# Patient Record
Sex: FEMALE | Race: BLACK OR AFRICAN AMERICAN | NOT HISPANIC OR LATINO | Employment: OTHER | URBAN - METROPOLITAN AREA
[De-identification: names, ages, dates, MRNs, and addresses within clinical notes are randomized per-mention and may not be internally consistent; named-entity substitution may affect disease eponyms.]

---

## 2017-02-02 ENCOUNTER — TELEPHONE (OUTPATIENT)
Dept: INTERNAL MEDICINE | Facility: CLINIC | Age: 82
End: 2017-02-02

## 2017-02-02 ENCOUNTER — OFFICE VISIT (OUTPATIENT)
Dept: INTERNAL MEDICINE | Facility: CLINIC | Age: 82
End: 2017-02-02

## 2017-02-02 VITALS
BODY MASS INDEX: 23.89 KG/M2 | SYSTOLIC BLOOD PRESSURE: 160 MMHG | OXYGEN SATURATION: 98 % | WEIGHT: 148 LBS | TEMPERATURE: 97.6 F | DIASTOLIC BLOOD PRESSURE: 80 MMHG | RESPIRATION RATE: 16 BRPM | HEART RATE: 51 BPM

## 2017-02-02 DIAGNOSIS — I10 ESSENTIAL HYPERTENSION: Primary | ICD-10-CM

## 2017-02-02 DIAGNOSIS — H65.91 MIDDLE EAR EFFUSION, RIGHT: ICD-10-CM

## 2017-02-02 DIAGNOSIS — H81.10 BENIGN POSITIONAL VERTIGO, UNSPECIFIED LATERALITY: Primary | ICD-10-CM

## 2017-02-02 PROCEDURE — 99214 OFFICE O/P EST MOD 30 MIN: CPT | Performed by: NURSE PRACTITIONER

## 2017-02-02 PROCEDURE — 96372 THER/PROPH/DIAG INJ SC/IM: CPT | Performed by: NURSE PRACTITIONER

## 2017-02-02 RX ORDER — METHYLDOPA 250 MG/1
250 TABLET, FILM COATED ORAL 3 TIMES DAILY
Qty: 30 TABLET | Refills: 0 | Status: SHIPPED | OUTPATIENT
Start: 2017-02-02 | End: 2017-02-02

## 2017-02-02 RX ORDER — FLUTICASONE PROPIONATE 50 MCG
2 SPRAY, SUSPENSION (ML) NASAL DAILY
Qty: 1 EACH | Refills: 2 | Status: SHIPPED | OUTPATIENT
Start: 2017-02-02 | End: 2017-03-04

## 2017-02-02 RX ORDER — MECLIZINE HYDROCHLORIDE 25 MG/1
25 TABLET ORAL 3 TIMES DAILY PRN
Qty: 30 TABLET | Refills: 0 | Status: SHIPPED | OUTPATIENT
Start: 2017-02-02 | End: 2017-02-03 | Stop reason: SDUPTHER

## 2017-02-02 RX ORDER — TRIAMCINOLONE ACETONIDE 40 MG/ML
40 INJECTION, SUSPENSION INTRA-ARTICULAR; INTRAMUSCULAR ONCE
Status: COMPLETED | OUTPATIENT
Start: 2017-02-02 | End: 2017-02-02

## 2017-02-02 RX ORDER — METHYLDOPA 250 MG/1
250 TABLET, FILM COATED ORAL 2 TIMES DAILY
Qty: 60 TABLET | Refills: 0 | Status: SHIPPED | OUTPATIENT
Start: 2017-02-02 | End: 2017-02-03 | Stop reason: SDUPTHER

## 2017-02-02 RX ADMIN — TRIAMCINOLONE ACETONIDE 40 MG: 40 INJECTION, SUSPENSION INTRA-ARTICULAR; INTRAMUSCULAR at 10:49

## 2017-02-02 NOTE — TELEPHONE ENCOUNTER
Left message on pt voicemail in regards to stopping bystolic and starting aldomet 250 mg BID per Dr. Clay request. I discussed pt visit with him today and he would like to give aldomet a try to see if pt will respond appropriately to this.

## 2017-02-02 NOTE — PROGRESS NOTES
Vitals:    02/02/17 1041   BP: 160/80   Pulse: 51   Resp:    Temp:    SpO2:      Last 2 weights    02/02/17  1002   Weight: 148 lb (67.1 kg)     Social History   Substance Use Topics   • Smoking status: Never Smoker   • Smokeless tobacco: None   • Alcohol use No       Subjective     HPI  Pt presents with dizziness, right ear pain/fullness, sinus pressure that has been occurring for the past week. She states she has been battling the dizziness for some time and was told her had vertigo. However, since her ear and sinus's having been acting up it seems to be getting worse. She denies fever, tooth pain, sore throat, cough, runny nose. She has been taking sudafed OTC. Also she says she has been on bystolic 5mg since this past November. She was on Tiazac 360mg prior to to noffsinger stopping and switching to bystolic. She denies any Se's from the bystolic. However today her BP I took manually was 160/80 and her HR is 51. This could be a contributing factor to her dizziness. Pt also take a Claritin a day.    The following portions of the patient's history were reviewed and updated as appropriate: allergies, current medications, past medical history, past social history and problem list.    Review of Systems   HENT: Positive for ear pain (and right ear pressure) and sinus pressure.    Neurological: Positive for dizziness.       Objective     Physical Exam   Constitutional: She is oriented to person, place, and time. She appears well-developed and well-nourished.   HENT:   Head: Normocephalic and atraumatic.   Right Ear: A middle ear effusion is present.   Nose: Right sinus exhibits maxillary sinus tenderness. Left sinus exhibits maxillary sinus tenderness.   Neck: Normal range of motion.   Cardiovascular: Normal rate, regular rhythm and normal heart sounds.    Pulmonary/Chest: Effort normal and breath sounds normal.   Musculoskeletal: Normal range of motion.   Neurological: She is alert and oriented to person, place, and  time.   Nursing note and vitals reviewed.      Assessment/Plan   Ryann was seen today for dizziness and headache.    Diagnoses and all orders for this visit:    Benign positional vertigo, unspecified laterality  -     Ambulatory Referral to Physical Therapy Vestibular, Evaluate and treat    Middle ear effusion, right  -     triamcinolone acetonide (KENALOG-40) injection 40 mg; Inject 1 mL into the shoulder, thigh, or buttocks 1 (One) Time.    Other orders  -     meclizine (ANTIVERT) 25 MG tablet; Take 1 tablet by mouth 3 (Three) Times a Day As Needed for dizziness.  -     fluticasone (FLONASE) 50 MCG/ACT nasal spray; 2 sprays into each nostril Daily for 30 days.           -refer to physical therapy for BPPV  -kenalog to help fluid off ear and sinus's  -keep taking claritin, and start flonase  -refilled antivert for pt to have on hand when she experience dizzy spells. Educated her on use of medication and to only take it when needed  -advised pt to stop sudafed and try coracidin instead for sinus's  -increase fluid intake  -Pt refuses change of BP medication at this time. She would like for Dr. Calvo to take that decision due to him managing her BP and she doesn't want to switch/start too many medication. I educated her on her elevated BP and low HR currently and how that could be a contributing factor to dizziness as well and importance of starting a new BP medication that would suit her body better than bystolic. She would still like for Dr. Calvo to make that call.  -spent more than 30 min with pt discussing her HPI and plan of care  -Fu prn or if symptoms persist/worsen

## 2017-02-03 DIAGNOSIS — I10 ESSENTIAL HYPERTENSION: ICD-10-CM

## 2017-02-03 RX ORDER — MECLIZINE HYDROCHLORIDE 25 MG/1
25 TABLET ORAL 3 TIMES DAILY PRN
Qty: 30 TABLET | Refills: 0 | Status: SHIPPED | OUTPATIENT
Start: 2017-02-03 | End: 2017-02-23 | Stop reason: SDUPTHER

## 2017-02-03 RX ORDER — METHYLDOPA 250 MG/1
250 TABLET, FILM COATED ORAL 2 TIMES DAILY
Qty: 60 TABLET | Refills: 0 | Status: SHIPPED | OUTPATIENT
Start: 2017-02-03 | End: 2017-02-09 | Stop reason: SINTOL

## 2017-02-09 ENCOUNTER — OFFICE VISIT (OUTPATIENT)
Dept: INTERNAL MEDICINE | Facility: CLINIC | Age: 82
End: 2017-02-09

## 2017-02-09 VITALS
HEART RATE: 53 BPM | WEIGHT: 148 LBS | RESPIRATION RATE: 16 BRPM | TEMPERATURE: 98.6 F | BODY MASS INDEX: 23.89 KG/M2 | SYSTOLIC BLOOD PRESSURE: 190 MMHG | DIASTOLIC BLOOD PRESSURE: 76 MMHG | OXYGEN SATURATION: 98 %

## 2017-02-09 DIAGNOSIS — I10 MALIGNANT HYPERTENSION: Primary | ICD-10-CM

## 2017-02-09 DIAGNOSIS — R00.1 BRADYCARDIA: ICD-10-CM

## 2017-02-09 DIAGNOSIS — R42 DIZZINESS: ICD-10-CM

## 2017-02-09 DIAGNOSIS — R53.83 FATIGUE, UNSPECIFIED TYPE: ICD-10-CM

## 2017-02-09 PROCEDURE — 99214 OFFICE O/P EST MOD 30 MIN: CPT | Performed by: NURSE PRACTITIONER

## 2017-02-09 PROCEDURE — 93000 ELECTROCARDIOGRAM COMPLETE: CPT | Performed by: NURSE PRACTITIONER

## 2017-02-09 RX ORDER — AMLODIPINE BESYLATE 2.5 MG/1
2.5 TABLET ORAL DAILY
Qty: 30 TABLET | Refills: 0 | Status: SHIPPED | OUTPATIENT
Start: 2017-02-09 | End: 2017-03-06 | Stop reason: SDUPTHER

## 2017-02-09 NOTE — PROGRESS NOTES
Procedure     ECG 12 Lead  Date/Time: 2/9/2017 2:11 PM  Performed by: HAMIDA LIU  Authorized by: HAMIDA LIU   Comparison: not compared with previous ECG   Rhythm: sinus bradycardia  Rate: bradycardic  Conduction: 1st degree  ST Segments: ST segments normal  T Waves: T waves normal  QRS axis: normal  Other: no other findings  Clinical impression: abnormal ECG

## 2017-02-09 NOTE — PROGRESS NOTES
"Vitals:    02/09/17 1316   BP: (!) 190/76   Pulse: 53   Resp: 16   Temp: 98.6 °F (37 °C)   SpO2: 98%     Last 2 weights    02/09/17  1316   Weight: 148 lb (67.1 kg)     Social History   Substance Use Topics   • Smoking status: Never Smoker   • Smokeless tobacco: Not on file   • Alcohol use No       Subjective     HPI  Pt presents to office with feeling much worse since starting aldomet. She states her dizziness is worse, she feels more tired, having difficulty getting her thoughts together. She stopped bystolic 5 mg 1 week ago due to her remaining hypertensive and bradycardia with dizziness. I spoke with Dr. Calvo and he decided on starting her on Aldomet 250mg BID. Pt BP today remains to be elevated at 190/76 and bradycardic. Pt is experiencing headaches as well, and states she can \"feel the pulsation in her legs.\" Pt denies CP, palpitations, SOA, wheezing, chest tightness, vision changes. Pt risk cardiovascular risk factors include greater than 65, AA, fam hx of CVD, hx HTN, sedentary.        The following portions of the patient's history were reviewed and updated as appropriate: allergies, current medications, past medical history, past social history and problem list.    Review of Systems   Constitutional: Negative.    Respiratory: Negative.    Cardiovascular: Negative.         HTN       Objective     Physical Exam   Constitutional: She is oriented to person, place, and time. She appears well-developed and well-nourished.   HENT:   Head: Normocephalic and atraumatic.   Neck: Normal range of motion.   Cardiovascular: Regular rhythm and normal heart sounds.  Bradycardia present.    Pulses:       Dorsalis pedis pulses are 2+ on the right side, and 2+ on the left side.        Posterior tibial pulses are 2+ on the right side, and 2+ on the left side.   Pulmonary/Chest: Effort normal and breath sounds normal.   Musculoskeletal: Normal range of motion.   Neurological: She is alert and oriented to person, place, " and time.   Nursing note and vitals reviewed.      Assessment/Plan   Ryann was seen today for medication problem, dizziness and fatigue.    Diagnoses and all orders for this visit:    Malignant hypertension  -     Ambulatory Referral to Nephrology  -     amLODIPine (NORVASC) 2.5 MG tablet; Take 1 tablet by mouth Daily.  -     ECG 12 Lead    Bradycardia  -     Ambulatory Referral to Nephrology  -     amLODIPine (NORVASC) 2.5 MG tablet; Take 1 tablet by mouth Daily.  -     ECG 12 Lead    Dizziness    Fatigue, unspecified type           -Spoke to Dr. Calvo and he would like to refer to nephrology to be evaluated  -advised pt to stopped aldomet, and start norvasc 2.5 mg at night. Also asked pt to get bp cuff fixed and importance of monitoring BP at home.   -Asked pt to call office on Monday to let me know her BP readings to see if adjustments of medication is needed  -Educated pt on s/s of MI and CVA  -spent more than 35 min discussing HPI, PE, plan of care, and education to pt during office visit.  -cont home meds  -FU in 4 weeks or soon if symptoms persist/worsen

## 2017-02-13 ENCOUNTER — TELEPHONE (OUTPATIENT)
Dept: INTERNAL MEDICINE | Facility: CLINIC | Age: 82
End: 2017-02-13

## 2017-02-13 ENCOUNTER — DOCUMENTATION (OUTPATIENT)
Dept: INTERNAL MEDICINE | Facility: CLINIC | Age: 82
End: 2017-02-13

## 2017-02-13 RX ORDER — AZITHROMYCIN 250 MG/1
TABLET, FILM COATED ORAL
Qty: 6 TABLET | Refills: 0 | Status: SHIPPED | OUTPATIENT
Start: 2017-02-13 | End: 2017-03-06

## 2017-02-13 NOTE — PROGRESS NOTES
Pt BP's readings for last Thursday and Friday were in the 150/80's HR in the 50's-low 60's. On Saturday/Sunday her BP are in the 110-130's/70's HR in the 50's. Pt started amlodipine 2.5 mg at night. Pt to continue taking BP medication and follow up in 2 weeks. She told MA that she thinks she has a sinus infection with the foul taste in mouth, nasal congestion, and ear pressure. Will send in antibiotic for pt

## 2017-02-22 ENCOUNTER — TELEPHONE (OUTPATIENT)
Dept: INTERNAL MEDICINE | Facility: CLINIC | Age: 82
End: 2017-02-22

## 2017-02-22 NOTE — TELEPHONE ENCOUNTER
PT FINISHED Z PACK MONDAY AND SHE STILL FEELS NAUSEATED AND SHAKY. PT IS ONLY TAKING BP MEDICATION AMLODIPINE

## 2017-02-23 ENCOUNTER — HOSPITAL ENCOUNTER (OUTPATIENT)
Dept: GENERAL RADIOLOGY | Facility: HOSPITAL | Age: 82
Discharge: HOME | End: 2017-02-23
Attending: INTERNAL MEDICINE | Admitting: INTERNAL MEDICINE

## 2017-02-23 ENCOUNTER — OFFICE VISIT (OUTPATIENT)
Dept: INTERNAL MEDICINE | Facility: CLINIC | Age: 82
End: 2017-02-23

## 2017-02-23 VITALS
RESPIRATION RATE: 16 BRPM | TEMPERATURE: 98.8 F | HEART RATE: 89 BPM | OXYGEN SATURATION: 95 % | HEIGHT: 66 IN | BODY MASS INDEX: 23.78 KG/M2 | WEIGHT: 148 LBS | SYSTOLIC BLOOD PRESSURE: 139 MMHG | DIASTOLIC BLOOD PRESSURE: 81 MMHG

## 2017-02-23 DIAGNOSIS — R22.0 FACIAL SWELLING: ICD-10-CM

## 2017-02-23 DIAGNOSIS — J01.01 ACUTE RECURRENT MAXILLARY SINUSITIS: Primary | ICD-10-CM

## 2017-02-23 DIAGNOSIS — I10 ESSENTIAL HYPERTENSION: ICD-10-CM

## 2017-02-23 DIAGNOSIS — R26.89 BALANCE DISORDER: ICD-10-CM

## 2017-02-23 DIAGNOSIS — K21.9 GASTROESOPHAGEAL REFLUX DISEASE WITHOUT ESOPHAGITIS: ICD-10-CM

## 2017-02-23 DIAGNOSIS — J30.1 NON-SEASONAL ALLERGIC RHINITIS DUE TO POLLEN: ICD-10-CM

## 2017-02-23 DIAGNOSIS — R42 DIZZINESS: ICD-10-CM

## 2017-02-23 PROCEDURE — 99214 OFFICE O/P EST MOD 30 MIN: CPT | Performed by: INTERNAL MEDICINE

## 2017-02-23 PROCEDURE — 71020 HC CHEST PA AND LATERAL: CPT

## 2017-02-23 PROCEDURE — 96372 THER/PROPH/DIAG INJ SC/IM: CPT | Performed by: INTERNAL MEDICINE

## 2017-02-23 RX ORDER — MECLIZINE HYDROCHLORIDE 25 MG/1
25 TABLET ORAL 3 TIMES DAILY PRN
Qty: 30 TABLET | Refills: 0 | Status: SHIPPED | OUTPATIENT
Start: 2017-02-23 | End: 2017-02-23 | Stop reason: SDUPTHER

## 2017-02-23 RX ORDER — METHYLPREDNISOLONE ACETATE 80 MG/ML
80 INJECTION, SUSPENSION INTRA-ARTICULAR; INTRALESIONAL; INTRAMUSCULAR; SOFT TISSUE ONCE
Status: COMPLETED | OUTPATIENT
Start: 2017-02-23 | End: 2017-02-23

## 2017-02-23 RX ORDER — AMOXICILLIN AND CLAVULANATE POTASSIUM 875; 125 MG/1; MG/1
1 TABLET, FILM COATED ORAL 2 TIMES DAILY
Qty: 20 TABLET | Refills: 0 | Status: SHIPPED | OUTPATIENT
Start: 2017-02-23 | End: 2017-05-03

## 2017-02-23 RX ORDER — MECLIZINE HYDROCHLORIDE 25 MG/1
25 TABLET ORAL 3 TIMES DAILY PRN
Qty: 30 TABLET | Refills: 1 | Status: SHIPPED | OUTPATIENT
Start: 2017-02-23 | End: 2017-06-13

## 2017-02-23 RX ORDER — TRIAMCINOLONE ACETONIDE 40 MG/ML
40 INJECTION, SUSPENSION INTRA-ARTICULAR; INTRAMUSCULAR ONCE
Status: COMPLETED | OUTPATIENT
Start: 2017-02-23 | End: 2017-02-23

## 2017-02-23 RX ADMIN — TRIAMCINOLONE ACETONIDE 40 MG: 40 INJECTION, SUSPENSION INTRA-ARTICULAR; INTRAMUSCULAR at 17:15

## 2017-02-23 RX ADMIN — METHYLPREDNISOLONE ACETATE 80 MG: 80 INJECTION, SUSPENSION INTRA-ARTICULAR; INTRALESIONAL; INTRAMUSCULAR; SOFT TISSUE at 17:14

## 2017-02-26 ENCOUNTER — APPOINTMENT (OUTPATIENT)
Dept: GENERAL RADIOLOGY | Facility: HOSPITAL | Age: 82
End: 2017-02-26

## 2017-02-26 ENCOUNTER — HOSPITAL ENCOUNTER (EMERGENCY)
Facility: HOSPITAL | Age: 82
Discharge: HOME | End: 2017-02-26
Attending: EMERGENCY MEDICINE | Admitting: EMERGENCY MEDICINE

## 2017-02-26 VITALS
OXYGEN SATURATION: 97 % | BODY MASS INDEX: 23.3 KG/M2 | SYSTOLIC BLOOD PRESSURE: 120 MMHG | DIASTOLIC BLOOD PRESSURE: 87 MMHG | HEIGHT: 66 IN | HEART RATE: 97 BPM | RESPIRATION RATE: 16 BRPM | TEMPERATURE: 99 F | WEIGHT: 145 LBS

## 2017-02-26 DIAGNOSIS — J10.1 INFLUENZA A: Primary | ICD-10-CM

## 2017-02-26 LAB
ALBUMIN SERPL-MCNC: 4.8 G/DL (ref 3.5–5.2)
ALBUMIN/GLOB SERPL: 1.2 G/DL
ALP SERPL-CCNC: 75 U/L (ref 39–117)
ALT SERPL W P-5'-P-CCNC: 22 U/L (ref 1–33)
ANION GAP SERPL CALCULATED.3IONS-SCNC: 13 MMOL/L
AST SERPL-CCNC: 24 U/L (ref 1–32)
BACTERIA UR QL AUTO: ABNORMAL /HPF
BASOPHILS # BLD AUTO: 0.01 10*3/MM3 (ref 0–0.2)
BASOPHILS NFR BLD AUTO: 0.2 % (ref 0–1.5)
BILIRUB SERPL-MCNC: 1.2 MG/DL (ref 0.1–1.2)
BILIRUB UR QL STRIP: NEGATIVE
BUN BLD-MCNC: 9 MG/DL (ref 8–23)
BUN/CREAT SERPL: 9.9 (ref 7–25)
CALCIUM SPEC-SCNC: 10.2 MG/DL (ref 8.6–10.5)
CHLORIDE SERPL-SCNC: 98 MMOL/L (ref 98–107)
CLARITY UR: CLEAR
CO2 SERPL-SCNC: 29 MMOL/L (ref 22–29)
COLOR UR: YELLOW
CREAT BLD-MCNC: 0.91 MG/DL (ref 0.57–1)
DEPRECATED RDW RBC AUTO: 49.1 FL (ref 37–54)
EOSINOPHIL # BLD AUTO: 0 10*3/MM3 (ref 0–0.7)
EOSINOPHIL NFR BLD AUTO: 0 % (ref 0.3–6.2)
ERYTHROCYTE [DISTWIDTH] IN BLOOD BY AUTOMATED COUNT: 14.1 % (ref 11.7–13)
FLUAV AG NPH QL: POSITIVE
FLUBV AG NPH QL IA: NEGATIVE
GFR SERPL CREATININE-BSD FRML MDRD: 71 ML/MIN/1.73
GLOBULIN UR ELPH-MCNC: 4 GM/DL
GLUCOSE BLD-MCNC: 136 MG/DL (ref 65–99)
GLUCOSE UR STRIP-MCNC: NEGATIVE MG/DL
HCT VFR BLD AUTO: 42.2 % (ref 35.6–45.5)
HETEROPH AB SER QL LA: NEGATIVE
HGB BLD-MCNC: 14.1 G/DL (ref 11.9–15.5)
HGB UR QL STRIP.AUTO: ABNORMAL
HYALINE CASTS UR QL AUTO: ABNORMAL /LPF
IMM GRANULOCYTES # BLD: 0 10*3/MM3 (ref 0–0.03)
IMM GRANULOCYTES NFR BLD: 0 % (ref 0–0.5)
KETONES UR QL STRIP: NEGATIVE
LEUKOCYTE ESTERASE UR QL STRIP.AUTO: NEGATIVE
LYMPHOCYTES # BLD AUTO: 0.38 10*3/MM3 (ref 0.9–4.8)
LYMPHOCYTES NFR BLD AUTO: 7.5 % (ref 19.6–45.3)
MCH RBC QN AUTO: 31.9 PG (ref 26.9–32)
MCHC RBC AUTO-ENTMCNC: 33.4 G/DL (ref 32.4–36.3)
MCV RBC AUTO: 95.5 FL (ref 80.5–98.2)
MONOCYTES # BLD AUTO: 0.85 10*3/MM3 (ref 0.2–1.2)
MONOCYTES NFR BLD AUTO: 16.7 % (ref 5–12)
NEUTROPHILS # BLD AUTO: 3.86 10*3/MM3 (ref 1.9–8.1)
NEUTROPHILS NFR BLD AUTO: 75.6 % (ref 42.7–76)
NITRITE UR QL STRIP: NEGATIVE
NT-PROBNP SERPL-MCNC: 618.8 PG/ML (ref 0–1800)
PH UR STRIP.AUTO: 7 [PH] (ref 5–8)
PLATELET # BLD AUTO: 195 10*3/MM3 (ref 140–500)
PMV BLD AUTO: 10.2 FL (ref 6–12)
POTASSIUM BLD-SCNC: 3.6 MMOL/L (ref 3.5–5.2)
PROT SERPL-MCNC: 8.8 G/DL (ref 6–8.5)
PROT UR QL STRIP: ABNORMAL
RBC # BLD AUTO: 4.42 10*6/MM3 (ref 3.9–5.2)
RBC # UR: ABNORMAL /HPF
REF LAB TEST METHOD: ABNORMAL
SODIUM BLD-SCNC: 140 MMOL/L (ref 136–145)
SP GR UR STRIP: 1.01 (ref 1–1.03)
SQUAMOUS #/AREA URNS HPF: ABNORMAL /HPF
UROBILINOGEN UR QL STRIP: ABNORMAL
WBC NRBC COR # BLD: 5.1 10*3/MM3 (ref 4.5–10.7)
WBC UR QL AUTO: ABNORMAL /HPF

## 2017-02-26 PROCEDURE — 85025 COMPLETE CBC W/AUTO DIFF WBC: CPT | Performed by: EMERGENCY MEDICINE

## 2017-02-26 PROCEDURE — 80053 COMPREHEN METABOLIC PANEL: CPT | Performed by: EMERGENCY MEDICINE

## 2017-02-26 PROCEDURE — 86308 HETEROPHILE ANTIBODY SCREEN: CPT | Performed by: EMERGENCY MEDICINE

## 2017-02-26 PROCEDURE — 83880 ASSAY OF NATRIURETIC PEPTIDE: CPT | Performed by: EMERGENCY MEDICINE

## 2017-02-26 PROCEDURE — 99284 EMERGENCY DEPT VISIT MOD MDM: CPT

## 2017-02-26 PROCEDURE — 81001 URINALYSIS AUTO W/SCOPE: CPT | Performed by: EMERGENCY MEDICINE

## 2017-02-26 PROCEDURE — 71020 HC CHEST PA AND LATERAL: CPT

## 2017-02-26 PROCEDURE — 87804 INFLUENZA ASSAY W/OPTIC: CPT | Performed by: EMERGENCY MEDICINE

## 2017-02-26 RX ORDER — ACETAMINOPHEN 500 MG
1000 TABLET ORAL ONCE
Status: COMPLETED | OUTPATIENT
Start: 2017-02-26 | End: 2017-02-26

## 2017-02-26 RX ORDER — OSELTAMIVIR PHOSPHATE 75 MG/1
75 CAPSULE ORAL 2 TIMES DAILY
Qty: 10 CAPSULE | Refills: 0 | Status: SHIPPED | OUTPATIENT
Start: 2017-02-26 | End: 2017-05-03

## 2017-02-26 RX ADMIN — ACETAMINOPHEN 1000 MG: 500 TABLET ORAL at 10:30

## 2017-02-27 ENCOUNTER — TELEPHONE (OUTPATIENT)
Dept: SOCIAL WORK | Facility: HOSPITAL | Age: 82
End: 2017-02-27

## 2017-02-27 NOTE — TELEPHONE ENCOUNTER
Spoke with pt today in f/u and she states she still feels bad but her temp has gone down. She was able to fill her script for Tamiflu and is taking it a Tylenol for aches. She will call in the next couple days to amanda a f/u with her PCP. No other questions or concerns voiced by pt at this time. Mary Kay MARIE

## 2017-03-05 NOTE — PROGRESS NOTES
Subjective   Ryann Hernadez is a 85 y.o. female.   She is here today with acute recurrent maxillary sinusitis along with allergic rhinitis hypertension GERD balance disorder facial swelling as well as dizziness  History of Present Illness   She is here today with acute recurrent maxillary sinusitis along with allergic rhinitis hypertension GERD balance disorder facial swelling as well as dizziness  The following portions of the patient's history were reviewed and updated as appropriate: allergies, current medications, past family history, past medical history, past social history, past surgical history and problem list.    Review of Systems   HENT: Positive for facial swelling and sinus pressure.    Musculoskeletal: Positive for gait problem.   Neurological: Positive for dizziness.   All other systems reviewed and are negative.      Objective   Physical Exam   Constitutional: She is oriented to person, place, and time. She appears well-developed and well-nourished. She is cooperative.   HENT:   Head: Normocephalic and atraumatic.   Right Ear: Hearing, tympanic membrane, external ear and ear canal normal.   Left Ear: Hearing, tympanic membrane, external ear and ear canal normal.   Nose: Right sinus exhibits maxillary sinus tenderness. Left sinus exhibits maxillary sinus tenderness.   Mouth/Throat: Uvula is midline, oropharynx is clear and moist and mucous membranes are normal.   Eyes: Conjunctivae, EOM and lids are normal. Pupils are equal, round, and reactive to light.   Neck: Phonation normal. Neck supple. Carotid bruit is not present.   Cardiovascular: Normal rate, regular rhythm and normal heart sounds.  Exam reveals no gallop and no friction rub.    No murmur heard.  Pulmonary/Chest: Effort normal and breath sounds normal. No respiratory distress.   Abdominal: Soft. Bowel sounds are normal. She exhibits no distension and no mass. There is no hepatosplenomegaly. There is no tenderness. There is no rebound and  no guarding. No hernia.   Musculoskeletal: She exhibits no edema.   Neurological: She is alert and oriented to person, place, and time. Gait abnormal. Coordination normal.   Skin: Skin is warm and dry.   Psychiatric: She has a normal mood and affect. Her speech is normal and behavior is normal. Judgment and thought content normal.   Nursing note and vitals reviewed.      Assessment/Plan   Diagnoses and all orders for this visit:    Acute recurrent maxillary sinusitis  -     methylPREDNISolone acetate (DEPO-medrol) injection 80 mg; Inject 1 mL into the shoulder, thigh, or buttocks 1 (One) Time.  -     triamcinolone acetonide (KENALOG-40) injection 40 mg; Inject 1 mL into the shoulder, thigh, or buttocks 1 (One) Time.    Non-seasonal allergic rhinitis due to pollen    Essential hypertension    Gastroesophageal reflux disease without esophagitis    Balance disorder    Facial swelling  -     XR Chest PA & Lateral    Dizziness  -     methylPREDNISolone acetate (DEPO-medrol) injection 80 mg; Inject 1 mL into the shoulder, thigh, or buttocks 1 (One) Time.  -     triamcinolone acetonide (KENALOG-40) injection 40 mg; Inject 1 mL into the shoulder, thigh, or buttocks 1 (One) Time.    Other orders  -     Discontinue: meclizine (ANTIVERT) 25 MG tablet; Take 1 tablet by mouth 3 (Three) Times a Day As Needed for dizziness.  -     amoxicillin-clavulanate (AUGMENTIN) 875-125 MG per tablet; Take 1 tablet by mouth 2 (Two) Times a Day.  -     meclizine (ANTIVERT) 25 MG tablet; Take 1 tablet by mouth 3 (Three) Times a Day As Needed for dizziness.     acute recurrent maxillary sinusitis supportive meds for this  Allergic rhinitis supportive meds  Hypertension well-controlled on current medication  Balance disorder follow closely  Facial swelling.  Lateral chest to rule out superior vena cava syndrome  Dizziness supportive meds for this

## 2017-03-06 ENCOUNTER — OFFICE VISIT (OUTPATIENT)
Dept: INTERNAL MEDICINE | Facility: CLINIC | Age: 82
End: 2017-03-06

## 2017-03-06 VITALS
BODY MASS INDEX: 23.78 KG/M2 | SYSTOLIC BLOOD PRESSURE: 158 MMHG | OXYGEN SATURATION: 96 % | DIASTOLIC BLOOD PRESSURE: 104 MMHG | WEIGHT: 148 LBS | HEIGHT: 66 IN | HEART RATE: 90 BPM | TEMPERATURE: 97.1 F

## 2017-03-06 DIAGNOSIS — I10 MALIGNANT HYPERTENSION: ICD-10-CM

## 2017-03-06 DIAGNOSIS — I10 ESSENTIAL HYPERTENSION: ICD-10-CM

## 2017-03-06 DIAGNOSIS — Z00.00 MEDICARE ANNUAL WELLNESS VISIT, INITIAL: Primary | ICD-10-CM

## 2017-03-06 DIAGNOSIS — Z09 HOSPITAL DISCHARGE FOLLOW-UP: ICD-10-CM

## 2017-03-06 DIAGNOSIS — R00.1 BRADYCARDIA: ICD-10-CM

## 2017-03-06 DIAGNOSIS — J30.1 NON-SEASONAL ALLERGIC RHINITIS DUE TO POLLEN: ICD-10-CM

## 2017-03-06 DIAGNOSIS — K21.9 GASTROESOPHAGEAL REFLUX DISEASE WITHOUT ESOPHAGITIS: ICD-10-CM

## 2017-03-06 DIAGNOSIS — R26.89 BALANCE DISORDER: ICD-10-CM

## 2017-03-06 PROBLEM — J01.01 ACUTE RECURRENT MAXILLARY SINUSITIS: Status: RESOLVED | Noted: 2017-02-23 | Resolved: 2017-03-06

## 2017-03-06 PROCEDURE — 99214 OFFICE O/P EST MOD 30 MIN: CPT | Performed by: INTERNAL MEDICINE

## 2017-03-06 PROCEDURE — G0439 PPPS, SUBSEQ VISIT: HCPCS | Performed by: INTERNAL MEDICINE

## 2017-03-06 RX ORDER — AMLODIPINE BESYLATE 2.5 MG/1
2.5 TABLET ORAL DAILY
Qty: 90 TABLET | Refills: 1 | Status: SHIPPED | OUTPATIENT
Start: 2017-03-06 | End: 2017-05-03

## 2017-03-06 NOTE — PATIENT INSTRUCTIONS
Medicare Wellness  Personal Prevention Plan of Service     Date of Office Visit:  2017  Encounter Provider:  Vern Calvo MD  Place of Service:  [facility]  Patient Name: Ryann Hernadez  :  1931    As part of the Medicare Wellness portion of your visit today, we are providing you with this personalized preventive plan of services (PPPS). This plan is based upon recommendations of the [place] Preventive Services Task Force (USPSTF) and the Advisory Committee on Immunization Practices (ACIP).    This lists the preventive care services that should be considered, and provides dates of when you are due. Items listed as completed are up-to-date and do not require any further intervention.    Health Maintenance   Topic Date Due   • ZOSTER VACCINE  2016   • MEDICARE ANNUAL WELLNESS  2017   • PNEUMOCOCCAL VACCINES (65+ LOW/MEDIUM RISK) (2 of 2 - PPSV23) 2018   • TDAP/TD VACCINES (2 - Td) 2027   • INFLUENZA VACCINE  Addressed

## 2017-03-06 NOTE — PROGRESS NOTES
QUICK REFERENCE INFORMATION:  The ABCs of the Annual Wellness Visit    Initial Medicare Wellness Visit    HEALTH RISK ASSESSMENT    6/1/1931    Recent Hospitalizations:  Recently treated at the following:  [facility].        Current Medical Providers:  Patient Care Team:  Vern Calvo MD as PCP - General (Internal Medicine)        Smoking Status:  History   Smoking Status   • Never Smoker   Smokeless Tobacco   • Never Used       Alcohol Consumption:  History   Alcohol Use No       Depression Screen:   PHQ-9 Depression Screening 3/6/2017   Little interest or pleasure in doing things 0   Feeling down, depressed, or hopeless 0   Trouble falling or staying asleep, or sleeping too much 0   Feeling tired or having little energy 0   Poor appetite or overeating 0   Feeling bad about yourself - or that you are a failure or have let yourself or your family down 0   Trouble concentrating on things, such as reading the newspaper or watching television 0   Moving or speaking so slowly that other people could have noticed. Or the opposite - being so fidgety or restless that you have been moving around a lot more than usual 0   Thoughts that you would be better off dead, or of hurting yourself in some way 0   PHQ-9 Total Score 0   If you checked off any problems, how difficult have these problems made it for you to do your work, take care of things at home, or get along with other people? Not difficult at all       Health Habits and Functional and Cognitive Screening:  Functional & Cognitive Status 3/6/2017   Do you have difficulty preparing food and eating? No   Do you have difficulty bathing yourself? No   Do you have difficulty getting dressed? No   Do you have difficulty using the toilet? No   Do you have difficulty moving around from place to place? No   In the past year have you fallen or experienced a near fall? No   Do you need help using the phone?  No   Are you deaf or do you have serious difficulty  hearing?  No   Do you need help with transportation? No   Do you need help shopping? No   Do you need help preparing meals?  No   Do you need help with housework?  No   Do you need help with laundry? No   Do you need help taking your medications? No   Do you need help managing money? No   Do you have difficulty concentrating, remembering or making decisions? No                  Does the patient have evidence of cognitive impairment? No    Asprin use counseling:yes      Recent Lab Results:    Visual Acuity:  No exam data present    Age-appropriate Screening Schedule:  Refer to the list below for future screening recommendations based on patient's age, sex and/or medical conditions. Orders for these recommended tests are listed in the plan section. The patient has been provided with a written plan.    Health Maintenance   Topic Date Due   • TDAP/TD VACCINES (1 - Tdap) [birth year]/1950   • PNEUMOCOCCAL VACCINES (65+ LOW/MEDIUM RISK) (1 of 2 - PCV13) 06/01/1996   • INFLUENZA VACCINE  08/01/2016   • ZOSTER VACCINE  08/02/2016        Subjective   History of Present Illness    Ryann Hernadez is a 85 y.o. female who presents for an Annual Wellness Visit.    The following portions of the patient's history were reviewed and updated as appropriate: allergies, current medications, past family history, past medical history, past social history, past surgical history and problem list.    Outpatient Medications Prior to Visit   Medication Sig Dispense Refill   • amLODIPine (NORVASC) 2.5 MG tablet Take 1 tablet by mouth Daily. 30 tablet 0   • amoxicillin-clavulanate (AUGMENTIN) 875-125 MG per tablet Take 1 tablet by mouth 2 (Two) Times a Day. 20 tablet 0   • meclizine (ANTIVERT) 25 MG tablet Take 1 tablet by mouth 3 (Three) Times a Day As Needed for dizziness. 30 tablet 1   • oseltamivir (TAMIFLU) 75 MG capsule Take 1 capsule by mouth 2 (Two) Times a Day. 10 capsule 0   • azithromycin (ZITHROMAX) 250 MG tablet Take 2 tablets the  "first day, then 1 tablet daily for 4 days. 6 tablet 0     No facility-administered medications prior to visit.        Patient Active Problem List   Diagnosis   • Allergic rhinitis   • Gastroesophageal reflux disease   • Hypertension   • Ataxic gait   • Balance disorder   • Chronic nonintractable headache   • Facial swelling   • Dizziness   • Medicare annual wellness visit, initial       Advanced Care Planning:  has an advanced directive - a copy has been provided and is in file    Identification of Risk Factors:  Risk factors include: increased fall risk.    Review of Systems    Compared to one year ago, the patient feels her physical health is the same.  Compared to one year ago, the patient feels her mental health is the same.    Objective     Physical Exam    Vitals:    03/06/17 0939   BP: (!) 158/104   BP Location: Left arm   Patient Position: Sitting   Cuff Size: Adult   Pulse: 90   Temp: 97.1 °F (36.2 °C)   TempSrc: Tympanic   SpO2: 96%   Weight: 148 lb (67.1 kg)   Height: 66\" (167.6 cm)       Body mass index is 23.89 kg/(m^2).  Discussed the patient's BMI with her. The BMI is in the acceptable range.    Assessment/Plan   Patient Self-Management and Personalized Health Advice  The patient has been provided with information about: fall prevention and preventive services including:   · Fall Risk plan of care done.    Visit Diagnoses:    ICD-10-CM ICD-9-CM   1. Medicare annual wellness visit, initial Z00.00 V70.0   2. Essential hypertension I10 401.9   3. Non-seasonal allergic rhinitis due to pollen J30.1 477.0   4. Gastroesophageal reflux disease without esophagitis K21.9 530.81   5. Balance disorder R26.89 781.99       No orders of the defined types were placed in this encounter.      Outpatient Encounter Prescriptions as of 3/6/2017   Medication Sig Dispense Refill   • amLODIPine (NORVASC) 2.5 MG tablet Take 1 tablet by mouth Daily. 30 tablet 0   • amoxicillin-clavulanate (AUGMENTIN) 875-125 MG per tablet Take " 1 tablet by mouth 2 (Two) Times a Day. 20 tablet 0   • meclizine (ANTIVERT) 25 MG tablet Take 1 tablet by mouth 3 (Three) Times a Day As Needed for dizziness. 30 tablet 1   • oseltamivir (TAMIFLU) 75 MG capsule Take 1 capsule by mouth 2 (Two) Times a Day. 10 capsule 0   • [DISCONTINUED] azithromycin (ZITHROMAX) 250 MG tablet Take 2 tablets the first day, then 1 tablet daily for 4 days. 6 tablet 0     No facility-administered encounter medications on file as of 3/6/2017.        Reviewed use of high risk medication in the elderly: yes  Reviewed for potential of harmful drug interactions in the elderly: yes    Follow Up:  No Follow-up on file.     An After Visit Summary and PPPS with all of these plans were given to the patient.

## 2017-03-07 LAB
ALBUMIN SERPL-MCNC: 4.6 G/DL (ref 3.5–5.2)
ALBUMIN/GLOB SERPL: 1.4 G/DL
ALP SERPL-CCNC: 64 U/L (ref 39–117)
ALT SERPL-CCNC: 23 U/L (ref 1–33)
APPEARANCE UR: CLEAR
AST SERPL-CCNC: 22 U/L (ref 1–32)
BACTERIA #/AREA URNS HPF: ABNORMAL /HPF
BASOPHILS # BLD AUTO: 0.01 10*3/MM3 (ref 0–0.2)
BASOPHILS NFR BLD AUTO: 0.2 % (ref 0–1.5)
BILIRUB SERPL-MCNC: 1.1 MG/DL (ref 0.1–1.2)
BILIRUB UR QL STRIP: NEGATIVE
BUN SERPL-MCNC: 14 MG/DL (ref 8–23)
BUN/CREAT SERPL: 17.1 (ref 7–25)
CALCIUM SERPL-MCNC: 10.2 MG/DL (ref 8.6–10.5)
CASTS URNS MICRO: ABNORMAL
CHLORIDE SERPL-SCNC: 100 MMOL/L (ref 98–107)
CHOLEST SERPL-MCNC: 218 MG/DL (ref 0–200)
CO2 SERPL-SCNC: 29.1 MMOL/L (ref 22–29)
COLOR UR: YELLOW
CREAT SERPL-MCNC: 0.82 MG/DL (ref 0.57–1)
CRYSTALS URNS MICRO: ABNORMAL
EOSINOPHIL # BLD AUTO: 0.02 10*3/MM3 (ref 0–0.7)
EOSINOPHIL NFR BLD AUTO: 0.4 % (ref 0.3–6.2)
EPI CELLS #/AREA URNS HPF: ABNORMAL /HPF
ERYTHROCYTE [DISTWIDTH] IN BLOOD BY AUTOMATED COUNT: 13.8 % (ref 11.7–13)
GLOBULIN SER CALC-MCNC: 3.3 GM/DL
GLUCOSE SERPL-MCNC: 107 MG/DL (ref 65–99)
GLUCOSE UR QL: NEGATIVE
HCT VFR BLD AUTO: 42.7 % (ref 35.6–45.5)
HDLC SERPL-MCNC: 72 MG/DL (ref 40–60)
HGB BLD-MCNC: 13.8 G/DL (ref 11.9–15.5)
HGB UR QL STRIP: NEGATIVE
IMM GRANULOCYTES # BLD: 0.03 10*3/MM3 (ref 0–0.03)
IMM GRANULOCYTES NFR BLD: 0.6 % (ref 0–0.5)
KETONES UR QL STRIP: NEGATIVE
LDLC SERPL CALC-MCNC: 132 MG/DL (ref 0–100)
LDLC/HDLC SERPL: 1.83 {RATIO}
LEUKOCYTE ESTERASE UR QL STRIP: (no result)
LYMPHOCYTES # BLD AUTO: 2.22 10*3/MM3 (ref 0.9–4.8)
LYMPHOCYTES NFR BLD AUTO: 41.8 % (ref 19.6–45.3)
MCH RBC QN AUTO: 31.9 PG (ref 26.9–32)
MCHC RBC AUTO-ENTMCNC: 32.3 G/DL (ref 32.4–36.3)
MCV RBC AUTO: 98.6 FL (ref 80.5–98.2)
MONOCYTES # BLD AUTO: 0.4 10*3/MM3 (ref 0.2–1.2)
MONOCYTES NFR BLD AUTO: 7.5 % (ref 5–12)
NEUTROPHILS # BLD AUTO: 2.63 10*3/MM3 (ref 1.9–8.1)
NEUTROPHILS NFR BLD AUTO: 49.5 % (ref 42.7–76)
NITRITE UR QL STRIP: NEGATIVE
PH UR STRIP: 6.5 [PH] (ref 5–8)
PLATELET # BLD AUTO: 245 10*3/MM3 (ref 140–500)
POTASSIUM SERPL-SCNC: 3.9 MMOL/L (ref 3.5–5.2)
PROT SERPL-MCNC: 7.9 G/DL (ref 6–8.5)
PROT UR QL STRIP: NEGATIVE
RBC # BLD AUTO: 4.33 10*6/MM3 (ref 3.9–5.2)
RBC #/AREA URNS HPF: ABNORMAL /HPF
SODIUM SERPL-SCNC: 145 MMOL/L (ref 136–145)
SP GR UR: 1.02 (ref 1–1.03)
T4 FREE SERPL-MCNC: 1.34 NG/DL (ref 0.93–1.7)
TRIGL SERPL-MCNC: 70 MG/DL (ref 0–150)
TSH SERPL DL<=0.005 MIU/L-ACNC: 1.07 MIU/ML (ref 0.27–4.2)
UROBILINOGEN UR STRIP-MCNC: (no result) MG/DL
VLDLC SERPL CALC-MCNC: 14 MG/DL (ref 5–40)
WBC # BLD AUTO: 5.31 10*3/MM3 (ref 4.5–10.7)
WBC #/AREA URNS HPF: ABNORMAL /HPF

## 2017-03-15 ENCOUNTER — TRANSCRIBE ORDERS (OUTPATIENT)
Dept: ADMINISTRATIVE | Facility: HOSPITAL | Age: 82
End: 2017-03-15

## 2017-03-15 DIAGNOSIS — N18.2 CHRONIC KIDNEY DISEASE, STAGE II (MILD): Primary | ICD-10-CM

## 2017-03-18 NOTE — PROGRESS NOTES
Subjective   Ryann Hernadez is a 85 y.o. female.   She is here today for Medicare annual wellness visit initial along with hypertension allergic rhinitis balance disorder malignant hypertension bradycardia and hospital discharge follow-up as well  History of Present Illness   She is here today for Medicare annual wellness visit initial along with hypertension allergic rhinitis bowel disorder lipid and hypertension bradycardia and hospital discharge follow-up as well and she did not take her blood pressure medicine today  The following portions of the patient's history were reviewed and updated as appropriate: allergies, current medications, past family history, past medical history, past social history, past surgical history and problem list.    Review of Systems   Musculoskeletal: Positive for gait problem.   Neurological: Positive for dizziness.   All other systems reviewed and are negative.      Objective   Physical Exam   Constitutional: She is oriented to person, place, and time. She appears well-developed and well-nourished. She is cooperative.   HENT:   Head: Normocephalic and atraumatic.   Right Ear: Hearing, tympanic membrane, external ear and ear canal normal.   Left Ear: Hearing, tympanic membrane, external ear and ear canal normal.   Nose: Nose normal.   Mouth/Throat: Uvula is midline, oropharynx is clear and moist and mucous membranes are normal.   Eyes: Conjunctivae, EOM and lids are normal. Pupils are equal, round, and reactive to light.   Neck: Phonation normal. Neck supple. Carotid bruit is not present.   Cardiovascular: Normal rate, regular rhythm and normal heart sounds.  Exam reveals no gallop and no friction rub.    No murmur heard.  Pulmonary/Chest: Effort normal and breath sounds normal. No respiratory distress.   Abdominal: Soft. Bowel sounds are normal. She exhibits no distension and no mass. There is no hepatosplenomegaly. There is no tenderness. There is no rebound and no guarding. No  hernia.   Musculoskeletal: She exhibits no edema.   Neurological: She is alert and oriented to person, place, and time. Gait abnormal. Coordination normal.   Skin: Skin is warm and dry.   Psychiatric: She has a normal mood and affect. Her speech is normal and behavior is normal. Judgment and thought content normal.   Nursing note and vitals reviewed.      Assessment/Plan   Diagnoses and all orders for this visit:    Medicare annual wellness visit, initial  -     T4, Free  -     TSH  -     Comprehensive Metabolic Panel  -     CBC & Differential  -     Urinalysis With Microscopic  -     Lipid Panel With LDL / HDL Ratio    Essential hypertension  -     T4, Free  -     TSH  -     Comprehensive Metabolic Panel  -     CBC & Differential  -     Urinalysis With Microscopic  -     Lipid Panel With LDL / HDL Ratio    Non-seasonal allergic rhinitis due to pollen  -     T4, Free  -     TSH  -     Comprehensive Metabolic Panel  -     CBC & Differential  -     Urinalysis With Microscopic  -     Lipid Panel With LDL / HDL Ratio    Gastroesophageal reflux disease without esophagitis  -     T4, Free  -     TSH  -     Comprehensive Metabolic Panel  -     CBC & Differential  -     Urinalysis With Microscopic  -     Lipid Panel With LDL / HDL Ratio    Balance disorder  -     T4, Free  -     TSH  -     Comprehensive Metabolic Panel  -     CBC & Differential  -     Urinalysis With Microscopic  -     Lipid Panel With LDL / HDL Ratio    Malignant hypertension  -     amLODIPine (NORVASC) 2.5 MG tablet; Take 1 tablet by mouth Daily.  -     T4, Free  -     TSH  -     Comprehensive Metabolic Panel  -     CBC & Differential  -     Urinalysis With Microscopic  -     Lipid Panel With LDL / HDL Ratio    Bradycardia  -     amLODIPine (NORVASC) 2.5 MG tablet; Take 1 tablet by mouth Daily.  -     T4, Free  -     TSH  -     Comprehensive Metabolic Panel  -     CBC & Differential  -     Urinalysis With Microscopic  -     Lipid Panel With LDL / HDL  Ratio    Hospital discharge follow-up  -     T4, Free  -     TSH  -     Comprehensive Metabolic Panel  -     CBC & Differential  -     Urinalysis With Microscopic  -     Lipid Panel With LDL / HDL Ratio    Other orders  -     Microscopic Examination        Medicare annual wellness visit see recommendations  Bradycardia stable at this time  Malignant Hypertension she did not take her blood pressure meds today and we encouraged her to take her blood pressure meds  Hospital discharge follow-up doing better  Balance disorder no easy answers  GERD stable on current medication  Allergic rhinitis supportive meds for this  Hypertension as mentioned

## 2017-03-20 ENCOUNTER — HOSPITAL ENCOUNTER (OUTPATIENT)
Dept: ULTRASOUND IMAGING | Facility: HOSPITAL | Age: 82
Discharge: HOME | End: 2017-03-20
Admitting: INTERNAL MEDICINE

## 2017-03-20 DIAGNOSIS — N18.2 CHRONIC KIDNEY DISEASE, STAGE II (MILD): ICD-10-CM

## 2017-03-20 PROCEDURE — 76775 US EXAM ABDO BACK WALL LIM: CPT

## 2017-05-03 ENCOUNTER — OFFICE VISIT (OUTPATIENT)
Dept: INTERNAL MEDICINE | Facility: CLINIC | Age: 82
End: 2017-05-03

## 2017-05-03 VITALS
SYSTOLIC BLOOD PRESSURE: 178 MMHG | BODY MASS INDEX: 22.82 KG/M2 | TEMPERATURE: 98.6 F | HEART RATE: 69 BPM | HEIGHT: 66 IN | OXYGEN SATURATION: 96 % | DIASTOLIC BLOOD PRESSURE: 92 MMHG | WEIGHT: 142 LBS

## 2017-05-03 DIAGNOSIS — J30.1 NON-SEASONAL ALLERGIC RHINITIS DUE TO POLLEN: ICD-10-CM

## 2017-05-03 DIAGNOSIS — R26.89 BALANCE DISORDER: ICD-10-CM

## 2017-05-03 DIAGNOSIS — R42 DIZZINESS: Primary | ICD-10-CM

## 2017-05-03 DIAGNOSIS — R26.0 ATAXIC GAIT: ICD-10-CM

## 2017-05-03 DIAGNOSIS — I10 ESSENTIAL HYPERTENSION: ICD-10-CM

## 2017-05-03 PROCEDURE — 99214 OFFICE O/P EST MOD 30 MIN: CPT | Performed by: INTERNAL MEDICINE

## 2017-05-03 RX ORDER — HYDRALAZINE HYDROCHLORIDE 50 MG/1
50 TABLET, FILM COATED ORAL 3 TIMES DAILY
Qty: 90 TABLET | Refills: 5 | Status: SHIPPED | OUTPATIENT
Start: 2017-05-03 | End: 2017-06-13

## 2017-06-13 ENCOUNTER — OFFICE VISIT (OUTPATIENT)
Dept: INTERNAL MEDICINE | Facility: CLINIC | Age: 82
End: 2017-06-13

## 2017-06-13 VITALS
DIASTOLIC BLOOD PRESSURE: 99 MMHG | BODY MASS INDEX: 22.98 KG/M2 | TEMPERATURE: 98.7 F | HEART RATE: 85 BPM | RESPIRATION RATE: 16 BRPM | SYSTOLIC BLOOD PRESSURE: 170 MMHG | WEIGHT: 143 LBS | HEIGHT: 66 IN | OXYGEN SATURATION: 98 %

## 2017-06-13 DIAGNOSIS — R26.0 ATAXIC GAIT: ICD-10-CM

## 2017-06-13 DIAGNOSIS — I10 ESSENTIAL HYPERTENSION: Primary | ICD-10-CM

## 2017-06-13 PROCEDURE — 99213 OFFICE O/P EST LOW 20 MIN: CPT | Performed by: INTERNAL MEDICINE

## 2017-06-13 RX ORDER — NEBIVOLOL 10 MG/1
10 TABLET ORAL DAILY
Qty: 90 TABLET | Refills: 3 | Status: ON HOLD | OUTPATIENT
Start: 2017-06-13 | End: 2019-09-22

## 2017-06-25 NOTE — PROGRESS NOTES
Subjective   Ryann Hernadez is a 86 y.o. female.   She is here today for hypertension and ataxic gait  History of Present Illness   She is here today for hypertension not well-controlled along with ataxic gait  The following portions of the patient's history were reviewed and updated as appropriate: allergies, current medications, past family history, past medical history, past social history, past surgical history and problem list.    Review of Systems   Musculoskeletal: Positive for gait problem.   All other systems reviewed and are negative.      Objective   Physical Exam   Constitutional: She is oriented to person, place, and time. She appears well-developed and well-nourished. She is cooperative.   HENT:   Head: Normocephalic and atraumatic.   Right Ear: Hearing, tympanic membrane, external ear and ear canal normal.   Left Ear: Hearing, tympanic membrane, external ear and ear canal normal.   Nose: Nose normal.   Mouth/Throat: Uvula is midline, oropharynx is clear and moist and mucous membranes are normal.   Eyes: Conjunctivae, EOM and lids are normal. Pupils are equal, round, and reactive to light.   Neck: Phonation normal. Neck supple. Carotid bruit is not present.   Cardiovascular: Normal rate, regular rhythm and normal heart sounds.  Exam reveals no gallop and no friction rub.    No murmur heard.  Pulmonary/Chest: Effort normal and breath sounds normal. No respiratory distress.   Abdominal: Soft. Bowel sounds are normal. She exhibits no distension and no mass. There is no hepatosplenomegaly. There is no tenderness. There is no rebound and no guarding. No hernia.   Musculoskeletal: She exhibits no edema.   Neurological: She is alert and oriented to person, place, and time. Gait abnormal. Coordination normal.   Skin: Skin is warm and dry.   Psychiatric: She has a normal mood and affect. Her speech is normal and behavior is normal. Judgment and thought content normal.   Nursing note and vitals  reviewed.      Assessment/Plan   Diagnoses and all orders for this visit:    Essential hypertension    Ataxic gait    Other orders  -     nebivolol (BYSTOLIC) 10 MG tablet; Take 1 tablet by mouth Daily.      Hypertension medication  Ataxic gait follow with physical therapy

## 2017-08-07 ENCOUNTER — OFFICE VISIT (OUTPATIENT)
Dept: INTERNAL MEDICINE | Facility: CLINIC | Age: 82
End: 2017-08-07

## 2017-08-07 VITALS
BODY MASS INDEX: 22.66 KG/M2 | HEIGHT: 66 IN | OXYGEN SATURATION: 94 % | TEMPERATURE: 98.4 F | DIASTOLIC BLOOD PRESSURE: 78 MMHG | WEIGHT: 141 LBS | HEART RATE: 58 BPM | SYSTOLIC BLOOD PRESSURE: 180 MMHG

## 2017-08-07 DIAGNOSIS — I10 ESSENTIAL HYPERTENSION: ICD-10-CM

## 2017-08-07 DIAGNOSIS — Z91.81 AT HIGH RISK FOR FALLS: Primary | ICD-10-CM

## 2017-08-07 DIAGNOSIS — H65.04 RECURRENT ACUTE SEROUS OTITIS MEDIA OF RIGHT EAR: ICD-10-CM

## 2017-08-07 DIAGNOSIS — R42 DIZZINESS: ICD-10-CM

## 2017-08-07 DIAGNOSIS — N28.9 RENAL INSUFFICIENCY: ICD-10-CM

## 2017-08-07 DIAGNOSIS — R26.0 ATAXIC GAIT: ICD-10-CM

## 2017-08-07 PROCEDURE — 99214 OFFICE O/P EST MOD 30 MIN: CPT | Performed by: INTERNAL MEDICINE

## 2017-08-07 RX ORDER — AZITHROMYCIN 250 MG/1
TABLET, FILM COATED ORAL
Qty: 6 TABLET | Refills: 0 | Status: ON HOLD | OUTPATIENT
Start: 2017-08-07 | End: 2019-09-22

## 2017-08-07 RX ORDER — METHYLPREDNISOLONE 4 MG/1
TABLET ORAL
Qty: 21 TABLET | Refills: 0 | Status: ON HOLD | OUTPATIENT
Start: 2017-08-07 | End: 2019-09-22

## 2017-08-10 ENCOUNTER — PATIENT OUTREACH (OUTPATIENT)
Dept: CASE MANAGEMENT | Facility: OTHER | Age: 82
End: 2017-08-10

## 2017-08-20 NOTE — PROGRESS NOTES
Subjective   Ryann Hernadez is a 86 y.o. female.   She is here today for follow-up risk ataxic gait dizziness renal insufficiency hypertension and recurrent acute otitis media of right ear  History of Present Illness   She is here today for follow-up for high fall risk ataxic gait dizziness renal insufficiency hypertension and recurrent otitis media of right ear  The following portions of the patient's history were reviewed and updated as appropriate: allergies, current medications, past family history, past medical history, past social history, past surgical history and problem list.    Review of Systems   HENT: Positive for ear pain (right ear).    Musculoskeletal: Positive for gait problem.   Neurological: Positive for dizziness.   All other systems reviewed and are negative.      Objective   Physical Exam   Constitutional: She is oriented to person, place, and time. She appears well-developed and well-nourished. She is cooperative.   HENT:   Head: Normocephalic and atraumatic.   Right Ear: Hearing, external ear and ear canal normal. Tympanic membrane is erythematous.   Left Ear: Hearing, tympanic membrane, external ear and ear canal normal.   Nose: Nose normal.   Mouth/Throat: Uvula is midline, oropharynx is clear and moist and mucous membranes are normal.   Eyes: Conjunctivae, EOM and lids are normal. Pupils are equal, round, and reactive to light.   Neck: Phonation normal. Neck supple. Carotid bruit is not present.   Cardiovascular: Normal rate, regular rhythm and normal heart sounds.  Exam reveals no gallop and no friction rub.    No murmur heard.  Pulmonary/Chest: Effort normal and breath sounds normal. No respiratory distress.   Abdominal: Soft. Bowel sounds are normal. She exhibits no distension and no mass. There is no hepatosplenomegaly. There is no tenderness. There is no rebound and no guarding. No hernia.   Musculoskeletal: She exhibits no edema.   Neurological: She is alert and oriented to  person, place, and time. Gait abnormal. Coordination normal.   Skin: Skin is warm and dry.   Psychiatric: She has a normal mood and affect. Her speech is normal and behavior is normal. Judgment and thought content normal.   Nursing note and vitals reviewed.      Assessment/Plan   Diagnoses and all orders for this visit:    At high risk for falls    Ataxic gait    Dizziness    Renal insufficiency    Essential hypertension    Recurrent acute serous otitis media of right ear    Other orders  -     MethylPREDNISolone (MEDROL, REGAN,) 4 MG tablet; Take as directed on package instructions.  -     azithromycin (ZITHROMAX) 250 MG tablet; Take 2 tablets the first day, then 1 tablet daily for 4 days.        At high risk for falls noted  Ataxic gait physical therapy as needed  Dizziness supportive meds for this  Renal sufficiency follow closely  Hypertension well-controlled on current medication  Recurrent otitis media right ear supportive meds for this

## 2017-09-01 DIAGNOSIS — M53.3 SACRO-ILIAC PAIN: Primary | ICD-10-CM

## 2017-09-01 DIAGNOSIS — Z91.81 HISTORY OF RECENT FALL: ICD-10-CM

## 2017-09-01 DIAGNOSIS — M53.3 SACROILIAC JOINT DYSFUNCTION OF LEFT SIDE: ICD-10-CM

## 2017-09-07 ENCOUNTER — PATIENT OUTREACH (OUTPATIENT)
Dept: CASE MANAGEMENT | Facility: OTHER | Age: 82
End: 2017-09-07

## 2017-09-22 ENCOUNTER — EPISODE CHANGES (OUTPATIENT)
Dept: CASE MANAGEMENT | Facility: OTHER | Age: 82
End: 2017-09-22

## 2018-04-25 ENCOUNTER — EPISODE CHANGES (OUTPATIENT)
Dept: CASE MANAGEMENT | Facility: OTHER | Age: 83
End: 2018-04-25

## 2019-09-22 ENCOUNTER — HOSPITAL ENCOUNTER (OUTPATIENT)
Facility: HOSPITAL | Age: 84
Setting detail: OBSERVATION
LOS: 1 days | Discharge: HOME | End: 2019-09-26
Attending: EMERGENCY MEDICINE | Admitting: HOSPITALIST

## 2019-09-22 ENCOUNTER — APPOINTMENT (OUTPATIENT)
Dept: GENERAL RADIOLOGY | Facility: HOSPITAL | Age: 84
End: 2019-09-22

## 2019-09-22 ENCOUNTER — APPOINTMENT (OUTPATIENT)
Dept: CT IMAGING | Facility: HOSPITAL | Age: 84
End: 2019-09-22

## 2019-09-22 DIAGNOSIS — N13.2 HYDRONEPHROSIS WITH URINARY OBSTRUCTION DUE TO URETERAL CALCULUS: ICD-10-CM

## 2019-09-22 DIAGNOSIS — N20.0 KIDNEY STONE ON LEFT SIDE: ICD-10-CM

## 2019-09-22 DIAGNOSIS — N13.2 URETERAL STONE WITH HYDRONEPHROSIS: Primary | ICD-10-CM

## 2019-09-22 DIAGNOSIS — I48.91 NEW ONSET A-FIB (HCC): ICD-10-CM

## 2019-09-22 LAB
ALBUMIN SERPL-MCNC: 4.8 G/DL (ref 3.5–5.2)
ALBUMIN/GLOB SERPL: 1.4 G/DL
ALP SERPL-CCNC: 90 U/L (ref 39–117)
ALT SERPL W P-5'-P-CCNC: 9 U/L (ref 1–33)
ANION GAP SERPL CALCULATED.3IONS-SCNC: 15.8 MMOL/L (ref 5–15)
AST SERPL-CCNC: 20 U/L (ref 1–32)
BACTERIA UR QL AUTO: NORMAL /HPF
BASOPHILS # BLD AUTO: 0.02 10*3/MM3 (ref 0–0.2)
BASOPHILS NFR BLD AUTO: 0.3 % (ref 0–1.5)
BILIRUB SERPL-MCNC: 1.1 MG/DL (ref 0.2–1.2)
BILIRUB UR QL STRIP: NEGATIVE
BUN BLD-MCNC: 13 MG/DL (ref 8–23)
BUN/CREAT SERPL: 12.1 (ref 7–25)
CALCIUM SPEC-SCNC: 9.8 MG/DL (ref 8.6–10.5)
CHLORIDE SERPL-SCNC: 96 MMOL/L (ref 98–107)
CLARITY UR: CLEAR
CO2 SERPL-SCNC: 25.2 MMOL/L (ref 22–29)
COLOR UR: YELLOW
CREAT BLD-MCNC: 1.07 MG/DL (ref 0.57–1)
DEPRECATED RDW RBC AUTO: 47.6 FL (ref 37–54)
EOSINOPHIL # BLD AUTO: 0.01 10*3/MM3 (ref 0–0.4)
EOSINOPHIL NFR BLD AUTO: 0.1 % (ref 0.3–6.2)
ERYTHROCYTE [DISTWIDTH] IN BLOOD BY AUTOMATED COUNT: 12.8 % (ref 12.3–15.4)
GFR SERPL CREATININE-BSD FRML MDRD: 59 ML/MIN/1.73
GLOBULIN UR ELPH-MCNC: 3.5 GM/DL
GLUCOSE BLD-MCNC: 153 MG/DL (ref 65–99)
GLUCOSE UR STRIP-MCNC: NEGATIVE MG/DL
HCT VFR BLD AUTO: 40.8 % (ref 34–46.6)
HGB BLD-MCNC: 13.1 G/DL (ref 12–15.9)
HGB UR QL STRIP.AUTO: NEGATIVE
HOLD SPECIMEN: NORMAL
HOLD SPECIMEN: NORMAL
HYALINE CASTS UR QL AUTO: NORMAL /LPF
IMM GRANULOCYTES # BLD AUTO: 0.01 10*3/MM3 (ref 0–0.05)
IMM GRANULOCYTES NFR BLD AUTO: 0.1 % (ref 0–0.5)
INR PPP: 0.99 (ref 0.9–1.1)
KETONES UR QL STRIP: ABNORMAL
LEUKOCYTE ESTERASE UR QL STRIP.AUTO: ABNORMAL
LIPASE SERPL-CCNC: 22 U/L (ref 13–60)
LYMPHOCYTES # BLD AUTO: 0.76 10*3/MM3 (ref 0.7–3.1)
LYMPHOCYTES NFR BLD AUTO: 10.3 % (ref 19.6–45.3)
MAGNESIUM SERPL-MCNC: 2.1 MG/DL (ref 1.6–2.4)
MCH RBC QN AUTO: 32.3 PG (ref 26.6–33)
MCHC RBC AUTO-ENTMCNC: 32.1 G/DL (ref 31.5–35.7)
MCV RBC AUTO: 100.7 FL (ref 79–97)
MONOCYTES # BLD AUTO: 0.44 10*3/MM3 (ref 0.1–0.9)
MONOCYTES NFR BLD AUTO: 6 % (ref 5–12)
NEUTROPHILS # BLD AUTO: 6.15 10*3/MM3 (ref 1.7–7)
NEUTROPHILS NFR BLD AUTO: 83.2 % (ref 42.7–76)
NITRITE UR QL STRIP: NEGATIVE
NRBC BLD AUTO-RTO: 0 /100 WBC (ref 0–0.2)
PH UR STRIP.AUTO: 7.5 [PH] (ref 5–8)
PLATELET # BLD AUTO: 225 10*3/MM3 (ref 140–450)
PMV BLD AUTO: 10.6 FL (ref 6–12)
POTASSIUM BLD-SCNC: 3.8 MMOL/L (ref 3.5–5.2)
PROT SERPL-MCNC: 8.3 G/DL (ref 6–8.5)
PROT UR QL STRIP: ABNORMAL
PROTHROMBIN TIME: 12.8 SECONDS (ref 11.7–14.2)
RBC # BLD AUTO: 4.05 10*6/MM3 (ref 3.77–5.28)
RBC # UR: NORMAL /HPF
REF LAB TEST METHOD: NORMAL
SODIUM BLD-SCNC: 137 MMOL/L (ref 136–145)
SP GR UR STRIP: 1.01 (ref 1–1.03)
SQUAMOUS #/AREA URNS HPF: NORMAL /HPF
T4 FREE SERPL-MCNC: 1.16 NG/DL (ref 0.93–1.7)
TROPONIN T SERPL-MCNC: <0.01 NG/ML (ref 0–0.03)
TSH SERPL DL<=0.05 MIU/L-ACNC: 1.43 UIU/ML (ref 0.27–4.2)
UROBILINOGEN UR QL STRIP: ABNORMAL
WBC NRBC COR # BLD: 7.39 10*3/MM3 (ref 3.4–10.8)
WBC UR QL AUTO: NORMAL /HPF
WHOLE BLOOD HOLD SPECIMEN: NORMAL
WHOLE BLOOD HOLD SPECIMEN: NORMAL

## 2019-09-22 PROCEDURE — 84484 ASSAY OF TROPONIN QUANT: CPT | Performed by: EMERGENCY MEDICINE

## 2019-09-22 PROCEDURE — 93010 ELECTROCARDIOGRAM REPORT: CPT | Performed by: INTERNAL MEDICINE

## 2019-09-22 PROCEDURE — 83735 ASSAY OF MAGNESIUM: CPT | Performed by: EMERGENCY MEDICINE

## 2019-09-22 PROCEDURE — G0378 HOSPITAL OBSERVATION PER HR: HCPCS

## 2019-09-22 PROCEDURE — 96375 TX/PRO/DX INJ NEW DRUG ADDON: CPT

## 2019-09-22 PROCEDURE — 71046 X-RAY EXAM CHEST 2 VIEWS: CPT

## 2019-09-22 PROCEDURE — 80053 COMPREHEN METABOLIC PANEL: CPT | Performed by: EMERGENCY MEDICINE

## 2019-09-22 PROCEDURE — 84443 ASSAY THYROID STIM HORMONE: CPT | Performed by: EMERGENCY MEDICINE

## 2019-09-22 PROCEDURE — 25010000002 IOPAMIDOL 61 % SOLUTION: Performed by: EMERGENCY MEDICINE

## 2019-09-22 PROCEDURE — 25010000002 ONDANSETRON PER 1 MG: Performed by: HOSPITALIST

## 2019-09-22 PROCEDURE — 93005 ELECTROCARDIOGRAM TRACING: CPT | Performed by: EMERGENCY MEDICINE

## 2019-09-22 PROCEDURE — 99284 EMERGENCY DEPT VISIT MOD MDM: CPT

## 2019-09-22 PROCEDURE — 25010000002 MORPHINE PER 10 MG: Performed by: EMERGENCY MEDICINE

## 2019-09-22 PROCEDURE — 85610 PROTHROMBIN TIME: CPT | Performed by: EMERGENCY MEDICINE

## 2019-09-22 PROCEDURE — 96374 THER/PROPH/DIAG INJ IV PUSH: CPT

## 2019-09-22 PROCEDURE — 84439 ASSAY OF FREE THYROXINE: CPT | Performed by: EMERGENCY MEDICINE

## 2019-09-22 PROCEDURE — 25010000002 MORPHINE PER 10 MG: Performed by: HOSPITALIST

## 2019-09-22 PROCEDURE — 25010000002 ONDANSETRON PER 1 MG: Performed by: EMERGENCY MEDICINE

## 2019-09-22 PROCEDURE — 81001 URINALYSIS AUTO W/SCOPE: CPT | Performed by: EMERGENCY MEDICINE

## 2019-09-22 PROCEDURE — 85025 COMPLETE CBC W/AUTO DIFF WBC: CPT | Performed by: EMERGENCY MEDICINE

## 2019-09-22 PROCEDURE — 93005 ELECTROCARDIOGRAM TRACING: CPT | Performed by: HOSPITALIST

## 2019-09-22 PROCEDURE — 83690 ASSAY OF LIPASE: CPT | Performed by: EMERGENCY MEDICINE

## 2019-09-22 PROCEDURE — 74177 CT ABD & PELVIS W/CONTRAST: CPT

## 2019-09-22 RX ORDER — ACETAMINOPHEN 325 MG/1
650 TABLET ORAL EVERY 4 HOURS PRN
Status: DISCONTINUED | OUTPATIENT
Start: 2019-09-22 | End: 2019-09-26 | Stop reason: HOSPADM

## 2019-09-22 RX ORDER — LISINOPRIL 40 MG/1
40 TABLET ORAL DAILY
COMMUNITY
End: 2019-09-26 | Stop reason: HOSPADM

## 2019-09-22 RX ORDER — ONDANSETRON 2 MG/ML
4 INJECTION INTRAMUSCULAR; INTRAVENOUS EVERY 6 HOURS PRN
Status: DISCONTINUED | OUTPATIENT
Start: 2019-09-22 | End: 2019-09-26 | Stop reason: HOSPADM

## 2019-09-22 RX ORDER — OXYCODONE HYDROCHLORIDE AND ACETAMINOPHEN 5; 325 MG/1; MG/1
1 TABLET ORAL EVERY 4 HOURS PRN
COMMUNITY
End: 2019-11-05

## 2019-09-22 RX ORDER — ACETAMINOPHEN 650 MG/1
650 SUPPOSITORY RECTAL EVERY 4 HOURS PRN
Status: DISCONTINUED | OUTPATIENT
Start: 2019-09-22 | End: 2019-09-26 | Stop reason: HOSPADM

## 2019-09-22 RX ORDER — AMLODIPINE BESYLATE 5 MG/1
5 TABLET ORAL DAILY
Status: ON HOLD | COMMUNITY
End: 2019-09-26 | Stop reason: SDUPTHER

## 2019-09-22 RX ORDER — OXYCODONE HYDROCHLORIDE AND ACETAMINOPHEN 5; 325 MG/1; MG/1
1 TABLET ORAL EVERY 4 HOURS PRN
Status: DISCONTINUED | OUTPATIENT
Start: 2019-09-22 | End: 2019-09-26 | Stop reason: HOSPADM

## 2019-09-22 RX ORDER — ONDANSETRON 2 MG/ML
4 INJECTION INTRAMUSCULAR; INTRAVENOUS ONCE
Status: COMPLETED | OUTPATIENT
Start: 2019-09-22 | End: 2019-09-22

## 2019-09-22 RX ORDER — MORPHINE SULFATE 2 MG/ML
2 INJECTION, SOLUTION INTRAMUSCULAR; INTRAVENOUS
Status: DISCONTINUED | OUTPATIENT
Start: 2019-09-22 | End: 2019-09-26 | Stop reason: HOSPADM

## 2019-09-22 RX ORDER — ACETAMINOPHEN 160 MG/5ML
650 SOLUTION ORAL EVERY 4 HOURS PRN
Status: DISCONTINUED | OUTPATIENT
Start: 2019-09-22 | End: 2019-09-26 | Stop reason: HOSPADM

## 2019-09-22 RX ORDER — SODIUM CHLORIDE 0.9 % (FLUSH) 0.9 %
10 SYRINGE (ML) INJECTION AS NEEDED
Status: DISCONTINUED | OUTPATIENT
Start: 2019-09-22 | End: 2019-09-22

## 2019-09-22 RX ORDER — NITROGLYCERIN 0.4 MG/1
0.4 TABLET SUBLINGUAL
Status: DISCONTINUED | OUTPATIENT
Start: 2019-09-22 | End: 2019-09-26 | Stop reason: HOSPADM

## 2019-09-22 RX ORDER — ONDANSETRON 4 MG/1
4 TABLET, FILM COATED ORAL EVERY 6 HOURS PRN
Status: DISCONTINUED | OUTPATIENT
Start: 2019-09-22 | End: 2019-09-26 | Stop reason: HOSPADM

## 2019-09-22 RX ORDER — MORPHINE SULFATE 2 MG/ML
2 INJECTION, SOLUTION INTRAMUSCULAR; INTRAVENOUS ONCE
Status: COMPLETED | OUTPATIENT
Start: 2019-09-22 | End: 2019-09-22

## 2019-09-22 RX ADMIN — ONDANSETRON 4 MG: 2 INJECTION INTRAMUSCULAR; INTRAVENOUS at 20:07

## 2019-09-22 RX ADMIN — MORPHINE SULFATE 2 MG: 2 INJECTION, SOLUTION INTRAMUSCULAR; INTRAVENOUS at 20:09

## 2019-09-22 RX ADMIN — IOPAMIDOL 85 ML: 612 INJECTION, SOLUTION INTRAVENOUS at 17:39

## 2019-09-23 ENCOUNTER — APPOINTMENT (OUTPATIENT)
Dept: CARDIOLOGY | Facility: HOSPITAL | Age: 84
End: 2019-09-23

## 2019-09-23 LAB
ANION GAP SERPL CALCULATED.3IONS-SCNC: 13.1 MMOL/L (ref 5–15)
BH CV ECHO MEAS - ACS: 1.5 CM
BH CV ECHO MEAS - AI DEC SLOPE: 152.5 CM/SEC^2
BH CV ECHO MEAS - AI MAX PG: 56.9 MMHG
BH CV ECHO MEAS - AI MAX VEL: 377 CM/SEC
BH CV ECHO MEAS - AI P1/2T: 724.1 MSEC
BH CV ECHO MEAS - AO MAX PG (FULL): 6.1 MMHG
BH CV ECHO MEAS - AO MAX PG: 11.6 MMHG
BH CV ECHO MEAS - AO MEAN PG (FULL): 2 MMHG
BH CV ECHO MEAS - AO MEAN PG: 5 MMHG
BH CV ECHO MEAS - AO ROOT AREA (BSA CORRECTED): 1.8
BH CV ECHO MEAS - AO ROOT AREA: 7.1 CM^2
BH CV ECHO MEAS - AO ROOT DIAM: 3 CM
BH CV ECHO MEAS - AO V2 MAX: 170 CM/SEC
BH CV ECHO MEAS - AO V2 MEAN: 101 CM/SEC
BH CV ECHO MEAS - AO V2 VTI: 35.9 CM
BH CV ECHO MEAS - ASC AORTA: 3.9 CM
BH CV ECHO MEAS - AVA(I,A): 2.3 CM^2
BH CV ECHO MEAS - AVA(I,D): 2.3 CM^2
BH CV ECHO MEAS - AVA(V,A): 2.2 CM^2
BH CV ECHO MEAS - AVA(V,D): 2.2 CM^2
BH CV ECHO MEAS - BSA(HAYCOCK): 1.7 M^2
BH CV ECHO MEAS - BSA: 1.7 M^2
BH CV ECHO MEAS - BZI_BMI: 21.8 KILOGRAMS/M^2
BH CV ECHO MEAS - BZI_METRIC_HEIGHT: 167.6 CM
BH CV ECHO MEAS - BZI_METRIC_WEIGHT: 61.2 KG
BH CV ECHO MEAS - EDV(CUBED): 117.6 ML
BH CV ECHO MEAS - EDV(MOD-SP2): 81 ML
BH CV ECHO MEAS - EDV(MOD-SP4): 76 ML
BH CV ECHO MEAS - EDV(TEICH): 112.8 ML
BH CV ECHO MEAS - EF(CUBED): 66.6 %
BH CV ECHO MEAS - EF(MOD-BP): 60 %
BH CV ECHO MEAS - EF(MOD-SP2): 60.5 %
BH CV ECHO MEAS - EF(MOD-SP4): 61.8 %
BH CV ECHO MEAS - EF(TEICH): 58 %
BH CV ECHO MEAS - ESV(CUBED): 39.3 ML
BH CV ECHO MEAS - ESV(MOD-SP2): 32 ML
BH CV ECHO MEAS - ESV(MOD-SP4): 29 ML
BH CV ECHO MEAS - ESV(TEICH): 47.4 ML
BH CV ECHO MEAS - FS: 30.6 %
BH CV ECHO MEAS - IVS/LVPW: 1
BH CV ECHO MEAS - IVSD: 1 CM
BH CV ECHO MEAS - LAT PEAK E' VEL: 5 CM/SEC
BH CV ECHO MEAS - LV DIASTOLIC VOL/BSA (35-75): 44.9 ML/M^2
BH CV ECHO MEAS - LV MASS(C)D: 176 GRAMS
BH CV ECHO MEAS - LV MASS(C)DI: 104 GRAMS/M^2
BH CV ECHO MEAS - LV MAX PG: 5.5 MMHG
BH CV ECHO MEAS - LV MEAN PG: 3 MMHG
BH CV ECHO MEAS - LV SYSTOLIC VOL/BSA (12-30): 17.1 ML/M^2
BH CV ECHO MEAS - LV V1 MAX: 117 CM/SEC
BH CV ECHO MEAS - LV V1 MEAN: 81.2 CM/SEC
BH CV ECHO MEAS - LV V1 VTI: 26.6 CM
BH CV ECHO MEAS - LVIDD: 4.9 CM
BH CV ECHO MEAS - LVIDS: 3.4 CM
BH CV ECHO MEAS - LVLD AP2: 7.2 CM
BH CV ECHO MEAS - LVLD AP4: 7.3 CM
BH CV ECHO MEAS - LVLS AP2: 5.9 CM
BH CV ECHO MEAS - LVLS AP4: 6.2 CM
BH CV ECHO MEAS - LVOT AREA (M): 3.1 CM^2
BH CV ECHO MEAS - LVOT AREA: 3.1 CM^2
BH CV ECHO MEAS - LVOT DIAM: 2 CM
BH CV ECHO MEAS - LVPWD: 1 CM
BH CV ECHO MEAS - MED PEAK E' VEL: 5 CM/SEC
BH CV ECHO MEAS - MR MAX PG: 85.7 MMHG
BH CV ECHO MEAS - MR MAX VEL: 463 CM/SEC
BH CV ECHO MEAS - MV A DUR: 0.12 SEC
BH CV ECHO MEAS - MV A MAX VEL: 119 CM/SEC
BH CV ECHO MEAS - MV DEC SLOPE: 193 CM/SEC^2
BH CV ECHO MEAS - MV DEC TIME: 245 SEC
BH CV ECHO MEAS - MV E MAX VEL: 73.7 CM/SEC
BH CV ECHO MEAS - MV E/A: 0.62
BH CV ECHO MEAS - MV MAX PG: 4.5 MMHG
BH CV ECHO MEAS - MV MEAN PG: 1 MMHG
BH CV ECHO MEAS - MV P1/2T MAX VEL: 72.1 CM/SEC
BH CV ECHO MEAS - MV P1/2T: 109.4 MSEC
BH CV ECHO MEAS - MV V2 MAX: 106 CM/SEC
BH CV ECHO MEAS - MV V2 MEAN: 50.9 CM/SEC
BH CV ECHO MEAS - MV V2 VTI: 39.8 CM
BH CV ECHO MEAS - MVA P1/2T LCG: 3.1 CM^2
BH CV ECHO MEAS - MVA(P1/2T): 2 CM^2
BH CV ECHO MEAS - MVA(VTI): 2.1 CM^2
BH CV ECHO MEAS - PA ACC TIME: 0.12 SEC
BH CV ECHO MEAS - PA MAX PG (FULL): 2.4 MMHG
BH CV ECHO MEAS - PA MAX PG: 6.7 MMHG
BH CV ECHO MEAS - PA PR(ACCEL): 25.5 MMHG
BH CV ECHO MEAS - PA V2 MAX: 129 CM/SEC
BH CV ECHO MEAS - PI END-D VEL: 129 CM/SEC
BH CV ECHO MEAS - PULM A REVS DUR: 0.16 SEC
BH CV ECHO MEAS - PULM A REVS VEL: 19.9 CM/SEC
BH CV ECHO MEAS - PULM DIAS VEL: 35.2 CM/SEC
BH CV ECHO MEAS - PULM S/D: 1.5
BH CV ECHO MEAS - PULM SYS VEL: 51.4 CM/SEC
BH CV ECHO MEAS - PVA(V,A): 3.6 CM^2
BH CV ECHO MEAS - PVA(V,D): 3.6 CM^2
BH CV ECHO MEAS - QP/QS: 1.2
BH CV ECHO MEAS - RAP SYSTOLE: 8 MMHG
BH CV ECHO MEAS - RV MAX PG: 4.2 MMHG
BH CV ECHO MEAS - RV MEAN PG: 2 MMHG
BH CV ECHO MEAS - RV V1 MAX: 103 CM/SEC
BH CV ECHO MEAS - RV V1 MEAN: 54.2 CM/SEC
BH CV ECHO MEAS - RV V1 VTI: 22.2 CM
BH CV ECHO MEAS - RVOT AREA: 4.5 CM^2
BH CV ECHO MEAS - RVOT DIAM: 2.4 CM
BH CV ECHO MEAS - RVSP: 53.4 MMHG
BH CV ECHO MEAS - SI(AO): 149.9 ML/M^2
BH CV ECHO MEAS - SI(CUBED): 46.3 ML/M^2
BH CV ECHO MEAS - SI(LVOT): 49.4 ML/M^2
BH CV ECHO MEAS - SI(MOD-SP2): 29 ML/M^2
BH CV ECHO MEAS - SI(MOD-SP4): 27.8 ML/M^2
BH CV ECHO MEAS - SI(TEICH): 38.6 ML/M^2
BH CV ECHO MEAS - SUP REN AO DIAM: 2.5 CM
BH CV ECHO MEAS - SV(AO): 253.8 ML
BH CV ECHO MEAS - SV(CUBED): 78.3 ML
BH CV ECHO MEAS - SV(LVOT): 83.6 ML
BH CV ECHO MEAS - SV(MOD-SP2): 49 ML
BH CV ECHO MEAS - SV(MOD-SP4): 47 ML
BH CV ECHO MEAS - SV(RVOT): 100.4 ML
BH CV ECHO MEAS - SV(TEICH): 65.4 ML
BH CV ECHO MEAS - TAPSE (>1.6): 2.5 CM2
BH CV ECHO MEAS - TR MAX VEL: 337 CM/SEC
BH CV ECHO MEASUREMENTS AVERAGE E/E' RATIO: 14.74
BH CV VAS BP RIGHT ARM: NORMAL MMHG
BH CV XLRA - RV BASE: 3.7 CM
BH CV XLRA - TDI S': 10 CM/SEC
BUN BLD-MCNC: 14 MG/DL (ref 8–23)
BUN/CREAT SERPL: 10.3 (ref 7–25)
CALCIUM SPEC-SCNC: 9.3 MG/DL (ref 8.6–10.5)
CHLORIDE SERPL-SCNC: 99 MMOL/L (ref 98–107)
CO2 SERPL-SCNC: 25.9 MMOL/L (ref 22–29)
CREAT BLD-MCNC: 1.36 MG/DL (ref 0.57–1)
DEPRECATED RDW RBC AUTO: 46.4 FL (ref 37–54)
ERYTHROCYTE [DISTWIDTH] IN BLOOD BY AUTOMATED COUNT: 12.9 % (ref 12.3–15.4)
GFR SERPL CREATININE-BSD FRML MDRD: 44 ML/MIN/1.73
GLUCOSE BLD-MCNC: 124 MG/DL (ref 65–99)
HCT VFR BLD AUTO: 39.6 % (ref 34–46.6)
HGB BLD-MCNC: 13 G/DL (ref 12–15.9)
LEFT ATRIUM VOLUME INDEX: 28 ML/M2
LV EF 2D ECHO EST: 60 %
MAXIMAL PREDICTED HEART RATE: 132 BPM
MCH RBC QN AUTO: 32.3 PG (ref 26.6–33)
MCHC RBC AUTO-ENTMCNC: 32.8 G/DL (ref 31.5–35.7)
MCV RBC AUTO: 98.5 FL (ref 79–97)
PLATELET # BLD AUTO: 210 10*3/MM3 (ref 140–450)
PMV BLD AUTO: 10.1 FL (ref 6–12)
POTASSIUM BLD-SCNC: 4.3 MMOL/L (ref 3.5–5.2)
RBC # BLD AUTO: 4.02 10*6/MM3 (ref 3.77–5.28)
SODIUM BLD-SCNC: 138 MMOL/L (ref 136–145)
STRESS TARGET HR: 112 BPM
WBC NRBC COR # BLD: 7.24 10*3/MM3 (ref 3.4–10.8)

## 2019-09-23 PROCEDURE — 99204 OFFICE O/P NEW MOD 45 MIN: CPT | Performed by: INTERNAL MEDICINE

## 2019-09-23 PROCEDURE — 85027 COMPLETE CBC AUTOMATED: CPT | Performed by: HOSPITALIST

## 2019-09-23 PROCEDURE — G0378 HOSPITAL OBSERVATION PER HR: HCPCS

## 2019-09-23 PROCEDURE — 93306 TTE W/DOPPLER COMPLETE: CPT | Performed by: INTERNAL MEDICINE

## 2019-09-23 PROCEDURE — 25010000002 ONDANSETRON PER 1 MG: Performed by: HOSPITALIST

## 2019-09-23 PROCEDURE — 93306 TTE W/DOPPLER COMPLETE: CPT

## 2019-09-23 PROCEDURE — 80048 BASIC METABOLIC PNL TOTAL CA: CPT | Performed by: HOSPITALIST

## 2019-09-23 PROCEDURE — 96376 TX/PRO/DX INJ SAME DRUG ADON: CPT

## 2019-09-23 RX ORDER — SODIUM CHLORIDE 9 MG/ML
50 INJECTION, SOLUTION INTRAVENOUS CONTINUOUS
Status: DISCONTINUED | OUTPATIENT
Start: 2019-09-23 | End: 2019-09-26

## 2019-09-23 RX ADMIN — ONDANSETRON 4 MG: 2 INJECTION INTRAMUSCULAR; INTRAVENOUS at 08:53

## 2019-09-23 RX ADMIN — SODIUM CHLORIDE 50 ML/HR: 9 INJECTION, SOLUTION INTRAVENOUS at 20:54

## 2019-09-23 RX ADMIN — METOPROLOL TARTRATE 25 MG: 25 TABLET ORAL at 08:40

## 2019-09-23 RX ADMIN — METOPROLOL TARTRATE 25 MG: 25 TABLET ORAL at 20:54

## 2019-09-24 PROBLEM — N13.1 HYDRONEPHROSIS DUE TO OBSTRUCTION OF URETER: Status: ACTIVE | Noted: 2019-09-24

## 2019-09-24 PROCEDURE — 99213 OFFICE O/P EST LOW 20 MIN: CPT | Performed by: INTERNAL MEDICINE

## 2019-09-24 RX ADMIN — SODIUM CHLORIDE 50 ML/HR: 9 INJECTION, SOLUTION INTRAVENOUS at 09:11

## 2019-09-24 RX ADMIN — METOPROLOL TARTRATE 25 MG: 25 TABLET ORAL at 09:11

## 2019-09-25 LAB
ANION GAP SERPL CALCULATED.3IONS-SCNC: 9.5 MMOL/L (ref 5–15)
BUN BLD-MCNC: 16 MG/DL (ref 8–23)
BUN/CREAT SERPL: 14.3 (ref 7–25)
CALCIUM SPEC-SCNC: 9.3 MG/DL (ref 8.6–10.5)
CHLORIDE SERPL-SCNC: 106 MMOL/L (ref 98–107)
CO2 SERPL-SCNC: 27.5 MMOL/L (ref 22–29)
CREAT BLD-MCNC: 1.12 MG/DL (ref 0.57–1)
GFR SERPL CREATININE-BSD FRML MDRD: 56 ML/MIN/1.73
GLUCOSE BLD-MCNC: 163 MG/DL (ref 65–99)
POTASSIUM BLD-SCNC: 3.7 MMOL/L (ref 3.5–5.2)
SODIUM BLD-SCNC: 143 MMOL/L (ref 136–145)

## 2019-09-25 PROCEDURE — G0378 HOSPITAL OBSERVATION PER HR: HCPCS

## 2019-09-25 PROCEDURE — 99213 OFFICE O/P EST LOW 20 MIN: CPT | Performed by: INTERNAL MEDICINE

## 2019-09-25 PROCEDURE — 82360 CALCULUS ASSAY QUANT: CPT | Performed by: UROLOGY

## 2019-09-25 PROCEDURE — 80048 BASIC METABOLIC PNL TOTAL CA: CPT | Performed by: INTERNAL MEDICINE

## 2019-09-25 RX ADMIN — SODIUM CHLORIDE 50 ML/HR: 9 INJECTION, SOLUTION INTRAVENOUS at 12:30

## 2019-09-25 RX ADMIN — METOPROLOL TARTRATE 12.5 MG: 25 TABLET ORAL at 09:40

## 2019-09-25 RX ADMIN — APIXABAN 2.5 MG: 2.5 TABLET, FILM COATED ORAL at 20:14

## 2019-09-26 VITALS
WEIGHT: 139.1 LBS | TEMPERATURE: 97.9 F | HEART RATE: 63 BPM | OXYGEN SATURATION: 95 % | SYSTOLIC BLOOD PRESSURE: 116 MMHG | DIASTOLIC BLOOD PRESSURE: 68 MMHG | RESPIRATION RATE: 18 BRPM | HEIGHT: 66 IN | BODY MASS INDEX: 22.35 KG/M2

## 2019-09-26 PROCEDURE — G0378 HOSPITAL OBSERVATION PER HR: HCPCS

## 2019-09-26 RX ORDER — AMLODIPINE BESYLATE 5 MG/1
5 TABLET ORAL DAILY
Start: 2019-09-26 | End: 2020-02-18 | Stop reason: SDUPTHER

## 2019-09-26 RX ORDER — ACETAMINOPHEN 325 MG/1
650 TABLET ORAL EVERY 4 HOURS PRN
Status: ON HOLD
Start: 2019-09-26 | End: 2020-02-06 | Stop reason: SDUPTHER

## 2019-09-26 RX ADMIN — APIXABAN 2.5 MG: 2.5 TABLET, FILM COATED ORAL at 09:13

## 2019-09-26 RX ADMIN — METOPROLOL TARTRATE 12.5 MG: 25 TABLET ORAL at 09:13

## 2019-10-02 LAB
CA PHOS CRY STONE QL IR: 3 %
COLOR STONE: NORMAL
COM CRY STONE QL IR: 97 %
COMPN STONE: NORMAL
Lab: NORMAL
Lab: NORMAL
NIDUS STONE QL: NORMAL
PATH REPORT.COMMENTS IMP SPEC: NORMAL
SIZE STONE: NORMAL MM
WT STONE: 38.9 MG

## 2019-10-10 ENCOUNTER — OFFICE VISIT (OUTPATIENT)
Dept: CARDIOLOGY | Facility: CLINIC | Age: 84
End: 2019-10-10

## 2019-10-10 VITALS
WEIGHT: 141 LBS | HEART RATE: 59 BPM | HEIGHT: 66 IN | BODY MASS INDEX: 22.66 KG/M2 | OXYGEN SATURATION: 95 % | SYSTOLIC BLOOD PRESSURE: 140 MMHG | DIASTOLIC BLOOD PRESSURE: 80 MMHG

## 2019-10-10 DIAGNOSIS — I48.0 PAROXYSMAL ATRIAL FIBRILLATION (HCC): ICD-10-CM

## 2019-10-10 DIAGNOSIS — I71.21 ASCENDING AORTIC ANEURYSM (HCC): Primary | ICD-10-CM

## 2019-10-10 DIAGNOSIS — I10 ESSENTIAL HYPERTENSION: ICD-10-CM

## 2019-10-10 PROCEDURE — 99214 OFFICE O/P EST MOD 30 MIN: CPT | Performed by: INTERNAL MEDICINE

## 2019-10-10 RX ORDER — CHLORPHENIR/PHENYLEPH/ASPIRIN 2-7.8-325
TABLET, EFFERVESCENT ORAL
COMMUNITY

## 2019-10-10 NOTE — PROGRESS NOTES
PATIENTINFORMATION    Date of Office Visit: 10/10/2019  Encounter Provider: Yossi Pastor MD  Place of Service: [facility] CARDIOLOGY  Patient Name: Ryann Hernadez  : 1931    Subjective:     Encounter Date:10/10/2019      Patient ID: Ryann Hernadez is a 88 y.o. female.    No chief complaint on file.    HPI  Ms. Hernadez is an 88 years old pleasant lady was past medical history of hypertension, paroxysmal A. fib and ureteral stones came to clinic for post hospital discharge follow-up.  She was incidentally found out to be in A. fib when she was admitted with ureteric colic last month.  She did not have recurrence of A. fib on telemetry and her heart rate mostly remained in the 50s and 60s beats per minute even after holding metoprolol that was started temporally.  She denied any palpitations after discharge.  Her daughter is concerned about her mom not returning to baseline yet.  She still lives by herself with little assistance and she can easily do house chores and even work some in her flower garden.   She admits to not taking Eliquis for fear of bleeding.  But she reports being compliant with amlodipine  No recurrence of flank pain.  No other significant new complaints today.  ROS    Past Medical History:   Diagnosis Date   • A-fib (CMS/HCC)    • Allergic rhinitis    • Anemia    • Aneurysm, ascending aorta (CMS/HCC) 2016    5 CM   • Ataxic gait    • Atypical chest pain    • BPPV (benign paroxysmal positional vertigo)    • Bradycardia    • Chronic cystitis    • Chronic headaches    • Chronic nonintractable headache 2016   • Chronic paroxysmal hemicrania    • Depression    • Esophagitis    • Gastritis    • GERD (gastroesophageal reflux disease)    • Hypertension    • IBS (irritable bowel syndrome)    • Influenza A    • Kidney lesion     LEFT KIDNEY CYSTIC   • Labyrinthitis 2014   • OA (osteoarthritis)    • Other abnormal clinical finding 2012  "   LEFT UTEROCELE   • Renal disorder    • Renal insufficiency 8/7/2017       Past Surgical History:   Procedure Laterality Date   • COLONOSCOPY N/A     DR. MARGARITA MENDOZA   • ELBOW ARTHROTOMY  07/01/1999    DR. RYAN RITCHIE   • FRACTURE SURGERY      HAND, BROKEN FINGERS  DR. MIKE CHRISTIANSON   • KNEE SURGERY  08/31/1999    DR. THADDEUS MENDES   • TUBAL ABDOMINAL LIGATION Bilateral 1962    DR. EDDA MANNING       Social History     Socioeconomic History   • Marital status: Single     Spouse name: Not on file   • Number of children: Not on file   • Years of education: Not on file   • Highest education level: Not on file   Tobacco Use   • Smoking status: Never Smoker   • Smokeless tobacco: Never Used   Substance and Sexual Activity   • Alcohol use: No   • Drug use: No   • Sexual activity: Defer       Family History   Problem Relation Age of Onset   • Heart attack Mother    • Arthritis Other    • Kidney disease Other    • Thyroid disease Other    • Diabetes Other    • Hypertension Other    • Heart disease Other          Procedures       Objective:     /80 (BP Location: Left arm)   Pulse 59   Ht 167.6 cm (66\")   Wt 64 kg (141 lb)   SpO2 95%   BMI 22.76 kg/m²  Body mass index is 22.76 kg/m².     Physical Exam   Constitutional: She is oriented to person, place, and time. She appears well-developed and well-nourished. No distress. Uses a cane  HENT:   Head: Normocephalic and atraumatic.   Eyes: EOM are normal. Pupils are equal, round, and reactive to light.   Neck: Normal range of motion. Neck supple. No thyromegaly present.   Cardiovascular: Normal rate, regular rhythm, normal heart sounds and intact distal pulses. Exam reveals no gallop and no friction rub.   Systolic and diastolic murmur heard in the second intercostal space.    Pulmonary/Chest: Effort normal and breath sounds normal. No respiratory distress. She has no wheezes. She has no rales. She exhibits no tenderness.   Abdominal: Soft. Bowel sounds are normal. She " exhibits no distension. There is no guarding.   Musculoskeletal: Normal range of motion. She exhibits no edema or deformity.   Neurological: She is alert and oriented to person, place, and time. She has normal reflexes. No cranial nerve deficit.   Skin: Skin is warm and dry. No rash noted. She is not diaphoretic.   Psychiatric: She has a normal mood and affect. Judgment normal.     Review Of Data: I have reviewed prior medical records and labs      Assessment/Plan:       Assessment/Plan  1.  Paroxysmal atrial fibrillation  -Currently in sinus rhythm.  A. fib diagnosed in the setting of acute illness in the hospital.  -[id number] Vasc score of 4( age, female and HTN)   -Echocardiogram- no significant valvular abnormalities except mild AI noted, proximal ascending aorta is dilated  -She quit taking Eliquis for fear of bleeding and after long discussion in the office today she still does want to take Eliquis or any form of blood thinner-  -heart rate in the 60s without metoprolol        2. Hypertension   -Fairly controlled on amlodipine  -She reports her home blood pressure is better than the office today which is 140 x 80     3. Ascending aortic aneurysm  -3.9 cm on echo.  No further work-up considering her age and after discussing with her.  -will fu on CT     4. Pulmonary hypertension   -Moderate  -No clinical evidence for right side heart failure   -fu on echo     5.  RUEBN  Resolved      6.  Ureteral stone  -Resolved   -FU with urology       Diagnosis and plan of care discussed with patient and verbalized understanding.           Yossi Pastor MD  10/10/19  11:17 AM

## 2019-10-29 ENCOUNTER — TELEPHONE (OUTPATIENT)
Dept: CARDIOLOGY | Facility: CLINIC | Age: 84
End: 2019-10-29

## 2019-11-05 ENCOUNTER — OFFICE VISIT (OUTPATIENT)
Dept: FAMILY MEDICINE CLINIC | Facility: CLINIC | Age: 84
End: 2019-11-05

## 2019-11-05 VITALS
OXYGEN SATURATION: 99 % | HEIGHT: 66 IN | SYSTOLIC BLOOD PRESSURE: 144 MMHG | DIASTOLIC BLOOD PRESSURE: 90 MMHG | WEIGHT: 140 LBS | BODY MASS INDEX: 22.5 KG/M2 | TEMPERATURE: 99.4 F | HEART RATE: 98 BPM

## 2019-11-05 DIAGNOSIS — N13.2 URETERAL STONE WITH HYDRONEPHROSIS: ICD-10-CM

## 2019-11-05 DIAGNOSIS — I10 ESSENTIAL HYPERTENSION: ICD-10-CM

## 2019-11-05 DIAGNOSIS — I48.0 PAROXYSMAL ATRIAL FIBRILLATION (HCC): Primary | ICD-10-CM

## 2019-11-05 DIAGNOSIS — R53.81 DEBILITY: ICD-10-CM

## 2019-11-05 DIAGNOSIS — N13.1 HYDRONEPHROSIS DUE TO OBSTRUCTION OF URETER: ICD-10-CM

## 2019-11-05 DIAGNOSIS — L84 CORN OF FOOT: ICD-10-CM

## 2019-11-05 PROBLEM — I77.810 AORTIC ROOT DILATATION: Status: ACTIVE | Noted: 2019-10-10

## 2019-11-05 PROBLEM — G31.84 MCI (MILD COGNITIVE IMPAIRMENT): Status: ACTIVE | Noted: 2018-09-26

## 2019-11-05 PROBLEM — M81.0 OSTEOPOROSIS: Status: ACTIVE | Noted: 2018-09-13

## 2019-11-05 PROCEDURE — 99214 OFFICE O/P EST MOD 30 MIN: CPT | Performed by: FAMILY MEDICINE

## 2019-11-05 RX ORDER — LISINOPRIL 20 MG/1
20 TABLET ORAL DAILY
COMMUNITY
End: 2019-11-05

## 2019-11-05 NOTE — PROGRESS NOTES
Yuly Hernadez is a 88 y.o. female.     Chief Complaint   Patient presents with   • Kidney stone follow up from [facility]       History of Present Illness   This is a hospital follow-up but out of the 14-day window.  Patient was discharged on September 22.  Is reviewed.  Patient had a hydronephrosis due to obstruction of ureter and stone.  As well as new onset A. fib.  She passed her kidney stone without intervention.  She converted back into normal rhythm and was given metoprolol to control her A. fib but then her heart rate went down in the 40s so was taken off of this and told to follow-up with cardiology outpatient.  Patient knows the risks and benefits of a blood thinner and was told to take Eliquis but has refused.  Has followed up with cardiology who did not change her plan. Has had follow up with urology and was told she needs no further eval or treatment. F/U as needed.     Using a ane as she is a little wobbly on her feet. Started after hospitalization and she was laying in bed for a week. Has not done PT.     Corn on left foot and wanting referral.     The following portions of the patient's history were reviewed and updated as appropriate: allergies, current medications, past family history, past medical history, past social history, past surgical history and problem list.    Past Medical History:   Diagnosis Date   • A-fib (CMS/[facility])    • Allergic rhinitis    • Anemia    • Aneurysm, ascending aorta (CMS/[facility]) 03/22/2016    5 CM   • Ataxic gait    • Atypical chest pain    • BPPV (benign paroxysmal positional vertigo)    • Bradycardia    • Chronic cystitis    • Chronic headaches    • Chronic nonintractable headache 9/14/2016   • Chronic paroxysmal hemicrania    • Depression    • Esophagitis    • Gastritis    • GERD (gastroesophageal reflux disease)    • Hypertension    • IBS (irritable bowel syndrome)    • Influenza A    • Kidney lesion     LEFT KIDNEY CYSTIC   • Labyrinthitis  "07/28/2014   • OA (osteoarthritis)    • Osteoporosis 9/13/2018   • Other abnormal clinical finding 09/21/2012    LEFT UTEROCELE   • Renal disorder    • Renal insufficiency 8/7/2017       Past Surgical History:   Procedure Laterality Date   • COLONOSCOPY N/A     DR. MARGARITA MENDOZA   • ELBOW ARTHROTOMY  07/01/1999    DR. RYAN RITCHIE   • FRACTURE SURGERY      HAND, BROKEN FINGERS  DR. MIKE CHRISTIANSON   • KNEE SURGERY  08/31/1999    DR. THADDEUS MENDES   • TUBAL ABDOMINAL LIGATION Bilateral 1962    DR. EDDA MANNING       Family History   Problem Relation Age of Onset   • Heart attack Mother    • Arthritis Other    • Kidney disease Other    • Thyroid disease Other    • Diabetes Other    • Hypertension Other    • Heart disease Other        Social History     Socioeconomic History   • Marital status: Single     Spouse name: Not on file   • Number of children: Not on file   • Years of education: Not on file   • Highest education level: Not on file   Tobacco Use   • Smoking status: Never Smoker   • Smokeless tobacco: Never Used   Substance and Sexual Activity   • Alcohol use: No   • Drug use: No   • Sexual activity: Defer       Review of Systems   Respiratory: Negative for shortness of breath.    Cardiovascular: Negative for chest pain.       Objective   Visit Vitals  /90   Pulse 98   Temp 99.4 °F (37.4 °C) (Temporal)   Ht 167.6 cm (66\")   Wt 63.5 kg (140 lb)   SpO2 99%   BMI 22.60 kg/m²     Body mass index is 22.6 kg/m².  Physical Exam   Constitutional: She is oriented to person, place, and time. She appears well-developed and well-nourished.   Cardiovascular: Normal rate, regular rhythm, normal heart sounds and intact distal pulses.   Pulmonary/Chest: Effort normal and breath sounds normal.   Musculoskeletal: Normal range of motion. She exhibits no edema.   Neurological: She is alert and oriented to person, place, and time.   Skin: Skin is warm and dry.   Thickened skin on 5th toe left foot.    Psychiatric: She has a normal " mood and affect. Her behavior is normal.         Assessment/Plan   Ryann was seen today for kidney stone follow up from [facility].    Diagnoses and all orders for this visit:    Paroxysmal atrial fibrillation (CMS/HCC)    Essential hypertension    Hydronephrosis due to obstruction of ureter    Ureteral stone with hydronephrosis    Debility  -     Ambulatory Referral to Physical Therapy Evaluate and treat    [place] of foot  -     Ambulatory Referral to Podiatry    f/u in 6 months and will get medicare wellness.

## 2020-01-08 ENCOUNTER — OFFICE VISIT (OUTPATIENT)
Dept: FAMILY MEDICINE CLINIC | Facility: CLINIC | Age: 85
End: 2020-01-08

## 2020-01-08 VITALS
BODY MASS INDEX: 22.73 KG/M2 | SYSTOLIC BLOOD PRESSURE: 136 MMHG | TEMPERATURE: 99.3 F | HEART RATE: 80 BPM | DIASTOLIC BLOOD PRESSURE: 84 MMHG | WEIGHT: 141.4 LBS | OXYGEN SATURATION: 97 % | HEIGHT: 66 IN

## 2020-01-08 DIAGNOSIS — R42 DIZZINESS: Primary | ICD-10-CM

## 2020-01-08 DIAGNOSIS — H81.10 BPPV (BENIGN PAROXYSMAL POSITIONAL VERTIGO), UNSPECIFIED LATERALITY: ICD-10-CM

## 2020-01-08 DIAGNOSIS — I71.21 ASCENDING AORTIC ANEURYSM (HCC): ICD-10-CM

## 2020-01-08 DIAGNOSIS — I48.0 PAROXYSMAL ATRIAL FIBRILLATION (HCC): ICD-10-CM

## 2020-01-08 PROCEDURE — 99214 OFFICE O/P EST MOD 30 MIN: CPT | Performed by: FAMILY MEDICINE

## 2020-01-08 RX ORDER — LISINOPRIL 40 MG/1
TABLET ORAL
COMMUNITY
Start: 2019-12-21 | End: 2020-01-08 | Stop reason: SDUPTHER

## 2020-01-08 RX ORDER — MECLIZINE HCL 25 MG/1
TABLET, CHEWABLE ORAL
COMMUNITY
Start: 2019-12-09 | End: 2020-02-06 | Stop reason: HOSPADM

## 2020-01-08 RX ORDER — LISINOPRIL 40 MG/1
40 TABLET ORAL DAILY
COMMUNITY
End: 2020-02-06 | Stop reason: HOSPADM

## 2020-01-08 NOTE — PROGRESS NOTES
Subjective   Ryann Hernadez is a 88 y.o. female.     Chief Complaint   Patient presents with   • Dizziness     x  1 month        History of Present Illness   Pt has had relapse of vertigo for one month, constant, Moving makes it worse. Turning her head or sudden movements makes it worse. Has been diagnosed with BPPV in the past. Went to [facility] and was give meclizine and this has not helped. PT in the past has been helpful. Has seen ENT in the past for this. Is a little better now. No sick symptoms. BP has been fine. No syncope.     Afib/ aortic root dilation- stable, has recently followed up with cardiologist.     The following portions of the patient's history were reviewed and updated as appropriate: allergies, current medications, past family history, past medical history, past social history, past surgical history and problem list.    Past Medical History:   Diagnosis Date   • A-fib (CMS/Formerly [facility])    • Allergic rhinitis    • Anemia    • Aneurysm, ascending aorta (CMS/Formerly [facility]) 03/22/2016    5 CM   • Anxiety    • Ataxic gait    • Atypical chest pain    • BPPV (benign paroxysmal positional vertigo)    • Bradycardia    • Chronic cystitis    • Chronic headaches    • Chronic nonintractable headache 9/14/2016   • Chronic paroxysmal hemicrania    • Depression    • Esophagitis    • Gastritis    • GERD (gastroesophageal reflux disease)    • Hematuria    • High blood pressure    • History of cataract    • History of irritable bowel syndrome    • Hypertension    • IBS (irritable bowel syndrome)    • Influenza A    • Kidney lesion     LEFT KIDNEY CYSTIC   • Labyrinthitis 07/28/2014   • OA (osteoarthritis)    • Osteoporosis 9/13/2018   • Other abnormal clinical finding 09/21/2012    LEFT UTEROCELE   • Pain of thigh    • Personal history of gastric ulcer    • Renal disorder    • Renal insufficiency 8/7/2017       Past Surgical History:   Procedure Laterality Date   • BREAST LUMPECTOMY     • BREAST SURGERY     • CATARACT EXTRACTION    "  • COLONOSCOPY N/A     DR. MARGARITA MENDOZA   • ELBOW ARTHROTOMY  07/01/1999    DR. RYAN RITCHIE   • EYE SURGERY     • FRACTURE SURGERY      HAND, BROKEN FINGERS  DR. MIKE CHRISTIANSON   • KNEE SURGERY  08/31/1999    DR. THADDEUS MENDES   • OOPHORECTOMY     • TUBAL ABDOMINAL LIGATION Bilateral 1962    DR. EDDA MANNING       Family History   Problem Relation Age of Onset   • Heart attack Mother    • Arthritis Mother    • Arthritis Father    • Arthritis Sister    • Arthritis Other    • Kidney disease Other    • Thyroid disease Other    • Diabetes Other    • Hypertension Other    • Heart disease Other    • Arthritis Maternal Grandmother        Social History     Socioeconomic History   • Marital status: Single     Spouse name: Not on file   • Number of children: Not on file   • Years of education: Not on file   • Highest education level: Not on file   Tobacco Use   • Smoking status: Never Smoker   • Smokeless tobacco: Never Used   Substance and Sexual Activity   • Alcohol use: No   • Drug use: No   • Sexual activity: Defer       Review of Systems   Respiratory: Negative for shortness of breath.    Cardiovascular: Negative for palpitations.       Objective   Visit Vitals  /84   Pulse 80   Temp 99.3 °F (37.4 °C) (Temporal)   Ht 167.6 cm (66\")   Wt 64.1 kg (141 lb 6.4 oz)   SpO2 97%   BMI 22.82 kg/m²     Body mass index is 22.82 kg/m².  Physical Exam   Constitutional: She is oriented to person, place, and time. She appears well-developed and well-nourished.   Cardiovascular: Normal rate, regular rhythm, normal heart sounds and intact distal pulses.   Pulmonary/Chest: Effort normal and breath sounds normal.   Musculoskeletal: Normal range of motion. She exhibits no edema.   Neurological: She is alert and oriented to person, place, and time.   Skin: Skin is warm and dry.   Psychiatric: She has a normal mood and affect. Her behavior is normal.         Assessment/Plan   Ryann was seen today for dizziness.    Diagnoses and all " orders for this visit:    Dizziness  -     CBC & Differential  -     Comprehensive Metabolic Panel  -     Ambulatory Referral to Physical Therapy Evaluate and treat    BPPV (benign paroxysmal positional vertigo), unspecified laterality  -     Ambulatory Referral to Physical Therapy Evaluate and treat    Paroxysmal atrial fibrillation (CMS/HCC)    Ascending aortic aneurysm (CMS/HCC)        F/U if worse or no better and gave ER warnings.

## 2020-01-09 LAB
ALBUMIN SERPL-MCNC: 4.5 G/DL (ref 3.5–5.2)
ALBUMIN/GLOB SERPL: 1.3 G/DL
ALP SERPL-CCNC: 79 U/L (ref 39–117)
ALT SERPL-CCNC: 9 U/L (ref 1–33)
AST SERPL-CCNC: 17 U/L (ref 1–32)
BASOPHILS # BLD AUTO: 0.04 10*3/MM3 (ref 0–0.2)
BASOPHILS NFR BLD AUTO: 0.9 % (ref 0–1.5)
BILIRUB SERPL-MCNC: 1.2 MG/DL (ref 0.2–1.2)
BUN SERPL-MCNC: 15 MG/DL (ref 8–23)
BUN/CREAT SERPL: 14.6 (ref 7–25)
CALCIUM SERPL-MCNC: 9.9 MG/DL (ref 8.6–10.5)
CHLORIDE SERPL-SCNC: 103 MMOL/L (ref 98–107)
CO2 SERPL-SCNC: 28.3 MMOL/L (ref 22–29)
CREAT SERPL-MCNC: 1.03 MG/DL (ref 0.57–1)
EOSINOPHIL # BLD AUTO: 0.04 10*3/MM3 (ref 0–0.4)
EOSINOPHIL NFR BLD AUTO: 0.9 % (ref 0.3–6.2)
ERYTHROCYTE [DISTWIDTH] IN BLOOD BY AUTOMATED COUNT: 12.9 % (ref 12.3–15.4)
GLOBULIN SER CALC-MCNC: 3.4 GM/DL
GLUCOSE SERPL-MCNC: 94 MG/DL (ref 65–99)
HCT VFR BLD AUTO: 37.5 % (ref 34–46.6)
HGB BLD-MCNC: 12.5 G/DL (ref 12–15.9)
IMM GRANULOCYTES # BLD AUTO: 0.01 10*3/MM3 (ref 0–0.05)
IMM GRANULOCYTES NFR BLD AUTO: 0.2 % (ref 0–0.5)
LYMPHOCYTES # BLD AUTO: 1.83 10*3/MM3 (ref 0.7–3.1)
LYMPHOCYTES NFR BLD AUTO: 39.7 % (ref 19.6–45.3)
MCH RBC QN AUTO: 33.6 PG (ref 26.6–33)
MCHC RBC AUTO-ENTMCNC: 33.3 G/DL (ref 31.5–35.7)
MCV RBC AUTO: 100.8 FL (ref 79–97)
MONOCYTES # BLD AUTO: 0.54 10*3/MM3 (ref 0.1–0.9)
MONOCYTES NFR BLD AUTO: 11.7 % (ref 5–12)
NEUTROPHILS # BLD AUTO: 2.15 10*3/MM3 (ref 1.7–7)
NEUTROPHILS NFR BLD AUTO: 46.6 % (ref 42.7–76)
NRBC BLD AUTO-RTO: 0 /100 WBC (ref 0–0.2)
PLATELET # BLD AUTO: 249 10*3/MM3 (ref 140–450)
POTASSIUM SERPL-SCNC: 5.2 MMOL/L (ref 3.5–5.2)
PROT SERPL-MCNC: 7.9 G/DL (ref 6–8.5)
RBC # BLD AUTO: 3.72 10*6/MM3 (ref 3.77–5.28)
SODIUM SERPL-SCNC: 144 MMOL/L (ref 136–145)
WBC # BLD AUTO: 4.61 10*3/MM3 (ref 3.4–10.8)

## 2020-02-05 ENCOUNTER — HOSPITAL ENCOUNTER (OUTPATIENT)
Facility: HOSPITAL | Age: 85
Setting detail: OBSERVATION
Discharge: HOME | End: 2020-02-06
Attending: EMERGENCY MEDICINE | Admitting: INTERNAL MEDICINE

## 2020-02-05 ENCOUNTER — APPOINTMENT (OUTPATIENT)
Dept: CT IMAGING | Facility: HOSPITAL | Age: 85
End: 2020-02-05

## 2020-02-05 ENCOUNTER — APPOINTMENT (OUTPATIENT)
Dept: GENERAL RADIOLOGY | Facility: HOSPITAL | Age: 85
End: 2020-02-05

## 2020-02-05 ENCOUNTER — APPOINTMENT (OUTPATIENT)
Dept: MRI IMAGING | Facility: HOSPITAL | Age: 85
End: 2020-02-05

## 2020-02-05 DIAGNOSIS — G45.9 TIA (TRANSIENT ISCHEMIC ATTACK): Primary | ICD-10-CM

## 2020-02-05 LAB
ABO GROUP BLD: NORMAL
ALBUMIN SERPL-MCNC: 4.4 G/DL (ref 3.5–5.2)
ALBUMIN/GLOB SERPL: 1.3 G/DL
ALP SERPL-CCNC: 86 U/L (ref 39–117)
ALT SERPL W P-5'-P-CCNC: 11 U/L (ref 1–33)
ANION GAP SERPL CALCULATED.3IONS-SCNC: 12.2 MMOL/L (ref 5–15)
APTT PPP: 28.9 SECONDS (ref 22.7–35.4)
AST SERPL-CCNC: 15 U/L (ref 1–32)
BASOPHILS # BLD AUTO: 0.02 10*3/MM3 (ref 0–0.2)
BASOPHILS NFR BLD AUTO: 0.4 % (ref 0–1.5)
BILIRUB SERPL-MCNC: 1 MG/DL (ref 0.2–1.2)
BLD GP AB SCN SERPL QL: NEGATIVE
BUN BLD-MCNC: 13 MG/DL (ref 8–23)
BUN/CREAT SERPL: 15.9 (ref 7–25)
CALCIUM SPEC-SCNC: 9.4 MG/DL (ref 8.6–10.5)
CHLORIDE SERPL-SCNC: 102 MMOL/L (ref 98–107)
CO2 SERPL-SCNC: 27.8 MMOL/L (ref 22–29)
CREAT BLD-MCNC: 0.82 MG/DL (ref 0.57–1)
DEPRECATED RDW RBC AUTO: 47.8 FL (ref 37–54)
EOSINOPHIL # BLD AUTO: 0.07 10*3/MM3 (ref 0–0.4)
EOSINOPHIL NFR BLD AUTO: 1.5 % (ref 0.3–6.2)
ERYTHROCYTE [DISTWIDTH] IN BLOOD BY AUTOMATED COUNT: 13 % (ref 12.3–15.4)
GFR SERPL CREATININE-BSD FRML MDRD: 80 ML/MIN/1.73
GLOBULIN UR ELPH-MCNC: 3.3 GM/DL
GLUCOSE BLD-MCNC: 93 MG/DL (ref 65–99)
HCT VFR BLD AUTO: 37.7 % (ref 34–46.6)
HGB BLD-MCNC: 12.6 G/DL (ref 12–15.9)
HOLD SPECIMEN: NORMAL
IMM GRANULOCYTES # BLD AUTO: 0.01 10*3/MM3 (ref 0–0.05)
IMM GRANULOCYTES NFR BLD AUTO: 0.2 % (ref 0–0.5)
INR PPP: 0.98 (ref 0.9–1.1)
LYMPHOCYTES # BLD AUTO: 1.95 10*3/MM3 (ref 0.7–3.1)
LYMPHOCYTES NFR BLD AUTO: 42.5 % (ref 19.6–45.3)
MCH RBC QN AUTO: 33.7 PG (ref 26.6–33)
MCHC RBC AUTO-ENTMCNC: 33.4 G/DL (ref 31.5–35.7)
MCV RBC AUTO: 100.8 FL (ref 79–97)
MONOCYTES # BLD AUTO: 0.49 10*3/MM3 (ref 0.1–0.9)
MONOCYTES NFR BLD AUTO: 10.7 % (ref 5–12)
NEUTROPHILS # BLD AUTO: 2.05 10*3/MM3 (ref 1.7–7)
NEUTROPHILS NFR BLD AUTO: 44.7 % (ref 42.7–76)
NRBC BLD AUTO-RTO: 0 /100 WBC (ref 0–0.2)
PLATELET # BLD AUTO: 223 10*3/MM3 (ref 140–450)
PMV BLD AUTO: 10.3 FL (ref 6–12)
POTASSIUM BLD-SCNC: 3.9 MMOL/L (ref 3.5–5.2)
PROT SERPL-MCNC: 7.7 G/DL (ref 6–8.5)
PROTHROMBIN TIME: 12.7 SECONDS (ref 11.7–14.2)
RBC # BLD AUTO: 3.74 10*6/MM3 (ref 3.77–5.28)
RH BLD: POSITIVE
SODIUM BLD-SCNC: 142 MMOL/L (ref 136–145)
T&S EXPIRATION DATE: NORMAL
TROPONIN T SERPL-MCNC: <0.01 NG/ML (ref 0–0.03)
WBC NRBC COR # BLD: 4.59 10*3/MM3 (ref 3.4–10.8)
WHOLE BLOOD HOLD SPECIMEN: NORMAL
WHOLE BLOOD HOLD SPECIMEN: NORMAL

## 2020-02-05 PROCEDURE — 80053 COMPREHEN METABOLIC PANEL: CPT | Performed by: EMERGENCY MEDICINE

## 2020-02-05 PROCEDURE — 0 IOPAMIDOL PER 1 ML: Performed by: EMERGENCY MEDICINE

## 2020-02-05 PROCEDURE — 70498 CT ANGIOGRAPHY NECK: CPT

## 2020-02-05 PROCEDURE — 86850 RBC ANTIBODY SCREEN: CPT | Performed by: EMERGENCY MEDICINE

## 2020-02-05 PROCEDURE — 86900 BLOOD TYPING SEROLOGIC ABO: CPT | Performed by: EMERGENCY MEDICINE

## 2020-02-05 PROCEDURE — 85025 COMPLETE CBC W/AUTO DIFF WBC: CPT | Performed by: EMERGENCY MEDICINE

## 2020-02-05 PROCEDURE — 93010 ELECTROCARDIOGRAM REPORT: CPT | Performed by: INTERNAL MEDICINE

## 2020-02-05 PROCEDURE — 71045 X-RAY EXAM CHEST 1 VIEW: CPT

## 2020-02-05 PROCEDURE — G0378 HOSPITAL OBSERVATION PER HR: HCPCS

## 2020-02-05 PROCEDURE — 70496 CT ANGIOGRAPHY HEAD: CPT

## 2020-02-05 PROCEDURE — 85610 PROTHROMBIN TIME: CPT | Performed by: EMERGENCY MEDICINE

## 2020-02-05 PROCEDURE — 82565 ASSAY OF CREATININE: CPT

## 2020-02-05 PROCEDURE — 86901 BLOOD TYPING SEROLOGIC RH(D): CPT | Performed by: EMERGENCY MEDICINE

## 2020-02-05 PROCEDURE — 70551 MRI BRAIN STEM W/O DYE: CPT

## 2020-02-05 PROCEDURE — 93005 ELECTROCARDIOGRAM TRACING: CPT | Performed by: EMERGENCY MEDICINE

## 2020-02-05 PROCEDURE — 99285 EMERGENCY DEPT VISIT HI MDM: CPT

## 2020-02-05 PROCEDURE — 84484 ASSAY OF TROPONIN QUANT: CPT | Performed by: EMERGENCY MEDICINE

## 2020-02-05 PROCEDURE — 0042T HC CT CEREBRAL PERFUSION W/WO CONTRAST: CPT

## 2020-02-05 PROCEDURE — 85730 THROMBOPLASTIN TIME PARTIAL: CPT | Performed by: EMERGENCY MEDICINE

## 2020-02-05 RX ORDER — ATORVASTATIN CALCIUM 80 MG/1
80 TABLET, FILM COATED ORAL NIGHTLY
Status: DISCONTINUED | OUTPATIENT
Start: 2020-02-05 | End: 2020-02-06 | Stop reason: HOSPADM

## 2020-02-05 RX ORDER — LABETALOL HYDROCHLORIDE 5 MG/ML
20 INJECTION, SOLUTION INTRAVENOUS
Status: DISCONTINUED | OUTPATIENT
Start: 2020-02-05 | End: 2020-02-06 | Stop reason: HOSPADM

## 2020-02-05 RX ORDER — ASPIRIN 325 MG
325 TABLET ORAL ONCE
Status: COMPLETED | OUTPATIENT
Start: 2020-02-05 | End: 2020-02-05

## 2020-02-05 RX ORDER — SODIUM CHLORIDE 9 MG/ML
75 INJECTION, SOLUTION INTRAVENOUS CONTINUOUS
Status: DISCONTINUED | OUTPATIENT
Start: 2020-02-05 | End: 2020-02-06

## 2020-02-05 RX ORDER — SODIUM CHLORIDE 0.9 % (FLUSH) 0.9 %
10 SYRINGE (ML) INJECTION AS NEEDED
Status: DISCONTINUED | OUTPATIENT
Start: 2020-02-05 | End: 2020-02-06 | Stop reason: HOSPADM

## 2020-02-05 RX ORDER — ASPIRIN 325 MG
325 TABLET ORAL DAILY
Status: DISCONTINUED | OUTPATIENT
Start: 2020-02-05 | End: 2020-02-06 | Stop reason: HOSPADM

## 2020-02-05 RX ORDER — SODIUM CHLORIDE 0.9 % (FLUSH) 0.9 %
10 SYRINGE (ML) INJECTION EVERY 12 HOURS SCHEDULED
Status: DISCONTINUED | OUTPATIENT
Start: 2020-02-05 | End: 2020-02-06 | Stop reason: HOSPADM

## 2020-02-05 RX ORDER — ACETAMINOPHEN 325 MG/1
650 TABLET ORAL EVERY 6 HOURS PRN
Status: DISCONTINUED | OUTPATIENT
Start: 2020-02-05 | End: 2020-02-06 | Stop reason: HOSPADM

## 2020-02-05 RX ORDER — ASPIRIN 300 MG/1
300 SUPPOSITORY RECTAL DAILY
Status: DISCONTINUED | OUTPATIENT
Start: 2020-02-05 | End: 2020-02-06 | Stop reason: HOSPADM

## 2020-02-05 RX ADMIN — ASPIRIN 325 MG: 325 TABLET ORAL at 16:40

## 2020-02-05 RX ADMIN — ACETAMINOPHEN 650 MG: 325 TABLET, FILM COATED ORAL at 21:57

## 2020-02-05 RX ADMIN — IOPAMIDOL 150 ML: 755 INJECTION, SOLUTION INTRAVENOUS at 15:33

## 2020-02-05 RX ADMIN — SODIUM CHLORIDE 75 ML/HR: 9 INJECTION, SOLUTION INTRAVENOUS at 21:24

## 2020-02-05 RX ADMIN — SODIUM CHLORIDE, PRESERVATIVE FREE 10 ML: 5 INJECTION INTRAVENOUS at 21:26

## 2020-02-05 RX ADMIN — ATORVASTATIN CALCIUM 80 MG: 80 TABLET, FILM COATED ORAL at 21:25

## 2020-02-05 NOTE — H&P
[facility]    Patient Identification:  Name: Ryann Hernadez  Age: 88 y.o.  Sex: female  :  1931  MRN: [id number]         Primary Care Physician: Vanesa Connor MD    Chief Complaint   Patient presents with   • Speech Problem     Subjective   Patient is a 88 y.o. female this morning she was taking her car in for service noticed that she had left hand numbness as well as difficulty finding words.  This started before noon possibly around 10 AM.  Symptoms have essentially resolved.  She denies any problems with understanding people, walking, slurred speech, weakness.  She states she has some chronic problems with balance and high blood pressure.  In 2019 she was admitted for new onset atrial fibrillation and it looks like she was started on Eliquis at that time but it is not on her home med list    Past Medical History:   Diagnosis Date   • A-fib (CMS/Formerly [facility])    • Allergic rhinitis    • Anemia    • Aneurysm, ascending aorta (CMS/Formerly [facility]) 2016    5 CM   • Anxiety    • Ataxic gait    • Atypical chest pain    • BPPV (benign paroxysmal positional vertigo)    • Bradycardia    • Chronic cystitis    • Chronic headaches    • Chronic nonintractable headache 2016   • Chronic paroxysmal hemicrania    • Depression    • Esophagitis    • Gastritis    • GERD (gastroesophageal reflux disease)    • Hematuria    • High blood pressure    • History of cataract    • History of irritable bowel syndrome    • Hypertension    • IBS (irritable bowel syndrome)    • Influenza A    • Kidney lesion     LEFT KIDNEY CYSTIC   • Labyrinthitis 2014   • OA (osteoarthritis)    • Osteoporosis 2018   • Other abnormal clinical finding 2012    LEFT UTEROCELE   • Pain of thigh    • Personal history of gastric ulcer    • Renal disorder    • Renal insufficiency 2017   • TIA (transient ischemic attack) 2020     Past Surgical History:   Procedure  Laterality Date   • BREAST LUMPECTOMY     • BREAST SURGERY     • CATARACT EXTRACTION     • COLONOSCOPY N/A     DR. MARGARITA MENDOZA   • ELBOW ARTHROTOMY  07/01/1999    DR. RYAN RITCHIE   • EYE SURGERY     • FRACTURE SURGERY      HAND, BROKEN FINGERS  DR. MIKE CHRISTIANSON   • KNEE SURGERY  08/31/1999    DR. THADDEUS MENDES   • OOPHORECTOMY     • TUBAL ABDOMINAL LIGATION Bilateral 1962    DR. EDDA MANNING     Current medications reviewed.    Family History   Problem Relation Age of Onset   • Heart attack Mother    • Arthritis Mother    • Arthritis Father    • Arthritis Sister    • Arthritis Other    • Kidney disease Other    • Thyroid disease Other    • Diabetes Other    • Hypertension Other    • Heart disease Other    • Arthritis Maternal Grandmother      Social History     Tobacco Use   • Smoking status: Never Smoker   • Smokeless tobacco: Never Used   Substance Use Topics   • Alcohol use: No   • Drug use: No     Review of Systems   Constitutional: Negative.  Negative for chills and fever.   HENT: Negative.    Eyes: Negative.    Respiratory: Negative.  Negative for chest tightness and shortness of breath.    Cardiovascular: Positive for leg swelling (ankles (chronic)). Negative for chest pain.   Gastrointestinal: Negative.  Negative for diarrhea, nausea and vomiting.   Endocrine: Negative.    Genitourinary: Negative.    Musculoskeletal: Negative.    Skin: Negative.    Allergic/Immunologic: Negative.    Neurological: Positive for speech difficulty and numbness. Negative for weakness.   Hematological: Negative.    Psychiatric/Behavioral: Negative.      Objective      Vital Signs  Temp:  [97.9 °F (36.6 °C)] 97.9 °F (36.6 °C)  Heart Rate:  [67-77] 69  Resp:  [16] 16  BP: (145-169)/(91-95) 148/92  Body mass index is 22.77 kg/m².    Physical Exam   Constitutional: She is oriented to person, place, and time. She appears well-developed and well-nourished.  Non-toxic appearance. She does not have a sickly appearance. She does not appear  ill. No distress.   HENT:   Head: Normocephalic and atraumatic.   Eyes: Conjunctivae and EOM are normal.   Neck: Neck supple. No tracheal deviation present.   Cardiovascular: Normal rate, regular rhythm and intact distal pulses.   Pulses:       Radial pulses are 2+ on the right side, and 2+ on the left side.        Dorsalis pedis pulses are 2+ on the right side, and 2+ on the left side.   Pulmonary/Chest: Effort normal and breath sounds normal. No respiratory distress. She has no wheezes. She has no rales.   Abdominal: Soft. Bowel sounds are normal. She exhibits no distension. There is no tenderness. There is no rebound and no guarding.   Musculoskeletal: She exhibits edema (ankles).   Lymphadenopathy:     She has no cervical adenopathy.   Neurological: She is alert and oriented to person, place, and time. She has normal strength.   face symmetric, tongue midline, no drift, speech fluent.   Skin: Skin is warm and dry.   Psychiatric: She has a normal mood and affect. Her behavior is normal.     Results Review:  I reviewed the patient's new clinical results.  I reviewed the patient's new imaging results and agree with the interpretation.  I personally viewed and interpreted the patient's EKG/Telemetry data.    Lab Results   Component Value Date    ANIONGAP 12.2 02/05/2020     Estimated Creatinine Clearance: 47.9 mL/min (by C-G formula based on SCr of 0.82 mg/dL).    Assessment/Plan   Active Hospital Problems    Diagnosis  POA   • **TIA (transient ischemic attack) [G45.9]  Yes   • Aortic root dilatation (CMS/HCC) [I77.810]  Yes   • Paroxysmal atrial fibrillation (CMS/HCC) [I48.0]  Yes   • HTN (hypertension) [I10]  Yes      Resolved Hospital Problems   No resolved problems to display.     88 y.o. female with left hand numbness and speech difficulty.  She has a history of paroxysmal atrial fibrillation diagnosed September, refused Eliquis    · CT/CTA unremarkable  · check MRI, echo  · Aspirin, statin, fluids  · Neurology  consult  · She has a history of paroxysmal atrial fibrillation diagnosed September 2019 and at that time was discharged on Eliquis she is currently not taking.  Office visit with PCP November 2019 notes that she refused to take Eliquis and it looks like even during September 2019 admission she stated that she might not take the Eliquis at discharge.  · She has chronic ankle swelling (Norvasc could be contributing)  · Daughter is healthcare surrogate.  Full code.  · discussed with patient, family and nursing staff and Dr. Yen.    Lance Puentes MD  02/05/20  7:10 PM

## 2020-02-05 NOTE — ED TRIAGE NOTES
"Pt to ED for subjective aphasia. Pt reports she is unsure of the last time her speech was clear, but speech is clear when pt arrived to ED.    Pt reports she was etting her car worked on and noticed she was having difficulty getting her words \"out\". Also reports numbness in fingers. Numbness started in her left thumb and went to 5th fingers. Numbness resolved at this time.     Pt then drove herself to [facility] and EMS was called.    Pt reports mild headache starting this am.    Not on blood thinners  "

## 2020-02-05 NOTE — ED PROVIDER NOTES
EMERGENCY DEPARTMENT ENCOUNTER    Room Number:  38/38  PCP: Vanesa Connor MD  Historian: Pt  History Limited By: None      HPI  Chief Complaint: Speech difficulty   Context: Ryann Hernadez is a 88 y.o. female who presents to the ED c/o an episode of word finding difficulty that occurred at 10:00AM. Pt was talking to her  and could not find the right words. Her speech was last known normal last night. She also c/o left hand numbness at the same time of the speech difficulty. These symptoms have resolved. She c/o intermittent headache for several days. Pt denies fever, CP, SOA, visual disturbance, and weakness.      Location: speech  Character: word finding difficulty   Duration: pt noticed at 10:00AM  Severity: moderate   Progression: resolved  Aggravating Factors: none  Alleviating Factors: none            PAST MEDICAL HISTORY  Active Ambulatory Problems     Diagnosis Date Noted   • Allergic rhinitis 01/15/2014   • Gastroesophageal reflux disease 01/15/2014   • HTN (hypertension) 01/09/2013   • Ataxic gait 08/12/2016   • Balance disorder 08/12/2016   • Acute right otitis media 08/12/2016   • Chronic nonintractable headache 09/14/2016   • Facial swelling 02/23/2017   • Dizziness 02/23/2017   • Medicare annual wellness visit, initial 03/06/2017   • Hospital discharge follow-up 03/06/2017   • At high risk for falls 08/07/2017   • Renal insufficiency 08/07/2017   • Paroxysmal atrial fibrillation (CMS/HCC) 09/22/2019   • Ureteral stone with hydronephrosis 09/22/2019   • Hydronephrosis due to obstruction of ureter 09/24/2019   • Aortic root dilatation (CMS/HCC) 10/10/2019   • MCI (mild cognitive impairment) 09/26/2018   • Osteoporosis 09/13/2018   • Corn of foot 11/05/2019   • BPPV (benign paroxysmal positional vertigo), unspecified laterality 01/08/2020     Resolved Ambulatory Problems     Diagnosis Date Noted   • Acute recurrent maxillary sinusitis 02/23/2017     Past Medical History:   Diagnosis Date    • A-fib (CMS/[facility])    • Anemia    • Aneurysm, ascending aorta (CMS/[facility]) 03/22/2016   • Anxiety    • Atypical chest pain    • BPPV (benign paroxysmal positional vertigo)    • Bradycardia    • Chronic cystitis    • Chronic headaches    • Chronic paroxysmal hemicrania    • Depression    • Esophagitis    • Gastritis    • GERD (gastroesophageal reflux disease)    • Hematuria    • High blood pressure    • History of cataract    • History of irritable bowel syndrome    • Hypertension    • IBS (irritable bowel syndrome)    • Influenza A    • Kidney lesion    • Labyrinthitis 07/28/2014   • OA (osteoarthritis)    • Other abnormal clinical finding 09/21/2012   • Pain of thigh    • Personal history of gastric ulcer    • Renal disorder    • TIA (transient ischemic attack) 2/5/2020         PAST SURGICAL HISTORY  Past Surgical History:   Procedure Laterality Date   • BREAST LUMPECTOMY     • BREAST SURGERY     • CATARACT EXTRACTION     • COLONOSCOPY N/A     DR. MARGARITA MENDOZA   • ELBOW ARTHROTOMY  07/01/1999    DR. RYAN RITCHIE   • EYE SURGERY     • FRACTURE SURGERY      HAND, BROKEN FINGERS  DR. MIKE CHRISTIANSON   • KNEE SURGERY  08/31/1999    DR. THADDEUS MENDES   • OOPHORECTOMY     • TUBAL ABDOMINAL LIGATION Bilateral 1962    DR. EDDA MANNING         FAMILY HISTORY  Family History   Problem Relation Age of Onset   • Heart attack Mother    • Arthritis Mother    • Arthritis Father    • Arthritis Sister    • Arthritis Other    • Kidney disease Other    • Thyroid disease Other    • Diabetes Other    • Hypertension Other    • Heart disease Other    • Arthritis Maternal Grandmother          SOCIAL HISTORY  Social History     Socioeconomic History   • Marital status: Single     Spouse name: Not on file   • Number of children: Not on file   • Years of education: Not on file   • Highest education level: Not on file   Tobacco Use   • Smoking status: Never Smoker   • Smokeless tobacco: Never Used   Substance and Sexual Activity   • Alcohol use: No    • Drug use: No   • Sexual activity: Defer         ALLERGIES  Sulfa antibiotics; Lisinopril; and Olmesartan        REVIEW OF SYSTEMS  Review of Systems   Constitutional: Negative for fever.   HENT: Negative for sore throat.    Eyes: Negative.  Negative for visual disturbance.   Respiratory: Negative for cough and shortness of breath.    Cardiovascular: Negative for chest pain.   Gastrointestinal: Negative for abdominal pain, diarrhea and vomiting.   Genitourinary: Negative for dysuria.   Musculoskeletal: Negative for neck pain.   Skin: Negative for rash.   Allergic/Immunologic: Negative.    Neurological: Positive for speech difficulty (resolved) and numbness (left hand (resolved)). Negative for weakness and headaches.   Hematological: Negative.    Psychiatric/Behavioral: Negative.    All other systems reviewed and are negative.           PHYSICAL EXAM  ED Triage Vitals   Temp Pulse Resp BP SpO2   -- -- -- -- --      Temp src Heart Rate Source Patient Position BP Location FiO2 (%)   -- -- -- -- --       Physical Exam   Constitutional: She is oriented to person, place, and time. No distress.   HENT:   Head: Normocephalic and atraumatic.   Eyes: Pupils are equal, round, and reactive to light. EOM are normal.   Neck: Normal range of motion. Neck supple.   Cardiovascular: Normal rate, regular rhythm and normal heart sounds.   Pulmonary/Chest: Effort normal and breath sounds normal. No respiratory distress.   Abdominal: Soft. There is no tenderness. There is no rebound and no guarding.   Musculoskeletal: Normal range of motion. She exhibits no edema.   Neurological: She is alert and oriented to person, place, and time. She has normal sensation and normal strength. She displays abnormal speech (very mild aphasia). She displays no weakness and facial symmetry. No sensory deficit.   No focal deficit   Skin: Skin is warm and dry. No rash noted.   Psychiatric: Mood and affect normal.   Nursing note and vitals  reviewed.          LAB RESULTS  Recent Results (from the past 24 hour(s))   Comprehensive Metabolic Panel    Collection Time: 02/05/20  3:14 PM   Result Value Ref Range    Glucose 93 65 - 99 mg/dL    BUN 13 8 - 23 mg/dL    Creatinine 0.82 0.57 - 1.00 mg/dL    Sodium 142 136 - 145 mmol/L    Potassium 3.9 3.5 - 5.2 mmol/L    Chloride 102 98 - 107 mmol/L    CO2 27.8 22.0 - 29.0 mmol/L    Calcium 9.4 8.6 - 10.5 mg/dL    Total Protein 7.7 6.0 - 8.5 g/dL    Albumin 4.40 3.50 - 5.20 g/dL    ALT (SGPT) 11 1 - 33 U/L    AST (SGOT) 15 1 - 32 U/L    Alkaline Phosphatase 86 39 - 117 U/L    Total Bilirubin 1.0 0.2 - 1.2 mg/dL    eGFR  African Amer 80 >60 mL/min/1.73    Globulin 3.3 gm/dL    A/G Ratio 1.3 g/dL    BUN/Creatinine Ratio 15.9 7.0 - 25.0    Anion Gap 12.2 5.0 - 15.0 mmol/L   Protime-INR    Collection Time: 02/05/20  3:14 PM   Result Value Ref Range    Protime 12.7 11.7 - 14.2 Seconds    INR 0.98 0.90 - 1.10   aPTT    Collection Time: 02/05/20  3:14 PM   Result Value Ref Range    PTT 28.9 22.7 - 35.4 seconds   Troponin    Collection Time: 02/05/20  3:14 PM   Result Value Ref Range    Troponin T <0.010 0.000 - 0.030 ng/mL   Type & Screen    Collection Time: 02/05/20  3:14 PM   Result Value Ref Range    ABO Type B     RH type Positive     Antibody Screen Negative     T&S Expiration Date 2/8/2020 11:59:59 PM    Light Blue Top    Collection Time: 02/05/20  3:14 PM   Result Value Ref Range    Extra Tube hold for add-on    Green Top (Gel)    Collection Time: 02/05/20  3:14 PM   Result Value Ref Range    Extra Tube Hold for add-ons.    Lavender Top    Collection Time: 02/05/20  3:14 PM   Result Value Ref Range    Extra Tube hold for add-on    CBC Auto Differential    Collection Time: 02/05/20  3:14 PM   Result Value Ref Range    WBC 4.59 3.40 - 10.80 10*3/mm3    RBC 3.74 (L) 3.77 - 5.28 10*6/mm3    Hemoglobin 12.6 12.0 - 15.9 g/dL    Hematocrit 37.7 34.0 - 46.6 %    .8 (H) 79.0 - 97.0 fL    MCH 33.7 (H) 26.6 - 33.0 pg     MCHC 33.4 31.5 - 35.7 g/dL    RDW 13.0 12.3 - 15.4 %    RDW-SD 47.8 37.0 - 54.0 fl    MPV 10.3 6.0 - 12.0 fL    Platelets 223 140 - 450 10*3/mm3    Neutrophil % 44.7 42.7 - 76.0 %    Lymphocyte % 42.5 19.6 - 45.3 %    Monocyte % 10.7 5.0 - 12.0 %    Eosinophil % 1.5 0.3 - 6.2 %    Basophil % 0.4 0.0 - 1.5 %    Immature Grans % 0.2 0.0 - 0.5 %    Neutrophils, Absolute 2.05 1.70 - 7.00 10*3/mm3    Lymphocytes, Absolute 1.95 0.70 - 3.10 10*3/mm3    Monocytes, Absolute 0.49 0.10 - 0.90 10*3/mm3    Eosinophils, Absolute 0.07 0.00 - 0.40 10*3/mm3    Basophils, Absolute 0.02 0.00 - 0.20 10*3/mm3    Immature Grans, Absolute 0.01 0.00 - 0.05 10*3/mm3    nRBC 0.0 0.0 - 0.2 /100 WBC       Ordered the above labs and reviewed the results.        RADIOLOGY  XR Chest 1 View    (Results Pending)   CT Angiogram Neck    (Results Pending)   CT Angiogram Head    (Results Pending)   CT Cerebral Perfusion With & Without Contrast    (Results Pending)        Ordered the above noted radiological studies. Reviewed by me in PACS.      EKG          EKG time: 16:12  Rhythm/Rate: NSR rare 67  P waves and TX: First degree AV block  QRS, axis: LVH   ST and T waves: nl    Interpreted Contemporaneously by me, independently viewed  Unchanged compared to prior 9/22/19      PROCEDURES  Procedures    NIH Stroke Scale  Interval: baseline  1a. Level of Consciousness: 0-->Alert, keenly responsive  1b. LOC Questions: 0-->Answers both questions correctly  1c. LOC Commands: 0-->Performs both tasks correctly  2. Best Gaze: 0-->Normal  3. Visual: 0-->No visual loss  4. Facial Palsy: 0-->Normal symmetrical movements  5a. Motor Arm, Left: 0-->No drift, limb holds 90 (or 45) degrees for full 10 secs  5b. Motor Arm, Right: 0-->No drift, limb holds 90 (or 45) degrees for full 10 secs  6a. Motor Leg, Left: 0-->No drift, leg holds 30 degree position for full 5 secs  6b. Motor Leg, Right: 0-->No drift, leg holds 30 degree position for full 5 secs  7. Limb Ataxia:  0-->Absent  8. Sensory: 0-->Normal, no sensory loss  9. Best Language: 1-->Mild-to-moderate aphasia, some obvious loss of fluency or facility of comprehension, without significant limitation on ideas expressed or form of expression. Reduction of speech and/or comprehension, however, makes conversation. . . (see row details)  10. Dysarthria: 0-->Normal  11. Extinction and Inattention (formerly Neglect): 0-->No abnormality    Total (NIH Stroke Scale): 1        MEDICATIONS GIVEN IN ER  Medications   sodium chloride 0.9 % flush 10 mL (has no administration in time range)   iopamidol (ISOVUE-370) 76 % injection 150 mL (150 mL Intravenous Given by Other 2/5/20 1533)   aspirin tablet 325 mg (325 mg Oral Given 2/5/20 1640)             PROGRESS AND CONSULTS  ED Course as of Feb 05 1646   Wed Feb 05, 2020   1623 4:23 PM  Patient here for expressive aphasia and left arm numbness.  Normal last night so not a tpa candidate.  Discussed with Dr. Marcus.  CT a and P normal.  Given aspirin and will admit.    [SL]   1644 4:44 PM  Discussed with Dr. Puenets and will admit.    [SL]      ED Course User Index  [SL] Brijesh Yen MD     3:09 PM  Discussed pt case with Dr. Marcus, stroke neurology. He requested all Team D imaging be ordered.   Pt is not a tPA candidate due to last known normal being last night and NIH of 1.  Labs and imaging ordered.       MEDICAL DECISION MAKING      MDM  Number of Diagnoses or Management Options     Amount and/or Complexity of Data Reviewed  Clinical lab tests: ordered and reviewed  Tests in the radiology section of CPT®: ordered and reviewed  Tests in the medicine section of CPT®: reviewed and ordered  Decide to obtain previous medical records or to obtain history from someone other than the patient: yes  Review and summarize past medical records: yes  Discuss the patient with other providers: yes (Dr. Marcus, stroke neurology)               DIAGNOSIS  Final diagnoses:   TIA (transient  ischemic attack)           DISPOSITION  admit        Latest Documented Vital Signs:  As of 4:46 PM  BP- 169/92 HR- 73 Temp- 97.9 °F (36.6 °C) (Oral) O2 sat- 98%        --  Documentation assistance provided by rosalba Calvin for Dr. Yovana MD.  Information recorded by the scribe was done at my direction and has been verified and validated by me.                   Karla Calvin  02/05/20 0239       Brijesh Yen MD  02/05/20 3285

## 2020-02-06 ENCOUNTER — APPOINTMENT (OUTPATIENT)
Dept: CARDIOLOGY | Facility: HOSPITAL | Age: 85
End: 2020-02-06

## 2020-02-06 VITALS
TEMPERATURE: 98 F | HEART RATE: 90 BPM | DIASTOLIC BLOOD PRESSURE: 100 MMHG | OXYGEN SATURATION: 93 % | WEIGHT: 142 LBS | BODY MASS INDEX: 22.82 KG/M2 | SYSTOLIC BLOOD PRESSURE: 135 MMHG | RESPIRATION RATE: 16 BRPM | HEIGHT: 66 IN

## 2020-02-06 PROBLEM — I71.21 ASCENDING AORTIC ANEURYSM (HCC): Status: ACTIVE | Noted: 2020-02-06

## 2020-02-06 PROBLEM — I82.462 ACUTE DEEP VEIN THROMBOSIS (DVT) OF CALF MUSCLE VEIN OF LEFT LOWER EXTREMITY (HCC): Status: ACTIVE | Noted: 2020-02-06

## 2020-02-06 LAB
AORTIC ARCH: 3.95 CM
AORTIC DIMENSIONLESS INDEX: 0.6 (DI)
ASCENDING AORTA: 4.7 CM
BH CV ECHO MEAS - ACS: 1.6 CM
BH CV ECHO MEAS - AI DEC SLOPE: 109.8 CM/SEC^2
BH CV ECHO MEAS - AI MAX PG: 35.2 MMHG
BH CV ECHO MEAS - AI MAX VEL: 294 CM/SEC
BH CV ECHO MEAS - AI P1/2T: 784.6 MSEC
BH CV ECHO MEAS - AO MAX PG: 15 MMHG
BH CV ECHO MEAS - AO MEAN PG (FULL): 5 MMHG
BH CV ECHO MEAS - AO MEAN PG: 8 MMHG
BH CV ECHO MEAS - AO ROOT AREA (BSA CORRECTED): 2
BH CV ECHO MEAS - AO ROOT AREA: 9.1 CM^2
BH CV ECHO MEAS - AO ROOT DIAM: 3.4 CM
BH CV ECHO MEAS - AO V2 MAX: 193 CM/SEC
BH CV ECHO MEAS - AO V2 MEAN: 130 CM/SEC
BH CV ECHO MEAS - AO V2 VTI: 44.7 CM
BH CV ECHO MEAS - AVA(I,A): 1.7 CM^2
BH CV ECHO MEAS - AVA(I,D): 1.7 CM^2
BH CV ECHO MEAS - BSA(HAYCOCK): 1.7 M^2
BH CV ECHO MEAS - BSA: 1.7 M^2
BH CV ECHO MEAS - BZI_BMI: 22.9 KILOGRAMS/M^2
BH CV ECHO MEAS - BZI_METRIC_HEIGHT: 167.6 CM
BH CV ECHO MEAS - BZI_METRIC_WEIGHT: 64.4 KG
BH CV ECHO MEAS - EDV(CUBED): 175.6 ML
BH CV ECHO MEAS - EDV(MOD-SP2): 113 ML
BH CV ECHO MEAS - EDV(MOD-SP4): 70 ML
BH CV ECHO MEAS - EDV(TEICH): 153.7 ML
BH CV ECHO MEAS - EF(CUBED): 57.8 %
BH CV ECHO MEAS - EF(MOD-BP): 57 %
BH CV ECHO MEAS - EF(MOD-SP2): 49 %
BH CV ECHO MEAS - EF(MOD-SP4): 64.3 %
BH CV ECHO MEAS - EF(TEICH): 48.9 %
BH CV ECHO MEAS - ESV(CUBED): 74.1 ML
BH CV ECHO MEAS - ESV(MOD-SP2): 58 ML
BH CV ECHO MEAS - ESV(MOD-SP4): 25 ML
BH CV ECHO MEAS - ESV(TEICH): 78.6 ML
BH CV ECHO MEAS - FS: 25 %
BH CV ECHO MEAS - IVS/LVPW: 0.88
BH CV ECHO MEAS - IVSD: 0.7 CM
BH CV ECHO MEAS - LAT PEAK E' VEL: 4 CM/SEC
BH CV ECHO MEAS - LV DIASTOLIC VOL/BSA (35-75): 40.5 ML/M^2
BH CV ECHO MEAS - LV MASS(C)D: 152.3 GRAMS
BH CV ECHO MEAS - LV MASS(C)DI: 88.1 GRAMS/M^2
BH CV ECHO MEAS - LV MAX PG: 5 MMHG
BH CV ECHO MEAS - LV MEAN PG: 3 MMHG
BH CV ECHO MEAS - LV SYSTOLIC VOL/BSA (12-30): 14.5 ML/M^2
BH CV ECHO MEAS - LV V1 MAX: 107 CM/SEC
BH CV ECHO MEAS - LV V1 MEAN: 73.5 CM/SEC
BH CV ECHO MEAS - LV V1 VTI: 24.2 CM
BH CV ECHO MEAS - LVIDD: 5.6 CM
BH CV ECHO MEAS - LVIDS: 4.2 CM
BH CV ECHO MEAS - LVLD AP4: 7.3 CM
BH CV ECHO MEAS - LVLS AP4: 6 CM
BH CV ECHO MEAS - LVOT AREA (M): 3.1 CM^2
BH CV ECHO MEAS - LVOT AREA: 3.1 CM^2
BH CV ECHO MEAS - LVOT DIAM: 2 CM
BH CV ECHO MEAS - LVPWD: 0.8 CM
BH CV ECHO MEAS - MED PEAK E' VEL: 3 CM/SEC
BH CV ECHO MEAS - MR MAX PG: 76.4 MMHG
BH CV ECHO MEAS - MR MAX VEL: 437 CM/SEC
BH CV ECHO MEAS - MV A DUR: 0.14 SEC
BH CV ECHO MEAS - MV A MAX VEL: 101 CM/SEC
BH CV ECHO MEAS - MV DEC SLOPE: 226 CM/SEC^2
BH CV ECHO MEAS - MV DEC TIME: 260 SEC
BH CV ECHO MEAS - MV E MAX VEL: 53.3 CM/SEC
BH CV ECHO MEAS - MV E/A: 0.53
BH CV ECHO MEAS - MV MAX PG: 6 MMHG
BH CV ECHO MEAS - MV MEAN PG: 2 MMHG
BH CV ECHO MEAS - MV P1/2T MAX VEL: 55.3 CM/SEC
BH CV ECHO MEAS - MV P1/2T: 71.7 MSEC
BH CV ECHO MEAS - MV V2 MEAN: 58 CM/SEC
BH CV ECHO MEAS - MV V2 VTI: 32 CM
BH CV ECHO MEAS - MVA P1/2T LCG: 4 CM^2
BH CV ECHO MEAS - MVA(P1/2T): 3.1 CM^2
BH CV ECHO MEAS - MVA(VTI): 2.4 CM^2
BH CV ECHO MEAS - PA MAX PG: 4.8 MMHG
BH CV ECHO MEAS - PA V2 MAX: 109 CM/SEC
BH CV ECHO MEAS - PULM A REVS DUR: 0.1 SEC
BH CV ECHO MEAS - PULM A REVS VEL: 26.7 CM/SEC
BH CV ECHO MEAS - PULM DIAS VEL: 33.1 CM/SEC
BH CV ECHO MEAS - PULM S/D: 1.7
BH CV ECHO MEAS - PULM SYS VEL: 54.8 CM/SEC
BH CV ECHO MEAS - RAP SYSTOLE: 3 MMHG
BH CV ECHO MEAS - RVSP: 30 MMHG
BH CV ECHO MEAS - SI(AO): 234.7 ML/M^2
BH CV ECHO MEAS - SI(CUBED): 58.7 ML/M^2
BH CV ECHO MEAS - SI(LVOT): 44 ML/M^2
BH CV ECHO MEAS - SI(MOD-SP4): 26 ML/M^2
BH CV ECHO MEAS - SI(TEICH): 43.4 ML/M^2
BH CV ECHO MEAS - SV(AO): 405.8 ML
BH CV ECHO MEAS - SV(CUBED): 101.5 ML
BH CV ECHO MEAS - SV(LVOT): 76 ML
BH CV ECHO MEAS - SV(MOD-SP4): 45 ML
BH CV ECHO MEAS - SV(TEICH): 75.1 ML
BH CV ECHO MEAS - TAPSE (>1.6): 1.9 CM2
BH CV ECHO MEAS - TR MAX VEL: 259 CM/SEC
BH CV ECHO MEASUREMENTS AVERAGE E/E' RATIO: 15.23
BH CV LOW VAS LEFT GASTRONEMIUS VESSEL: 1
BH CV LOWER VASCULAR LEFT COMMON FEMORAL AUGMENT: NORMAL
BH CV LOWER VASCULAR LEFT COMMON FEMORAL COMPETENT: NORMAL
BH CV LOWER VASCULAR LEFT COMMON FEMORAL COMPRESS: NORMAL
BH CV LOWER VASCULAR LEFT COMMON FEMORAL PHASIC: NORMAL
BH CV LOWER VASCULAR LEFT COMMON FEMORAL SPONT: NORMAL
BH CV LOWER VASCULAR LEFT DISTAL FEMORAL COMPRESS: NORMAL
BH CV LOWER VASCULAR LEFT GASTRONEMIUS COMPRESS: NORMAL
BH CV LOWER VASCULAR LEFT GASTRONEMIUS THROMBUS: NORMAL
BH CV LOWER VASCULAR LEFT GREATER SAPH AK COMPRESS: NORMAL
BH CV LOWER VASCULAR LEFT GREATER SAPH BK COMPRESS: NORMAL
BH CV LOWER VASCULAR LEFT MID FEMORAL AUGMENT: NORMAL
BH CV LOWER VASCULAR LEFT MID FEMORAL COMPETENT: NORMAL
BH CV LOWER VASCULAR LEFT MID FEMORAL COMPRESS: NORMAL
BH CV LOWER VASCULAR LEFT MID FEMORAL PHASIC: NORMAL
BH CV LOWER VASCULAR LEFT MID FEMORAL SPONT: NORMAL
BH CV LOWER VASCULAR LEFT PERONEAL COMPRESS: NORMAL
BH CV LOWER VASCULAR LEFT POPLITEAL AUGMENT: NORMAL
BH CV LOWER VASCULAR LEFT POPLITEAL COMPETENT: NORMAL
BH CV LOWER VASCULAR LEFT POPLITEAL COMPRESS: NORMAL
BH CV LOWER VASCULAR LEFT POPLITEAL PHASIC: NORMAL
BH CV LOWER VASCULAR LEFT POPLITEAL SPONT: NORMAL
BH CV LOWER VASCULAR LEFT POSTERIOR TIBIAL COMPRESS: NORMAL
BH CV LOWER VASCULAR LEFT PROFUNDA FEMORAL COMPRESS: NORMAL
BH CV LOWER VASCULAR LEFT PROXIMAL FEMORAL COMPRESS: NORMAL
BH CV LOWER VASCULAR LEFT SAPHENOFEMORAL JUNCTION COMPRESS: NORMAL
BH CV LOWER VASCULAR RIGHT COMMON FEMORAL AUGMENT: NORMAL
BH CV LOWER VASCULAR RIGHT COMMON FEMORAL COMPETENT: NORMAL
BH CV LOWER VASCULAR RIGHT COMMON FEMORAL COMPRESS: NORMAL
BH CV LOWER VASCULAR RIGHT COMMON FEMORAL PHASIC: NORMAL
BH CV LOWER VASCULAR RIGHT COMMON FEMORAL SPONT: NORMAL
BH CV LOWER VASCULAR RIGHT DISTAL FEMORAL COMPRESS: NORMAL
BH CV LOWER VASCULAR RIGHT GASTRONEMIUS COMPRESS: NORMAL
BH CV LOWER VASCULAR RIGHT GREATER SAPH AK COMPRESS: NORMAL
BH CV LOWER VASCULAR RIGHT GREATER SAPH BK COMPRESS: NORMAL
BH CV LOWER VASCULAR RIGHT MID FEMORAL AUGMENT: NORMAL
BH CV LOWER VASCULAR RIGHT MID FEMORAL COMPETENT: NORMAL
BH CV LOWER VASCULAR RIGHT MID FEMORAL COMPRESS: NORMAL
BH CV LOWER VASCULAR RIGHT MID FEMORAL PHASIC: NORMAL
BH CV LOWER VASCULAR RIGHT MID FEMORAL SPONT: NORMAL
BH CV LOWER VASCULAR RIGHT PERONEAL COMPRESS: NORMAL
BH CV LOWER VASCULAR RIGHT POPLITEAL AUGMENT: NORMAL
BH CV LOWER VASCULAR RIGHT POPLITEAL COMPETENT: NORMAL
BH CV LOWER VASCULAR RIGHT POPLITEAL COMPRESS: NORMAL
BH CV LOWER VASCULAR RIGHT POPLITEAL PHASIC: NORMAL
BH CV LOWER VASCULAR RIGHT POPLITEAL SPONT: NORMAL
BH CV LOWER VASCULAR RIGHT POSTERIOR TIBIAL COMPRESS: NORMAL
BH CV LOWER VASCULAR RIGHT PROFUNDA FEMORAL COMPRESS: NORMAL
BH CV LOWER VASCULAR RIGHT PROXIMAL FEMORAL COMPRESS: NORMAL
BH CV LOWER VASCULAR RIGHT SAPHENOFEMORAL JUNCTION COMPRESS: NORMAL
BH CV VAS BP RIGHT ARM: NORMAL MMHG
BH CV XLRA - RV BASE: 2.9 CM
BH CV XLRA - TDI S': 9 CM/SEC
CHOLEST SERPL-MCNC: 158 MG/DL (ref 0–200)
GLUCOSE BLDC GLUCOMTR-MCNC: 102 MG/DL (ref 70–130)
GLUCOSE BLDC GLUCOMTR-MCNC: 116 MG/DL (ref 70–130)
GLUCOSE BLDC GLUCOMTR-MCNC: 64 MG/DL (ref 70–130)
GLUCOSE BLDC GLUCOMTR-MCNC: 72 MG/DL (ref 70–130)
GLUCOSE BLDC GLUCOMTR-MCNC: 92 MG/DL (ref 70–130)
HBA1C MFR BLD: 5.8 % (ref 4.8–5.6)
HDLC SERPL-MCNC: 72 MG/DL (ref 40–60)
LDLC SERPL CALC-MCNC: 79 MG/DL (ref 0–100)
LDLC/HDLC SERPL: 1.09 {RATIO}
LEFT ATRIUM VOLUME INDEX: 46 ML/M2
LEFT ATRIUM VOLUME: 63 CM3
LV EF 2D ECHO EST: 57 %
MAXIMAL PREDICTED HEART RATE: 132 BPM
STRESS TARGET HR: 112 BPM
TRIGL SERPL-MCNC: 37 MG/DL (ref 0–150)
VLDLC SERPL-MCNC: 7.4 MG/DL (ref 5–40)

## 2020-02-06 PROCEDURE — 93306 TTE W/DOPPLER COMPLETE: CPT

## 2020-02-06 PROCEDURE — G0378 HOSPITAL OBSERVATION PER HR: HCPCS

## 2020-02-06 PROCEDURE — 82962 GLUCOSE BLOOD TEST: CPT

## 2020-02-06 PROCEDURE — 80061 LIPID PANEL: CPT | Performed by: HOSPITALIST

## 2020-02-06 PROCEDURE — 93970 EXTREMITY STUDY: CPT

## 2020-02-06 PROCEDURE — 83036 HEMOGLOBIN GLYCOSYLATED A1C: CPT | Performed by: HOSPITALIST

## 2020-02-06 PROCEDURE — 36415 COLL VENOUS BLD VENIPUNCTURE: CPT | Performed by: HOSPITALIST

## 2020-02-06 PROCEDURE — 93306 TTE W/DOPPLER COMPLETE: CPT | Performed by: INTERNAL MEDICINE

## 2020-02-06 PROCEDURE — 99213 OFFICE O/P EST LOW 20 MIN: CPT | Performed by: INTERNAL MEDICINE

## 2020-02-06 PROCEDURE — 99203 OFFICE O/P NEW LOW 30 MIN: CPT | Performed by: PSYCHIATRY & NEUROLOGY

## 2020-02-06 RX ORDER — ASPIRIN 325 MG
325 TABLET ORAL DAILY
Start: 2020-02-07 | End: 2020-02-18

## 2020-02-06 RX ORDER — ATORVASTATIN CALCIUM 80 MG/1
80 TABLET, FILM COATED ORAL NIGHTLY
Qty: 30 TABLET | Refills: 0 | Status: SHIPPED | OUTPATIENT
Start: 2020-02-06 | End: 2020-02-18

## 2020-02-06 RX ORDER — ACETAMINOPHEN 325 MG/1
650 TABLET ORAL EVERY 6 HOURS PRN
Start: 2020-02-06 | End: 2020-02-18

## 2020-02-06 RX ADMIN — ASPIRIN 325 MG: 325 TABLET ORAL at 09:18

## 2020-02-06 RX ADMIN — SODIUM CHLORIDE, PRESERVATIVE FREE 10 ML: 5 INJECTION INTRAVENOUS at 09:18

## 2020-02-06 NOTE — CONSULTS
Neurology Consult Note  Consult Date: 2/6/2020  Referring MD: Lance Puentes MD  Reason for Consult I have been asked to see the patient in neurological consultation to render advice and opinion regarding episode of expressive aphasia    Ryann Hernadez is a 88 y.o. female with a medical history significant for hypertension and paroxysmal A. fib not on anticoagulation, presenting for an episode of expressive aphasia and left digits 1 through 4 tingling.    Patient reports that she was in her normal state of health when she had onset of above-mentioned symptoms.  She denies have any symptoms in the past.  She reports having a mild bitemporal throbbing headache at the time.  The symptoms self resolved in less than an hour.  She presented to her outside PMD who in turn sent her to the ED for evaluation.  Patient was then admitted for TIA work-up.  MRI of the brain shows extensive microvascular changes likely related to longstanding high blood pressure; the diffusion sequence fails to show any areas of diffusion restriction.  The CTA of the head and neck is negative for any flow-limiting stenosis.  TTE is with normal left ventricular ejection fraction however there is severe left atrial dilatation.  Patient also had lower extremity Dopplers for bilateral feet swelling and was found to have a left calf DVT.    Talked extensively with the patient and her daughter and the patient is still uncertain about anticoagulation.  She understands the risks and the benefits of the medication, however, she does not like the idea of preventive medication.      Past Medical/Surgical Hx:  Past Medical History:   Diagnosis Date   • A-fib (CMS/Formerly [facility])    • Allergic rhinitis    • Anemia    • Aneurysm, ascending aorta (CMS/Formerly [facility]) 03/22/2016    5 CM   • Anxiety    • Ataxic gait    • Atypical chest pain    • BPPV (benign paroxysmal positional vertigo)    • Bradycardia    • Chronic cystitis    • Chronic headaches    • Chronic nonintractable  headache 9/14/2016   • Chronic paroxysmal hemicrania    • Depression    • Esophagitis    • Gastritis    • GERD (gastroesophageal reflux disease)    • Hematuria    • High blood pressure    • History of cataract    • History of irritable bowel syndrome    • Hypertension    • IBS (irritable bowel syndrome)    • Influenza A    • Kidney lesion     LEFT KIDNEY CYSTIC   • Labyrinthitis 07/28/2014   • OA (osteoarthritis)    • Osteoporosis 9/13/2018   • Other abnormal clinical finding 09/21/2012    LEFT UTEROCELE   • Pain of thigh    • Personal history of gastric ulcer    • Renal disorder    • Renal insufficiency 8/7/2017   • TIA (transient ischemic attack) 2/5/2020     Past Surgical History:   Procedure Laterality Date   • BREAST LUMPECTOMY     • BREAST SURGERY     • CATARACT EXTRACTION     • COLONOSCOPY N/A     DR. MARGARITA MENDOZA   • ELBOW ARTHROTOMY  07/01/1999    DR. RYAN RITCHIE   • EYE SURGERY     • FRACTURE SURGERY      HAND, BROKEN FINGERS  DR. MIKE CHRISTIANSON   • KNEE SURGERY  08/31/1999    DR. THADDEUS MENDES   • OOPHORECTOMY     • TUBAL ABDOMINAL LIGATION Bilateral 1962    DR. EDDA MANNING     Medications On Admission  Medications Prior to Admission   Medication Sig Dispense Refill Last Dose   • amLODIPine (NORVASC) 5 MG tablet Take 1 tablet by mouth Daily.   2/5/2020 at Unknown time   • Cholecalciferol (VITAMIN D3) 5000 units capsule capsule Take 5,000 Units by mouth Daily.   2/5/2020 at Unknown time   • Multiple Vitamins-Minerals (ONE-A-DAY WOMENS 50 PLUS) tablet Take  by mouth.   2/5/2020 at Unknown time   • [DISCONTINUED] acetaminophen (TYLENOL) 325 MG tablet Take 2 tablets by mouth Every 4 (Four) Hours As Needed for Mild Pain .   Past Week at Unknown time   • lisinopril (PRINIVIL,ZESTRIL) 40 MG tablet Take 40 mg by mouth Daily.      • TRAVEL SICKNESS 25 MG chewable tablet chewable tablet CSW ONE T  Q 8 H        Allergies:  Allergies   Allergen Reactions   • Sulfa Antibiotics Anaphylaxis   • Lisinopril    • Olmesartan   "    Social Hx:  Social History     Socioeconomic History   • Marital status: Single     Spouse name: Not on file   • Number of children: Not on file   • Years of education: Not on file   • Highest education level: Not on file   Tobacco Use   • Smoking status: Never Smoker   • Smokeless tobacco: Never Used   Substance and Sexual Activity   • Alcohol use: No   • Drug use: No   • Sexual activity: Defer     Family Hx:  Family History   Problem Relation Age of Onset   • Heart attack Mother    • Arthritis Mother    • Arthritis Father    • Arthritis Sister    • Arthritis Other    • Kidney disease Other    • Thyroid disease Other    • Diabetes Other    • Hypertension Other    • Heart disease Other    • Arthritis Maternal Grandmother      Review of Systems as above  Exam  /100   Pulse 90   Temp 98 °F (36.7 °C) (Oral)   Resp 16   Ht 167.6 cm (66\")   Wt 64.4 kg (142 lb)   SpO2 93%   BMI 22.92 kg/m²     Current Facility-Administered Medications:   •  acetaminophen (TYLENOL) tablet 650 mg, 650 mg, Oral, Q6H PRN, Yumi Engle APRN, 650 mg at 02/05/20 2157  •  aspirin tablet 325 mg, 325 mg, Oral, Daily, 325 mg at 02/06/20 0918 **OR** aspirin suppository 300 mg, 300 mg, Rectal, Daily, Lance Puentes MD  •  atorvastatin (LIPITOR) tablet 80 mg, 80 mg, Oral, Nightly, Lance Puentes MD, 80 mg at 02/05/20 2125  •  labetalol (NORMODYNE,TRANDATE) injection 20 mg, 20 mg, Intravenous, Q10 Min PRN, Lance Puentes MD  •  sodium chloride 0.9 % flush 10 mL, 10 mL, Intravenous, PRN, Brijesh Yen MD  •  sodium chloride 0.9 % flush 10 mL, 10 mL, Intravenous, Q12H, Lance Puentes MD, 10 mL at 02/06/20 0918  •  sodium chloride 0.9 % flush 10 mL, 10 mL, Intravenous, PRN, Lance Puentes MD    PRN meds  •  acetaminophen  •  labetalol  •  sodium chloride  •  sodium chloride    No current facility-administered medications on file prior to encounter.      Current Outpatient Medications on File Prior to Encounter "   Medication Sig   • amLODIPine (NORVASC) 5 MG tablet Take 1 tablet by mouth Daily.   • Cholecalciferol (VITAMIN D3) 5000 units capsule capsule Take 5,000 Units by mouth Daily.   • Multiple Vitamins-Minerals (ONE-A-DAY WOMENS 50 PLUS) tablet Take  by mouth.   • [DISCONTINUED] acetaminophen (TYLENOL) 325 MG tablet Take 2 tablets by mouth Every 4 (Four) Hours As Needed for Mild Pain .   • lisinopril (PRINIVIL,ZESTRIL) 40 MG tablet Take 40 mg by mouth Daily.   • TRAVEL SICKNESS 25 MG chewable tablet chewable tablet CSW ONE T  Q 8 H       gen: NAD, vitals reviewed, alert and cooperative  HEENT: PERRL, no masses or tenderness, normocephalic, atraumatic  CVS: RRR  Ext: Bilateral 1+ pedal edema    MS: oriented x3, recent/remote memory intact, normal attention/concentration, language intact, no neglect, normal fund of knowledge  CN:visual fields full, PERRL, EOMI, facial sensation equal, no facial droop, hearing symmetric, palate elevates symmetrically, shoulder shrug equal, tongue midline  Motor: 5/5 throughout upper and lower extremities, normal tone  Sensation: intact to light touch and temperature throughout  Coordination: no dysmetria with finger to nose bilaterally    NIHSS      Level Of Consciousness 0   0=Alert; keenly responsive 1=Not alert, but arousable by minor stimulation 2=Not alert, requires repeated stimulation 3=Responds only with reflex movements      LOC Questions to Month and age 0  0=Answers both questions correctly 1=Answers one question correctly 2=Answers neither question correctly      LOC Commands -Open/Close eyes -Open/close  0   0=Performs both tasks correctly 1=Performs one task correctly 2=Performs neighter task correctly      Best Gaze 0   0=Normal 1=Partial gaze palsy 2=Forced deviation, or total gaze paresis      Visual 0   0=No visual loss 1=Partial hemianopia 2=Complete hemianopia 3=Bilateral hemianopia (blind including cortical blindness)      Facial Palsy 0  0=Normal symmetrical  movement 1=Minor paralysis (asymmetry) 2=Partial paralysis (lower facde) 3=Complete paralysis (upper and lower face)      Motor:   Right Arm 0  0=No drift, limb holds posture for full 10 seconds 1=Drift, limb holds posture, no drift to bed 2=Some antigravity effort, cannot maintain posture, drifts to bed 3=No effort against gravity, limb falls 4=No movement    Left Arm 0  0=No drift, limb holds posture for full 10 seconds 1=Drift, limb holds posture, no drift to bed 2=Some antigravity effort, cannot maintain posture, drifts to bed 3=No effort against gravity, limb falls 4=No movement    Right Leg 0  0=No drift, limb holds posture for full 10 seconds 1=Drift, limb holds posture, no drift to bed 2=Some antigravity effort, cannot maintain posture, drifts to bed 3=No effort against gravity, limb falls 4=No movement    Left Leg  0  0=No drift, limb holds posture for full 10 seconds 1=Drift, limb holds posture, no drift to bed 2=Some antigravity effort, cannot maintain posture, drifts to bed 3=No effort against gravity, limb falls 4=No movement      Limb Ataxia 0   0=Absent 1=Present in one limb 2=Present in two limbs      Sensory 0  1=Normal 2=Mild to moderate sensory loss 3=Severe to total sensory loss      Best Language 0   0=No aphasia, normal 1=Mild to moderate aphasia 2=Mute, global aphasia 3=Multe, global aphasia      Dysarthria 0  0=Normal 1=Mild to moderate 2=Severe, unintelligible or mute/anarthric      Extinction/Neglect 0   0=No abnormality 1=Extinction to bilateral simultaneous stimulation 2=Profound neglect      Total  0    Laboratory results:  Lab Results   Component Value Date    GLUCOSE 93 02/05/2020    CALCIUM 9.4 02/05/2020     02/05/2020    K 3.9 02/05/2020    CO2 27.8 02/05/2020     02/05/2020    BUN 13 02/05/2020    CREATININE 0.82 02/05/2020    EGFRIFAFRI 80 02/05/2020    EGFRIFNONA 51 (L) 01/08/2020    BCR 15.9 02/05/2020    ANIONGAP 12.2 02/05/2020     Lab Results   Component Value  Date    WBC 4.59 02/05/2020    HGB 12.6 02/05/2020    HCT 37.7 02/05/2020    .8 (H) 02/05/2020     02/05/2020     Lab Results   Component Value Date    CHOL 158 02/06/2020     Lab Results   Component Value Date    HDL 72 (H) 02/06/2020    HDL 70 03/14/2019    HDL 71 02/23/2018     Lab Results   Component Value Date    LDL 79 02/06/2020    LDL 91 03/14/2019     (H) 02/23/2018     Lab Results   Component Value Date    TRIG 37 02/06/2020    TRIG 63 03/14/2019    TRIG 106 02/23/2018     Lab Results   Component Value Date    HGBA1C 5.80 (H) 02/06/2020     Lab Results   Component Value Date    INR 0.98 02/05/2020    INR 0.99 09/22/2019    INR 1.1 09/25/2018    PROTIME 12.7 02/05/2020    PROTIME 12.8 09/22/2019    PROTIME 12.8 09/25/2018     No results found for: [id number]  Lab Results   Component Value Date    TSH 1.430 09/22/2019       Lab review: I personally reviewed all labs as documented above.    Imaging review:   Ct Angiogram Head    Result Date: 2/6/2020  1. No evidence of acute infarction, hemorrhage, focal perfusion abnormality or of intracranial proximal high-grade stenosis/thrombus or aneurysm. 2. 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      Radiation dose reduction techniques were utilized, including automated exposure control and exposure modulation based on body size.  This report was finalized on 2/6/2020 9:27 AM by Dr. Ruddy Delatorre M.D.      Ct Angiogram Neck    Result Date: 2/6/2020  1. No evidence of acute infarction, hemorrhage, focal perfusion abnormality or of intracranial proximal high-grade stenosis/thrombus or aneurysm. 2. 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      Radiation dose reduction techniques were utilized, including automated exposure control and exposure modulation based on body size.  This report was finalized on 2/6/2020 9:27 AM by Dr. Ruddy Delatorre M.D.      Mri Brain Without Contrast    Result Date: 2/6/2020  1. There is  moderate small vessel disease in cerebral and central pontine white matter mild cerebral atrophy. The remainder of the MRI of the brain is normal. Specifically no acute infarct is identified  This report was finalized on 2/6/2020 6:39 AM by Dr. Tim Serrano M.D.      Xr Chest 1 View    Result Date: 2/5/2020  No radiographic change compared to prior exam 09/22/2019. No evidence for active disease in the chest.  This report was finalized on 2/5/2020 5:01 PM by Dr. Carlos Cardona M.D.      Ct Cerebral Perfusion With & Without Contrast    Result Date: 2/6/2020  1. No evidence of acute infarction, hemorrhage, focal perfusion abnormality or of intracranial proximal high-grade stenosis/thrombus or aneurysm. 2. 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      Radiation dose reduction techniques were utilized, including automated exposure control and exposure modulation based on body size.  This report was finalized on 2/6/2020 9:27 AM by Dr. Ruddy Delatorre M.D.      Results for orders placed during the hospital encounter of 02/05/20   Adult Transthoracic Echo Complete W/ Cont if Necessary Per Protocol    Narrative · Left ventricular systolic function is normal. Calculated EF = 57.0%.   Estimated EF was in agreement with the calculated EF. Estimated EF = 57%.   Normal left ventricular cavity size and wall thickness noted. All left   ventricular wall segments contract normally. Left ventricular diastolic   dysfunction is noted (grade I a w/high LAP) consistent with impaired   relaxation.  · Left atrial volume is severely increased. Right atrial cavity size is   moderately dilated  · There is nodular sclerosis noted on the right coronary cusp leaflet   edge.. Mild aortic valve regurgitation is present.  · Mild MAC is present. Mild mitral valve regurgitation is present.  · Moderate tricuspid valve regurgitation is present. Estimated right   ventricular systolic pressure from tricuspid regurgitation is normal (<35    mmHg).  · Moderate dilation of the ascending aorta is present. The ascending aorta   measures 4.7 cm. Moderate dilation of the aortic arch is present. Aortic   arch measures 4 cm.. The descending aorta not well visualized.          I personally reviewed Noncon head CT, CTA of the head and neck, MRI of the brain.    Impression/Assessment:  88 y.o. right-handed female with a medical history significant for hypertension and paroxysmal A. fib not on anticoagulation, presenting for an episode of expressive aphasia and left digits 1 through 4 tingling that spontaneously resolved within an hour.  Patient now admitted for TIA work-up, with MRI brain found to be negative and vessel imaging without flow-limiting stenosis.  TTE without mention of shunt however there is significant left atrial dilatation.  Work-up for bilateral pedal edema found patient to have a left lower extremity DVT at the calf.  At this point I am not clear if yesterday's episode represents a TIA; however, patient's history of paroxysmal A. fib is certainly worrisome so I will recommend starting anticoagulation for primary prevention of stroke.  Patient currently has a chadsVasc score of 4 versus 6; depending on whether or not you include yesterday's event as a TIA.  Either way this warrants anticoagulation.    If the left lower extremity DVT is felt to be acute will recommend hematology consult versus work-up for occult malignancy.    --Is my recommendation for Eliquis 5 mg every 12 hours; however, patient refusing.  She understands the risk and the benefits of the medication, but she still denies  --As for DVT, likely that she will still need a course of Eliquis if it is in fact acute.  Will defer to inpatient hospitalist  --Blood pressure goal normotension  --If patient declines Eliquis would definitely recommend daily aspirin, however, it is likely that she will also declined this medication  --Would recommend atorvastatin 20mg daily  --Nutrition  consult for prediabetes  --PT/OT/speech to eval and treat  --Stat Noncon head CT for any acute neurologic change      Matteo Marcus MD  Stroke Attending

## 2020-02-06 NOTE — CONSULTS
[facility] Consult Note    Patient Name: Ryann Hernadez  :1931  88 y.o.    Date of Admission: 2020  Date of Consultation:  20  Encounter Provider: Yen Henriquez MD  Place of Service: [facility] CARDIOLOGY  Referring Provider: Lance Puentes MD  Patient Care Team:  Vanesa Connor MD as PCP - General (Family Medicine)  Ruddy Sinha MD as PCP - Claims Attributed      Chief complaint: TIA    Reason for Consult:  History of atrial fibrillation now with TIA    History of Present Illness:    Ms Hernadez is an 88 year old patient of Dr Pastor's with a history of hypertension, paroxsymal atrial fibrillation, mild aortic root dilatation, pulmonary hypertension, who is admitted following a TIA.      She presented to the emergency room yesterday for difficulties with her speech and left hand numbness.  Her symptoms resolved on arrival to the hospital.  She also complained of headache for the last couple days.  CTA of head and neck showed no acute abnormality or occlusion.  She was in sinus rhythm on arrival to the hospital and has remained so.     She denies any chest pain, dyspnea or recent palpitations.  Speech issues and left hand numbness have not recurred.     She was incidentally found to be in atrial fibrillation on her admission in 2019 with ureteral colic.  She was started on metoprolol temporarily but her heart was in the 40- 60 range so it was stopped.  She did not have any reoccurrence. She was started on apixaban at discharge but the patient was hesitant to continue this because of concerns of bleeding issues.  When she saw Dr. Mccarty back in the office she reported that she stopped the apixaban and following a long discussion refused to resume it or try another anticoagulant.           Previous Cardiac Testing  ECHO 2019  · Estimated EF = 60%.  · Left ventricular systolic function is normal.  · Left ventricular diastolic  dysfunction (grade I) consistent with impaired relaxation.  · Mild aortic valve regurgitation is present.  · Mild mitral valve regurgitation is present  · Mild tricuspid valve regurgitation is present.  · Calculated right ventricular systolic pressure from tricuspid regurgitation is 53.4 mmHg.  · Proximal aorta poorly seen but measures at least 3.9 cm, and appears to be increasing in diameter at limit of visualization, recommend assessment of ascending aorta if clinically indicated               Past Medical History:   Diagnosis Date   • A-fib (CMS/[facility])    • Allergic rhinitis    • Anemia    • Aneurysm, ascending aorta (CMS/HCC) 03/22/2016    5 CM   • Anxiety    • Ataxic gait    • Atypical chest pain    • BPPV (benign paroxysmal positional vertigo)    • Bradycardia    • Chronic cystitis    • Chronic headaches    • Chronic nonintractable headache 9/14/2016   • Chronic paroxysmal hemicrania    • Depression    • Esophagitis    • Gastritis    • GERD (gastroesophageal reflux disease)    • Hematuria    • High blood pressure    • History of cataract    • History of irritable bowel syndrome    • Hypertension    • IBS (irritable bowel syndrome)    • Influenza A    • Kidney lesion     LEFT KIDNEY CYSTIC   • Labyrinthitis 07/28/2014   • OA (osteoarthritis)    • Osteoporosis 9/13/2018   • Other abnormal clinical finding 09/21/2012    LEFT UTEROCELE   • Pain of thigh    • Personal history of gastric ulcer    • Renal disorder    • Renal insufficiency 8/7/2017   • TIA (transient ischemic attack) 2/5/2020       Past Surgical History:   Procedure Laterality Date   • BREAST LUMPECTOMY     • BREAST SURGERY     • CATARACT EXTRACTION     • COLONOSCOPY N/A     DR. MARGARITA MENDOZA   • ELBOW ARTHROTOMY  07/01/1999    DR. RYAN RITCHIE   • EYE SURGERY     • FRACTURE SURGERY      HAND, BROKEN FINGERS  DR. MIKE CHRISTIANSON   • KNEE SURGERY  08/31/1999    DR. THADDEUS MENDES   • OOPHORECTOMY     • TUBAL ABDOMINAL LIGATION Bilateral 1962    DR. EDDA MANNING          Prior to Admission medications    Medication Sig Start Date End Date Taking? Authorizing Provider   acetaminophen (TYLENOL) 325 MG tablet Take 2 tablets by mouth Every 4 (Four) Hours As Needed for Mild Pain . 9/26/19  Yes Ruddy Neil MD   amLODIPine (NORVASC) 5 MG tablet Take 1 tablet by mouth Daily. 9/26/19  Yes Ruddy Neil MD   Cholecalciferol (VITAMIN D3) 5000 units capsule capsule Take 5,000 Units by mouth Daily.   Yes Christopher Gill MD   Multiple Vitamins-Minerals (ONE-A-DAY WOMENS 50 PLUS) tablet Take  by mouth.   Yes Christopher Gill MD   lisinopril (PRINIVIL,ZESTRIL) 40 MG tablet Take 40 mg by mouth Daily.    ProviderChristopher MD   TRAVEL SICKNESS 25 MG chewable tablet chewable tablet CSW ONE T  Q 8 H 12/9/19   Christopher Gill MD       Allergies   Allergen Reactions   • Sulfa Antibiotics Anaphylaxis   • Lisinopril    • Olmesartan        Social History     Socioeconomic History   • Marital status: Single     Spouse name: Not on file   • Number of children: Not on file   • Years of education: Not on file   • Highest education level: Not on file   Tobacco Use   • Smoking status: Never Smoker   • Smokeless tobacco: Never Used   Substance and Sexual Activity   • Alcohol use: No   • Drug use: No   • Sexual activity: Defer       Family History   Problem Relation Age of Onset   • Heart attack Mother    • Arthritis Mother    • Arthritis Father    • Arthritis Sister    • Arthritis Other    • Kidney disease Other    • Thyroid disease Other    • Diabetes Other    • Hypertension Other    • Heart disease Other    • Arthritis Maternal Grandmother        REVIEW OF SYSTEMS:   All systems reviewed.  Pertinent positives identified in HPI.  All other systems are negative.      Objective:     Vitals:    02/05/20 2130 02/05/20 2300 02/05/20 2352 02/06/20 0500   BP:  128/78 136/91    BP Location:  Left arm Left arm    Patient Position:  Lying Lying    Pulse:  65 62    Resp:  18 18       Temp:  98 °F (36.7 °C)     TempSrc:  Oral     SpO2:  94% 94%    Weight: 64.9 kg (143 lb)   64.5 kg (142 lb 4.8 oz)   Height:         Body mass index is 22.98 kg/m².    General Appearance:    Alert, cooperative, in no acute distress   Head:    Normocephalic, without obvious abnormality, atraumatic   Eyes:            Lids and lashes normal, conjunctivae and sclerae normal, no icterus, no pallor, corneas clear, PERRLA   Ears:    Ears appear intact with no abnormalities noted   Neck:   No adenopathy, supple, trachea midline, no thyromegaly, no carotid bruit, no JVD   Lungs:     Clear to auscultation, respirations regular, even and unlabored    Heart:    Regular rhythm and normal rate, normal S1 and S2, no murmur, no gallop, no rub, no click   Chest Wall:    No abnormalities observed   Abdomen:     Normal bowel sounds, no masses, no organomegaly, soft, nontender, nondistended, no guarding, no rebound  tenderness   Extremities:   Moves all extremities well, no edema, no cyanosis, no redness   Pulses:   Pulses palpable and equal bilaterally. Normal radial, carotid, femoral, dorsalis pedis and posterior tibial pulses bilaterally. Normal abdominal aorta   Skin:  Psychiatric:   No bleeding, bruising or rash    Alert and oriented x 3, normal mood and affect   Lab Review:     Results from last 7 days   Lab Units 02/05/20  1514   SODIUM mmol/L 142   POTASSIUM mmol/L 3.9   CHLORIDE mmol/L 102   CO2 mmol/L 27.8   BUN mg/dL 13   CREATININE mg/dL 0.82   CALCIUM mg/dL 9.4   BILIRUBIN mg/dL 1.0   ALK PHOS U/L 86   ALT (SGPT) U/L 11   AST (SGOT) U/L 15   GLUCOSE mg/dL 93     Results from last 7 days   Lab Units 02/05/20  1514   TROPONIN T ng/mL <0.010     Results from last 7 days   Lab Units 02/05/20  1514   WBC 10*3/mm3 4.59   HEMOGLOBIN g/dL 12.6   HEMATOCRIT % 37.7   PLATELETS 10*3/mm3 223     Results from last 7 days   Lab Units 02/05/20  1514   INR  0.98   APTT seconds 28.9         Results from last 7 days   Lab Units  02/06/20  0442   CHOLESTEROL mg/dL 158   TRIGLYCERIDES mg/dL 37   HDL CHOL mg/dL 72*   LDL CHOL mg/dL 79             CTA of head and neck  IMPRESSION:  1. No evidence of acute infarction, hemorrhage, focal perfusion  abnormality or of intracranial proximal high-grade stenosis/thrombus or  aneurysm.  2. 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            Telemetry          EKG this admission    EKG baseline 09/22/2019      I personally viewed and interpreted the patient's EKG/Telemetry data.        Assessment and Plan:       1. TIA. Neurology to see.  CT unremarkable. MRI with no acute infarct.  2. Paroxsymal atrial fibrillation. In sinus rhythm here.  She has refused anticoagulation in the past.  Her CHADS-VASc score is 4 if this was indeed a TIA.    3. Hypertension.  Intermittently elevated.   4. Aortic dilatation  5. Pulmonary hypertension    -  I spent 15 minutes discussing anticoagulation, including that the benefits would outweigh the risks of bleeding.  She remains resistant to starting anticoagulation.    -  Resume antihypertensives if no need for permissive hypertension.     Yen Henriquez MD  02/06/20  8:02 AM

## 2020-02-06 NOTE — PROGRESS NOTES
Bilateral lower extremity venous doppler completed, Prelim Rt negative and Lt positive for acute calf vein thrombus given to MELL Gama.

## 2020-02-06 NOTE — PLAN OF CARE
Problem: Stroke (Ischemic) (Adult)  Goal: Signs and Symptoms of Listed Potential Problems Will be Absent, Minimized or Managed (Stroke)  Outcome: Ongoing (interventions implemented as appropriate)  Flowsheets (Taken 2/6/2020 0506)  Problems Assessed (Stroke (Ischemic)): motor/sensory impairment;situational response  Problems Assessed (Stroke (Ischemic)): none     Problem: Patient Care Overview  Goal: Plan of Care Review  Outcome: Ongoing (interventions implemented as appropriate)  Flowsheets (Taken 2/6/2020 0506)  Progress: improving  Plan of Care Reviewed With: patient  Outcome Summary: VSS. NIH 0. MRI completed overnight. Echo to be completed. Neurology and Cardiology to see today. Labs for the morning. Will continue to monitor.     Problem: Fall Risk (Adult)  Goal: Identify Related Risk Factors and Signs and Symptoms  Outcome: Ongoing (interventions implemented as appropriate)  Flowsheets (Taken 2/6/2020 0506)  Related Risk Factors (Fall Risk): slippery/uneven surfaces;environment unfamiliar  Signs and Symptoms (Fall Risk): presence of risk factors     Problem: Fall Risk (Adult)  Goal: Absence of Fall  Outcome: Ongoing (interventions implemented as appropriate)  Flowsheets (Taken 2/6/2020 0506)  Absence of Fall: making progress toward outcome

## 2020-02-06 NOTE — PROGRESS NOTES
Discharge Planning Assessment  [facility]     Patient Name: Ryann Hernadez  MRN: [id number]  Today's Date: 2/6/2020    Admit Date: 2/5/2020    Discharge Needs Assessment     Row Name 02/06/20 0832       Living Environment    Lives With  alone    Current Living Arrangements  home/apartment/condo    Primary Care Provided by  self    Provides Primary Care For  no one    Family Caregiver if Needed  child(kaylynn), adult    Quality of Family Relationships  supportive    Able to Return to Prior Arrangements  yes       Transition Planning    Patient/Family Anticipates Transition to  home    Patient/Family Anticipated Services at Transition  none    Transportation Anticipated  family or friend will provide       Discharge Needs Assessment    Readmission Within the Last 30 Days  no previous admission in last 30 days    Concerns to be Addressed  denies needs/concerns at this time    Equipment Currently Used at Home  cane, straight;grab bar    Provided post acute provider list?  N/A        Discharge Plan     Row Name 02/06/20 0833       Plan    Plan  Pt plans to return home upon DC.  She denies DC needs at this time.  CCP will continue to follow.     Plan Comments  Spoke with pt at bedside.  Facesheet and pharmacy info confirmed.  Pt lives alone in a house, is IADLs and can drive.   Home DME includes a cane and grab bars in the bathroom.  No hx of HH or SNF.  Pt states her dtrs check on her and are available to assist if needed.  She does not have an advance directive.  Pt feels her mobility is at baseline and denies need for HH or SNF.                    Demographic Summary     Row Name 02/06/20 0831       General Information    Admission Type  observation    Arrived From  home    Required Notices Provided  -- MONET given 2/5/2020    Referral Source  admission list    Reason for Consult  discharge planning    Preferred Language  English        Functional Status     Row Name 02/06/20 0831       Functional Status    Usual  Activity Tolerance  good    Current Activity Tolerance  good       Functional Status, IADL    Medications  independent    Meal Preparation  independent    Housekeeping  independent    Laundry  independent    Shopping  independent       Mental Status    General Appearance WDL  WDL       Employment/    Employment Status  retired          Zeny Fine RN

## 2020-02-06 NOTE — SIGNIFICANT NOTE
02/06/20 1353   Rehab Time/Intention   Evaluation Not Performed other (see comments)  (Pt at her baseline mobility, skilled acute PT not indicated. Pt plans to return home with family.)   Rehab Treatment   Discipline physical therapist

## 2020-02-06 NOTE — DISCHARGE SUMMARY
Patient Name: Ryann Hernadez  : 1931  MRN: [id number]    Date of Admission: 2020  Date of Discharge:  2020  Primary Care Physician: Vanesa Connor MD      Chief Complaint:   Speech Problem      Discharge Diagnoses     Active Hospital Problems    Diagnosis  POA   • **TIA (transient ischemic attack) [G45.9]  Yes   • Ascending aortic aneurysm (CMS/HCC) [I71.2]  Unknown   • Acute deep vein thrombosis (DVT) of calf muscle vein of left lower extremity [I82.462]  Unknown   • Aortic root dilatation (CMS/HCC) [I77.810]  Yes   • Paroxysmal atrial fibrillation (CMS/HCC) [I48.0]  Yes   • HTN (hypertension) [I10]  Yes      Resolved Hospital Problems   No resolved problems to display.        Hospital Course     Ms. Hernadez is a 88 y.o. woman admitted for left-sided numbness and word finding.  Imaging negative for a stroke.  She was diagnosed with a TIA.  She has known atrial fibrillation and has been advised to take anticoagulation.  I discussed at length with her in the presence of her daughter.  She still refuses anticoagulation.  I told her she may have another stroke which may be debilitating.  She still refuses anticoagulation.  She told the nurse she was not going to take the aspirin either.  Her symptoms have resolved.  She will be ready for discharge home.    Echocardiogram results are just now available.  Aortic root dilatation noted (not new) with 4.7 centimeter ascending aortic aneurysm present.  If she would consider it, would recommend outpatient referral to thoracic surgeon.  Aneurysm 3.9cm on 2019 echocardiogram.  Calf vein thrombosis present. Pt declines anticoagulation. Discussed at length with RN.     Stable condition; fair prognosis    Day of Discharge       Physical Exam:  Temp:  [98 °F (36.7 °C)-98.2 °F (36.8 °C)] 98 °F (36.7 °C)  Heart Rate:  [62-90] 90  Resp:  [16-18] 16  BP: (128-162)/() 135/100  Body mass index is 22.92 kg/m².  Physical Exam   Constitutional:  She appears well-developed and well-nourished. No distress.   Looks younger than age   Cardiovascular: Normal rate.   No murmur heard.  Irregular   Pulmonary/Chest: No stridor. No respiratory distress. She has no wheezes. She has no rales.   Clear   Musculoskeletal: She exhibits edema.   Trace   Neurological: She is alert.   Skin: Skin is warm. She is not diaphoretic.   Nursing note and vitals reviewed.      Consultants     Consult Orders (all) (From admission, onward)     Start     Ordered    02/06/20 0000  Inpatient Neuro Clinical Specialist Consult  Once     Provider:  (Not yet assigned)    02/05/20 1853    02/05/20 1911  Inpatient Cardiology Consult  Once     Specialty:  Cardiology  Provider:  Yossi Pastor MD    02/05/20 1910 02/05/20 1854  Inpatient Rehab Admission Consult  Once     Provider:  (Not yet assigned)    02/05/20 1853    02/05/20 1854  Inpatient Case Management  Consult  Once     Provider:  (Not yet assigned)    02/05/20 1853    02/05/20 1854  Inpatient Diabetes Educator Consult  Once,   Status:  Canceled     Provider:  (Not yet assigned)    02/05/20 1853    02/05/20 1854  Inpatient Neurology Consult Stroke  Once     Specialty:  Neurology  Provider:  Ricky Méndez MD    02/05/20 1853    02/05/20 1601  [facility] (on-call MD unless specified) Details  Once     Specialty:  Hospitalist  Provider:  (Not yet assigned)    02/05/20 1600    02/05/20 1512  Inpatient Neurology Consult Stroke  Once     Specialty:  Neurology  Provider:  (Not yet assigned)    02/05/20 1512    02/05/20 1512  Inpatient Neurology Consult Stroke  Once     Specialty:  Neurology  Provider:  (Not yet assigned)    02/05/20 1512              Procedures     * Surgery not found *      Imaging Results (All)     Procedure Component Value Units Date/Time    CT Angiogram Neck [[id number]] Collected:  02/05/20 1818     Updated:  02/06/20 0930    Narrative:       CT ANGIOGRAM NECK AND HEAD WITH CONTRAST AND CT  PERFUSION WITH CONTRAST     HISTORY: Speech difficulty, TIA, stroke.     COMPARISON: MRI brain 09/26/2016 and CT head 09/22/2016.     Initially, a noncontrasted CT examination of brain was performed.     FINDINGS: The brain ventricles are symmetrical. There is no evidence of  intracranial hemorrhage, hydrocephalus or of abnormal extra-axial fluid.  Areas of decreased attenuation involving the white matter cerebral  hemispheres are noted bilaterally suggesting mild to moderate small  vessel ischemic disease. No focal area of decreased attenuation to  suggest acute infarction is identified.     The above information was called to and discussed with Dr. Marcus.  Dr. Marcus requested further evaluation with CT perfusion and CT  angiography.     CT PERFUSION: There was no evidence of a perfusion defect.     CT ANGIOGRAM NECK AND HEAD WITH CONTRAST: CT angiogram of the neck and  head was performed. Multiplanar as well as 3-dimensional reconstructions  were also generated.     There is atelectasis/scarring involving the right lung apex. The patient  was injected in the left upper extremity. Venous collaterals are noted  at the base of the neck. There is a moderate stenosis involving the left  innominate vein.     The great vessels are arranged in a classic configuration. There is mild  calcified plaque appreciated involving the carotid bifurcations  bilaterally but with 0% stenosis using NASCET criteria. Mild vascular  calcifications involving the distal aspects of the internal carotid  arteries are noted bilaterally. The proximal aspects of the anterior and  middle cerebral arteries appear unremarkable.     Both vertebral arteries were opacified. The basilar artery and the  proximal aspects of the posterior cerebral arteries appear unremarkable.     A standard postcontrast CT examination of brain showed no evidence of  abnormal enhancement.       Impression:       1. No evidence of acute infarction, hemorrhage,  focal perfusion  abnormality or of intracranial proximal high-grade stenosis/thrombus or  aneurysm.  2. 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                 Radiation dose reduction techniques were utilized, including automated  exposure control and exposure modulation based on body size.     This report was finalized on 2/6/2020 9:27 AM by Dr. Ruddy Delatorre M.D.       CT Angiogram Head [[id number]] Collected:  02/05/20 1818     Updated:  02/06/20 0930    Narrative:       CT ANGIOGRAM NECK AND HEAD WITH CONTRAST AND CT PERFUSION WITH CONTRAST     HISTORY: Speech difficulty, TIA, stroke.     COMPARISON: MRI brain 09/26/2016 and CT head 09/22/2016.     Initially, a noncontrasted CT examination of brain was performed.     FINDINGS: The brain ventricles are symmetrical. There is no evidence of  intracranial hemorrhage, hydrocephalus or of abnormal extra-axial fluid.  Areas of decreased attenuation involving the white matter cerebral  hemispheres are noted bilaterally suggesting mild to moderate small  vessel ischemic disease. No focal area of decreased attenuation to  suggest acute infarction is identified.     The above information was called to and discussed with Dr. Marcus.  Dr. Marcus requested further evaluation with CT perfusion and CT  angiography.     CT PERFUSION: There was no evidence of a perfusion defect.     CT ANGIOGRAM NECK AND HEAD WITH CONTRAST: CT angiogram of the neck and  head was performed. Multiplanar as well as 3-dimensional reconstructions  were also generated.     There is atelectasis/scarring involving the right lung apex. The patient  was injected in the left upper extremity. Venous collaterals are noted  at the base of the neck. There is a moderate stenosis involving the left  innominate vein.     The great vessels are arranged in a classic configuration. There is mild  calcified plaque appreciated involving the carotid bifurcations  bilaterally but with 0% stenosis  using NASCET criteria. Mild vascular  calcifications involving the distal aspects of the internal carotid  arteries are noted bilaterally. The proximal aspects of the anterior and  middle cerebral arteries appear unremarkable.     Both vertebral arteries were opacified. The basilar artery and the  proximal aspects of the posterior cerebral arteries appear unremarkable.     A standard postcontrast CT examination of brain showed no evidence of  abnormal enhancement.       Impression:       1. No evidence of acute infarction, hemorrhage, focal perfusion  abnormality or of intracranial proximal high-grade stenosis/thrombus or  aneurysm.  2. 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                 Radiation dose reduction techniques were utilized, including automated  exposure control and exposure modulation based on body size.     This report was finalized on 2/6/2020 9:27 AM by Dr. Ruddy Delatorre M.D.       CT Cerebral Perfusion With & Without Contrast [[id number]] Collected:  02/05/20 1818     Updated:  02/06/20 0930    Narrative:       CT ANGIOGRAM NECK AND HEAD WITH CONTRAST AND CT PERFUSION WITH CONTRAST     HISTORY: Speech difficulty, TIA, stroke.     COMPARISON: MRI brain 09/26/2016 and CT head 09/22/2016.     Initially, a noncontrasted CT examination of brain was performed.     FINDINGS: The brain ventricles are symmetrical. There is no evidence of  intracranial hemorrhage, hydrocephalus or of abnormal extra-axial fluid.  Areas of decreased attenuation involving the white matter cerebral  hemispheres are noted bilaterally suggesting mild to moderate small  vessel ischemic disease. No focal area of decreased attenuation to  suggest acute infarction is identified.     The above information was called to and discussed with Dr. Marcus.  Dr. Marcus requested further evaluation with CT perfusion and CT  angiography.     CT PERFUSION: There was no evidence of a perfusion defect.     CT ANGIOGRAM  NECK AND HEAD WITH CONTRAST: CT angiogram of the neck and  head was performed. Multiplanar as well as 3-dimensional reconstructions  were also generated.     There is atelectasis/scarring involving the right lung apex. The patient  was injected in the left upper extremity. Venous collaterals are noted  at the base of the neck. There is a moderate stenosis involving the left  innominate vein.     The great vessels are arranged in a classic configuration. There is mild  calcified plaque appreciated involving the carotid bifurcations  bilaterally but with 0% stenosis using NASCET criteria. Mild vascular  calcifications involving the distal aspects of the internal carotid  arteries are noted bilaterally. The proximal aspects of the anterior and  middle cerebral arteries appear unremarkable.     Both vertebral arteries were opacified. The basilar artery and the  proximal aspects of the posterior cerebral arteries appear unremarkable.     A standard postcontrast CT examination of brain showed no evidence of  abnormal enhancement.       Impression:       1. No evidence of acute infarction, hemorrhage, focal perfusion  abnormality or of intracranial proximal high-grade stenosis/thrombus or  aneurysm.  2. 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                 Radiation dose reduction techniques were utilized, including automated  exposure control and exposure modulation based on body size.     This report was finalized on 2/6/2020 9:27 AM by Dr. Ruddy Delatorre M.D.       MRI Brain Without Contrast [[id number]] Collected:  02/05/20 2253     Updated:  02/06/20 0642    Narrative:       MRI OF THE BRAIN WITHOUT CONTRAST 02/05/2020     CLINICAL HISTORY: Patient presented earlier today with speech difficulty  and had a CT angiogram of the head and neck and CT perfusion study.  Please evaluate TIA versus stroke.     TECHNIQUE: Axial T1, FLAIR, fat-suppressed T2, axial diffusion and  gradient echo T2, sagittal  T1-weighted images were obtained of the  entire head.      This is correlated to CT angiogram of the head and neck and CT perfusion  study earlier today 02/05/2020 at 3:20 PM.     FINDINGS: There are patchy nodular areas of T2 high signal throughout  the periventricular and subcortical white matter of the cerebral  hemispheres and patchy T2 high signal and central pontine white matter  all of which is most consistent with moderate small vessel disease. The  remainder of the brain parenchyma is normal in signal intensity.  Specifically no diffusion-weighted abnormality is seen with no acute  infarct identified. The ventricles are normal in size. I see no focal  mass effect. There is no midline shift. No extra-axial fluid collections  are identified. The paranasal sinuses and the mastoid air cells and  middle ear cavities are clear. Good flow voids are demonstrated within  the cerebral vessels and in the dural venous sinuses..       Impression:       1. There is moderate small vessel disease in cerebral and central  pontine white matter mild cerebral atrophy. The remainder of the MRI of  the brain is normal. Specifically no acute infarct is identified     This report was finalized on 2/6/2020 6:39 AM by Dr. Tim Serrano M.D.       XR Chest 1 View [[id number]] Collected:  02/05/20 1651     Updated:  02/05/20 1705    Narrative:       AP PORTABLE UPRIGHT CHEST     HISTORY: Stroke. History of hypertension and reflux disease.     COMPARISON: Two-view chest 09/22/2019.     FINDINGS: The heart size is enlarged. Thoracic aorta is tortuous.  Cardiomediastinal silhouette is not changed. Previous CTA chest  demonstrated enlargement of the ascending thoracic aorta. The lungs  appear clear and there is no evidence for pulmonary edema or pleural  effusion or infiltrate. Small scattered calcified granulomas are  present. Cardiac monitoring leads are noted. The bones are osteopenic.       Impression:       No radiographic change  compared to prior exam 09/22/2019. No  evidence for active disease in the chest.     This report was finalized on 2/5/2020 5:01 PM by Dr. Carlos Cardona M.D.           Results for orders placed during the hospital encounter of 02/05/20   Duplex Venous Lower Extremity - Bilateral CAR    Narrative · Acute left lower extremity deep vein thrombosis noted in the   gastrocnemius/soleal.  · All other veins appeared normal bilaterally.        Results for orders placed during the hospital encounter of 02/05/20   Adult Transthoracic Echo Complete W/ Cont if Necessary Per Protocol    Narrative · Left ventricular systolic function is normal. Calculated EF = 57.0%.   Estimated EF was in agreement with the calculated EF. Estimated EF = 57%.   Normal left ventricular cavity size and wall thickness noted. All left   ventricular wall segments contract normally. Left ventricular diastolic   dysfunction is noted (grade I a w/high LAP) consistent with impaired   relaxation.  · Left atrial volume is severely increased. Right atrial cavity size is   moderately dilated  · There is nodular sclerosis noted on the right coronary cusp leaflet   edge.. Mild aortic valve regurgitation is present.  · Mild MAC is present. Mild mitral valve regurgitation is present.  · Moderate tricuspid valve regurgitation is present. Estimated right   ventricular systolic pressure from tricuspid regurgitation is normal (<35   mmHg).  · Moderate dilation of the ascending aorta is present. The ascending aorta   measures 4.7 cm. Moderate dilation of the aortic arch is present. Aortic   arch measures 4 cm.. The descending aorta not well visualized.        Pertinent Labs     Results from last 7 days   Lab Units 02/05/20  1514   WBC 10*3/mm3 4.59   HEMOGLOBIN g/dL 12.6   PLATELETS 10*3/mm3 223     Results from last 7 days   Lab Units 02/05/20  1514   SODIUM mmol/L 142   POTASSIUM mmol/L 3.9   CHLORIDE mmol/L 102   CO2 mmol/L 27.8   BUN mg/dL 13   CREATININE mg/dL  0.82   GLUCOSE mg/dL 93   Estimated Creatinine Clearance: 48.2 mL/min (by C-G formula based on SCr of 0.82 mg/dL).  Results from last 7 days   Lab Units 02/05/20  1514   ALBUMIN g/dL 4.40   BILIRUBIN mg/dL 1.0   ALK PHOS U/L 86   AST (SGOT) U/L 15   ALT (SGPT) U/L 11     Results from last 7 days   Lab Units 02/05/20  1514   CALCIUM mg/dL 9.4   ALBUMIN g/dL 4.40       Results from last 7 days   Lab Units 02/05/20  1514   TROPONIN T ng/mL <0.010       Results from last 7 days   Lab Units 02/06/20  0442   CHOLESTEROL mg/dL 158   TRIGLYCERIDES mg/dL 37   HDL CHOL mg/dL 72*   LDL CHOL mg/dL 79           Test Results Pending at Discharge       Discharge Details        Discharge Medications      New Medications      Instructions Start Date   aspirin 325 MG tablet   325 mg, Oral, Daily   Start Date:  February 7, 2020     atorvastatin 80 MG tablet  Commonly known as:  LIPITOR   80 mg, Oral, Nightly         Changes to Medications      Instructions Start Date   acetaminophen 325 MG tablet  Commonly known as:  TYLENOL  What changed:  when to take this   650 mg, Oral, Every 6 Hours PRN         Continue These Medications      Instructions Start Date   amLODIPine 5 MG tablet  Commonly known as:  NORVASC   5 mg, Oral, Daily      ONE-A-DAY WOMENS 50 PLUS tablet   Oral      vitamin D3 125 MCG (5000 UT) capsule capsule   5,000 Units, Oral, Daily         Stop These Medications    lisinopril 40 MG tablet  Commonly known as:  PRINIVIL,ZESTRIL     TRAVEL SICKNESS 25 MG chewable tablet chewable tablet  Generic drug:  meclizine            Allergies   Allergen Reactions   • Sulfa Antibiotics Anaphylaxis   • Lisinopril    • Olmesartan          Discharge Disposition:  Home or Self Care    Discharge Diet:  Diet Order   Procedures   • Diet Regular; Cardiac       Discharge Activity:   Activity Instructions     Activity as Tolerated            CODE STATUS:    Code Status and Medical Interventions:   Ordered at: 02/05/20 5982     Code Status:     CPR     Medical Interventions (Level of Support Prior to Arrest):    Full       Future Appointments   Date Time Provider Department Center   4/14/2020 10:30 AM Yossi Pastor MD [id number] None   5/5/2020 10:00 AM Vanesa Connor MD [id number]     Follow-up Information     Vanesa Connor MD Follow up in 1 week(s).    Specialty:  Family Medicine  Why:  TIA; left CVT.  4.7cm  AAA  Contact information:  [address] [address] [address] [phone]                   Time Spent on Discharge:  Greater than 30 minutes      Eladia Patrick MD  [facility]  02/06/20  1:47 PM

## 2020-02-06 NOTE — PLAN OF CARE
Problem: Patient Care Overview  Goal: Plan of Care Review  Outcome: Ongoing (interventions implemented as appropriate)  Flowsheets (Taken 2/6/2020 1136)  Progress: improving  Plan of Care Reviewed With: patient   Vss. Room air. No falls. A&O x4. NIH 0. No pain. Echo today. Doppler of lower extremities today. NS @ 75 mL/hr. D/C home today. I will continue to monitor and provide care.

## 2020-02-07 ENCOUNTER — READMISSION MANAGEMENT (OUTPATIENT)
Dept: CALL CENTER | Facility: HOSPITAL | Age: 85
End: 2020-02-07

## 2020-02-07 ENCOUNTER — TRANSITIONAL CARE MANAGEMENT TELEPHONE ENCOUNTER (OUTPATIENT)
Dept: FAMILY MEDICINE CLINIC | Facility: CLINIC | Age: 85
End: 2020-02-07

## 2020-02-07 NOTE — PROGRESS NOTES
Case Management Discharge Note      Final Note: DC'd home    Provided post acute provider list?: N/A    Destination      No service has been selected for the patient.      Durable Medical Equipment      No service has been selected for the patient.      Dialysis/Infusion      No service has been selected for the patient.      Home Medical Care      No service has been selected for the patient.      Therapy      No service has been selected for the patient.      Community Resources      No service has been selected for the patient.             Final Discharge Disposition Code: 01 - home or self-care

## 2020-02-07 NOTE — OUTREACH NOTE
Prep Survey      Responses   Facility patient discharged from?  [facility]   Is patient eligible?  Yes   Discharge diagnosis  TIA   Does the patient have one of the following disease processes/diagnoses(primary or secondary)?  Stroke (TIA)   Does the patient have Home health ordered?  No   Is there a DME ordered?  No   Prep survey completed?  Yes          Tori New RN

## 2020-02-10 ENCOUNTER — READMISSION MANAGEMENT (OUTPATIENT)
Dept: CALL CENTER | Facility: HOSPITAL | Age: 85
End: 2020-02-10

## 2020-02-10 NOTE — OUTREACH NOTE
Stroke Week 2 Survey      Responses   Facility patient discharged from?  [facility]   Does the patient have one of the following disease processes/diagnoses(primary or secondary)?  Stroke (TIA)          Hipolito Perez RN

## 2020-02-10 NOTE — OUTREACH NOTE
Pt states she is ok. Appetite is normal. No fever, chills, chest pain, SOB. She has had no further episodes of numbness, difficulty finding her words. Confirms receipt and understanding of d/c orders and medications. Confirmed TCM [facility] with Dr Cox on 02/18/20. Pt states she would like to discuss seeing a vascular MD with Dr Cox at this appt.

## 2020-02-11 ENCOUNTER — READMISSION MANAGEMENT (OUTPATIENT)
Dept: CALL CENTER | Facility: HOSPITAL | Age: 85
End: 2020-02-11

## 2020-02-11 LAB — CREAT BLDA-MCNC: 0.8 MG/DL (ref 0.6–1.3)

## 2020-02-11 NOTE — OUTREACH NOTE
Stroke Week 1 Survey      Responses   Facility patient discharged from?  [facility]   Does the patient have one of the following disease processes/diagnoses(primary or secondary)?  Stroke (TIA)   Is there a successful TCM telephone encounter documented?  Yes          Hipolito Perez RN

## 2020-02-14 ENCOUNTER — READMISSION MANAGEMENT (OUTPATIENT)
Dept: CALL CENTER | Facility: HOSPITAL | Age: 85
End: 2020-02-14

## 2020-02-14 NOTE — OUTREACH NOTE
Stroke Week 2 Survey      Responses   Facility patient discharged from?  [facility]   Does the patient have one of the following disease processes/diagnoses(primary or secondary)?  Stroke (TIA)   Week 2 attempt successful?  No   Unsuccessful attempts  Attempt 1          Ashley Wood RN

## 2020-02-17 ENCOUNTER — READMISSION MANAGEMENT (OUTPATIENT)
Dept: CALL CENTER | Facility: HOSPITAL | Age: 85
End: 2020-02-17

## 2020-02-17 NOTE — OUTREACH NOTE
Stroke Week 2 Survey      Responses   Facility patient discharged from?  [facility]   Does the patient have one of the following disease processes/diagnoses(primary or secondary)?  Stroke (TIA)   Week 2 attempt successful?  Yes   Call start time  1346   Call end time  1356   Discharge diagnosis  TIA   Meds reviewed with patient/caregiver?  Yes   Is the patient having any side effects they believe may be caused by any medication additions or changes?  No   Does the patient have all medications ordered at discharge?  Yes   Is the patient taking all medications as directed (includes completed medication regime)?  No   What is preventing the patient from taking all medications as directed?  Other [Refuses to take ASA. ]   Nursing Interventions  Nurse provided patient education   Medication comments  She only wants medication if there is an active problem. She does not feel preventative medication is needed.    Does the patient have a primary care provider?   Yes   Does the patient have an appointment with their PCP within 7 days of discharge?  Yes   Has the patient kept scheduled appointments due by today?  Yes   Psychosocial issues?  No   Does the patient require any assistance with activities of daily living such as eating, bathing, dressing, walking, etc.?  No   Does the patient have any residual symptoms from stroke/TIA?  No   Does the patient understand the diet ordered at discharge?  Yes   Did the patient receive a copy of their discharge instructions?  Yes   Nursing interventions  Reviewed instructions with patient, Educated on MyChart   What is the patient's perception of their health status since discharge?  Same   Nursing interventions  Nurse provided patient education   Is the patient able to teach back FAST for Stroke?  Yes   Is the patient/caregiver able to teach back the risk factors for a stroke?  High blood pressure-goal below 120/80, History of TIAs, History of Afib   Is the patient/caregiver able to  teach back signs and symptoms related to disease process for when to call PCP?  Yes   Is the patient/caregiver able to teach back signs and symptoms related to disease process for when to call 911?  Yes   Is the patient/caregiver able to teach back the hierarchy of who to call/visit for symptoms/problems? PCP, Specialist, Home health nurse, Urgent Care, ED, 911  Yes   Week 2 call completed?  Yes   Revoked  No further contact(revokes)-requires comment   Graduated/Revoked comments  She does not feel that our calls will be helpful.    Wrap up additional comments  She is hard to follow with conversation at times. She feels that hospital stay was not needed. She thinks that it is because she has anemia. She says that she always has trouble with her words.           Dede Garcia RN

## 2020-02-18 ENCOUNTER — OFFICE VISIT (OUTPATIENT)
Dept: FAMILY MEDICINE CLINIC | Facility: CLINIC | Age: 85
End: 2020-02-18

## 2020-02-18 VITALS
BODY MASS INDEX: 22.66 KG/M2 | OXYGEN SATURATION: 98 % | HEIGHT: 66 IN | TEMPERATURE: 98 F | HEART RATE: 73 BPM | DIASTOLIC BLOOD PRESSURE: 82 MMHG | WEIGHT: 141 LBS | SYSTOLIC BLOOD PRESSURE: 130 MMHG

## 2020-02-18 DIAGNOSIS — G45.9 TIA (TRANSIENT ISCHEMIC ATTACK): Primary | ICD-10-CM

## 2020-02-18 DIAGNOSIS — I71.21 ASCENDING AORTIC ANEURYSM (HCC): ICD-10-CM

## 2020-02-18 DIAGNOSIS — I48.0 PAROXYSMAL ATRIAL FIBRILLATION (HCC): ICD-10-CM

## 2020-02-18 DIAGNOSIS — I77.810 AORTIC ROOT DILATATION (HCC): ICD-10-CM

## 2020-02-18 DIAGNOSIS — I10 ESSENTIAL HYPERTENSION: ICD-10-CM

## 2020-02-18 DIAGNOSIS — I82.462 ACUTE DEEP VEIN THROMBOSIS (DVT) OF CALF MUSCLE VEIN OF LEFT LOWER EXTREMITY (HCC): ICD-10-CM

## 2020-02-18 DIAGNOSIS — J32.9 SINUSITIS, UNSPECIFIED CHRONICITY, UNSPECIFIED LOCATION: ICD-10-CM

## 2020-02-18 PROCEDURE — 99495 TRANSJ CARE MGMT MOD F2F 14D: CPT | Performed by: FAMILY MEDICINE

## 2020-02-18 RX ORDER — AMOXICILLIN 500 MG/1
1000 CAPSULE ORAL 2 TIMES DAILY
Qty: 40 CAPSULE | Refills: 0 | Status: SHIPPED | OUTPATIENT
Start: 2020-02-18 | End: 2020-02-21

## 2020-02-18 RX ORDER — AMLODIPINE BESYLATE 5 MG/1
5 TABLET ORAL DAILY
Qty: 90 TABLET | Refills: 3
Start: 2020-02-18 | End: 2020-03-17 | Stop reason: SDUPTHER

## 2020-02-18 NOTE — PROGRESS NOTES
Transitional Care Follow Up Visit  Subjective     Ryann Hernadez is a 88 y.o. female who presents for a transitional care management visit.    Within 48 business hours after discharge our office contacted her via telephone to coordinate her care and needs.      I reviewed and discussed the details of that call along with the discharge summary, hospital problems, inpatient lab results, inpatient diagnostic studies, and consultation reports with Ryann.     Current outpatient and discharge medications have been reconciled for the patient.  Reviewed by: Vanesa Connor MD      Date of TCM Phone Call 2/10/2020   [facility]   Date of Admission 2/5/2020   Date of Discharge 2/6/2020   Discharge Disposition Home or Self Care     Risk for Readmission (LACE) Score: 3 (2/6/2020  6:01 AM)      History of Present Illness   Course During Hospital Stay: Patient was having some left-sided numbness and word finding difficulty and presented to the hospital.  Records reviewed.  She was diagnosed with a TIA.  She has known atrial fibrillation, DVT and aortic dilation as well as aortic aneurysm.  She has been advised to take anticoagulation from several doctors.  She has declined this.  It has been discussed with her at length the benefits and risks.  She still refuses any anticoagulation.  She knows that she could have another stroke.  She declines aspirin as well. Since that time her symptoms have resolved but she is now having a few days of congestion and sinus pain. Has f/u with cardiology scheduled. Daughter is here today too and helping with history and management. Pt declines other follow ups. Pt is working on quality of life and not quantity of life. Pt is taking lisinopril and refusing to take medications written in the hospital. Will at least agree to change lisinopril to amlodipine.      The following portions of the patient's history were reviewed and updated as appropriate: allergies, current medications,  past family history, past medical history, past social history, past surgical history and problem list.    Review of Systems   Respiratory: Negative for shortness of breath.    Cardiovascular: Negative for chest pain.       Objective   Physical Exam   Constitutional: She is oriented to person, place, and time. She appears well-developed and well-nourished.   Cardiovascular: Normal rate, regular rhythm, normal heart sounds and intact distal pulses.   Pulmonary/Chest: Effort normal and breath sounds normal.   Neurological: She is alert and oriented to person, place, and time.   Skin: Skin is warm and dry.   Psychiatric: She has a normal mood and affect. Her behavior is normal.       Assessment/Plan   Problems Addressed this Visit        Cardiovascular and Mediastinum    HTN (hypertension)    Relevant Medications    amLODIPine (NORVASC) 5 MG tablet    Paroxysmal atrial fibrillation (CMS/HCC)    Relevant Medications    amLODIPine (NORVASC) 5 MG tablet    Aortic root dilatation (CMS/HCC)    TIA (transient ischemic attack) - Primary    Ascending aortic aneurysm (CMS/HCC)    Acute deep vein thrombosis (DVT) of calf muscle vein of left lower extremity      Other Visit Diagnoses     Sinusitis, unspecified chronicity, unspecified location        Relevant Medications    amoxicillin (AMOXIL) 500 MG capsule          Rest, fluids and follow up if worse or no better.   Had a long discussion about quality of life.

## 2020-02-21 DIAGNOSIS — J32.9 SINUSITIS, UNSPECIFIED CHRONICITY, UNSPECIFIED LOCATION: Primary | ICD-10-CM

## 2020-02-21 RX ORDER — DOXYCYCLINE 100 MG/1
100 CAPSULE ORAL 2 TIMES DAILY
Qty: 14 CAPSULE | Refills: 0 | Status: SHIPPED | OUTPATIENT
Start: 2020-02-21 | End: 2020-12-08

## 2020-03-02 ENCOUNTER — TELEPHONE (OUTPATIENT)
Dept: FAMILY MEDICINE CLINIC | Facility: CLINIC | Age: 85
End: 2020-03-02

## 2020-03-02 NOTE — TELEPHONE ENCOUNTER
She is feeling better except for the congestion she does not need any further antibiotics.  As long as she does not have glaucoma she should try some over-the-counter Flonase or Nasonex.

## 2020-03-02 NOTE — TELEPHONE ENCOUNTER
Patient called and states she has taken her antibiotic  That was given on 02/21 but she states she is still congested.  She wants to know if you think she needs more antibotics or what she should do now?  Phone number [phone].

## 2020-03-17 DIAGNOSIS — I10 ESSENTIAL HYPERTENSION: ICD-10-CM

## 2020-03-17 RX ORDER — AMLODIPINE BESYLATE 5 MG/1
5 TABLET ORAL DAILY
Qty: 90 TABLET | Refills: 3
Start: 2020-03-17 | End: 2020-03-19 | Stop reason: SDUPTHER

## 2020-03-19 ENCOUNTER — TELEPHONE (OUTPATIENT)
Dept: FAMILY MEDICINE CLINIC | Facility: CLINIC | Age: 85
End: 2020-03-19

## 2020-03-19 DIAGNOSIS — I10 ESSENTIAL HYPERTENSION: ICD-10-CM

## 2020-03-19 RX ORDER — AMLODIPINE BESYLATE 5 MG/1
5 TABLET ORAL DAILY
Qty: 90 TABLET | Refills: 3
Start: 2020-03-19 | End: 2020-03-23 | Stop reason: SDUPTHER

## 2020-03-19 NOTE — TELEPHONE ENCOUNTER
rx was sent over   amLODIPine (NORVASC) 5 MG tablet  E-Prescribing Status     Outpatient Medication Detail     amLODIPine (NORVASC) 5 MG tablet        Sig: Take 1 tablet by mouth Daily.        Class: No Print        Route: Oral          [id number] - [place], [place] - [address] AT [facility] - [phone]  - [phone] (Phone)  [phone] (Fax)     please resend to pharm

## 2020-03-23 DIAGNOSIS — I10 ESSENTIAL HYPERTENSION: ICD-10-CM

## 2020-03-23 RX ORDER — AMLODIPINE BESYLATE 5 MG/1
5 TABLET ORAL DAILY
Qty: 90 TABLET | Refills: 3
Start: 2020-03-23 | End: 2020-03-23 | Stop reason: SDUPTHER

## 2020-03-23 RX ORDER — AMLODIPINE BESYLATE 5 MG/1
5 TABLET ORAL DAILY
Qty: 90 TABLET | Refills: 3 | Status: SHIPPED | OUTPATIENT
Start: 2020-03-23 | End: 2021-01-20 | Stop reason: SDUPTHER

## 2020-05-05 ENCOUNTER — TELEPHONE (OUTPATIENT)
Dept: FAMILY MEDICINE CLINIC | Facility: CLINIC | Age: 85
End: 2020-05-05

## 2020-05-05 NOTE — TELEPHONE ENCOUNTER
I spoke with patient and he is needing to speak with Dr. Connor regarding medication.  She has appt for in office on 5/14 but is not wanting to come in due to COVID.... I also spoke with daughter that stated she would like to be in on the conversation with patient. She stated that patient is not letting anyone in her house further than the drive way so she can not be with patient during a phone call (can not do video).  Please advice on what to do?

## 2020-05-05 NOTE — TELEPHONE ENCOUNTER
DAUGHTER OF PATIENT IS CALLING TO CONFIRM APPT, STATING HER MOTHER HAS DEMENTIA AND TOLD HER WON'T BE COMING TO THE OFFICE BECAUSE OF COVID 19- DAUGHTER STILL WANTS PATIENT TO COME TO THE OFFICE OR HAVE AN ENCOUNTER WITH  SO THEY CAN DO MEDS REVIEW. PATIENT SEEMS NOT TO BE TAKING THE MEDICATIONS LIKE SHE SHOULD, IS CONFUSED ABOUT IT.  DAUGHTER WOULD LIKE FOR SOMEONE TO CALL PATIENT AND REVIEW THOSE MEDS AND ALSO CALL HER SO CAN DO A 3-WAY CONFERENCE CALL SO SHE IS AWARE OF WHAT IS GOING ON.

## 2020-05-05 NOTE — TELEPHONE ENCOUNTER
I spoke with pateint daughter and she is going to check with pharmacy to see what medcation was given to patient.  She canceled the visit on 5/14 because of COVID concern.

## 2020-05-05 NOTE — TELEPHONE ENCOUNTER
I have this conversation with her often. She never remembers what she is on or how to take it and wont admit she has a memory problem or needs help. I have written out her meds several times as well. She wont look at it. I advise to send the daughter a med list and have her supervise her medications. Maybe put in a pill box for the week and then check that she has taken them. I have suggested this many times.

## 2020-12-08 ENCOUNTER — APPOINTMENT (OUTPATIENT)
Dept: CT IMAGING | Facility: HOSPITAL | Age: 85
End: 2020-12-08

## 2020-12-08 ENCOUNTER — HOSPITAL ENCOUNTER (EMERGENCY)
Facility: HOSPITAL | Age: 85
Discharge: HOME | End: 2020-12-08
Attending: EMERGENCY MEDICINE | Admitting: EMERGENCY MEDICINE

## 2020-12-08 VITALS
HEIGHT: 66 IN | SYSTOLIC BLOOD PRESSURE: 127 MMHG | RESPIRATION RATE: 16 BRPM | TEMPERATURE: 97.6 F | WEIGHT: 150 LBS | DIASTOLIC BLOOD PRESSURE: 85 MMHG | OXYGEN SATURATION: 100 % | HEART RATE: 90 BPM | BODY MASS INDEX: 24.11 KG/M2

## 2020-12-08 DIAGNOSIS — R51.9 ACUTE NONINTRACTABLE HEADACHE, UNSPECIFIED HEADACHE TYPE: Primary | ICD-10-CM

## 2020-12-08 LAB
ALBUMIN SERPL-MCNC: 4.3 G/DL (ref 3.5–5.2)
ALBUMIN/GLOB SERPL: 1.3 G/DL
ALP SERPL-CCNC: 86 U/L (ref 39–117)
ALT SERPL W P-5'-P-CCNC: 15 U/L (ref 1–33)
ANION GAP SERPL CALCULATED.3IONS-SCNC: 8.7 MMOL/L (ref 5–15)
AST SERPL-CCNC: 14 U/L (ref 1–32)
BASOPHILS # BLD AUTO: 0.02 10*3/MM3 (ref 0–0.2)
BASOPHILS NFR BLD AUTO: 0.4 % (ref 0–1.5)
BILIRUB SERPL-MCNC: 1.5 MG/DL (ref 0–1.2)
BUN SERPL-MCNC: 13 MG/DL (ref 8–23)
BUN/CREAT SERPL: 14.3 (ref 7–25)
CALCIUM SPEC-SCNC: 9.6 MG/DL (ref 8.6–10.5)
CHLORIDE SERPL-SCNC: 103 MMOL/L (ref 98–107)
CO2 SERPL-SCNC: 28.3 MMOL/L (ref 22–29)
CREAT SERPL-MCNC: 0.91 MG/DL (ref 0.57–1)
CRP SERPL-MCNC: 0.17 MG/DL (ref 0–0.5)
DEPRECATED RDW RBC AUTO: 43.9 FL (ref 37–54)
EOSINOPHIL # BLD AUTO: 0.02 10*3/MM3 (ref 0–0.4)
EOSINOPHIL NFR BLD AUTO: 0.4 % (ref 0.3–6.2)
ERYTHROCYTE [DISTWIDTH] IN BLOOD BY AUTOMATED COUNT: 12.1 % (ref 12.3–15.4)
ERYTHROCYTE [SEDIMENTATION RATE] IN BLOOD: 14 MM/HR (ref 0–30)
GFR SERPL CREATININE-BSD FRML MDRD: 71 ML/MIN/1.73
GLOBULIN UR ELPH-MCNC: 3.3 GM/DL
GLUCOSE SERPL-MCNC: 163 MG/DL (ref 65–99)
HCT VFR BLD AUTO: 39.6 % (ref 34–46.6)
HGB BLD-MCNC: 13 G/DL (ref 12–15.9)
IMM GRANULOCYTES # BLD AUTO: 0.02 10*3/MM3 (ref 0–0.05)
IMM GRANULOCYTES NFR BLD AUTO: 0.4 % (ref 0–0.5)
LYMPHOCYTES # BLD AUTO: 1.64 10*3/MM3 (ref 0.7–3.1)
LYMPHOCYTES NFR BLD AUTO: 35 % (ref 19.6–45.3)
MCH RBC QN AUTO: 32.5 PG (ref 26.6–33)
MCHC RBC AUTO-ENTMCNC: 32.8 G/DL (ref 31.5–35.7)
MCV RBC AUTO: 99 FL (ref 79–97)
MONOCYTES # BLD AUTO: 0.38 10*3/MM3 (ref 0.1–0.9)
MONOCYTES NFR BLD AUTO: 8.1 % (ref 5–12)
NEUTROPHILS NFR BLD AUTO: 2.6 10*3/MM3 (ref 1.7–7)
NEUTROPHILS NFR BLD AUTO: 55.7 % (ref 42.7–76)
NRBC BLD AUTO-RTO: 0 /100 WBC (ref 0–0.2)
PLATELET # BLD AUTO: 209 10*3/MM3 (ref 140–450)
PMV BLD AUTO: 10.2 FL (ref 6–12)
POTASSIUM SERPL-SCNC: 3.5 MMOL/L (ref 3.5–5.2)
PROT SERPL-MCNC: 7.6 G/DL (ref 6–8.5)
RBC # BLD AUTO: 4 10*6/MM3 (ref 3.77–5.28)
SODIUM SERPL-SCNC: 140 MMOL/L (ref 136–145)
WBC # BLD AUTO: 4.68 10*3/MM3 (ref 3.4–10.8)

## 2020-12-08 PROCEDURE — 85025 COMPLETE CBC W/AUTO DIFF WBC: CPT | Performed by: EMERGENCY MEDICINE

## 2020-12-08 PROCEDURE — 80053 COMPREHEN METABOLIC PANEL: CPT | Performed by: EMERGENCY MEDICINE

## 2020-12-08 PROCEDURE — 86140 C-REACTIVE PROTEIN: CPT | Performed by: EMERGENCY MEDICINE

## 2020-12-08 PROCEDURE — 99284 EMERGENCY DEPT VISIT MOD MDM: CPT

## 2020-12-08 PROCEDURE — 85652 RBC SED RATE AUTOMATED: CPT | Performed by: EMERGENCY MEDICINE

## 2020-12-08 PROCEDURE — 99283 EMERGENCY DEPT VISIT LOW MDM: CPT

## 2020-12-08 PROCEDURE — 70450 CT HEAD/BRAIN W/O DYE: CPT

## 2020-12-08 RX ORDER — ACETAMINOPHEN 500 MG
1000 TABLET ORAL ONCE
Status: COMPLETED | OUTPATIENT
Start: 2020-12-08 | End: 2020-12-08

## 2020-12-08 RX ADMIN — ACETAMINOPHEN 1000 MG: 500 TABLET ORAL at 17:28

## 2020-12-08 NOTE — ED PROVIDER NOTES
EMERGENCY DEPARTMENT ENCOUNTER  Patient was placed in face mask in first look and the following protective measures were taken unless additional measures were taken and documented below in the ED course. Patient was wearing facemask when I entered the room and throughout our encounter. I wore full protective equipment throughout this patient encounter including a face mask, and gloves. Hand hygiene was performed before donning protective equipment and after removal when leaving the room.    Room Number:  30/30  Date of encounter:  12/8/2020  PCP: Vanesa Connor MD    HPI:  Context: Ryann Hernadez is a 89 y.o. female who presents to the ED c/o chief complaint of headache.  Patient complains of intermittent left-sided headache for the last 2 days.  Patient states headache is usually occurs in the morning, she is usually waking up with a headache.  Patient complains of throbbing at her left temple.  No aggravating or ameliorating factors.  Headache is not positional, temples nontender to touch, pain is not worsened with mastication.  Patient has been taking Tylenol at home with resolution of the pain.  Patient denies any visual disturbances, no double vision, no difficulty moving her eyes, no pain with eye movements.  No confusion, no weakness or numbness.  Patient denies any neck pain or stiffness.  No fever shakes chills or night sweats.  Patient reports mild headache at present.  Patient reports she last took Tylenol at approximately 8:00 this morning.    MEDICAL HISTORY REVIEW  Reviewed in EPIC    PAST MEDICAL HISTORY  Active Ambulatory Problems     Diagnosis Date Noted   • Allergic rhinitis 01/15/2014   • Gastroesophageal reflux disease 01/15/2014   • HTN (hypertension) 01/09/2013   • Ataxic gait 08/12/2016   • Balance disorder 08/12/2016   • Acute right otitis media 08/12/2016   • Chronic nonintractable headache 09/14/2016   • Facial swelling 02/23/2017   • Dizziness 02/23/2017   • Medicare annual  wellness visit, initial 03/06/2017   • Hospital discharge follow-up 03/06/2017   • At high risk for falls 08/07/2017   • Renal insufficiency 08/07/2017   • Paroxysmal atrial fibrillation (CMS/[facility]) 09/22/2019   • Ureteral stone with hydronephrosis 09/22/2019   • Hydronephrosis due to obstruction of ureter 09/24/2019   • Aortic root dilatation (CMS/HCC) 10/10/2019   • MCI (mild cognitive impairment) 09/26/2018   • Osteoporosis 09/13/2018   • Corn of foot 11/05/2019   • BPPV (benign paroxysmal positional vertigo), unspecified laterality 01/08/2020   • TIA (transient ischemic attack) 02/05/2020   • Ascending aortic aneurysm (CMS/HCC) 02/06/2020   • Acute deep vein thrombosis (DVT) of calf muscle vein of left lower extremity (CMS/HCC) 02/06/2020     Resolved Ambulatory Problems     Diagnosis Date Noted   • Acute recurrent maxillary sinusitis 02/23/2017     Past Medical History:   Diagnosis Date   • A-fib (CMS/HCC)    • Anemia    • Aneurysm, ascending aorta (CMS/HCC) 03/22/2016   • Anxiety    • Atypical chest pain    • BPPV (benign paroxysmal positional vertigo)    • Bradycardia    • Chronic cystitis    • Chronic headaches    • Chronic paroxysmal hemicrania    • Depression    • Esophagitis    • Gastritis    • GERD (gastroesophageal reflux disease)    • Hematuria    • High blood pressure    • History of cataract    • History of irritable bowel syndrome    • Hypertension    • IBS (irritable bowel syndrome)    • Influenza A    • Kidney lesion    • Labyrinthitis 07/28/2014   • OA (osteoarthritis)    • Other abnormal clinical finding 09/21/2012   • Pain of thigh    • Personal history of gastric ulcer    • Renal disorder        PAST SURGICAL HISTORY  Past Surgical History:   Procedure Laterality Date   • BREAST LUMPECTOMY     • BREAST SURGERY     • CATARACT EXTRACTION     • COLONOSCOPY N/A     DR. MARGARITA MENDOZA   • ELBOW ARTHROTOMY  07/01/1999    DR. RYAN RITCHIE   • EYE SURGERY     • FRACTURE SURGERY      HAND, BROKEN FINGERS   DR. MIKE CHRISTIANSON   • KNEE SURGERY  08/31/1999    DR. THADDEUS MENDES   • OOPHORECTOMY     • TUBAL ABDOMINAL LIGATION Bilateral 1962    DR. EDDA MANNING       FAMILY HISTORY  Family History   Problem Relation Age of Onset   • Heart attack Mother    • Arthritis Mother    • Arthritis Father    • Arthritis Sister    • Arthritis Other    • Kidney disease Other    • Thyroid disease Other    • Diabetes Other    • Hypertension Other    • Heart disease Other    • Arthritis Maternal Grandmother        SOCIAL HISTORY  Social History     Socioeconomic History   • Marital status: Single     Spouse name: Not on file   • Number of children: Not on file   • Years of education: Not on file   • Highest education level: Not on file   Tobacco Use   • Smoking status: Never Smoker   • Smokeless tobacco: Never Used   Substance and Sexual Activity   • Alcohol use: No   • Drug use: No   • Sexual activity: Defer       ALLERGIES  Sulfa antibiotics, Lisinopril, and Olmesartan    The patient's allergies have been reviewed    REVIEW OF SYSTEMS  All systems reviewed and negative except for those discussed in HPI.     PHYSICAL EXAM  I have reviewed the triage vital signs and nursing notes.  ED Triage Vitals [12/08/20 1644]   Temp Heart Rate Resp BP SpO2   97.6 °F (36.4 °C) 90 16 -- 99 %      Temp src Heart Rate Source Patient Position BP Location FiO2 (%)   Tympanic Monitor -- -- --     General: No acute distress  HENT: NCAT, temporal arteries are normal in appearance, not torturous, nontender to palpation, extraocular motion intact, visual acuity grossly normal, PERRL, Nares patent  Eyes: no scleral icterus  Neck: trachea midline, no ROM limitations.  No nuchal rigidity.  Negative Kernig sign.  Negative Brudzinski sign.  CV: regular rhythm, regular rate  Respiratory: normal effort, CTAB  Abdomen: soft, nondistended, nontender to palpation, no rebound tenderness, no guarding or rigidity  : deferred  Musculoskeletal: no deformity  Neuro: Alert and  oriented x3, extraocular motion intact, pupils are equal and round reactive to light, cranial nerves II through XII are grossly intact, normal speech, moves all extremities well, 5 out of 5 strength all 4 extremities, sensation intact light touch all 4 extremities, no ataxia.  Skin: warm, dry    LAB RESULTS  Recent Results (from the past 24 hour(s))   POCT GLUCOSE FINGERSTICK    Collection Time: 12/08/20 12:45 PM    Specimen type and source: Blood   Result Value Ref Range    Glucose 112 (A) 70 - 99 mg/dL    QC Yes     Lot Number [id number]    Comprehensive Metabolic Panel    Collection Time: 12/08/20  5:23 PM    Specimen: Blood   Result Value Ref Range    Glucose 163 (H) 65 - 99 mg/dL    BUN 13 8 - 23 mg/dL    Creatinine 0.91 0.57 - 1.00 mg/dL    Sodium 140 136 - 145 mmol/L    Potassium 3.5 3.5 - 5.2 mmol/L    Chloride 103 98 - 107 mmol/L    CO2 28.3 22.0 - 29.0 mmol/L    Calcium 9.6 8.6 - 10.5 mg/dL    Total Protein 7.6 6.0 - 8.5 g/dL    Albumin 4.30 3.50 - 5.20 g/dL    ALT (SGPT) 15 1 - 33 U/L    AST (SGOT) 14 1 - 32 U/L    Alkaline Phosphatase 86 39 - 117 U/L    Total Bilirubin 1.5 (H) 0.0 - 1.2 mg/dL    eGFR  African Amer 71 >60 mL/min/1.73    Globulin 3.3 gm/dL    A/G Ratio 1.3 g/dL    BUN/Creatinine Ratio 14.3 7.0 - 25.0    Anion Gap 8.7 5.0 - 15.0 mmol/L   Sedimentation Rate    Collection Time: 12/08/20  5:23 PM    Specimen: Blood   Result Value Ref Range    Sed Rate 14 0 - 30 mm/hr   C-reactive Protein    Collection Time: 12/08/20  5:23 PM    Specimen: Blood   Result Value Ref Range    C-Reactive Protein 0.17 0.00 - 0.50 mg/dL   CBC Auto Differential    Collection Time: 12/08/20  5:23 PM    Specimen: Blood   Result Value Ref Range    WBC 4.68 3.40 - 10.80 10*3/mm3    RBC 4.00 3.77 - 5.28 10*6/mm3    Hemoglobin 13.0 12.0 - 15.9 g/dL    Hematocrit 39.6 34.0 - 46.6 %    MCV 99.0 (H) 79.0 - 97.0 fL    MCH 32.5 26.6 - 33.0 pg    MCHC 32.8 31.5 - 35.7 g/dL    RDW 12.1 (L) 12.3 - 15.4 %    RDW-SD 43.9 37.0 - 54.0 fl     MPV 10.2 6.0 - 12.0 fL    Platelets 209 140 - 450 10*3/mm3    Neutrophil % 55.7 42.7 - 76.0 %    Lymphocyte % 35.0 19.6 - 45.3 %    Monocyte % 8.1 5.0 - 12.0 %    Eosinophil % 0.4 0.3 - 6.2 %    Basophil % 0.4 0.0 - 1.5 %    Immature Grans % 0.4 0.0 - 0.5 %    Neutrophils, Absolute 2.60 1.70 - 7.00 10*3/mm3    Lymphocytes, Absolute 1.64 0.70 - 3.10 10*3/mm3    Monocytes, Absolute 0.38 0.10 - 0.90 10*3/mm3    Eosinophils, Absolute 0.02 0.00 - 0.40 10*3/mm3    Basophils, Absolute 0.02 0.00 - 0.20 10*3/mm3    Immature Grans, Absolute 0.02 0.00 - 0.05 10*3/mm3    nRBC 0.0 0.0 - 0.2 /100 WBC       I ordered the above labs and reviewed the results.    RADIOLOGY  No Radiology Exams Resulted Within Past 24 Hours    I ordered the above noted radiological studies. I reviewed the images and results. I agree with the radiologist interpretation.    PROCEDURES  Procedures    MEDICATIONS GIVEN IN ER  Medications   acetaminophen (TYLENOL) tablet 1,000 mg (1,000 mg Oral Given 12/8/20 1728)       PROGRESS, DATA ANALYSIS, CONSULTS, AND MEDICAL DECISION MAKING  A complete history and physical exam have been performed.  All available laboratory and imaging results have been reviewed by myself prior to disposition.    MDM  After the initial H&P, I discussed pertinent information from history and physical exam with patient/family.  Discussed differential diagnosis.  Discussed plan for ED evaluation/work-up/treatment.  All questions answered.  Patient/family is agreeable with plan.  ED Course as of Dec 08 1912   Tue Dec 08, 2020   1904 Phone call with radiologist.  I had previously reviewed the images. I discussed the patient, imaging, and their interpretation.  CT head is negative for acute pathology.  See dictated report for final interpretation.        [JG]   1911 CT head imaging is negative.  Patient afebrile, vital signs stable, no leukocytosis, negative inflammatory markers.  Headache has no alarm signs or symptoms other than  unilateral which is concerning for temporal arteritis but inflammatory markers are negative.  Patient will be given extensive discussion return precautions and discharging with primary care and neurology follow-up.    [JG]   1912 The patient was reexamined.  They have had symptomatic improvement during their ED stay.  I discussed today's findings with the patient, explaining the pertinent positives and negatives from today's visit, and the plan of care.  Discussed plan for discharge as there is no emergent indication for admission.  Discussed limitation of the ED work-up and that this is to rule out life-threatening emergencies but that they could require further testing as determined by their primary care and or any referred specialist patient is agreeable and understands need for follow-up and repeat exam/testing.  Patient is aware that discharge does not mean there is nothing wrong, indicates no emergency is present, and that they must continue their care with their primary care physician and/or any referred specialist.  They were given appropriate follow-up with their primary care physician and/or specialist.  I had an extensive discussion on the expected clinical course and return precautions.  Patient understands to return to the emergency department for continuation, worsening, or new symptoms.  I answered any of the patient's questions. Patient was discharged home in a stable condition.        [JG]      ED Course User Index  [JG] Sadiq Singletary MD       AS OF 19:12 EST VITALS:    BP - 127/92  HR - 90  TEMP - 97.6 °F (36.4 °C) (Tympanic)  O2 SATS - 100%    DIAGNOSIS  Final diagnoses:   Acute nonintractable headache, unspecified headache type         DISPOSITION  DISCHARGE    Patient discharged in stable condition.    Reviewed implications of results, diagnosis, meds, responsibility to follow up, warning signs and symptoms of possible worsening, potential complications and reasons to return to  ER.    Patient/Family voiced understanding of above instructions.    Discussed plan for discharge, as there is no emergent indication for admission. Patient referred to primary care provider for BP management due to today's BP. Pt/family is agreeable and understands need for follow up and repeat testing.  Pt is aware that discharge does not mean that nothing is wrong but it indicates no emergency is present that requires admission and they must continue care with follow-up as given below or physician of their choice.     FOLLOW-UP  Vanesa Connor MD  [address]  [place] [address] [phone]    Schedule an appointment as soon as possible for a visit in 2 days  even if well    [facility]UR[facility]  [address]  [place] [address] [phone]  Schedule an appointment as soon as possible for a visit in 2 days  for further evaluation/management of your headaches         Medication List      No changes were made to your prescriptions during this visit.          Sadiq Singletary MD  12/08/20 [id number]

## 2020-12-08 NOTE — ED NOTES
Pt presents to ED with complains of headache of the last 3-4 days. Pt denies N/V at this time.  Pt deniers any falls, head injuries, or use of blood thinners. Pt is A&OX4, able to ambulate but placed in wheelchair, and in a mask.     Nakul Perez, RN  12/08/20 0840

## 2020-12-15 ENCOUNTER — OFFICE VISIT (OUTPATIENT)
Dept: FAMILY MEDICINE CLINIC | Facility: CLINIC | Age: 85
End: 2020-12-15

## 2020-12-15 VITALS
HEART RATE: 64 BPM | DIASTOLIC BLOOD PRESSURE: 72 MMHG | WEIGHT: 132 LBS | OXYGEN SATURATION: 98 % | SYSTOLIC BLOOD PRESSURE: 132 MMHG | BODY MASS INDEX: 21.99 KG/M2 | HEIGHT: 65 IN | TEMPERATURE: 98.2 F

## 2020-12-15 DIAGNOSIS — M26.69 JAW CLAUDICATION: Primary | ICD-10-CM

## 2020-12-15 DIAGNOSIS — R51.9 HEADACHE DISORDER: ICD-10-CM

## 2020-12-15 DIAGNOSIS — R51.9 NONINTRACTABLE EPISODIC HEADACHE, UNSPECIFIED HEADACHE TYPE: ICD-10-CM

## 2020-12-15 PROCEDURE — 99214 OFFICE O/P EST MOD 30 MIN: CPT | Performed by: FAMILY MEDICINE

## 2020-12-15 NOTE — PROGRESS NOTES
Subjective   Ryann Hernadez is a 89 y.o. female.     Chief Complaint   Patient presents with   • Hospital Follow Up Visit       History of Present Illness   Patient was in the ER 1 week ago with complaints of headache.  ER records reviewed.  Throbbing and left-sided.  It is worsening with chewing.  Tylenol helps.  No difficulty with vision no pain with moving her eyes, no confusion or weakness.  She was advised to follow-up with neurology.  Head CT was okay.  Inflammatory markers negative.  There was concern for temporal arteritis. Has been having these headaches for years maybe, poor historian. Headaches getting worse.     Pt is loosing weight. Is not eating much.     Pt wants to only do things that improve quality of life. Not interested in blood thinner.     The following portions of the patient's history were reviewed and updated as appropriate: allergies, current medications, past family history, past medical history, past social history, past surgical history and problem list.    Past Medical History:   Diagnosis Date   • A-fib (CMS/[facility])    • Acute deep vein thrombosis (DVT) of calf muscle vein of left lower extremity (CMS/[facility]) 2/6/2020   • Allergic rhinitis    • Anemia    • Aneurysm, ascending aorta (CMS/[facility]) 03/22/2016    5 CM   • Anxiety    • Ataxic gait    • Atypical chest pain    • BPPV (benign paroxysmal positional vertigo)    • Bradycardia    • Chronic cystitis    • Chronic headaches    • Chronic nonintractable headache 9/14/2016   • Chronic paroxysmal hemicrania    • Depression    • Esophagitis    • Gastritis    • GERD (gastroesophageal reflux disease)    • Hematuria    • High blood pressure    • History of cataract    • History of irritable bowel syndrome    • Hypertension    • IBS (irritable bowel syndrome)    • Influenza A    • Kidney lesion     LEFT KIDNEY CYSTIC   • Labyrinthitis 07/28/2014   • OA (osteoarthritis)    • Osteoporosis 9/13/2018   • Other abnormal clinical finding 09/21/2012     "LEFT UTEROCELE   • Pain of thigh    • Personal history of gastric ulcer    • Renal disorder    • Renal insufficiency 8/7/2017   • TIA (transient ischemic attack) 2/5/2020       Past Surgical History:   Procedure Laterality Date   • BREAST LUMPECTOMY     • BREAST SURGERY     • CATARACT EXTRACTION     • COLONOSCOPY N/A     DR. MARGARITA MENDOZA   • ELBOW ARTHROTOMY  07/01/1999    DR. RYAN RITCHIE   • EYE SURGERY     • FRACTURE SURGERY      HAND, BROKEN FINGERS  DR. MIKE CHRISTIANSON   • KNEE SURGERY  08/31/1999    DR. THADDEUS MENDES   • OOPHORECTOMY     • TUBAL ABDOMINAL LIGATION Bilateral 1962    DR. EDDA MANNING       Family History   Problem Relation Age of Onset   • Heart attack Mother    • Arthritis Mother    • Arthritis Father    • Arthritis Sister    • Arthritis Other    • Kidney disease Other    • Thyroid disease Other    • Diabetes Other    • Hypertension Other    • Heart disease Other    • Arthritis Maternal Grandmother        Social History     Socioeconomic History   • Marital status: Single     Spouse name: Not on file   • Number of children: Not on file   • Years of education: Not on file   • Highest education level: Not on file   Tobacco Use   • Smoking status: Never Smoker   • Smokeless tobacco: Never Used   Substance and Sexual Activity   • Alcohol use: No   • Drug use: No   • Sexual activity: Defer       Review of Systems   Constitutional: Negative for fever.   Respiratory: Negative for shortness of breath.        Objective   Visit Vitals  /72   Pulse 64   Temp 98.2 °F (36.8 °C) (Infrared)   Ht 165.1 cm (65\")   Wt 59.9 kg (132 lb)   SpO2 98%   BMI 21.97 kg/m²     Body mass index is 21.97 kg/m².  Physical Exam  Constitutional:       Appearance: Normal appearance. She is well-developed.   Cardiovascular:      Rate and Rhythm: Normal rate and regular rhythm.      Heart sounds: Normal heart sounds.   Pulmonary:      Effort: Pulmonary effort is normal.      Breath sounds: Normal breath sounds.   Musculoskeletal: " Normal range of motion.         General: No swelling.      Comments: Gait slow with walker.    Skin:     General: Skin is warm and dry.      Findings: No rash.   Neurological:      General: No focal deficit present.      Mental Status: She is alert and oriented to person, place, and time.   Psychiatric:         Mood and Affect: Mood normal.         Behavior: Behavior normal.           Assessment/Plan   Diagnoses and all orders for this visit:    1. Jaw claudication (Primary)  -     Ambulatory Referral to Vascular Surgery  -     Ambulatory Referral to Neurology    2. Headache disorder  -     Ambulatory Referral to Vascular Surgery  -     Ambulatory Referral to Neurology    3. Nonintractable episodic headache, unspecified headache type  -     Ambulatory Referral to Neurology        Needs to r/o NPH and TA. F/u in 1 month and encouraged boost etc. ER warnings given.

## 2020-12-22 ENCOUNTER — TELEPHONE (OUTPATIENT)
Dept: FAMILY MEDICINE CLINIC | Facility: CLINIC | Age: 85
End: 2020-12-22

## 2020-12-22 NOTE — TELEPHONE ENCOUNTER
PATIENT WAS REFERRED TO A NEUROLOGIST BUT HAS NOT RECEIVED A CALL TO SETUP AN APPOINTMENT/FRIEND OF PATIENT, JAKE STEVENS, IS CALLING TO CHECK ON STAT REFERRAL    IF NO ANSWER ON PHONE PLEASE LEAVE A MESSAGE    CALLBACK NUMBER: [phone]

## 2021-01-20 ENCOUNTER — OFFICE VISIT (OUTPATIENT)
Dept: FAMILY MEDICINE CLINIC | Facility: CLINIC | Age: 86
End: 2021-01-20

## 2021-01-20 VITALS
OXYGEN SATURATION: 98 % | HEART RATE: 82 BPM | WEIGHT: 134 LBS | TEMPERATURE: 97.3 F | DIASTOLIC BLOOD PRESSURE: 90 MMHG | HEIGHT: 66 IN | BODY MASS INDEX: 21.53 KG/M2 | SYSTOLIC BLOOD PRESSURE: 130 MMHG

## 2021-01-20 DIAGNOSIS — I82.462 ACUTE DEEP VEIN THROMBOSIS (DVT) OF CALF MUSCLE VEIN OF LEFT LOWER EXTREMITY (HCC): ICD-10-CM

## 2021-01-20 DIAGNOSIS — I10 ESSENTIAL HYPERTENSION: ICD-10-CM

## 2021-01-20 DIAGNOSIS — I48.0 PAROXYSMAL ATRIAL FIBRILLATION (HCC): ICD-10-CM

## 2021-01-20 DIAGNOSIS — Z00.00 MEDICARE ANNUAL WELLNESS VISIT, SUBSEQUENT: Primary | ICD-10-CM

## 2021-01-20 DIAGNOSIS — I71.21 ASCENDING AORTIC ANEURYSM (HCC): ICD-10-CM

## 2021-01-20 DIAGNOSIS — M81.0 AGE-RELATED OSTEOPOROSIS WITHOUT CURRENT PATHOLOGICAL FRACTURE: ICD-10-CM

## 2021-01-20 PROCEDURE — G0439 PPPS, SUBSEQ VISIT: HCPCS | Performed by: FAMILY MEDICINE

## 2021-01-20 PROCEDURE — 99214 OFFICE O/P EST MOD 30 MIN: CPT | Performed by: FAMILY MEDICINE

## 2021-01-20 RX ORDER — AMLODIPINE BESYLATE 5 MG/1
5 TABLET ORAL DAILY
Qty: 90 TABLET | Refills: 3 | Status: SHIPPED | OUTPATIENT
Start: 2021-01-20 | End: 2021-03-15

## 2021-01-20 NOTE — PROGRESS NOTES
Subsequent Medicare Wellness Visit   The ABC's of the Annual Wellness Visit    Chief Complaint   Patient presents with   • Medicare Wellness-subsequent     med refill on amlodipine       HPI:  Ryann Hernadez, -1931, is a 89 y.o. female who presents for a Subsequent Medicare Wellness Visit.    Pt having sinus pain and allergy symptoms for years. Taking tylenol sinus daily. Head ct was ok in ER. Has appt with neurology pending. And with vasc.     Has not had cardiology f/u in some time. Needs help with appt.     htn- doing well on meds    Osteoporosis- due bd, on calcium and vit d.     Pt declines any shots.     Recent Hospitalizations:  No hospitalization(s) within the last year..    Current Medical Providers:  Patient Care Team:  Vanesa Connor MD as PCP - General (Family Medicine)    Health Habits and Functional and Cognitive Screening and Depression Screening:  Functional & Cognitive Status 2021   Do you have difficulty preparing food and eating? No   Do you have difficulty bathing yourself, getting dressed or grooming yourself? No   Do you have difficulty using the toilet? No   Do you have difficulty moving around from place to place? No   Do you have trouble with steps or getting out of a bed or a chair? No   Current Diet Well Balanced Diet   Dental Exam Up to date   Eye Exam Up to date   Exercise (times per week) 0 times per week   Current Exercises Include No Regular Exercise   Do you need help using the phone?  No   Are you deaf or do you have serious difficulty hearing?  Yes   Do you need help with transportation? No   Do you need help shopping? Yes   Do you need help preparing meals?  No   Do you need help with housework?  No   Do you need help with laundry? No   Do you need help taking your medications? No   Do you need help managing money? No   Do you ever drive or ride in a car without wearing a seat belt? No   Have you felt unusual stress, anger or loneliness in the last month? No    Who do you live with? Alone   If you need help, do you have trouble finding someone available to you? No   Do you have difficulty concentrating, remembering or making decisions? -       Compared to one year ago, the patient feels her physical health is the same and her mental health is the same.    Depression Screen:  PHQ-2/PHQ-9 Depression Screening 1/20/2021   Little interest or pleasure in doing things 0   Feeling down, depressed, or hopeless 0   Trouble falling or staying asleep, or sleeping too much -   Feeling tired or having little energy -   Poor appetite or overeating -   Feeling bad about yourself - or that you are a failure or have let yourself or your family down -   Trouble concentrating on things, such as reading the newspaper or watching television -   Moving or speaking so slowly that other people could have noticed. Or the opposite - being so fidgety or restless that you have been moving around a lot more than usual -   Thoughts that you would be better off dead, or of hurting yourself in some way -   Total Score 0   If you checked off any problems, how difficult have these problems made it for you to do your work, take care of things at home, or get along with other people? -         Past Medical/Family/Social History:  The following portions of the patient's history were reviewed and updated as appropriate: allergies, current medications, past family history, past medical history, past social history, past surgical history and problem list.    Allergies   Allergen Reactions   • Sulfa Antibiotics Anaphylaxis   • Lisinopril    • Olmesartan          Current Outpatient Medications:   •  amLODIPine (NORVASC) 5 MG tablet, Take 1 tablet by mouth Daily., Disp: 90 tablet, Rfl: 3  •  Cholecalciferol (VITAMIN D3) 5000 units capsule capsule, Take 5,000 Units by mouth Daily., Disp: , Rfl:   •  Multiple Vitamins-Minerals (ONE-A-DAY WOMENS 50 PLUS) tablet, Take  by mouth., Disp: , Rfl:     Aspirin use  counseling: Does not need ASA (and currently is not on it)    Current medication list contains no high risk medications.  No harmful drug interactions have been identified.     Family History   Problem Relation Age of Onset   • Heart attack Mother    • Arthritis Mother    • Arthritis Father    • Arthritis Sister    • Arthritis Other    • Kidney disease Other    • Thyroid disease Other    • Diabetes Other    • Hypertension Other    • Heart disease Other    • Arthritis Maternal Grandmother        Social History     Tobacco Use   • Smoking status: Never Smoker   • Smokeless tobacco: Never Used   Substance Use Topics   • Alcohol use: No       Past Surgical History:   Procedure Laterality Date   • BREAST LUMPECTOMY     • BREAST SURGERY     • CATARACT EXTRACTION     • COLONOSCOPY N/A     DR. MARGARITA MENDOZA   • ELBOW ARTHROTOMY  07/01/1999    DR. RYAN RITCHIE   • EYE SURGERY     • FRACTURE SURGERY      HAND, BROKEN FINGERS  DR. MIKE CHRISTIANSON   • KNEE SURGERY  08/31/1999    DR. THADDEUS MENDES   • OOPHORECTOMY     • TUBAL ABDOMINAL LIGATION Bilateral 1962    DR. EDDA MANNING       Patient Active Problem List   Diagnosis   • Allergic rhinitis   • Gastroesophageal reflux disease   • Essential hypertension   • Ataxic gait   • Balance disorder   • Acute right otitis media   • Nonintractable episodic headache   • Facial swelling   • Dizziness   • Medicare annual wellness visit, initial   • Hospital discharge follow-up   • At high risk for falls   • Renal insufficiency   • Paroxysmal atrial fibrillation (CMS/HCC)   • Ureteral stone with hydronephrosis   • Hydronephrosis due to obstruction of ureter   • Aortic root dilatation (CMS/HCC)   • MCI (mild cognitive impairment)   • Age-related osteoporosis without current pathological fracture   • Corn of foot   • BPPV (benign paroxysmal positional vertigo), unspecified laterality   • TIA (transient ischemic attack)   • Ascending aortic aneurysm (CMS/HCC)   • Acute deep vein thrombosis (DVT) of  "calf muscle vein of left lower extremity (CMS/HCC)   • Medicare annual wellness visit, subsequent       Review of Systems   Constitutional: Negative for fever.   Respiratory: Negative for shortness of breath.        Objective     Vitals:    01/20/21 1039   BP: 130/90   Pulse: 82   Temp: 97.3 °F (36.3 °C)   TempSrc: Infrared   SpO2: 98%   Weight: 60.8 kg (134 lb)   Height: 167.6 cm (66\")       Patient's Body mass index is 21.63 kg/m². BMI is within normal parameters. No follow-up required..      No exam data present    The patient has potential evidence of moderate cognitive impairment. 1/3 items were correctly remembered at 5 minutes.     Physical Exam  Constitutional:       Appearance: Normal appearance. She is well-developed.   Cardiovascular:      Rate and Rhythm: Normal rate and regular rhythm.      Heart sounds: Normal heart sounds.   Pulmonary:      Effort: Pulmonary effort is normal.      Breath sounds: Normal breath sounds.   Musculoskeletal: Normal range of motion.         General: No swelling.   Skin:     General: Skin is warm and dry.      Findings: No rash.   Neurological:      General: No focal deficit present.      Mental Status: She is alert and oriented to person, place, and time.   Psychiatric:         Mood and Affect: Mood normal.         Behavior: Behavior normal.         Recent Lab Results:  Lab Results   Component Value Date    GLU 94 01/08/2020     Lab Results   Component Value Date    CHOL 158 02/06/2020    TRIG 37 02/06/2020    HDL 72 (H) 02/06/2020    VLDL 7.4 02/06/2020    LDLHDL 1.09 02/06/2020       Assessment/Plan   Age-appropriate Screening Schedule:  Refer to the list below for future screening recommendations based on patient's age, sex and/or medical conditions.      Health Maintenance   Topic Date Due   • ZOSTER VACCINE (1 of 2) 06/01/1981   • DXA SCAN  11/05/2019   • INFLUENZA VACCINE  08/01/2020   • TDAP/TD VACCINES (2 - Td) 03/06/2027       Medicare Risks and Personalized Health " Plan:  Advance Directive Discussion      CMS-Preventive Services Quick Reference  Medicare Preventive Services Addressed:  Annual Wellness Visit (AWV)    Advance Care Planning:  ACP discussion was held with the patient during this visit. Patient does not have an advance directive, information provided.    Diagnoses and all orders for this visit:    1. Medicare annual wellness visit, subsequent (Primary)    2. Essential hypertension  -     amLODIPine (NORVASC) 5 MG tablet; Take 1 tablet by mouth Daily.  Dispense: 90 tablet; Refill: 3    3. Paroxysmal atrial fibrillation (CMS/[facility])  -     Ambulatory Referral to Cardiology    4. Ascending aortic aneurysm (CMS/[facility])  -     Ambulatory Referral to Cardiology    5. Acute deep vein thrombosis (DVT) of calf muscle vein of left lower extremity (CMS/[facility])  -     Ambulatory Referral to Cardiology    6. Age-related osteoporosis without current pathological fracture  -     DEXA Bone Density Axial; Future        An After Visit Summary and PPPS with all of these plans were given to the patient.      Follow Up:  Return in about 6 months (around 7/20/2021) for Recheck.              Declines Pt for falls. Discussed risk factors and f/u in 3-6 months. Cont meds.

## 2021-01-29 ENCOUNTER — TELEPHONE (OUTPATIENT)
Dept: FAMILY MEDICINE CLINIC | Facility: CLINIC | Age: 86
End: 2021-01-29

## 2021-02-03 ENCOUNTER — LAB (OUTPATIENT)
Dept: LAB | Facility: HOSPITAL | Age: 86
End: 2021-02-03

## 2021-02-03 ENCOUNTER — OFFICE VISIT (OUTPATIENT)
Dept: CARDIOLOGY | Facility: CLINIC | Age: 86
End: 2021-02-03

## 2021-02-03 VITALS
SYSTOLIC BLOOD PRESSURE: 122 MMHG | BODY MASS INDEX: 21.69 KG/M2 | DIASTOLIC BLOOD PRESSURE: 80 MMHG | WEIGHT: 135 LBS | OXYGEN SATURATION: 98 % | HEIGHT: 66 IN | HEART RATE: 81 BPM

## 2021-02-03 DIAGNOSIS — I48.0 PAROXYSMAL ATRIAL FIBRILLATION (HCC): ICD-10-CM

## 2021-02-03 DIAGNOSIS — G44.011 INTRACTABLE EPISODIC CLUSTER HEADACHE: ICD-10-CM

## 2021-02-03 DIAGNOSIS — G44.011 INTRACTABLE EPISODIC CLUSTER HEADACHE: Primary | ICD-10-CM

## 2021-02-03 DIAGNOSIS — I10 ESSENTIAL HYPERTENSION: ICD-10-CM

## 2021-02-03 LAB — ERYTHROCYTE [SEDIMENTATION RATE] IN BLOOD: 11 MM/HR (ref 0–30)

## 2021-02-03 PROCEDURE — 99214 OFFICE O/P EST MOD 30 MIN: CPT | Performed by: INTERNAL MEDICINE

## 2021-02-03 PROCEDURE — 85652 RBC SED RATE AUTOMATED: CPT

## 2021-02-03 PROCEDURE — 36415 COLL VENOUS BLD VENIPUNCTURE: CPT

## 2021-02-03 PROCEDURE — 93000 ELECTROCARDIOGRAM COMPLETE: CPT | Performed by: INTERNAL MEDICINE

## 2021-02-03 NOTE — PROGRESS NOTES
PATIENTINFORMATION    Date of Office Visit: 2021  Encounter Provider: Yossi Pastor MD  Place of Service: [facility] CARDIOLOGY  Patient Name: Ryann Hernadez  : 1931    Subjective:     Encounter Date:2021      Patient ID: Ryann Hernadez is a 89 y.o. female.    Chief Complaint   Patient presents with   • Atrial Fibrillation     new patient    • ascending aortic  aneursym    • paroxysmal atrial fibrillation     HPI  Ms. Hernadez is an 89 years old female patient with past medical history of paroxysmal atrial fibrillation, hypertension and pulmonary pretension came to cardiology clinic for follow-up visit.  She still lives independently by herself and she does not still house chores without any significant symptoms.  But lately she is having increased episodes of headache for which she had to go to the ER in 2020 and she has also pending appointment with a neurologist on February 10.  She has to take Tylenol daily to get relief.  Otherwise she denied any palpitations, presyncope or syncope, shortness of breath, chest pain, orthopnea, PND or extremity swelling.  She reports that her blood pressure runs normal at home and she is compliant with amlodipine.  She has refused anticoagulation in the past.  A. manuela was initially diagnosed in the setting of acute illness when she was admitted for ureteral stone.      ROS   All systems reviewed and negative except as noted in HPI.    Past Medical History:   Diagnosis Date   • A-fib (CMS/[facility])    • Acute deep vein thrombosis (DVT) of calf muscle vein of left lower extremity (CMS/[facility]) 2020   • Allergic rhinitis    • Anemia    • Aneurysm, ascending aorta (CMS/[facility]) 2016    5 CM   • Anxiety    • Ataxic gait    • Atypical chest pain    • BPPV (benign paroxysmal positional vertigo)    • Bradycardia    • Chronic cystitis    • Chronic headaches    • Chronic nonintractable headache 2016   • Chronic  paroxysmal hemicrania    • Depression    • Esophagitis    • Gastritis    • GERD (gastroesophageal reflux disease)    • Hematuria    • High blood pressure    • History of cataract    • History of irritable bowel syndrome    • Hypertension    • IBS (irritable bowel syndrome)    • Influenza A    • Kidney lesion     LEFT KIDNEY CYSTIC   • Labyrinthitis 07/28/2014   • OA (osteoarthritis)    • Osteoporosis 9/13/2018   • Other abnormal clinical finding 09/21/2012    LEFT UTEROCELE   • Pain of thigh    • Personal history of gastric ulcer    • Renal disorder    • Renal insufficiency 8/7/2017   • TIA (transient ischemic attack) 2/5/2020       Past Surgical History:   Procedure Laterality Date   • BREAST LUMPECTOMY     • BREAST SURGERY     • CATARACT EXTRACTION     • COLONOSCOPY N/A     DR. MARGARITA MENDOZA   • ELBOW ARTHROTOMY  07/01/1999    DR. RYAN RITCHIE   • EYE SURGERY     • FRACTURE SURGERY      HAND, BROKEN FINGERS  DR. MIKE CHRISTIANSON   • KNEE SURGERY  08/31/1999    DR. THADDEUS MENDES   • OOPHORECTOMY     • TUBAL ABDOMINAL LIGATION Bilateral 1962    DR. EDDA MANNING       Social History     Socioeconomic History   • Marital status: Single     Spouse name: Not on file   • Number of children: Not on file   • Years of education: Not on file   • Highest education level: Not on file   Tobacco Use   • Smoking status: Never Smoker   • Smokeless tobacco: Never Used   Substance and Sexual Activity   • Alcohol use: No   • Drug use: No   • Sexual activity: Defer       Family History   Problem Relation Age of Onset   • Heart attack Mother    • Arthritis Mother    • Arthritis Father    • Arthritis Sister    • Arthritis Other    • Kidney disease Other    • Thyroid disease Other    • Diabetes Other    • Hypertension Other    • Heart disease Other    • Arthritis Maternal Grandmother            ECG 12 Lead    Date/Time: 2/3/2021 4:31 PM  Performed by: Yossi Pastor MD  Authorized by: Yossi Pastor MD   Comparison: compared with  "previous ECG from 2/5/2020  Comparison to previous ECG: Patient is in atrial fibrillation today.  Rhythm: atrial fibrillation  Rate: normal  Conduction: left anterior fascicular block  ST Segments: ST segments normal  T Waves: T waves normal  QRS axis: left  Other: no other findings    Clinical impression: abnormal EKG               Objective:     /80   Pulse 81   Ht 167.6 cm (66\")   Wt 61.2 kg (135 lb)   SpO2 98%   BMI 21.79 kg/m²  Body mass index is 21.79 kg/m².     Constitutional:       General: Not in acute distress.     Appearance: Well-developed. Not diaphoretic.   Eyes:      Pupils: Pupils are equal, round, and reactive to light.   HENT:      Head: Normocephalic and atraumatic.   Neck:      Musculoskeletal: Normal range of motion and neck supple.      Thyroid: No thyromegaly.   Pulmonary:      Effort: Pulmonary effort is normal. No respiratory distress.      Breath sounds: Normal breath sounds. No wheezing. No rales.   Chest:      Chest wall: Not tender to palpatation.   Cardiovascular:      Normal rate. Irregularly irregular rhythm.      No gallop.   Pulses:     Intact distal pulses.   Edema:     Peripheral edema absent.   Abdominal:      General: Bowel sounds are normal. There is no distension.      Palpations: Abdomen is soft.      Tenderness: There is no guarding.   Musculoskeletal: Normal range of motion.         General: No deformity.   Skin:     General: Skin is warm and dry.      Findings: No rash.   Neurological:      Mental Status: Alert and oriented to person, place, and time.      Cranial Nerves: No cranial nerve deficit.      Deep Tendon Reflexes: Reflexes are normal and symmetric.   Psychiatric:         Judgment: Judgment normal.         Review Of Data:       Assessment/Plan:       1.  Paroxysmal atrial fibrillation  -Patient in A. fib today that is rate controlled  -[id number] Vasc score of 4( age, female and HTN)   -Echocardiogram done in September 2019- no significant valvular " abnormalities except mild AI noted, proximal ascending aorta is dilated  -She still not willing to be started on anticoagulation.  I have advised her to start taking baby aspirin and she is agreeable to that.        2. Hypertension   -Well-controlled on her blood     3. Ascending aortic aneurysm  -3.9 cm on echo.  No further work-up considering her age and after discussing with her.  -will fu on CT     4. Pulmonary hypertension   -Moderate  -No clinical evidence for right side heart failure      5.  Headache-patient has a follow-up appointment with neurology    Return to clinic in 6 months    Diagnosis and plan of care discussed with patient and verbalized understanding.           Yossi Pastor MD  02/03/21  16:36 EST

## 2021-03-14 DIAGNOSIS — I10 ESSENTIAL HYPERTENSION: ICD-10-CM

## 2021-03-15 RX ORDER — AMLODIPINE BESYLATE 5 MG/1
5 TABLET ORAL DAILY
Qty: 90 TABLET | Refills: 3 | Status: SHIPPED | OUTPATIENT
Start: 2021-03-15 | End: 2022-08-12 | Stop reason: SDUPTHER

## 2021-03-30 ENCOUNTER — HOSPITAL ENCOUNTER (EMERGENCY)
Facility: HOSPITAL | Age: 86
Discharge: HOME | End: 2021-03-30
Attending: EMERGENCY MEDICINE | Admitting: EMERGENCY MEDICINE

## 2021-03-30 VITALS
HEART RATE: 77 BPM | DIASTOLIC BLOOD PRESSURE: 82 MMHG | TEMPERATURE: 98 F | OXYGEN SATURATION: 98 % | SYSTOLIC BLOOD PRESSURE: 150 MMHG | RESPIRATION RATE: 16 BRPM

## 2021-03-30 DIAGNOSIS — G44.321 INTRACTABLE CHRONIC POST-TRAUMATIC HEADACHE: Primary | ICD-10-CM

## 2021-03-30 LAB
ANION GAP SERPL CALCULATED.3IONS-SCNC: 8.5 MMOL/L (ref 5–15)
BASOPHILS # BLD AUTO: 0.04 10*3/MM3 (ref 0–0.2)
BASOPHILS NFR BLD AUTO: 0.8 % (ref 0–1.5)
BUN SERPL-MCNC: 14 MG/DL (ref 8–23)
BUN/CREAT SERPL: 15.7 (ref 7–25)
CALCIUM SPEC-SCNC: 9.9 MG/DL (ref 8.6–10.5)
CHLORIDE SERPL-SCNC: 104 MMOL/L (ref 98–107)
CO2 SERPL-SCNC: 28.5 MMOL/L (ref 22–29)
CREAT SERPL-MCNC: 0.89 MG/DL (ref 0.57–1)
DEPRECATED RDW RBC AUTO: 44.6 FL (ref 37–54)
EOSINOPHIL # BLD AUTO: 0.03 10*3/MM3 (ref 0–0.4)
EOSINOPHIL NFR BLD AUTO: 0.6 % (ref 0.3–6.2)
ERYTHROCYTE [DISTWIDTH] IN BLOOD BY AUTOMATED COUNT: 12.7 % (ref 12.3–15.4)
ERYTHROCYTE [SEDIMENTATION RATE] IN BLOOD: 13 MM/HR (ref 0–30)
GFR SERPL CREATININE-BSD FRML MDRD: 72 ML/MIN/1.73
GLUCOSE SERPL-MCNC: 108 MG/DL (ref 65–99)
HCT VFR BLD AUTO: 36.8 % (ref 34–46.6)
HGB BLD-MCNC: 12.3 G/DL (ref 12–15.9)
IMM GRANULOCYTES # BLD AUTO: 0.01 10*3/MM3 (ref 0–0.05)
IMM GRANULOCYTES NFR BLD AUTO: 0.2 % (ref 0–0.5)
LYMPHOCYTES # BLD AUTO: 1.58 10*3/MM3 (ref 0.7–3.1)
LYMPHOCYTES NFR BLD AUTO: 32.2 % (ref 19.6–45.3)
MCH RBC QN AUTO: 32.4 PG (ref 26.6–33)
MCHC RBC AUTO-ENTMCNC: 33.4 G/DL (ref 31.5–35.7)
MCV RBC AUTO: 96.8 FL (ref 79–97)
MONOCYTES # BLD AUTO: 0.54 10*3/MM3 (ref 0.1–0.9)
MONOCYTES NFR BLD AUTO: 11 % (ref 5–12)
NEUTROPHILS NFR BLD AUTO: 2.71 10*3/MM3 (ref 1.7–7)
NEUTROPHILS NFR BLD AUTO: 55.2 % (ref 42.7–76)
NRBC BLD AUTO-RTO: 0 /100 WBC (ref 0–0.2)
PLATELET # BLD AUTO: 198 10*3/MM3 (ref 140–450)
PMV BLD AUTO: 10.1 FL (ref 6–12)
POTASSIUM SERPL-SCNC: 3.9 MMOL/L (ref 3.5–5.2)
RBC # BLD AUTO: 3.8 10*6/MM3 (ref 3.77–5.28)
SODIUM SERPL-SCNC: 141 MMOL/L (ref 136–145)
WBC # BLD AUTO: 4.91 10*3/MM3 (ref 3.4–10.8)

## 2021-03-30 PROCEDURE — 85652 RBC SED RATE AUTOMATED: CPT | Performed by: EMERGENCY MEDICINE

## 2021-03-30 PROCEDURE — 80048 BASIC METABOLIC PNL TOTAL CA: CPT | Performed by: EMERGENCY MEDICINE

## 2021-03-30 PROCEDURE — 99284 EMERGENCY DEPT VISIT MOD MDM: CPT

## 2021-03-30 PROCEDURE — 85025 COMPLETE CBC W/AUTO DIFF WBC: CPT | Performed by: EMERGENCY MEDICINE

## 2021-03-30 RX ORDER — GABAPENTIN 300 MG/1
300 CAPSULE ORAL
Qty: 30 CAPSULE | Refills: 0 | Status: ON HOLD | OUTPATIENT
Start: 2021-03-30 | End: 2022-07-08 | Stop reason: SDUPTHER

## 2021-03-30 RX ORDER — SODIUM CHLORIDE 0.9 % (FLUSH) 0.9 %
10 SYRINGE (ML) INJECTION AS NEEDED
Status: DISCONTINUED | OUTPATIENT
Start: 2021-03-30 | End: 2021-03-30 | Stop reason: HOSPADM

## 2021-03-31 ENCOUNTER — EPISODE CHANGES (OUTPATIENT)
Dept: CASE MANAGEMENT | Facility: OTHER | Age: 86
End: 2021-03-31

## 2021-04-12 ENCOUNTER — TELEPHONE (OUTPATIENT)
Dept: FAMILY MEDICINE CLINIC | Facility: CLINIC | Age: 86
End: 2021-04-12

## 2021-04-12 NOTE — TELEPHONE ENCOUNTER
Pt had a neurology appt today and they stated that patient needs home health. Pt daughter stated neurology wants PCP to order home health.

## 2021-04-13 ENCOUNTER — TELEPHONE (OUTPATIENT)
Dept: FAMILY MEDICINE CLINIC | Facility: CLINIC | Age: 86
End: 2021-04-13

## 2021-04-13 NOTE — TELEPHONE ENCOUNTER
[employer] TO CALL AND SET UP A VIDEO CALL SO THAT DR. SAN CAN WRITE FOR HOME HEALTH    OK FOR HUB TO READ

## 2021-04-13 NOTE — TELEPHONE ENCOUNTER
I cannot order home health without an appointment.  Insurance will not pay for it.  Please at least make a video appointment.

## 2021-06-03 ENCOUNTER — TELEPHONE (OUTPATIENT)
Dept: FAMILY MEDICINE CLINIC | Facility: CLINIC | Age: 86
End: 2021-06-03

## 2021-06-03 NOTE — TELEPHONE ENCOUNTER
Moon ([phone]) w/ [facility] attempted to contact the following patient to reschedule an appt that the patient cancelled the earlier part of this year. She stated the patient had no recollection of needing a vascular appointment. Does the patient still need the vascular? If she does please let her know and she will get her scheduled.

## 2022-07-04 ENCOUNTER — APPOINTMENT (OUTPATIENT)
Dept: GENERAL RADIOLOGY | Facility: HOSPITAL | Age: 87
End: 2022-07-04

## 2022-07-04 ENCOUNTER — APPOINTMENT (OUTPATIENT)
Dept: CT IMAGING | Facility: HOSPITAL | Age: 87
End: 2022-07-04

## 2022-07-04 ENCOUNTER — HOSPITAL ENCOUNTER (INPATIENT)
Facility: HOSPITAL | Age: 87
LOS: 4 days | Discharge: NURSING, REHAB OR LONG-TERM CARE | End: 2022-07-08
Attending: EMERGENCY MEDICINE | Admitting: INTERNAL MEDICINE
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
## 2022-07-04 DIAGNOSIS — M62.82 NON-TRAUMATIC RHABDOMYOLYSIS: ICD-10-CM

## 2022-07-04 DIAGNOSIS — I48.91 NEW ONSET ATRIAL FIBRILLATION: ICD-10-CM

## 2022-07-04 DIAGNOSIS — G44.321 INTRACTABLE CHRONIC POST-TRAUMATIC HEADACHE: ICD-10-CM

## 2022-07-04 DIAGNOSIS — R53.1 LEFT-SIDED WEAKNESS: ICD-10-CM

## 2022-07-04 DIAGNOSIS — R77.8 ELEVATED TROPONIN: ICD-10-CM
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
## 2022-07-04 DIAGNOSIS — I10 ESSENTIAL HYPERTENSION: ICD-10-CM

## 2022-07-04 LAB
ALBUMIN SERPL-MCNC: 4.3 G/DL (ref 3.5–5.2)
ALBUMIN/GLOB SERPL: 1.1 G/DL
ALP SERPL-CCNC: 72 U/L (ref 39–117)
ALT SERPL W P-5'-P-CCNC: 28 U/L (ref 1–33)
ANION GAP SERPL CALCULATED.3IONS-SCNC: 13.3 MMOL/L (ref 5–15)
APTT PPP: 27.6 SECONDS (ref 22.7–35.4)
AST SERPL-CCNC: 43 U/L (ref 1–32)
BASOPHILS # BLD AUTO: 0.01 10*3/MM3 (ref 0–0.2)
BASOPHILS NFR BLD AUTO: 0.1 % (ref 0–1.5)
BILIRUB SERPL-MCNC: 1.6 MG/DL (ref 0–1.2)
BUN SERPL-MCNC: 29 MG/DL (ref 8–23)
BUN/CREAT SERPL: 14.6 (ref 7–25)
CALCIUM SPEC-SCNC: 10.3 MG/DL (ref 8.2–9.6)
CHLORIDE SERPL-SCNC: 102 MMOL/L (ref 98–107)
CK SERPL-CCNC: 1398 U/L (ref 20–180)
CO2 SERPL-SCNC: 24.7 MMOL/L (ref 22–29)
CREAT SERPL-MCNC: 1.98 MG/DL (ref 0.57–1)
DEPRECATED RDW RBC AUTO: 44.3 FL (ref 37–54)
EGFRCR SERPLBLD CKD-EPI 2021: 23.5 ML/MIN/1.73
EOSINOPHIL # BLD AUTO: 0 10*3/MM3 (ref 0–0.4)
EOSINOPHIL NFR BLD AUTO: 0 % (ref 0.3–6.2)
ERYTHROCYTE [DISTWIDTH] IN BLOOD BY AUTOMATED COUNT: 12.8 % (ref 12.3–15.4)
GLOBULIN UR ELPH-MCNC: 4 GM/DL
GLUCOSE SERPL-MCNC: 159 MG/DL (ref 65–99)
HCT VFR BLD AUTO: 43.2 % (ref 34–46.6)
HGB BLD-MCNC: 14.5 G/DL (ref 12–15.9)
HOLD SPECIMEN: NORMAL
IMM GRANULOCYTES # BLD AUTO: 0.1 10*3/MM3 (ref 0–0.05)
IMM GRANULOCYTES NFR BLD AUTO: 1.1 % (ref 0–0.5)
INR PPP: 1.27 (ref 0.9–1.1)
LYMPHOCYTES # BLD AUTO: 0.96 10*3/MM3 (ref 0.7–3.1)
LYMPHOCYTES NFR BLD AUTO: 10.7 % (ref 19.6–45.3)
MCH RBC QN AUTO: 31.9 PG (ref 26.6–33)
MCHC RBC AUTO-ENTMCNC: 33.6 G/DL (ref 31.5–35.7)
MCV RBC AUTO: 95.2 FL (ref 79–97)
MONOCYTES # BLD AUTO: 0.62 10*3/MM3 (ref 0.1–0.9)
MONOCYTES NFR BLD AUTO: 6.9 % (ref 5–12)
NEUTROPHILS NFR BLD AUTO: 7.29 10*3/MM3 (ref 1.7–7)
NEUTROPHILS NFR BLD AUTO: 81.2 % (ref 42.7–76)
NRBC BLD AUTO-RTO: 0 /100 WBC (ref 0–0.2)
NT-PROBNP SERPL-MCNC: ABNORMAL PG/ML (ref 0–1800)
PLATELET # BLD AUTO: 236 10*3/MM3 (ref 140–450)
PMV BLD AUTO: 10.2 FL (ref 6–12)
POTASSIUM SERPL-SCNC: 4.4 MMOL/L (ref 3.5–5.2)
PROT SERPL-MCNC: 8.3 G/DL (ref 6–8.5)
PROTHROMBIN TIME: 15.7 SECONDS (ref 11.7–14.2)
QT INTERVAL: 516 MS
RBC # BLD AUTO: 4.54 10*6/MM3 (ref 3.77–5.28)
SARS-COV-2 ORF1AB RESP QL NAA+PROBE: DETECTED
SODIUM SERPL-SCNC: 140 MMOL/L (ref 136–145)
TROPONIN T SERPL-MCNC: 0.11 NG/ML (ref 0–0.03)
WBC NRBC COR # BLD: 8.98 10*3/MM3 (ref 3.4–10.8)
WHOLE BLOOD HOLD COAG: NORMAL
WHOLE BLOOD HOLD SPECIMEN: NORMAL

## 2022-07-04 PROCEDURE — 85730 THROMBOPLASTIN TIME PARTIAL: CPT | Performed by: EMERGENCY MEDICINE

## 2022-07-04 PROCEDURE — 70450 CT HEAD/BRAIN W/O DYE: CPT

## 2022-07-04 PROCEDURE — 85610 PROTHROMBIN TIME: CPT | Performed by: EMERGENCY MEDICINE

## 2022-07-04 PROCEDURE — 85025 COMPLETE CBC W/AUTO DIFF WBC: CPT | Performed by: EMERGENCY MEDICINE

## 2022-07-04 PROCEDURE — P9612 CATHETERIZE FOR URINE SPEC: HCPCS

## 2022-07-04 PROCEDURE — 99285 EMERGENCY DEPT VISIT HI MDM: CPT

## 2022-07-04 PROCEDURE — 84484 ASSAY OF TROPONIN QUANT: CPT | Performed by: EMERGENCY MEDICINE

## 2022-07-04 PROCEDURE — 80053 COMPREHEN METABOLIC PANEL: CPT | Performed by: EMERGENCY MEDICINE

## 2022-07-04 PROCEDURE — 71045 X-RAY EXAM CHEST 1 VIEW: CPT

## 2022-07-04 PROCEDURE — 93005 ELECTROCARDIOGRAM TRACING: CPT | Performed by: EMERGENCY MEDICINE

## 2022-07-04 PROCEDURE — 82550 ASSAY OF CK (CPK): CPT | Performed by: EMERGENCY MEDICINE

## 2022-07-04 PROCEDURE — 83880 ASSAY OF NATRIURETIC PEPTIDE: CPT | Performed by: EMERGENCY MEDICINE

## 2022-07-04 PROCEDURE — 93010 ELECTROCARDIOGRAM REPORT: CPT | Performed by: INTERNAL MEDICINE

## 2022-07-04 PROCEDURE — U0005 INFEC AGEN DETEC AMPLI PROBE: HCPCS | Performed by: EMERGENCY MEDICINE

## 2022-07-04 PROCEDURE — U0004 COV-19 TEST NON-CDC HGH THRU: HCPCS | Performed by: EMERGENCY MEDICINE

## 2022-07-04 RX ORDER — GABAPENTIN 300 MG/1
300 CAPSULE ORAL NIGHTLY
Status: DISCONTINUED | OUTPATIENT
Start: 2022-07-04 | End: 2022-07-08 | Stop reason: HOSPADM

## 2022-07-04 RX ORDER — ATENOLOL 50 MG/1
50 TABLET ORAL DAILY
COMMUNITY
End: 2022-07-08 | Stop reason: HOSPADM

## 2022-07-04 RX ORDER — AMLODIPINE BESYLATE 5 MG/1
5 TABLET ORAL DAILY
Status: DISCONTINUED | OUTPATIENT
Start: 2022-07-05 | End: 2022-07-05

## 2022-07-04 RX ORDER — MELATONIN
5000 DAILY
Status: DISCONTINUED | OUTPATIENT
Start: 2022-07-05 | End: 2022-07-08 | Stop reason: HOSPADM

## 2022-07-04 RX ORDER — MULTIPLE VITAMINS W/ MINERALS TAB 9MG-400MCG
1 TAB ORAL DAILY
Status: DISCONTINUED | OUTPATIENT
Start: 2022-07-05 | End: 2022-07-08 | Stop reason: HOSPADM

## 2022-07-04 RX ORDER — SODIUM CHLORIDE 0.9 % (FLUSH) 0.9 %
10 SYRINGE (ML) INJECTION AS NEEDED
Status: DISCONTINUED | OUTPATIENT
Start: 2022-07-04 | End: 2022-07-08 | Stop reason: HOSPADM

## 2022-07-04 RX ORDER — ACETAMINOPHEN 325 MG/1
650 TABLET ORAL EVERY 4 HOURS PRN
Status: DISCONTINUED | OUTPATIENT
Start: 2022-07-04 | End: 2022-07-08 | Stop reason: HOSPADM

## 2022-07-04 RX ORDER — ATORVASTATIN CALCIUM 80 MG/1
80 TABLET, FILM COATED ORAL NIGHTLY
Status: DISCONTINUED | OUTPATIENT
Start: 2022-07-04 | End: 2022-07-08 | Stop reason: HOSPADM

## 2022-07-04 RX ORDER — SODIUM CHLORIDE 9 MG/ML
125 INJECTION, SOLUTION INTRAVENOUS CONTINUOUS
Status: DISCONTINUED | OUTPATIENT
Start: 2022-07-04 | End: 2022-07-05

## 2022-07-04 RX ORDER — ONDANSETRON 2 MG/ML
4 INJECTION INTRAMUSCULAR; INTRAVENOUS EVERY 6 HOURS PRN
Status: DISCONTINUED | OUTPATIENT
Start: 2022-07-04 | End: 2022-07-08 | Stop reason: HOSPADM

## 2022-07-04 RX ORDER — NITROGLYCERIN 0.4 MG/1
0.4 TABLET SUBLINGUAL
Status: DISCONTINUED | OUTPATIENT
Start: 2022-07-04 | End: 2022-07-08 | Stop reason: HOSPADM

## 2022-07-04 RX ORDER — ATENOLOL 50 MG/1
50 TABLET ORAL DAILY
Status: DISCONTINUED | OUTPATIENT
Start: 2022-07-05 | End: 2022-07-05

## 2022-07-04 RX ORDER — ONDANSETRON 4 MG/1
4 TABLET, FILM COATED ORAL EVERY 6 HOURS PRN
Status: DISCONTINUED | OUTPATIENT
Start: 2022-07-04 | End: 2022-07-08 | Stop reason: HOSPADM

## 2022-07-04 RX ORDER — UREA 10 %
3 LOTION (ML) TOPICAL NIGHTLY PRN
Status: DISCONTINUED | OUTPATIENT
Start: 2022-07-04 | End: 2022-07-08 | Stop reason: HOSPADM

## 2022-07-04 RX ORDER — BISACODYL 10 MG
10 SUPPOSITORY, RECTAL RECTAL DAILY PRN
Status: DISCONTINUED | OUTPATIENT
Start: 2022-07-04 | End: 2022-07-08 | Stop reason: HOSPADM

## 2022-07-04 RX ORDER — ASPIRIN 300 MG/1
300 SUPPOSITORY RECTAL DAILY
Status: DISCONTINUED | OUTPATIENT
Start: 2022-07-05 | End: 2022-07-08

## 2022-07-04 RX ORDER — ASPIRIN 325 MG
325 TABLET ORAL DAILY
Status: DISCONTINUED | OUTPATIENT
Start: 2022-07-05 | End: 2022-07-08

## 2022-07-04 RX ORDER — SODIUM CHLORIDE 9 MG/ML
75 INJECTION, SOLUTION INTRAVENOUS CONTINUOUS
Status: DISCONTINUED | OUTPATIENT
Start: 2022-07-04 | End: 2022-07-06

## 2022-07-04 RX ORDER — ACETAMINOPHEN 650 MG/1
650 SUPPOSITORY RECTAL EVERY 4 HOURS PRN
Status: DISCONTINUED | OUTPATIENT
Start: 2022-07-04 | End: 2022-07-08 | Stop reason: HOSPADM

## 2022-07-04 RX ADMIN — ATORVASTATIN CALCIUM 80 MG: 80 TABLET, FILM COATED ORAL at 23:46

## 2022-07-04 RX ADMIN — SODIUM CHLORIDE 75 ML/HR: 9 INJECTION, SOLUTION INTRAVENOUS at 21:41

## 2022-07-04 RX ADMIN — GABAPENTIN 300 MG: 300 CAPSULE ORAL at 23:46

## 2022-07-04 RX ADMIN — SODIUM CHLORIDE 125 ML/HR: 9 INJECTION, SOLUTION INTRAVENOUS at 18:24

## 2022-07-04 RX ADMIN — SODIUM CHLORIDE 500 ML: 9 INJECTION, SOLUTION INTRAVENOUS at 16:51

## 2022-07-04 NOTE — ED NOTES
Nursing report ED to floor  Ryann Hernadez  89 y.o.  female    HPI (triage note):   Chief Complaint   Patient presents with   • Fall       Admitting doctor:   Erlinda Marcum MD    Admitting diagnosis:   The primary encounter diagnosis was Fall in home, initial encounter. Diagnoses of Non-traumatic rhabdomyolysis, Left-sided weakness, New onset atrial fibrillation (HCC), and Elevated troponin were also pertinent to this visit.    Code status:   Current Code Status     Date Active Code Status Order ID Comments User Context       Prior    Advance Care Planning Activity          Allergies:   Sulfa antibiotics, Lisinopril, and Olmesartan    Weight:   There were no vitals filed for this visit.    Most recent vitals:   Vitals:    07/04/22 1352   BP: 150/98   Pulse: 88   Resp: 22   SpO2: 98%       Active LDAs/IV Access:   Lines, Drains & Airways     Active LDAs     Name Placement date Placement time Site Days    Peripheral IV 07/04/22 Anterior;Right Forearm 07/04/22  --  Forearm  less than 1    Peripheral IV 07/04/22 1404 Right Antecubital 07/04/22  1404  Antecubital  less than 1    External Urinary Catheter 07/04/22  1559  --  less than 1                Labs (abnormal labs have a star):   Labs Reviewed   COMPREHENSIVE METABOLIC PANEL - Abnormal; Notable for the following components:       Result Value    Glucose 159 (*)     BUN 29 (*)     Creatinine 1.98 (*)     Calcium 10.3 (*)     AST (SGOT) 43 (*)     Total Bilirubin 1.6 (*)     eGFR 23.5 (*)     All other components within normal limits    Narrative:     GFR Normal >60  Chronic Kidney Disease <60  Kidney Failure <15     PROTIME-INR - Abnormal; Notable for the following components:    Protime 15.7 (*)     INR 1.27 (*)     All other components within normal limits   BNP (IN-HOUSE) - Abnormal; Notable for the following components:    proBNP 21,291.0 (*)     All other components within normal limits    Narrative:     Among patients with dyspnea, NT-proBNP is  highly sensitive for the detection of acute congestive heart failure. In addition NT-proBNP of <300 pg/ml effectively rules out acute congestive heart failure with 99% negative predictive value.    Results may be falsely decreased if patient taking Biotin.     TROPONIN (IN-HOUSE) - Abnormal; Notable for the following components:    Troponin T 0.110 (*)     All other components within normal limits    Narrative:     Troponin T Reference Range:  <= 0.03 ng/mL-   Negative for AMI  >0.03 ng/mL-     Abnormal for myocardial necrosis.  Clinicians would have to utilize clinical acumen, EKG, Troponin and serial changes to determine if it is an Acute Myocardial Infarction or myocardial injury due to an underlying chronic condition.       Results may be falsely decreased if patient taking Biotin.     CK - Abnormal; Notable for the following components:    Creatine Kinase 1,398 (*)     All other components within normal limits   CBC WITH AUTO DIFFERENTIAL - Abnormal; Notable for the following components:    Neutrophil % 81.2 (*)     Lymphocyte % 10.7 (*)     Eosinophil % 0.0 (*)     Immature Grans % 1.1 (*)     Neutrophils, Absolute 7.29 (*)     Immature Grans, Absolute 0.10 (*)     All other components within normal limits   APTT - Normal   COVID PRE-OP / PRE-PROCEDURE SCREENING ORDER (NO ISOLATION)    Narrative:     The following orders were created for panel order COVID PRE-OP / PRE-PROCEDURE SCREENING ORDER (NO ISOLATION) - Swab, Nasopharynx.  Procedure                               Abnormality         Status                     ---------                               -----------         ------                     COVID-19,APTIMA PANTHER(...[[id number]]                      In process                   Please view results for these tests on the individual orders.   COVID-19,APTIMA PANTHER (NASH) QUINTIN/ LULÚ, NP/OP SWAB IN UTM/VTM/SALINE TRANSPORT MEDIA,24 HR TAT   RAINBOW DRAW    Narrative:     The following orders were  created for panel order [place] Draw.  Procedure                               Abnormality         Status                     ---------                               -----------         ------                     Green Top (Gel)[[id number]]                                  Final result               Lavender Top[[id number]]                                     Final result               Gold Top - SST[[id number]]                                   Final result               Edgar Top[[id number]]                                         In process                 Light Blue Top[[id number]]                                   Final result                 Please view results for these tests on the individual orders.   URINALYSIS W/ CULTURE IF INDICATED   MYOGLOBIN SCREEN, URINE   GREEN TOP   LAVENDER TOP   GOLD TOP - SST   LIGHT BLUE TOP   CBC AND DIFFERENTIAL    Narrative:     The following orders were created for panel order CBC & Differential.  Procedure                               Abnormality         Status                     ---------                               -----------         ------                     CBC Auto Differential[[id number]]        Abnormal            Final result                 Please view results for these tests on the individual orders.   GRAY TOP       EKG:   ECG 12 Lead   Preliminary Result   HEART RATE= 71  bpm   RR Interval= 845  ms   IN Interval=   ms   P Horizontal Axis=   deg   P Front Axis=   deg   QRSD Interval= 86  ms   QT Interval= 516  ms   QRS Axis= -74  deg   T Wave Axis= 212  deg   - ABNORMAL ECG -   Atrial fibrillation   Left anterior fascicular block   RSR' in V1 or V2, right VCD or RVH   LVH by voltage   Abnormal T, probable ischemia, widespread   Prolonged QT interval   Electronically Signed By:    Date and Time of Study: 2022-07-04 14:32:30          Meds given in ED:   Medications   sodium chloride 0.9 % flush 10 mL (has no administration in time range)   sodium chloride 0.9 %  bolus 500 mL (500 mL Intravenous New Bag 7/4/22 5692)   sodium chloride 0.9 % infusion (has no administration in time range)       Imaging results:  CT Head Without Contrast    Result Date: 7/4/2022  Moderate chronic small vessel ischemic white matter change and atrophy. No evidence for acute intracranial abnormality.  This report was finalized on 7/4/2022 4:12 PM by Dr. Carlos Cardona M.D.      XR Chest 1 View    Result Date: 7/4/2022  Within the right lung base just above the right hemidiaphragm there is a small area of hazy increased density that is suspicious for a small infiltrate though it is difficult to definitively differentiate from an area of chronic change. There is no evidence for perihilar edema or other change when compared to previous imaging.  This report was finalized on 7/4/2022 2:59 PM by Dr. Carlos Cardona M.D.        Ambulatory status:   - assist    Social issues:   Social History     Socioeconomic History   • Marital status: Single   Tobacco Use   • Smoking status: Never Smoker   • Smokeless tobacco: Never Used   Substance and Sexual Activity   • Alcohol use: No   • Drug use: No   • Sexual activity: Defer

## 2022-07-04 NOTE — PLAN OF CARE
Goal Outcome Evaluation:  Plan of Care Reviewed With: patient      Progress: no change  Outcome Evaluation: Pt admitted to [facility] for fall at home. VSS. BP elevated, Pt has not had home dose of atenolol. Daughter at bedside. Cardiac diet started. IVF going. Med list completed. awaiting futher orders. Pt stable and all needs met at this time.

## 2022-07-04 NOTE — ED PROVIDER NOTES
EMERGENCY DEPARTMENT ENCOUNTER    Room Number:  27/27  Date of encounter:  7/4/2022  PCP: Vanesa Connor MD  Historian: Patient and family member      HPI:  Chief Complaint: Altered mental status, found down  A complete HPI/ROS/PMH/PSH/SH/FH are unobtainable due to: She is a poor historian    Context: Ryann Hernadez is a 89 y.o. female who presents to the ED after her daughter found her on the floor this after falling last night or the day before and not being able to get up off the floor.  She was last seen 2 days ago by her other daughter.  She has no complaints, but she is confused and cannot give any history.  She denies pain.        The patient was placed in a mask in triage, hand hygiene was performed before and after my interaction with the patient.  I wore a mask, safety glasses and gloves during my entire interaction with the patient.    PAST MEDICAL HISTORY  Active Ambulatory Problems     Diagnosis Date Noted   • Allergic rhinitis 01/15/2014   • Gastroesophageal reflux disease 01/15/2014   • Essential hypertension 01/09/2013   • Ataxic gait 08/12/2016   • Balance disorder 08/12/2016   • Acute right otitis media 08/12/2016   • Nonintractable episodic headache 09/14/2016   • Facial swelling 02/23/2017   • Dizziness 02/23/2017   • Medicare annual wellness visit, initial 03/06/2017   • Hospital discharge follow-up 03/06/2017   • At high risk for falls 08/07/2017   • Renal insufficiency 08/07/2017   • Paroxysmal atrial fibrillation (HCC) 09/22/2019   • Ureteral stone with hydronephrosis 09/22/2019   • Hydronephrosis due to obstruction of ureter 09/24/2019   • Aortic root dilatation (HCC) 10/10/2019   • MCI (mild cognitive impairment) 09/26/2018   • Age-related osteoporosis without current pathological fracture 09/13/2018   • Corn of foot 11/05/2019   • BPPV (benign paroxysmal positional vertigo), unspecified laterality 01/08/2020   • TIA (transient ischemic attack) 02/05/2020   • Ascending aortic  aneurysm ([facility]) 02/06/2020   • Acute deep vein thrombosis (DVT) of calf muscle vein of left lower extremity ([facility]) 02/06/2020   • Medicare annual wellness visit, subsequent 01/20/2021   • Paroxysmal atrial fibrillation ([facility]) 02/03/2021     Resolved Ambulatory Problems     Diagnosis Date Noted   • Acute recurrent maxillary sinusitis 02/23/2017     Past Medical History:   Diagnosis Date   • A-fib (CMS/[facility])    • Anemia    • Aneurysm, ascending aorta (CMS/[facility]) 03/22/2016   • Anxiety    • Atypical chest pain    • BPPV (benign paroxysmal positional vertigo)    • Bradycardia    • Chronic cystitis    • Chronic headaches    • Chronic nonintractable headache 9/14/2016   • Chronic paroxysmal hemicrania    • Depression    • Esophagitis    • Gastritis    • GERD (gastroesophageal reflux disease)    • Hematuria    • High blood pressure    • History of cataract    • History of irritable bowel syndrome    • Hypertension    • IBS (irritable bowel syndrome)    • Influenza A    • Kidney lesion    • Labyrinthitis 07/28/2014   • OA (osteoarthritis)    • Osteoporosis 9/13/2018   • Other abnormal clinical finding 09/21/2012   • Pain of thigh    • Personal history of gastric ulcer    • Renal disorder          PAST SURGICAL HISTORY  Past Surgical History:   Procedure Laterality Date   • BREAST LUMPECTOMY     • BREAST SURGERY     • CATARACT EXTRACTION     • COLONOSCOPY N/A     DR. MARGARITA MENDOZA   • ELBOW ARTHROTOMY  07/01/1999    DR. RYAN RITCHIE   • EYE SURGERY     • FRACTURE SURGERY      HAND, BROKEN FINGERS  DR. MIKE CHRISTIANSON   • KNEE SURGERY  08/31/1999    DR. THADDEUS MENDES   • OOPHORECTOMY     • TUBAL ABDOMINAL LIGATION Bilateral 1962    DR. EDDA MANNING         FAMILY HISTORY  Family History   Problem Relation Age of Onset   • Heart attack Mother    • Arthritis Mother    • Arthritis Father    • Arthritis Sister    • Arthritis Other    • Kidney disease Other    • Thyroid disease Other    • Diabetes Other    • Hypertension Other    • Heart  disease Other    • Arthritis Maternal Grandmother          SOCIAL HISTORY  Social History     Socioeconomic History   • Marital status: Single   Tobacco Use   • Smoking status: Never Smoker   • Smokeless tobacco: Never Used   Substance and Sexual Activity   • Alcohol use: No   • Drug use: No   • Sexual activity: Defer         ALLERGIES  Sulfa antibiotics, Lisinopril, and Olmesartan        REVIEW OF SYSTEMS  Review of Systems   Reason unable to perform ROS: Limited due to age and confusion.   Constitutional: Negative for fever.   Respiratory: Negative for shortness of breath.    Cardiovascular: Negative for chest pain.   Gastrointestinal: Negative for abdominal pain.          PHYSICAL EXAM    I have reviewed the triage vital signs and nursing notes.    ED Triage Vitals [07/04/22 1352]   Temp Heart Rate Resp BP SpO2   -- 88 22 150/98 98 %      Temp src Heart Rate Source Patient Position BP Location FiO2 (%)   -- -- -- -- --       Physical Exam   Constitutional: Pt. is awake and alert.  She is oriented to place but not time.  She recognizes the names of family members that are with her.  HENT: Normocephalic and atraumatic.   Neck: Normal range of motion. Neck supple. No JVD present.   Cardiovascular: Normal rate, regular rhythm and normal heart sounds. No murmur heard.  Pulmonary/Chest: Effort normal and breath sounds normal. No stridor. No respiratory distress. No wheezes, no rales.   Abdominal: Soft. Bowel sounds are normal. No distension. There is no tenderness. There is no rebound and no guarding.   Musculoskeletal: . No edema, tenderness or deformity.   Neurological: Pt. awake and alert.  She is disoriented to time.  She has left upper and left lower extremity weakness.  Skin: Skin is warm and dry. No rash noted. Pt. is not diaphoretic. No erythema.   Psychiatric: Mood, affect normal.  She is pleasant and cooperative.  Nursing note and vitals reviewed.        LAB RESULTS  Recent Results (from the past 24 hour(s))    Green Top (Gel)    Collection Time: 07/04/22  2:05 PM   Result Value Ref Range    Extra Tube Hold for add-ons.    Lavender Top    Collection Time: 07/04/22  2:05 PM   Result Value Ref Range    Extra Tube hold for add-on    Gold Top - SST    Collection Time: 07/04/22  2:05 PM   Result Value Ref Range    Extra Tube Hold for add-ons.    Light Blue Top    Collection Time: 07/04/22  2:05 PM   Result Value Ref Range    Extra Tube Hold for add-ons.    Comprehensive Metabolic Panel    Collection Time: 07/04/22  2:05 PM    Specimen: Blood   Result Value Ref Range    Glucose 159 (H) 65 - 99 mg/dL    BUN 29 (H) 8 - 23 mg/dL    Creatinine 1.98 (H) 0.57 - 1.00 mg/dL    Sodium 140 136 - 145 mmol/L    Potassium 4.4 3.5 - 5.2 mmol/L    Chloride 102 98 - 107 mmol/L    CO2 24.7 22.0 - 29.0 mmol/L    Calcium 10.3 (H) 8.2 - 9.6 mg/dL    Total Protein 8.3 6.0 - 8.5 g/dL    Albumin 4.30 3.50 - 5.20 g/dL    ALT (SGPT) 28 1 - 33 U/L    AST (SGOT) 43 (H) 1 - 32 U/L    Alkaline Phosphatase 72 39 - 117 U/L    Total Bilirubin 1.6 (H) 0.0 - 1.2 mg/dL    Globulin 4.0 gm/dL    A/G Ratio 1.1 g/dL    BUN/Creatinine Ratio 14.6 7.0 - 25.0    Anion Gap 13.3 5.0 - 15.0 mmol/L    eGFR 23.5 (L) >60.0 mL/min/1.73   aPTT    Collection Time: 07/04/22  2:05 PM    Specimen: Blood   Result Value Ref Range    PTT 27.6 22.7 - 35.4 seconds   Protime-INR    Collection Time: 07/04/22  2:05 PM    Specimen: Blood   Result Value Ref Range    Protime 15.7 (H) 11.7 - 14.2 Seconds    INR 1.27 (H) 0.90 - 1.10   BNP    Collection Time: 07/04/22  2:05 PM    Specimen: Blood   Result Value Ref Range    proBNP 21,291.0 (H) 0.0 - 1,800.0 pg/mL   Troponin    Collection Time: 07/04/22  2:05 PM    Specimen: Blood   Result Value Ref Range    Troponin T 0.110 (C) 0.000 - 0.030 ng/mL   CK    Collection Time: 07/04/22  2:05 PM    Specimen: Blood   Result Value Ref Range    Creatine Kinase 1,398 (H) 20 - 180 U/L   CBC Auto Differential    Collection Time: 07/04/22  2:05 PM     Specimen: Blood   Result Value Ref Range    WBC 8.98 3.40 - 10.80 10*3/mm3    RBC 4.54 3.77 - 5.28 10*6/mm3    Hemoglobin 14.5 12.0 - 15.9 g/dL    Hematocrit 43.2 34.0 - 46.6 %    MCV 95.2 79.0 - 97.0 fL    MCH 31.9 26.6 - 33.0 pg    MCHC 33.6 31.5 - 35.7 g/dL    RDW 12.8 12.3 - 15.4 %    RDW-SD 44.3 37.0 - 54.0 fl    MPV 10.2 6.0 - 12.0 fL    Platelets 236 140 - 450 10*3/mm3    Neutrophil % 81.2 (H) 42.7 - 76.0 %    Lymphocyte % 10.7 (L) 19.6 - 45.3 %    Monocyte % 6.9 5.0 - 12.0 %    Eosinophil % 0.0 (L) 0.3 - 6.2 %    Basophil % 0.1 0.0 - 1.5 %    Immature Grans % 1.1 (H) 0.0 - 0.5 %    Neutrophils, Absolute 7.29 (H) 1.70 - 7.00 10*3/mm3    Lymphocytes, Absolute 0.96 0.70 - 3.10 10*3/mm3    Monocytes, Absolute 0.62 0.10 - 0.90 10*3/mm3    Eosinophils, Absolute 0.00 0.00 - 0.40 10*3/mm3    Basophils, Absolute 0.01 0.00 - 0.20 10*3/mm3    Immature Grans, Absolute 0.10 (H) 0.00 - 0.05 10*3/mm3    nRBC 0.0 0.0 - 0.2 /100 WBC   ECG 12 Lead    Collection Time: 07/04/22  2:32 PM   Result Value Ref Range    QT Interval 516 ms       Ordered the above labs and independently reviewed the results.        RADIOLOGY  CT Head Without Contrast    Result Date: 7/4/2022  CT HEAD WITHOUT CONTRAST  HISTORY: Altered mental status with left-sided weakness.  TECHNIQUE:  Head CT includes axial imaging from the base of skull to the vertex without intravenous contrast. Radiation dose reduction techniques were utilized, including automated exposure control and exposure modulation based on body size.  COMPARISON:CT head 12/08/2020, MRI brain 02/05/2020, CT angiogram head 02/05/2020.  FINDINGS: There is moderate chronic small vessel ischemic white matter change as well as cerebral and cerebellar atrophy. There are no abnormal areas of increased attenuation intra-axially to suggest hemorrhage. No extra-axial fluid collection is observed. There is no evidence for mass effect or shift of the midline structures. The mastoid air cells appear  clear. There is mild right inferior maxillary sinus mucoperiosteal thickening.      Moderate chronic small vessel ischemic white matter change and atrophy. No evidence for acute intracranial abnormality.  This report was finalized on 7/4/2022 4:12 PM by Dr. Carlos Cardona M.D.      XR Chest 1 View    Result Date: 7/4/2022  CHEST SINGLE VIEW  HISTORY: Generalized weakness.  COMPARISON: AP chest 02/05/2020.  FINDINGS: Heart size is enlarged. Thoracic aorta is tortuous and appears to be ectatic. There is pulmonary arterial enlargement. Pleural parenchymal scarring is present in the medial right apex. Within the right lung base there has developed hazy pulmonary opacity potentially due to a very small area of infiltrate though difficult to definitively differentiate from chronic change. There is no evidence for perihilar edema or pneumothorax.      Within the right lung base just above the right hemidiaphragm there is a small area of hazy increased density that is suspicious for a small infiltrate though it is difficult to definitively differentiate from an area of chronic change. There is no evidence for perihilar edema or other change when compared to previous imaging.  This report was finalized on 7/4/2022 2:59 PM by Dr. Carlos Cardona M.D.        I ordered the above noted radiological studies. Reviewed by me and discussed with radiologist.  See dictation for official radiology interpretation.      PROCEDURES    Procedures      MEDICATIONS GIVEN IN ER    Medications   sodium chloride 0.9 % flush 10 mL (has no administration in time range)   sodium chloride 0.9 % bolus 500 mL (500 mL Intravenous New Bag 7/4/22 9907)   sodium chloride 0.9 % infusion (has no administration in time range)         PROGRESS, DATA ANALYSIS, CONSULTS, AND MEDICAL DECISION MAKING    Any/all labs have been independently reviewed by me.  Any/all radiology studies have been reviewed by me and discussed with radiologist dictating the report.    EKG's independently viewed and interpreted by me.  Discussion below represents my analysis of pertinent findings related to patient's condition, differential diagnosis, treatment plan and final disposition.    Number of Diagnoses or Management Options     Amount and/or Complexity of Data Reviewed  Clinical lab tests:  Yes  Tests in the radiology section of CPT®:  Yes  Tests in the medicine section of CPT®:  Yes  Review and summarize past medical records:  (Yes-no recent ER visits/hospitalizations at [facility])  Independent visualization of images, tracings, or specimens: (Yes-see below)      ED Course as of 07/04/22 1701   Mon Jul 04, 2022   1436 She is not a TNKase or interventional candidate-last known normal was 2 days prior. [WC]   1456 Creatine Kinase(!): 1,398 [WC]   1456 BUN(!): 29 [WC]   1456 Creatinine(!): 1.98  Consistent with RUBEN [WC]   1456 Creatine Kinase(!): 1,398 [WC]   1505 Chest x-ray independently visualized by me and interpreted by/discussed with Dr. Cardona (Radiology)-small density in the right lung base, difficult to differentiate from an area of chronic change.  For official interpretation, see dictated report. [WC]   1510 Troponin T(!!): 0.110 [WC]   1548 EKG performed at 1432 and interpreted by me show was atrial fibrillation with a heart approxi-7 bpm.  QRS complexes are unremarkable.  There are nonspecific ST-T changes throughout, most pronounced in the lateral leads which are more pronounced when compared to 2/5/2020.  Also, atrial fibrillation is new. [WC]   1548 Troponin T(!!): 0.110 [WC]   1614 CT of the brain was independently visualized by me and discussed with/interpreted by Dr. Cardona (radiology)-there is moderate chronic small vessel ischemic white matter change and atrophy but no evidence for acute intracranial abnormality.  For official interpretation, see dictated report. [WC]   1643 Case discussed with Dr. Marcum ([facility])-she agrees to admit the patient to telemetry bed.  [WC]   1659 Discussed with Dr. Romi Ohara (cardiology)-she agrees to see the patient in consultation. [WC]   1700 Case discussed with Dr. Pierce (stroke neurology)-he agrees to patient consultation but does not recommend heparin at this point in time. [WC]   1701 30 minutes of critical care provided. This time excludes other billable procedures. Time does include preparation of documents, medical consultations, review of old records, and direct bedside care. Patient is at high risk for life-threatening deterioration due to RUBEN, rhabdomyolysis, new onset atrial fibrillation, possible stroke.    [WC]      ED Course User Index  [WC] Lloyd Kirk MD       AS OF 17:01 EDT VITALS:    BP - 150/98  HR - 88  TEMP -    02 SATS - 98%        DIAGNOSIS  Final diagnoses:   Fall in home, initial encounter   Non-traumatic rhabdomyolysis   Left-sided weakness   New onset atrial fibrillation (HCC)   Elevated troponin         DISPOSITION  Admitted-telemetry           Lloyd Kirk MD  07/04/22 9377

## 2022-07-04 NOTE — ED NOTES
Patient arrives via EMS from home. Found down on floor of bathrooom. Had not been seen for 36 hr. Family was unable to get into house yesterday. Hx dementia. AOx2. Lower extremity pain per EMS.

## 2022-07-04 NOTE — ED NOTES
Patient refuses to be catheterized by this nurse; would prefer a female RN. Family notes she has not drank or eaten in two days and likely does not have any

## 2022-07-05 ENCOUNTER — APPOINTMENT (OUTPATIENT)
Dept: CARDIOLOGY | Facility: HOSPITAL | Age: 87
End: 2022-07-05

## 2022-07-05 ENCOUNTER — TELEPHONE (OUTPATIENT)
Dept: FAMILY MEDICINE CLINIC | Facility: CLINIC | Age: 87
End: 2022-07-05

## 2022-07-05 PROBLEM — M62.82 RHABDOMYOLYSIS: Status: ACTIVE | Noted: 2022-07-05

## 2022-07-05 PROBLEM — U07.1 COVID-19 VIRUS DETECTED: Status: ACTIVE | Noted: 2022-07-05

## 2022-07-05 PROBLEM — N17.9 AKI (ACUTE KIDNEY INJURY) (HCC): Status: ACTIVE | Noted: 2022-07-05

## 2022-07-05 PROBLEM — R77.8 ELEVATED TROPONIN: Status: ACTIVE | Noted: 2022-07-05

## 2022-07-05 PROBLEM — R79.89 ELEVATED TROPONIN: Status: ACTIVE | Noted: 2022-07-05

## 2022-07-05 LAB
ALBUMIN SERPL-MCNC: 3.3 G/DL (ref 3.5–5.2)
ALBUMIN/GLOB SERPL: 1.4 G/DL
ALP SERPL-CCNC: 56 U/L (ref 39–117)
ALT SERPL W P-5'-P-CCNC: 20 U/L (ref 1–33)
ANION GAP SERPL CALCULATED.3IONS-SCNC: 11 MMOL/L (ref 5–15)
AST SERPL-CCNC: 42 U/L (ref 1–32)
BACTERIA UR QL AUTO: ABNORMAL /HPF
BH CV ECHO MEAS - ACS: 1.88 CM
BH CV ECHO MEAS - AO ROOT DIAM: 3.1 CM
BH CV ECHO MEAS - EDV(CUBED): 44.1 ML
BH CV ECHO MEAS - EDV(MOD-SP4): 27 ML
BH CV ECHO MEAS - EF(MOD-SP4): 55.6 %
BH CV ECHO MEAS - ESV(CUBED): 10.6 ML
BH CV ECHO MEAS - ESV(MOD-SP4): 12 ML
BH CV ECHO MEAS - FS: 37.8 %
BH CV ECHO MEAS - IVS/LVPW: 1.04 CM
BH CV ECHO MEAS - IVSD: 1.2 CM
BH CV ECHO MEAS - LA DIMENSION: 3.2 CM
BH CV ECHO MEAS - LV DIASTOLIC VOL/BSA (35-75): 16 CM2
BH CV ECHO MEAS - LV MASS(C)D: 134.2 GRAMS
BH CV ECHO MEAS - LV SYSTOLIC VOL/BSA (12-30): 7.1 CM2
BH CV ECHO MEAS - LVIDD: 3.5 CM
BH CV ECHO MEAS - LVIDS: 2.2 CM
BH CV ECHO MEAS - LVOT AREA: 2.8 CM2
BH CV ECHO MEAS - LVOT DIAM: 1.9 CM
BH CV ECHO MEAS - LVPWD: 1.16 CM
BH CV ECHO MEAS - RAP SYSTOLE: 3 MMHG
BH CV ECHO MEAS - RVSP: 37.7 MMHG
BH CV ECHO MEAS - SI(MOD-SP4): 8.9 ML/M2
BH CV ECHO MEAS - SV(MOD-SP4): 15 ML
BH CV ECHO MEAS - TR MAX PG: 34.7 MMHG
BH CV ECHO MEAS - TR MAX VEL: 294.5 CM/SEC
BILIRUB SERPL-MCNC: 1.9 MG/DL (ref 0–1.2)
BILIRUB UR QL STRIP: NEGATIVE
BUN SERPL-MCNC: 28 MG/DL (ref 8–23)
BUN/CREAT SERPL: 18.5 (ref 7–25)
CALCIUM SPEC-SCNC: 9.1 MG/DL (ref 8.2–9.6)
CHLORIDE SERPL-SCNC: 108 MMOL/L (ref 98–107)
CHOLEST SERPL-MCNC: 182 MG/DL (ref 0–200)
CK SERPL-CCNC: 1462 U/L (ref 20–180)
CLARITY UR: CLEAR
CO2 SERPL-SCNC: 23 MMOL/L (ref 22–29)
COLOR UR: YELLOW
CREAT SERPL-MCNC: 1.51 MG/DL (ref 0.57–1)
CRP SERPL-MCNC: 3.83 MG/DL (ref 0–0.5)
DEPRECATED RDW RBC AUTO: 43.8 FL (ref 37–54)
EGFRCR SERPLBLD CKD-EPI 2021: 32.5 ML/MIN/1.73
ERYTHROCYTE [DISTWIDTH] IN BLOOD BY AUTOMATED COUNT: 13 % (ref 12.3–15.4)
GLOBULIN UR ELPH-MCNC: 2.4 GM/DL
GLUCOSE BLDC GLUCOMTR-MCNC: 100 MG/DL (ref 70–130)
GLUCOSE BLDC GLUCOMTR-MCNC: 210 MG/DL (ref 70–130)
GLUCOSE BLDC GLUCOMTR-MCNC: 90 MG/DL (ref 70–130)
GLUCOSE SERPL-MCNC: 111 MG/DL (ref 65–99)
GLUCOSE UR STRIP-MCNC: NEGATIVE MG/DL
HBA1C MFR BLD: 6.2 % (ref 4.8–5.6)
HCT VFR BLD AUTO: 35.6 % (ref 34–46.6)
HDLC SERPL-MCNC: 54 MG/DL (ref 40–60)
HGB BLD-MCNC: 12.3 G/DL (ref 12–15.9)
HGB UR QL STRIP.AUTO: ABNORMAL
HYALINE CASTS UR QL AUTO: ABNORMAL /LPF
KETONES UR QL STRIP: NEGATIVE
LDLC SERPL CALC-MCNC: 113 MG/DL (ref 0–100)
LDLC/HDLC SERPL: 2.07 {RATIO}
LEUKOCYTE ESTERASE UR QL STRIP.AUTO: ABNORMAL
LV EF 2D ECHO EST: 56 %
MAXIMAL PREDICTED HEART RATE: 129 BPM
MCH RBC QN AUTO: 32.4 PG (ref 26.6–33)
MCHC RBC AUTO-ENTMCNC: 34.6 G/DL (ref 31.5–35.7)
MCV RBC AUTO: 93.7 FL (ref 79–97)
NITRITE UR QL STRIP: NEGATIVE
PH UR STRIP.AUTO: 5.5 [PH] (ref 5–8)
PLATELET # BLD AUTO: 177 10*3/MM3 (ref 140–450)
PMV BLD AUTO: 10.1 FL (ref 6–12)
POTASSIUM SERPL-SCNC: 4.1 MMOL/L (ref 3.5–5.2)
PROT SERPL-MCNC: 5.7 G/DL (ref 6–8.5)
PROT UR QL STRIP: ABNORMAL
RBC # BLD AUTO: 3.8 10*6/MM3 (ref 3.77–5.28)
RBC # UR STRIP: ABNORMAL /HPF
REF LAB TEST METHOD: ABNORMAL
SINUS: 3 CM
SODIUM SERPL-SCNC: 142 MMOL/L (ref 136–145)
SP GR UR STRIP: 1.02 (ref 1–1.03)
SQUAMOUS #/AREA URNS HPF: ABNORMAL /HPF
STJ: 3.2 CM
STRESS TARGET HR: 110 BPM
TRIGL SERPL-MCNC: 81 MG/DL (ref 0–150)
TROPONIN T SERPL-MCNC: 0.07 NG/ML (ref 0–0.03)
TSH SERPL DL<=0.05 MIU/L-ACNC: 0.46 UIU/ML (ref 0.27–4.2)
URINE MYOGLOBIN, QUALITATIVE: POSITIVE
UROBILINOGEN UR QL STRIP: ABNORMAL
VLDLC SERPL-MCNC: 15 MG/DL (ref 5–40)
WBC # UR STRIP: ABNORMAL /HPF
WBC NRBC COR # BLD: 9.57 10*3/MM3 (ref 3.4–10.8)

## 2022-07-05 PROCEDURE — 82962 GLUCOSE BLOOD TEST: CPT

## 2022-07-05 PROCEDURE — 84443 ASSAY THYROID STIM HORMONE: CPT | Performed by: INTERNAL MEDICINE

## 2022-07-05 PROCEDURE — 99223 1ST HOSP IP/OBS HIGH 75: CPT | Performed by: PSYCHIATRY & NEUROLOGY

## 2022-07-05 PROCEDURE — 86140 C-REACTIVE PROTEIN: CPT | Performed by: HOSPITALIST

## 2022-07-05 PROCEDURE — 97166 OT EVAL MOD COMPLEX 45 MIN: CPT

## 2022-07-05 PROCEDURE — 93321 DOPPLER ECHO F-UP/LMTD STD: CPT

## 2022-07-05 PROCEDURE — 82550 ASSAY OF CK (CPK): CPT | Performed by: HOSPITALIST

## 2022-07-05 PROCEDURE — 83874 ASSAY OF MYOGLOBIN: CPT | Performed by: EMERGENCY MEDICINE

## 2022-07-05 PROCEDURE — 97530 THERAPEUTIC ACTIVITIES: CPT

## 2022-07-05 PROCEDURE — 85027 COMPLETE CBC AUTOMATED: CPT | Performed by: INTERNAL MEDICINE

## 2022-07-05 PROCEDURE — 93308 TTE F-UP OR LMTD: CPT | Performed by: INTERNAL MEDICINE

## 2022-07-05 PROCEDURE — 93308 TTE F-UP OR LMTD: CPT

## 2022-07-05 PROCEDURE — 84484 ASSAY OF TROPONIN QUANT: CPT | Performed by: HOSPITALIST

## 2022-07-05 PROCEDURE — 83036 HEMOGLOBIN GLYCOSYLATED A1C: CPT | Performed by: INTERNAL MEDICINE

## 2022-07-05 PROCEDURE — 80061 LIPID PANEL: CPT | Performed by: INTERNAL MEDICINE

## 2022-07-05 PROCEDURE — 25010000002 ENOXAPARIN PER 10 MG: Performed by: HOSPITALIST

## 2022-07-05 PROCEDURE — 25010000002 PERFLUTREN (DEFINITY) 8.476 MG IN SODIUM CHLORIDE (PF) 0.9 % 10 ML INJECTION: Performed by: INTERNAL MEDICINE

## 2022-07-05 PROCEDURE — 93325 DOPPLER ECHO COLOR FLOW MAPG: CPT | Performed by: INTERNAL MEDICINE

## 2022-07-05 PROCEDURE — 81001 URINALYSIS AUTO W/SCOPE: CPT | Performed by: EMERGENCY MEDICINE

## 2022-07-05 PROCEDURE — 93325 DOPPLER ECHO COLOR FLOW MAPG: CPT

## 2022-07-05 PROCEDURE — 93321 DOPPLER ECHO F-UP/LMTD STD: CPT | Performed by: INTERNAL MEDICINE

## 2022-07-05 PROCEDURE — 80053 COMPREHEN METABOLIC PANEL: CPT | Performed by: INTERNAL MEDICINE

## 2022-07-05 PROCEDURE — 97162 PT EVAL MOD COMPLEX 30 MIN: CPT

## 2022-07-05 RX ORDER — ENOXAPARIN SODIUM 100 MG/ML
30 INJECTION SUBCUTANEOUS EVERY 24 HOURS
Status: DISCONTINUED | OUTPATIENT
Start: 2022-07-05 | End: 2022-07-08

## 2022-07-05 RX ORDER — ENOXAPARIN SODIUM 100 MG/ML
40 INJECTION SUBCUTANEOUS EVERY 24 HOURS
Status: DISCONTINUED | OUTPATIENT
Start: 2022-07-05 | End: 2022-07-05

## 2022-07-05 RX ORDER — ATENOLOL 25 MG/1
25 TABLET ORAL DAILY
Status: DISCONTINUED | OUTPATIENT
Start: 2022-07-06 | End: 2022-07-08 | Stop reason: HOSPADM

## 2022-07-05 RX ADMIN — Medication 5000 UNITS: at 09:48

## 2022-07-05 RX ADMIN — ATORVASTATIN CALCIUM 80 MG: 80 TABLET, FILM COATED ORAL at 20:27

## 2022-07-05 RX ADMIN — MULTIPLE VITAMINS W/ MINERALS TAB 1 TABLET: TAB at 09:48

## 2022-07-05 RX ADMIN — ASPIRIN 325 MG: 325 TABLET ORAL at 09:48

## 2022-07-05 RX ADMIN — GABAPENTIN 300 MG: 300 CAPSULE ORAL at 20:27

## 2022-07-05 RX ADMIN — ENOXAPARIN SODIUM 30 MG: 30 INJECTION SUBCUTANEOUS at 13:55

## 2022-07-05 RX ADMIN — PERFLUTREN 3 ML: 6.52 INJECTION, SUSPENSION INTRAVENOUS at 10:50

## 2022-07-05 RX ADMIN — SODIUM CHLORIDE 75 ML/HR: 9 INJECTION, SOLUTION INTRAVENOUS at 23:59

## 2022-07-05 NOTE — PLAN OF CARE
Goal Outcome Evaluation:  Plan of Care Reviewed With: patient      Progress: no change  Outcome Evaluation: VSS. Family at bedside for part of day. MRI screening sheet completed with family and faxed down to MRI. awaiting MRI to be completed. NIH completed. Pt not voiding indendently. Straight cathed 500ML this am. 140ML noted in evening bladder scan. Pt resting through shift. No C/o pain, SOA, or N/V. BG management maintained. Enhanced Isolation maintained. Pt stable and all needs met at this time.

## 2022-07-05 NOTE — CASE MANAGEMENT/SOCIAL WORK
Continued Stay Note  [facility]     Patient Name: Ryann Hernadez  MRN: [id number]  Today's Date: 7/5/2022    Admit Date: 7/4/2022     Discharge Plan     Row Name 07/05/22 1019       Plan    Plan DC plan pending at this time.    Plan Comments CCP called pts daughter Rhea as pt is confused and left voice mail with my return number.  CCP will continue to follow.  ThanksSteffanie  11:18 EDT CCP called aaron Wilkerson- [phone] and introduced self and role, reviewed face sheet.  According to dgt pt has been living at her home as independently as she can. Pt uses [facility] by [facility].  Pt has a living will and Rhea is the POA.  CCP advised her to bring the paperwork in to be scanned.  Pt uses a cane at home, does not use [employer] or any other DME.   Family has been trying to get pt to go to facility for some time.  Pt is currently confused.  Pt came in Covid + which  limits resources for nursing home placement.  CCP discussed options with Rhea garibay and she is agreeable with referrals using a radius containing pts zip code.  CCP  Made referrals to [facility], [facility] and the [facility]. CCP  Called Bailee regarding referrals to [facility].  She will review pt and let us know.CCP called and referred to [facility], spoke to Lauren.  She already had the referral and will let us know.   CCP will continue to follow. ThanksSteffanie                 Discharge Codes    No documentation.                     Steffanie Sterling

## 2022-07-05 NOTE — PLAN OF CARE
Problem: Adult Inpatient Plan of Care  Goal: Plan of Care Review  Outcome: Ongoing, Progressing  Flowsheets (Taken 7/5/2022 1105)  Outcome Evaluation: VSS. No acute events during shift. NIHSS done, patient. Dysphagia screen done, patient passed and tolerated whole pills in thin liquids. COVID positived, isolation precautions started. PEr daughter, patient didn't drink water for a day or two due to fall at home, urine to be collected, on purewik but there is no output as of yet. Patient stable. WIll continue to monitor.

## 2022-07-05 NOTE — THERAPY EVALUATION
Patient Name: Ryann Hernadez  : 1931    MRN: [id number]                              Today's Date: 2022       Admit Date: 2022    Visit Dx:     ICD-10-CM ICD-9-CM   1. Fall in home, initial encounter  W19.XXXA E888.9    Y92.009 E849.0   2. Non-traumatic rhabdomyolysis  M62.82 728.88   3. Left-sided weakness  R53.1 728.87   4. New onset atrial fibrillation (HCC)  I48.91 427.31   5. Elevated troponin  R77.8 790.6     Patient Active Problem List   Diagnosis   • Allergic rhinitis   • Gastroesophageal reflux disease   • Essential hypertension   • Ataxic gait   • Balance disorder   • Acute right otitis media   • Nonintractable episodic headache   • Facial swelling   • Dizziness   • Medicare annual wellness visit, initial   • Hospital discharge follow-up   • At high risk for falls   • Renal insufficiency   • Paroxysmal atrial fibrillation ([facility])   • Ureteral stone with hydronephrosis   • Hydronephrosis due to obstruction of ureter   • Aortic root dilatation ([facility])   • MCI (mild cognitive impairment)   • Age-related osteoporosis without current pathological fracture   • Corn of foot   • BPPV (benign paroxysmal positional vertigo), unspecified laterality   • TIA (transient ischemic attack)   • Ascending aortic aneurysm ([facility])   • Acute deep vein thrombosis (DVT) of calf muscle vein of left lower extremity ([facility])   • Medicare annual wellness visit, subsequent   • Paroxysmal atrial fibrillation ([facility])   • Fall at home   • RUBEN (acute kidney injury) ([facility])   • Rhabdomyolysis   • Elevated troponin   • COVID-19 virus detected     Past Medical History:   Diagnosis Date   • A-fib ([facility])    • Acute deep vein thrombosis (DVT) of calf muscle vein of left lower extremity ([facility]) 2020   • Allergic rhinitis    • Anemia    • Aneurysm, ascending aorta ([facility]) 2016    5 CM   • Anxiety    • Ataxic gait    • Atypical chest pain    • BPPV (benign paroxysmal positional vertigo)    • Bradycardia    • Chronic cystitis    •  Chronic headaches    • Chronic nonintractable headache 9/14/2016   • Chronic paroxysmal hemicrania    • Depression    • Esophagitis    • Gastritis    • GERD (gastroesophageal reflux disease)    • Hematuria    • High blood pressure    • History of cataract    • History of irritable bowel syndrome    • Hypertension    • IBS (irritable bowel syndrome)    • Influenza A    • Kidney lesion     LEFT KIDNEY CYSTIC   • Labyrinthitis 07/28/2014   • OA (osteoarthritis)    • Osteoporosis 9/13/2018   • Other abnormal clinical finding 09/21/2012    LEFT UTEROCELE   • Pain of thigh    • Personal history of gastric ulcer    • Renal disorder    • Renal insufficiency 8/7/2017   • Rhabdomyolysis 7/5/2022   • TIA (transient ischemic attack) 2/5/2020     Past Surgical History:   Procedure Laterality Date   • BREAST LUMPECTOMY     • BREAST SURGERY     • CATARACT EXTRACTION     • COLONOSCOPY N/A     DR. MARGARITA MENDOZA   • ELBOW ARTHROTOMY  07/01/1999    DR. RYAN RITCHIE   • EYE SURGERY     • FRACTURE SURGERY      HAND, BROKEN FINGERS  DR. MIKE CHRISTIANSON   • KNEE SURGERY  08/31/1999    DR. THADDEUS MENDES   • OOPHORECTOMY     • TUBAL ABDOMINAL LIGATION Bilateral 1962    DR. EDDA MANNING      General Information     Row Name 07/05/22 1307          Physical Therapy Time and Intention    Document Type evaluation  -DB     Mode of Treatment co-treatment;physical therapy;occupational therapy  -DB     Row Name 07/05/22 1307          General Information    Patient Profile Reviewed yes  -DB     Prior Level of Function ADL's;min assist:  -DB     Existing Precautions/Restrictions fall  -DB     Barriers to Rehab none identified  -DB     Row Name 07/05/22 1307          Living Environment    People in Home alone  -DB     Row Name 07/05/22 1307          Home Main Entrance    Number of Stairs, Main Entrance one  -DB     Stair Railings, Main Entrance railings safe and in good condition  -DB     Row Name 07/05/22 1307          Stairs Within Home, Primary    Number  of Stairs, Within Home, Primary none  -DB     Row Name 07/05/22 1307          Safety Issues, Functional Mobility    Safety Issues Affecting Function (Mobility) awareness of need for assistance;sequencing abilities  -DB     Impairments Affecting Function (Mobility) balance;endurance/activity tolerance;strength;postural/trunk control;pain  -DB           User Key  (r) = Recorded By, (t) = Taken By, (c) = Cosigned By    Initials Name Provider Type    DB Elsie Moralez PT Physical Therapist               Mobility     Row Name 07/05/22 1309          Bed Mobility    Bed Mobility supine-sit;sit-supine;scooting/bridging  -DB     Scooting/Bridging [place] (Bed Mobility) dependent (less than 25% patient effort);2 person assist  -DB     Supine-Sit [place] (Bed Mobility) moderate assist (50% patient effort);2 person assist;verbal cues;nonverbal cues (demo/gesture)  -DB     Sit-Supine [place] (Bed Mobility) moderate assist (50% patient effort);verbal cues;nonverbal cues (demo/gesture)  -DB     Assistive Device (Bed Mobility) bed rails;draw sheet;head of bed elevated  -DB     Comment, (Bed Mobility) inc'd time to complete  -DB     Row Name 07/05/22 1309          Sit-Stand Transfer    Sit-Stand [place] (Transfers) minimum assist (75% patient effort);2 person assist;verbal cues;nonverbal cues (demo/gesture)  -DB     Assistive Device (Sit-Stand Transfers) walker, front-wheeled  -DB     Row Name 07/05/22 1309          Gait/Stairs (Locomotion)    [place] Level (Gait) minimum assist (75% patient effort);2 person assist;verbal cues;nonverbal cues (demo/gesture)  -DB     Assistive Device (Gait) walker, front-wheeled  -DB     Distance in Feet (Gait) 3' side steps EOB  -DB     Deviations/Abnormal Patterns (Gait) antalgic;weight shifting decreased;stride length decreased;gait speed decreased;base of support, narrow  -DB     Bilateral Gait Deviations heel strike decreased;forward flexed posture  -DB     Comment,  (Gait/Stairs) cues for upright posture, difficulty advancing LLE, inc'd time needed to complete activity  -DB           User Key  (r) = Recorded By, (t) = Taken By, (c) = Cosigned By    Initials Name Provider Type    Elsie Cooley PT Physical Therapist               Obj/Interventions     Row Name 07/05/22 1310          Range of Motion Comprehensive    General Range of Motion no range of motion deficits identified  -DB     Row Name 07/05/22 1310          Strength Comprehensive (MMT)    Comment, General Manual Muscle Testing (MMT) Assessment generalized weakness, LLE weaker than RLE  -DB     Row Name 07/05/22 1310          Balance    Balance Assessment sitting static balance;sitting dynamic balance;standing static balance;standing dynamic balance  -DB     Static Sitting Balance contact guard  -DB     Dynamic Sitting Balance contact guard  -DB     Position, Sitting Balance unsupported;sitting edge of bed  -DB     Static Standing Balance minimal assist  -DB     Dynamic Standing Balance minimal assist;2-person assist  -DB     Position/Device Used, Standing Balance supported;walker, front-wheeled  -DB     Balance Interventions sitting;standing;sit to stand  -DB           User Key  (r) = Recorded By, (t) = Taken By, (c) = Cosigned By    Initials Name Provider Type    Elsie Cooley, PT Physical Therapist               Goals/Plan     Row Name 07/05/22 1317          Bed Mobility Goal 1 (PT)    Activity/Assistive Device (Bed Mobility Goal 1, PT) bed mobility activities, all  -DB     [place] Level/Cues Needed (Bed Mobility Goal 1, PT) standby assist  -DB     Time Frame (Bed Mobility Goal 1, PT) 1 week  -DB     Row Name 07/05/22 1317          Transfer Goal 1 (PT)    Activity/Assistive Device (Transfer Goal 1, PT) transfers, all  -DB     [place] Level/Cues Needed (Transfer Goal 1, PT) standby assist  -DB     Time Frame (Transfer Goal 1, PT) 1 week  -DB     Row Name 07/05/22 1317          Gait Training Goal  1 (PT)    Activity/Assistive Device (Gait Training Goal 1, PT) gait (walking locomotion)  -DB     [place] Level (Gait Training Goal 1, PT) contact guard required  -DB     Time Frame (Gait Training Goal 1, PT) 1 week  -DB     Row Name 07/05/22 1317          Therapy Assessment/Plan (PT)    Planned Therapy Interventions (PT) balance training;bed mobility training;gait training;home exercise program;neuromuscular re-education;stair training;patient/family education;postural re-education;transfer training;strengthening  -DB           User Key  (r) = Recorded By, (t) = Taken By, (c) = Cosigned By    Initials Name Provider Type    DB Elsie Moralez, PT Physical Therapist               Clinical Impression     Row Name 07/05/22 1311          Pain    Pain Intervention(s) Ambulation/increased activity;Repositioned  -DB     Row Name 07/05/22 1311          Plan of Care Review    Plan of Care Reviewed With patient  -DB     Outcome Evaluation Pt is a 89 y/o F admitted to [facility] from home following a fall. Pt lives alone and family was not able to check on pt for at least 36 hours, unsure how long pt was on the ground. Pt has 1 GLADYS and no stairs, family assists with cooking and other ADL's. Today, pt was seen for PT/OT co-eval. Req's modAx1-2 for bed mobility, minAx2 for STS and to take 3 steps, and inc'd time needed to complete all activities. Pt demo's dec'd strength, endurance, an balanace. Unsafe for pt to return home at this time d/t high fall risk and dec'd functional mobility, rec D/C to SNF.  -DB     Row Name 07/05/22 1311          Therapy Assessment/Plan (PT)    Rehab Potential (PT) good, to achieve stated therapy goals  -DB     Criteria for Skilled Interventions Met (PT) yes  -DB     Therapy Frequency (PT) 3 times/wk  -DB     Row Name 07/05/22 1311          Vital Signs    O2 Delivery Pre Treatment room air  -DB     O2 Delivery Intra Treatment room air  -DB     O2 Delivery Post Treatment room air  -DB     Pre Patient  Position Supine  -DB     Intra Patient Position Standing  -DB     Post Patient Position Supine  -DB     Row Name 07/05/22 1311          Positioning and Restraints    Pre-Treatment Position in bed  -DB     Post Treatment Position bed  -DB     In Bed supine;encouraged to call for assist;exit alarm on;with family/caregiver;call light within reach  -DB           User Key  (r) = Recorded By, (t) = Taken By, (c) = Cosigned By    Initials Name Provider Type    Elsie Cooley PT Physical Therapist               Outcome Measures     Row Name 07/05/22 1317          How much help from another person do you currently need...    Turning from your back to your side while in flat bed without using bedrails? 2  -DB     Moving from lying on back to sitting on the side of a flat bed without bedrails? 2  -DB     Moving to and from a bed to a chair (including a wheelchair)? 2  -DB     Standing up from a chair using your arms (e.g., wheelchair, bedside chair)? 2  -DB     Climbing 3-5 steps with a railing? 1  -DB     To walk in hospital room? 1  -DB     AM-PAC 6 Clicks Score (PT) 10  -DB     Highest level of mobility 4 --> Transferred to chair/commode  -DB     Row Name 07/05/22 1317          Functional Assessment    Outcome Measure Options AM-PAC 6 Clicks Basic Mobility (PT)  -DB           User Key  (r) = Recorded By, (t) = Taken By, (c) = Cosigned By    Initials Name Provider Type    Elsie Cooley PT Physical Therapist                             Physical Therapy Education                 Title: PT OT SLP Therapies (In Progress)     Topic: Physical Therapy (In Progress)     Point: Mobility training (In Progress)     Learning Progress Summary           Patient Acceptance, E, NR by DB at 7/5/2022 1319                   Point: Home exercise program (In Progress)     Learning Progress Summary           Patient Acceptance, E, NR by DB at 7/5/2022 1319                   Point: Body mechanics (In Progress)     Learning Progress  Summary           Patient Acceptance, E, NR by DB at 7/5/2022 1319                   Point: Precautions (In Progress)     Learning Progress Summary           Patient Acceptance, E, NR by DB at 7/5/2022 1319                               User Key     Initials Effective Dates Name Provider Type Discipline    DB 06/16/21 -  Elsie Moralez PT Physical Therapist PT              PT Recommendation and Plan  Planned Therapy Interventions (PT): balance training, bed mobility training, gait training, home exercise program, neuromuscular re-education, stair training, patient/family education, postural re-education, transfer training, strengthening  Plan of Care Reviewed With: patient  Outcome Evaluation: Pt is a 89 y/o F admitted to [facility] from home following a fall. Pt lives alone and family was not able to check on pt for at least 36 hours, unsure how long pt was on the ground. Pt has 1 GLADYS and no stairs, family assists with cooking and other ADL's. Today, pt was seen for PT/OT co-eval. Req's modAx1-2 for bed mobility, minAx2 for STS and to take 3 steps, and inc'd time needed to complete all activities. Pt demo's dec'd strength, endurance, an balanace. Unsafe for pt to return home at this time d/t high fall risk and dec'd functional mobility, rec D/C to SNF.     Time Calculation:    PT Charges     Row Name 07/05/22 1320             Time Calculation    Start Time 1125  -DB      Stop Time 1151  -DB      Time Calculation (min) 26 min  -DB      PT Received On 07/05/22  -DB      PT - Next Appointment 07/06/22  -DB      PT Goal Re-Cert Due Date 07/12/22  -DB              Time Calculation- PT    Total Timed Code Minutes- PT 10 minute(s)  -DB            User Key  (r) = Recorded By, (t) = Taken By, (c) = Cosigned By    Initials Name Provider Type    DB Elsie Moralez PT Physical Therapist              Therapy Charges for Today     Code Description Service Date Service Provider Modifiers Qty    72240106480 HC PT EVAL MOD COMPLEXITY  2 7/5/2022 Elsie Moralez, PT GP 1    41179936225 HC PT THERAPEUTIC ACT EA 15 MIN 7/5/2022 Elsie Moralez, PT GP 1          PT G-Codes  Outcome Measure Options: AM-PAC 6 Clicks Basic Mobility (PT)  AM-PAC 6 Clicks Score (PT): 10    Elsie Moralez, PT  7/5/2022

## 2022-07-05 NOTE — DISCHARGE PLACEMENT REQUEST
"Francisco Hernadez N (89 y.o. Female)             Date of Birth   06/01/1931    Social Security Number       Address   [address] [address]    Home Phone   [phone]    MRN   [id number]       [employer]   [facility]    Marital Status   Single                            Admission Date   7/4/22    Admission Type   Emergency    Admitting Provider   Erlinda Marcum MD    Attending Provider   Julio Calzada MD    Department, Room/Bed   [address], E455/1       Discharge Date       Discharge Disposition       Discharge Destination                               Attending Provider: Julio Calzada MD    Allergies: Sulfa Antibiotics, Lisinopril, Olmesartan    Isolation: Enh Drop/Con   Infection: COVID (confirmed) (07/04/22)   Code Status: CPR   Advance Care Planning Activity    Ht: 167.6 cm (66\")   Wt: 61.2 kg (135 lb)    Admission Cmt: None   Principal Problem: None                Active Insurance as of 7/4/2022     Primary Coverage     Payor Plan Insurance Group Employer/Plan Group    MEDICARE MEDICARE A & B      Payor Plan Address Payor Plan Phone Number Payor Plan Fax Number Effective Dates    [address] [phone]  5/1/1996 - None Entered    [facility] [id number]       Subscriber Name Subscriber Birth Date Member ID       FRANCISCO HERNADEZ N 6/1/1931 [id number]           Secondary Coverage     Payor Plan Insurance Group Employer/Plan Group    [facility] SUPP [id number]     Payor Plan Address Payor Plan Phone Number Payor Plan Fax Number Effective Dates    [address]   12/1/2016 - None Entered    [facility] [id number]       Subscriber Name Subscriber Birth Date Member ID       FRANCISCO HERNADEZ N 6/1/1931 [id number]                 Emergency Contacts      (Rel.) Home Phone Work Phone Mobile Phone    Rhea Angelo (Daughter) [phone] --              "

## 2022-07-05 NOTE — PLAN OF CARE
Goal Outcome Evaluation:              Outcome Evaluation: SLP discussed pt with RN this am, who reported pt passed swallow screen and tolerating diet well. Noted neurologist suspects R CVA, though MRI pending. Will f/u 7/6 for cognitive evaluation if MRI positive for acute stroke.

## 2022-07-05 NOTE — CONSULTS
NEUROLOGY CONSULT - STROKE TEAM    DOS: 2022  NAME: Ryann Hernadez   : 1931  PCP: Vanesa Connor MD  CC: Stroke  Referring MD: Erlinda Marcum, *  Patient being seen at request of Dr. Calzada for advice and opinion on found down    Neurological Problem and Interval History:  89 y.o. female presents with chief complaint of: 'I fell'. 89f found down by family. Had been in the bathroom and fell and then couldn't get back up. Didn't realize it had been a whole day. Did not try to call out, incontinent from holding despite being in the bathroom. Was trying to get up and could not.       Past Medical/Surgical Hx:  Past Medical History:   Diagnosis Date   • A-fib (CMS/[facility])    • Acute deep vein thrombosis (DVT) of calf muscle vein of left lower extremity (CMS/[facility]) 2020   • Allergic rhinitis    • Anemia    • Aneurysm, ascending aorta (CMS/[facility]) 2016    5 CM   • Anxiety    • Ataxic gait    • Atypical chest pain    • BPPV (benign paroxysmal positional vertigo)    • Bradycardia    • Chronic cystitis    • Chronic headaches    • Chronic nonintractable headache 2016   • Chronic paroxysmal hemicrania    • Depression    • Esophagitis    • Gastritis    • GERD (gastroesophageal reflux disease)    • Hematuria    • High blood pressure    • History of cataract    • History of irritable bowel syndrome    • Hypertension    • IBS (irritable bowel syndrome)    • Influenza A    • Kidney lesion     LEFT KIDNEY CYSTIC   • Labyrinthitis 2014   • OA (osteoarthritis)    • Osteoporosis 2018   • Other abnormal clinical finding 2012    LEFT UTEROCELE   • Pain of thigh    • Personal history of gastric ulcer    • Renal disorder    • Renal insufficiency 2017   • TIA (transient ischemic attack) 2020     Past Surgical History:   Procedure Laterality Date   • BREAST LUMPECTOMY     • BREAST SURGERY     • CATARACT EXTRACTION     • COLONOSCOPY N/A     DR. MARGARITA MENDOZA   • ELBOW ARTHROTOMY   07/01/1999    DR. RYAN RITCHIE   • EYE SURGERY     • FRACTURE SURGERY      HAND, BROKEN FINGERS  DR. MIKE CHRISTIANSON   • KNEE SURGERY  08/31/1999    DR. THADDEUS MENDES   • OOPHORECTOMY     • TUBAL ABDOMINAL LIGATION Bilateral 1962    DR. EDDA MANNING       Review of Systems:        No fever, sweats or chills,  No neck pain, back pain or joint pain  No loss of hearing or tinnitus  No blurry or double vision  No rashes or edema  No chest pain or palpitations  No shortness of breath or wheezing  No diarrhea or constipation  No urinary incontinence or dysuria  No seizures or syncope  No depression or anxiety    Medications On Admission  Medications Prior to Admission   Medication Sig Dispense Refill Last Dose   • atenolol (TENORMIN) 50 MG tablet Take 50 mg by mouth Daily.   7/3/2022 at Unknown time   • Cholecalciferol (VITAMIN D3) 5000 units capsule capsule Take 5,000 Units by mouth Daily.   7/3/2022 at Unknown time   • Multiple Vitamins-Minerals (ONE-A-DAY WOMENS 50 PLUS) tablet Take  by mouth.   7/3/2022 at Unknown time   • amLODIPine (NORVASC) 5 MG tablet TAKE 1 TABLET BY MOUTH DAILY 90 tablet 3    • gabapentin (NEURONTIN) 300 MG capsule Take 1 capsule by mouth every night at bedtime. 30 capsule 0        Allergies:  Allergies   Allergen Reactions   • Sulfa Antibiotics Anaphylaxis   • Lisinopril    • Olmesartan        Social Hx:  Social History     Socioeconomic History   • Marital status: Single   Tobacco Use   • Smoking status: Never Smoker   • Smokeless tobacco: Never Used   Substance and Sexual Activity   • Alcohol use: No   • Drug use: No   • Sexual activity: Defer       Family Hx:  Family History   Problem Relation Age of Onset   • Heart attack Mother    • Arthritis Mother    • Arthritis Father    • Arthritis Sister    • Arthritis Other    • Kidney disease Other    • Thyroid disease Other    • Diabetes Other    • Hypertension Other    • Heart disease Other    • Arthritis Maternal Grandmother        Review of Imaging  "(Interpretation of images not reports):      Laboratory Results:   Lab Results   Component Value Date    GLUCOSE 111 (H) 07/05/2022    CALCIUM 9.1 07/05/2022     07/05/2022    K 4.1 07/05/2022    CO2 23.0 07/05/2022     (H) 07/05/2022    BUN 28 (H) 07/05/2022    CREATININE 1.51 (H) 07/05/2022    EGFRIFAFRI 72 03/30/2021    EGFRIFNONA 51 (L) 01/08/2020    BCR 18.5 07/05/2022    ANIONGAP 11.0 07/05/2022     Lab Results   Component Value Date    WBC 9.57 07/05/2022    HGB 12.3 07/05/2022    HCT 35.6 07/05/2022    MCV 93.7 07/05/2022     07/05/2022     Lab Results   Component Value Date    CHOL 182 07/05/2022    CHOL 158 02/06/2020     Lab Results   Component Value Date    HDL 54 07/05/2022    HDL 72 (H) 02/06/2020    HDL 70 03/14/2019     Lab Results   Component Value Date     (H) 07/05/2022    LDL 79 02/06/2020    LDL 91 03/14/2019     Lab Results   Component Value Date    TRIG 81 07/05/2022    TRIG 37 02/06/2020    TRIG 63 03/14/2019     Lab Results   Component Value Date    HGBA1C 6.20 (H) 07/05/2022     Lab Results   Component Value Date    INR 1.27 (H) 07/04/2022    INR 0.98 02/05/2020    INR 0.99 09/22/2019    PROTIME 15.7 (H) 07/04/2022    PROTIME 12.7 02/05/2020    PROTIME 12.8 09/22/2019         Physical Examination:   BP (!) 89/60   Pulse 71   Temp 98.2 °F (36.8 °C) (Oral)   Resp 18   Ht 167.6 cm (66\")   Wt 61.2 kg (135 lb)   SpO2 98%   BMI 21.79 kg/m²   General Appearance:   Well developed, well nourished, well groomed, alert, and cooperative.  HEENT: Normocephalic. Atraumatic. No JVD, no tracheal deviation.  Neck and Spine: Normal range of motion.  Normal alignment. No mass or tenderness. No bruits.  Cardiac: Regular rate and rhythm. No murmurs.  Pulmonary: No shortness of breath, clear anteriorly to auscultation  Abdomen:  Soft, non-tender, non-distended   Peripheral Vasculature: Radial pulses are equal and symmetric. No signs of distal embolization.  Extremities:    No " edema or deformities.   Skin:    No rashes or discoloration    Neurological examination:  Higher Integrative  Function: Somewhat confused. Able to tell time and speak and follow simple commands but seemed to miss some commands. Left sided extinction visually. Preferred right side attention.  CN II: Pupils are equal, round, and reactive to light. Can count on left but extinguishes to double simultaneous.  CN III IV VI: EOMI without nystagmus. Right gaze preference but can cross midline to left.   CN V: Normal facial sensation   CN VII: Facial movements are symmetric. No labial dysarthria   CN VIII:   Hard of hearing.   CN IX & X:   No palatal or pharnygeal dysarthria.  CN XI: Turns head without abnormality.   CN XII:   The tongue is midline.  No lingual dysarthria.   Motor: Slower finger tap on left hand versus right. Otherwise symmetric activation and strength examination.  Reflexes: Hypoactive knee reflexes. Normal biceps.  Sensation: Able to detect light touch equally.  Station and Gait: Deferred for bed rest        NIHSS:     Diagnoses / Discussion:  89 y.o. who presents with Sx of right parietal stroke in setting of atrial fibrillation not on anticoagulation and covid infection.    1) Stroke, right MCA embolic likely from atrial fibrillation and possibly with covid contributing. Patient on aspirin at present. Need MRI for infarct burden to determine if patient should be on acute anticoagulation given her very high risk with both atrial fib and covid. On statin.  2) Covid - If not able to anticoagulate, would consider adding dual antiplatelet given the high risk of thrombi.  3) Atrial fibrillation - Long term, patient may be more suitable for a Watchman device rather than anticoagulation long term but will defer to outpatient. Consider TTE for thrombi.    Plan:  MRI for infarct burden.    Call 911 for stroke any stroke symptoms    I have discussed the above with the patient and family.  Time spent with patient:  60min    MDM  Reviewed: vitals  Reviewed previous: CT scan  Interpretation: CT scan  Total time providing critical care: 30-74 minutes. This excludes time spent performing separately reportable procedures and services.         Radu Charles MD  Neurology

## 2022-07-05 NOTE — THERAPY EVALUATION
Patient Name: Ryann Hernadez  : 1931    MRN: [id number]                              Today's Date: 2022       Admit Date: 2022    Visit Dx:     ICD-10-CM ICD-9-CM   1. Fall in home, initial encounter  W19.XXXA E888.9    Y92.009 E849.0   2. Non-traumatic rhabdomyolysis  M62.82 728.88   3. Left-sided weakness  R53.1 728.87   4. New onset atrial fibrillation (HCC)  I48.91 427.31   5. Elevated troponin  R77.8 790.6     Patient Active Problem List   Diagnosis   • Allergic rhinitis   • Gastroesophageal reflux disease   • Essential hypertension   • Ataxic gait   • Balance disorder   • Acute right otitis media   • Nonintractable episodic headache   • Facial swelling   • Dizziness   • Medicare annual wellness visit, initial   • Hospital discharge follow-up   • At high risk for falls   • Renal insufficiency   • Paroxysmal atrial fibrillation ([facility])   • Ureteral stone with hydronephrosis   • Hydronephrosis due to obstruction of ureter   • Aortic root dilatation ([facility])   • MCI (mild cognitive impairment)   • Age-related osteoporosis without current pathological fracture   • Corn of foot   • BPPV (benign paroxysmal positional vertigo), unspecified laterality   • TIA (transient ischemic attack)   • Ascending aortic aneurysm ([facility])   • Acute deep vein thrombosis (DVT) of calf muscle vein of left lower extremity ([facility])   • Medicare annual wellness visit, subsequent   • Paroxysmal atrial fibrillation ([facility])   • Fall at home   • RUBEN (acute kidney injury) ([facility])   • Rhabdomyolysis   • Elevated troponin   • COVID-19 virus detected     Past Medical History:   Diagnosis Date   • A-fib ([facility])    • Acute deep vein thrombosis (DVT) of calf muscle vein of left lower extremity ([facility]) 2020   • Allergic rhinitis    • Anemia    • Aneurysm, ascending aorta ([facility]) 2016    5 CM   • Anxiety    • Ataxic gait    • Atypical chest pain    • BPPV (benign paroxysmal positional vertigo)    • Bradycardia    • Chronic cystitis    •  Chronic headaches    • Chronic nonintractable headache 9/14/2016   • Chronic paroxysmal hemicrania    • Depression    • Esophagitis    • Gastritis    • GERD (gastroesophageal reflux disease)    • Hematuria    • High blood pressure    • History of cataract    • History of irritable bowel syndrome    • Hypertension    • IBS (irritable bowel syndrome)    • Influenza A    • Kidney lesion     LEFT KIDNEY CYSTIC   • Labyrinthitis 07/28/2014   • OA (osteoarthritis)    • Osteoporosis 9/13/2018   • Other abnormal clinical finding 09/21/2012    LEFT UTEROCELE   • Pain of thigh    • Personal history of gastric ulcer    • Renal disorder    • Renal insufficiency 8/7/2017   • Rhabdomyolysis 7/5/2022   • TIA (transient ischemic attack) 2/5/2020     Past Surgical History:   Procedure Laterality Date   • BREAST LUMPECTOMY     • BREAST SURGERY     • CATARACT EXTRACTION     • COLONOSCOPY N/A     DR. MARGARITA MENDOZA   • ELBOW ARTHROTOMY  07/01/1999    DR. RYAN RITCHIE   • EYE SURGERY     • FRACTURE SURGERY      HAND, BROKEN FINGERS  DR. MIKE CHRISTIANSON   • KNEE SURGERY  08/31/1999    DR. THADDEUS MENDES   • OOPHORECTOMY     • TUBAL ABDOMINAL LIGATION Bilateral 1962    DR. EDDA MANNING      General Information     Row Name 07/05/22 1505          OT Time and Intention    Document Type evaluation  -MW     Mode of Treatment co-treatment;occupational therapy;physical therapy  -     Row Name 07/05/22 1505          General Information    Prior Level of Function min assist:;ADL's;independent:;transfer  pt's daughter report pt able to get around home mod (I) with walker, occassional assist for ADLs, mainly IADL assist with cooking/cleaning  -     Existing Precautions/Restrictions fall  -MW     Barriers to Rehab none identified  -MW     Row Name 07/05/22 1505          Living Environment    People in Home alone  -     Row Name 07/05/22 1505          Home Main Entrance    Number of Stairs, Main Entrance one  -MW     Stair Railings, Main Entrance  railings safe and in good condition  -     Row Name 07/05/22 1505          Stairs Within Home, Primary    Number of Stairs, Within Home, Primary none  -     Row Name 07/05/22 1505          Cognition    Orientation Status (Cognition) oriented x 3  -[facility] Name 07/05/22 1505          Safety Issues, Functional Mobility    Safety Issues Affecting Function (Mobility) insight into deficits/self-awareness;awareness of need for assistance;safety precautions follow-through/compliance;safety precaution awareness;sequencing abilities  -     Impairments Affecting Function (Mobility) balance;endurance/activity tolerance;strength;postural/trunk control;pain  -           User Key  (r) = Recorded By, (t) = Taken By, (c) = Cosigned By    Initials Name Provider Type     Laurie Allen OT Occupational Therapist                 Mobility/ADL's     Row Name 07/05/22 1506          Bed Mobility    Bed Mobility supine-sit;sit-supine;scooting/bridging  -     Scooting/Bridging [place] (Bed Mobility) dependent (less than 25% patient effort);2 person assist  -     Supine-Sit [place] (Bed Mobility) moderate assist (50% patient effort);2 person assist;verbal cues;nonverbal cues (demo/gesture)  -     Sit-Supine [place] (Bed Mobility) moderate assist (50% patient effort);verbal cues;nonverbal cues (demo/gesture)  -     Assistive Device (Bed Mobility) bed rails;draw sheet;head of bed elevated  -     Comment, (Bed Mobility) increased time to complete, max vc's for hand placement  -[facility] Name 07/05/22 1506          Transfers    Transfers sit-stand transfer;stand-sit transfer  -     Sit-Stand [place] (Transfers) minimum assist (75% patient effort);2 person assist;verbal cues;nonverbal cues (demo/gesture)  -     Stand-Sit [place] (Transfers) minimum assist (75% patient effort)  -     Row Name 07/05/22 1506          Sit-Stand Transfer    Assistive Device (Sit-Stand Transfers) walker,  front-wheeled  -     Comment, (Sit-Stand Transfer) pt demo x3 lateral steps to HOB, however steps slow and uncoordinated. Increased time and cueing to initiate and follow through with steps and max A for Rwx advancement  -[facility] Name 07/05/22 1506          Stand-Sit Transfer    Assistive Device (Stand-Sit Transfers) walker, front-wheeled  -[facility] Name 07/05/22 1506          Activities of Daily Living    BADL Assessment/Intervention lower body dressing  -MW     Row Name 07/05/22 1506          Lower Body Dressing Assessment/Training    [place] Level (Lower Body Dressing) don;socks;maximum assist (25% patient effort)  -           User Key  (r) = Recorded By, (t) = Taken By, (c) = Cosigned By    Initials Name Provider Type    Laurie Lynch OT Occupational Therapist               Obj/Interventions     [facility] Name 07/05/22 1508          Sensory Assessment (Somatosensory)    Sensory Assessment (Somatosensory) UE sensation intact  -MW     Row Name 07/05/22 1508          Vision Assessment/Intervention    Visual Impairment/Limitations WFL  -[facility] Name 07/05/22 1508          Range of Motion Comprehensive    General Range of Motion bilateral upper extremity ROM WNL  -MW     Row Name 07/05/22 1508          Strength Comprehensive (MMT)    General Manual Muscle Testing (MMT) Assessment upper extremity strength deficits identified  -     Comment, General Manual Muscle Testing (MMT) Assessment generalized weakness  -[facility] Name 07/05/22 1508          Motor Skills    Motor Skills functional endurance  -     Functional Endurance fair  -MW     Row Name 07/05/22 1508          Balance    Balance Assessment sitting static balance;sitting dynamic balance;sit to stand dynamic balance;standing static balance;standing dynamic balance  -     Static Sitting Balance contact guard  -     Dynamic Sitting Balance contact guard  -     Position, Sitting Balance unsupported;sitting edge of bed  -     Sit to  Stand Dynamic Balance minimal assist;2-person assist  -MW     Static Standing Balance minimal assist  -MW     Dynamic Standing Balance minimal assist;2-person assist  -MW     Position/Device Used, Standing Balance supported;walker, front-wheeled  -MW     Balance Interventions sit to stand;standing;sitting;dynamic;supported;static;minimal challenge  -     Comment, Balance min A x2 for dynamic standing requirng assist and max vc's for weight shifitng technique, pt easily distracted  -           User Key  (r) = Recorded By, (t) = Taken By, (c) = Cosigned By    Initials Name Provider Type    MW Laurie Allen, OT Occupational Therapist               Goals/Plan     Row Name 07/05/22 1514          Transfer Goal 1 (OT)    Activity/Assistive Device (Transfer Goal 1, OT) sit-to-stand/stand-to-sit;bed-to-chair/chair-to-bed;toilet  -     [place] Level/Cues Needed (Transfer Goal 1, OT) minimum assist (75% or more patient effort)  -     Time Frame (Transfer Goal 1, OT) short term goal (STG);2 weeks  -MW     Progress/Outcome (Transfer Goal 1, OT) goal ongoing  -     Row Name 07/05/22 1514          Dressing Goal 1 (OT)    Activity/Device (Dressing Goal 1, OT) upper body dressing;lower body dressing  -     [place]/Cues Needed (Dressing Goal 1, OT) minimum assist (75% or more patient effort)  -     Time Frame (Dressing Goal 1, OT) short term goal (STG);2 weeks  -MW     Progress/Outcome (Dressing Goal 1, OT) goal ongoing  -     Row Name 07/05/22 1514          Toileting Goal 1 (OT)    Activity/Device (Toileting Goal 1, OT) toileting skills, all  -     [place] Level/Cues Needed (Toileting Goal 1, OT) minimum assist (75% or more patient effort)  -     Time Frame (Toileting Goal 1, OT) short term goal (STG);2 weeks  -MW     Progress/Outcome (Toileting Goal 1, OT) goal ongoing  -     Row Name 07/05/22 1514          Grooming Goal 1 (OT)    Activity/Device (Grooming Goal 1, OT) grooming skills, all   -MW     [place] (Grooming Goal 1, OT) supervision required  -MW     Time Frame (Grooming Goal 1, OT) short term goal (STG);2 weeks  -MW     Progress/Outcome (Grooming Goal 1, OT) goal ongoing  -MW     Row Name 07/05/22 1514          Problem Specific Goal 1 (OT)    Problem Specific Goal 1 (OT) pt will improve overall act tolerance to 5 mins upright with ADL task to improve (I) with daily routine.  -MW     Time Frame (Problem Specific Goal 1, OT) short term goal (STG);2 weeks  -MW     Progress/Outcome (Problem Specific Goal 1, OT) goal ongoing  -MW     Row Name 07/05/22 1514          Therapy Assessment/Plan (OT)    Planned Therapy Interventions (OT) activity tolerance training;functional balance retraining;BADL retraining;neuromuscular control/coordination retraining;occupation/activity based interventions;ROM/therapeutic exercise;strengthening exercise;transfer/mobility retraining;patient/caregiver education/training  -MW           User Key  (r) = Recorded By, (t) = Taken By, (c) = Cosigned By    Initials Name Provider Type    Laurie Lynch OT Occupational Therapist               Clinical Impression     Row Name 07/05/22 1510          Pain Assessment    Pretreatment Pain Rating 0/10 - no pain  -MW     Posttreatment Pain Rating 0/10 - no pain  -MW     Row Name 07/05/22 1510          Plan of Care Review    Plan of Care Reviewed With patient  -MW     Progress no change  -MW     Outcome Evaluation Pt is a 89 y/o F admitted to [facility] from home following a fall. Pt lives alone and family was not able to check on pt, unsure how long pt was on the ground. Pt also COVID (+). Pt has 1 GLADYS and no stairs, family assists with cooking/cleaning, however reports able to complete basic ADLs with mod (I). Pt seen this date for OT eval, A&Ox3, presenting with impaired weakness, L side weaker than R, impaired balance, UE strength, below ADL baseline, impaired func transfers and mobility. Bed mob completed with mod A x2 this  date, EOB sitting balance at [facility]. STS with min A x2 and lateral steps with RWx support. Max vc's for technique as pt with difficulty sequencing lateral steps, however able to remain upright for increased time. Pt will continue to benefit from skilled OT to address deficits and promote return to prior level. D/c rec SNF.  -     Row Name 07/05/22 1510          Therapy Assessment/Plan (OT)    Rehab Potential (OT) good, to achieve stated therapy goals  -     Criteria for Skilled Therapeutic Interventions Met (OT) yes;meets criteria;skilled treatment is necessary  -     Therapy Frequency (OT) 3 times/wk  -     Row Name 07/05/22 1510          Therapy Plan Review/Discharge Plan (OT)    Anticipated Discharge Disposition (OT) skilled nursing facility  -     Row Name 07/05/22 1510          Vital Signs    O2 Delivery Pre Treatment room air  -MW     Pre Patient Position Supine  -MW     Intra Patient Position Standing  -MW     Post Patient Position Supine  -     Row Name 07/05/22 1510          Positioning and Restraints    Pre-Treatment Position in bed  -MW     Post Treatment Position bed  -MW     In Bed supine;notified nsg;encouraged to call for assist;call light within reach;exit alarm on;with family/caregiver;legs elevated  -MW           User Key  (r) = Recorded By, (t) = Taken By, (c) = Cosigned By    Initials Name Provider Type    Laurie Lynch, LIANNA Occupational Therapist               Outcome Measures     Row Name 07/05/22 1518          How much help from another is currently needed...    Putting on and taking off regular lower body clothing? 2  -MW     Bathing (including washing, rinsing, and drying) 2  -MW     Toileting (which includes using toilet bed pan or urinal) 2  -MW     Putting on and taking off regular upper body clothing 3  -MW     Taking care of personal grooming (such as brushing teeth) 3  -MW     Eating meals 3  -MW     AM-PAC 6 Clicks Score (OT) 15  -MW     Row Name 07/05/22 1319           How much help from another person do you currently need...    Turning from your back to your side while in flat bed without using bedrails? 2  -DB     Moving from lying on back to sitting on the side of a flat bed without bedrails? 2  -DB     Moving to and from a bed to a chair (including a wheelchair)? 2  -DB     Standing up from a chair using your arms (e.g., wheelchair, bedside chair)? 2  -DB     Climbing 3-5 steps with a railing? 1  -DB     To walk in hospital room? 1  -DB     AM-PAC 6 Clicks Score (PT) 10  -DB     Highest level of mobility 4 --> Transferred to chair/commode  -DB     Row Name 07/05/22 1515          Modified [place] Scale    Modified [place] Scale 4 - Moderately severe disability.  Unable to walk without assistance, and unable to attend to own bodily needs without assistance.  -     Row Name 07/05/22 1515 07/05/22 1317       Functional Assessment    Outcome Measure Options AM-PAC 6 Clicks Daily Activity (OT);Modified [place]  - AM-PAC 6 Clicks Basic Mobility (PT)  -DB          User Key  (r) = Recorded By, (t) = Taken By, (c) = Cosigned By    Initials Name Provider Type    Elsie Cooley PT Physical Therapist    Laurie Lynch OT Occupational Therapist                Occupational Therapy Education                 Title: PT OT SLP Therapies (In Progress)     Topic: Occupational Therapy (Done)     Point: ADL training (Done)     Description:   Instruct learner(s) on proper safety adaptation and remediation techniques during self care or transfers.   Instruct in proper use of assistive devices.              Learning Progress Summary           Patient Acceptance, E, VU by MEGAN at 7/5/2022 1516    Comment: role of OT, d/c rec, therapy goals   Family Acceptance, E, VU by MEGAN at 7/5/2022 1516    Comment: role of OT, d/c rec, therapy goals                   Point: Home exercise program (Done)     Description:   Instruct learner(s) on appropriate technique for monitoring, assisting and/or  progressing therapeutic exercises/activities.              Learning Progress Summary           Patient Acceptance, E, VU by  at 7/5/2022 1516    Comment: role of OT, d/c rec, therapy goals   Family Acceptance, E, VU by  at 7/5/2022 1516    Comment: role of OT, d/c rec, therapy goals                   Point: Precautions (Done)     Description:   Instruct learner(s) on prescribed precautions during self-care and functional transfers.              Learning Progress Summary           Patient Acceptance, E, VU by  at 7/5/2022 1516    Comment: role of OT, d/c rec, therapy goals   Family Acceptance, E, VU by  at 7/5/2022 1516    Comment: role of OT, d/c rec, therapy goals                   Point: Body mechanics (Done)     Description:   Instruct learner(s) on proper positioning and spine alignment during self-care, functional mobility activities and/or exercises.              Learning Progress Summary           Patient Acceptance, E, VU by  at 7/5/2022 1516    Comment: role of OT, d/c rec, therapy goals   Family Acceptance, E, VU by  at 7/5/2022 1516    Comment: role of OT, d/c rec, therapy goals                               User Key     Initials Effective Dates Name Provider Type Discipline     08/20/21 -  Laurie Allen OT Occupational Therapist OT              OT Recommendation and Plan  Planned Therapy Interventions (OT): activity tolerance training, functional balance retraining, BADL retraining, neuromuscular control/coordination retraining, occupation/activity based interventions, ROM/therapeutic exercise, strengthening exercise, transfer/mobility retraining, patient/caregiver education/training  Therapy Frequency (OT): 3 times/wk  Plan of Care Review  Plan of Care Reviewed With: patient  Progress: no change  Outcome Evaluation: Pt is a 89 y/o F admitted to [facility] from home following a fall. Pt lives alone and family was not able to check on pt, unsure how long pt was on the ground. Pt also COVID  (+). Pt has 1 GLADYS and no stairs, family assists with cooking/cleaning, however reports able to complete basic ADLs with mod (I). Pt seen this date for OT eval, A&Ox3, presenting with impaired weakness, L side weaker than R, impaired balance, UE strength, below ADL baseline, impaired func transfers and mobility. Bed mob completed with mod A x2 this date, EOB sitting balance at [facility]. STS with min A x2 and lateral steps with RWx support. Max vc's for technique as pt with difficulty sequencing lateral steps, however able to remain upright for increased time. Pt will continue to benefit from skilled OT to address deficits and promote return to prior level. D/c rec SNF.     Time Calculation:    Time Calculation- OT     Row Name 07/05/22 1516             Time Calculation- OT    OT Start Time 1124  -MW      OT Stop Time 1152  -MW      OT Time Calculation (min) 28 min  -MW      Total Timed Code Minutes- OT 20 minute(s)  -MW      OT Received On 07/05/22  -MW      OT - Next Appointment 07/06/22  -MW      OT Goal Re-Cert Due Date 07/19/22  -MW              Timed Charges    24139 - OT Therapeutic Activity Minutes 20  -MW              Untimed Charges    OT Eval/Re-eval Minutes 8  -MW              Total Minutes    Timed Charges Total Minutes 20  -MW      Untimed Charges Total Minutes 8  -MW       Total Minutes 28  -MW            User Key  (r) = Recorded By, (t) = Taken By, (c) = Cosigned By    Initials Name Provider Type     Laurie Allen OT Occupational Therapist              Therapy Charges for Today     Code Description Service Date Service Provider Modifiers Qty    [id number]  OT THERAPEUTIC ACT EA 15 MIN 7/5/2022 Laurie Allen OT GO 1    [id number] HC OT EVAL MOD COMPLEXITY 2 7/5/2022 Laurie Allen OT GO 1               Laurie Allen OT  7/5/2022

## 2022-07-05 NOTE — PROGRESS NOTES
Name: Ryann Hernadez ADMIT: 2022   : 1931  PCP: Vanesa Connor MD    MRN: [id number] LOS: 1 days   AGE/SEX: 89 y.o. female  ROOM: [facility]/     Subjective   Subjective   Patient seen while getting echocardiogram so exam somewhat limited.  She denies any complaints but is confused.  Reports that she had some chest pain yesterday but could not describe it to me.    Discussed with daughter at bedside as well.  Unknown how long patient has been on the floor but daughter did not think it had been more than 24 hours.  Said the patient had been telling her she did not feel well for a couple of weeks but without specific complaint.  They had not really noticed her coughing or short of breath    Review of Systems     Objective   Objective   Vital Signs  Temp:  [98.2 °F (36.8 °C)-98.6 °F (37 °C)] 98.2 °F (36.8 °C)  Heart Rate:  [71-90] 71  Resp:  [18-22] 18  BP: ()/() 112/83  SpO2:  [90 %-98 %] 98 %  on   ;   Device (Oxygen Therapy): room air  Body mass index is 21.79 kg/m².  Physical Exam  Vitals and nursing note reviewed.   Constitutional:       General: She is not in acute distress.     Comments: Frail/chronically ill-appearing   HENT:      Head: Normocephalic and atraumatic.      Mouth/Throat:      Mouth: Mucous membranes are moist.   Eyes:      General: No scleral icterus.  Neck:      Vascular: No JVD.   Cardiovascular:      Rate and Rhythm: Normal rate and regular rhythm.      Pulses: Normal pulses.      Heart sounds: Normal heart sounds. No murmur heard.  Pulmonary:      Effort: Pulmonary effort is normal. No respiratory distress.      Comments: Decreased breath sounds  Abdominal:      General: Bowel sounds are normal. There is no distension.      Palpations: Abdomen is soft.      Tenderness: There is no abdominal tenderness.   Musculoskeletal:         General: No swelling or tenderness.      Cervical back: Neck supple.   Skin:     General: Skin is warm and dry.      Coloration: Skin is  not jaundiced.      Findings: No rash.   Neurological:      Mental Status: She is alert. She is disoriented.      Comments: Globally weak   Psychiatric:         Mood and Affect: Mood normal.         Behavior: Behavior normal.         Results Review     I reviewed the patient's new clinical results.  Results from last 7 days   Lab Units 07/05/22  0607 07/04/22  1405   WBC 10*3/mm3 9.57 8.98   HEMOGLOBIN g/dL 12.3 14.5   PLATELETS 10*3/mm3 177 236     Results from last 7 days   Lab Units 07/05/22  0607 07/04/22  1405   SODIUM mmol/L 142 140   POTASSIUM mmol/L 4.1 4.4   CHLORIDE mmol/L 108* 102   CO2 mmol/L 23.0 24.7   BUN mg/dL 28* 29*   CREATININE mg/dL 1.51* 1.98*   GLUCOSE mg/dL 111* 159*   EGFR mL/min/1.73 32.5* 23.5*     Results from last 7 days   Lab Units 07/05/22  0607 07/04/22  1405   ALBUMIN g/dL 3.30* 4.30   BILIRUBIN mg/dL 1.9* 1.6*   ALK PHOS U/L 56 72   AST (SGOT) U/L 42* 43*   ALT (SGPT) U/L 20 28     Results from last 7 days   Lab Units 07/05/22  0607 07/04/22  1405   CALCIUM mg/dL 9.1 10.3*   ALBUMIN g/dL 3.30* 4.30       Hemoglobin A1C   Date/Time Value Ref Range Status   07/05/2022 0607 6.20 (H) 4.80 - 5.60 % Final     Glucose   Date/Time Value Ref Range Status   07/05/2022 1042 90 70 - 130 mg/dL Final     Comment:     Meter: [id number] : [id number] Yao ADDISON   07/05/2022 0615 90 70 - 130 mg/dL Final     Comment:     Meter: [id number] : [id number] Amber ADDISON   07/05/2022 0001 90 70 - 130 mg/dL Final     Comment:     Meter: [id number] : [id number] Amberfatuma Lua CYN       CT Head Without Contrast    Result Date: 7/4/2022  Moderate chronic small vessel ischemic white matter change and atrophy. No evidence for acute intracranial abnormality.  This report was finalized on 7/4/2022 4:12 PM by Dr. Carlos Cardona M.D.      XR Chest 1 View    Result Date: 7/4/2022  Within the right lung base just above the right hemidiaphragm there is a small area of hazy increased density that is  suspicious for a small infiltrate though it is difficult to definitively differentiate from an area of chronic change. There is no evidence for perihilar edema or other change when compared to previous imaging.  This report was finalized on 7/4/2022 2:59 PM by Dr. Carlos Cardona M.D.      Scheduled Medications  amLODIPine, 5 mg, Oral, Daily  aspirin, 325 mg, Oral, Daily   Or  aspirin, 300 mg, Rectal, Daily  atenolol, 50 mg, Oral, Daily  atorvastatin, 80 mg, Oral, Nightly  cholecalciferol, 5,000 Units, Oral, Daily  enoxaparin, 30 mg, Subcutaneous, Q24H  gabapentin, 300 mg, Oral, Nightly  multivitamin with minerals, 1 tablet, Oral, Daily    Infusions  Pharmacy to Dose enoxaparin (LOVENOX),   sodium chloride, 75 mL/hr, Last Rate: 75 mL/hr (07/04/22 2141)    Diet  Diet Regular; Cardiac       Assessment/Plan     Active Hospital Problems    Diagnosis  POA   • RUBEN (acute kidney injury) (HCC) [N17.9]  Yes   • Rhabdomyolysis [M62.82]  Yes   • Elevated troponin [R77.8]  Unknown   • COVID-19 virus detected [U07.1]  Yes   • Fall at home [W19.XXXA, Y92.009]  Not Applicable   • Paroxysmal atrial fibrillation (HCC) [I48.0]  Yes      Resolved Hospital Problems   No resolved problems to display.       89 y.o. female with history of PAF admitted after being found down, findings of mild rhabdo, RUBEN and mildly elevated troponin along with COVID-positive.    COVID-19- hard to tell when symptom onset was.  Sounds like she has been feeling poorly for a while.  Not hypoxic currently or short of breath  - Without knowing onset of symptoms, doubt any role for remdesivir even though she does have multiple risk factors obviously  - Hold on Decadron unless she becomes hypoxic    RUBEN/rhabdo-would continue IV fluids  - BP running low.  Will DC amlodipine and put hold parameters on atenolol with half dose for now    Elevated troponin probably not significant given renal failure and muscle injury.  Echocardiogram ordered and will be reviewed.   Started on aspirin    PAF-previously refused anticoagulation per cardiology notes  - Rate controlled currently  - Echocardiogram ordered.  Cardiology note reviewed    Neurology note reviewed.  MRI pending to rule out stroke.  Started on a statin though at age 89 not sure of benefits if no CVA seen on imaging      · Starting Lovenox for DVT prophylaxis.  Obviously some risk given acute CVA potential but at the same time she does have PAF and COVID so high risk for clotting  · Dispo: Will need SNF. Talked with daughter      Julio Lopes MD [facility]  07/05/22  11:37 EDT

## 2022-07-05 NOTE — CONSULTS
Patient Name: Ryann Hernadez  :1931  89 y.o.    Date of Admission: 2022  Encounter Provider: Jory Kwan MD  Date of Encounter Visit: 22  Place of Service: [facility] CARDIOLOGY  Referring Provider: Erlinda Marcum MD  Patient Care Team:  Vanesa Connor MD as PCP - General (Family Medicine)      Chief complaint: Altered mental status/found down    Reason for consult: New atrial fibrillation    History of Present Illness:      89-year-old.  Paroxysmal A. fib, hypertension, ascending aortic aneurysm here with COVID.  She is now in A. fib.  I tried to cancel the echo as it is not necessary.  However they may have already started it.  Altered mental status.  Elevated creatinine.  Patient cannot give me much history herself.            Previous cardiac testing:     Echocardiogram 2020:  · Left ventricular systolic function is normal. Calculated EF = 57.0%. Estimated EF was in agreement with the calculated EF. Estimated EF = 57%. Normal left ventricular cavity size and wall thickness noted. All left ventricular wall segments contract normally. Left ventricular diastolic dysfunction is noted (grade I a w/high LAP) consistent with impaired relaxation.  · Left atrial volume is severely increased. Right atrial cavity size is moderately dilated  · There is nodular sclerosis noted on the right coronary cusp leaflet edge.. Mild aortic valve regurgitation is present.  · Mild MAC is present. Mild mitral valve regurgitation is present.  · Moderate tricuspid valve regurgitation is present. Estimated right ventricular systolic pressure from tricuspid regurgitation is normal (<35 mmHg).  · Moderate dilation of the ascending aorta is present. The ascending aorta measures 4.7 cm. Moderate dilation of the aortic arch is present. Aortic arch measures 4 cm.. The descending aorta not well visualized.        Past Medical History:   Diagnosis Date   • A-fib (CMS/HCC)    •  Acute deep vein thrombosis (DVT) of calf muscle vein of left lower extremity (CMS/[facility]) 2/6/2020   • Allergic rhinitis    • Anemia    • Aneurysm, ascending aorta (CMS/[facility]) 03/22/2016    5 CM   • Anxiety    • Ataxic gait    • Atypical chest pain    • BPPV (benign paroxysmal positional vertigo)    • Bradycardia    • Chronic cystitis    • Chronic headaches    • Chronic nonintractable headache 9/14/2016   • Chronic paroxysmal hemicrania    • Depression    • Esophagitis    • Gastritis    • GERD (gastroesophageal reflux disease)    • Hematuria    • High blood pressure    • History of cataract    • History of irritable bowel syndrome    • Hypertension    • IBS (irritable bowel syndrome)    • Influenza A    • Kidney lesion     LEFT KIDNEY CYSTIC   • Labyrinthitis 07/28/2014   • OA (osteoarthritis)    • Osteoporosis 9/13/2018   • Other abnormal clinical finding 09/21/2012    LEFT UTEROCELE   • Pain of thigh    • Personal history of gastric ulcer    • Renal disorder    • Renal insufficiency 8/7/2017   • TIA (transient ischemic attack) 2/5/2020       Past Surgical History:   Procedure Laterality Date   • BREAST LUMPECTOMY     • BREAST SURGERY     • CATARACT EXTRACTION     • COLONOSCOPY N/A     DR. MARGARITA MENDOZA   • ELBOW ARTHROTOMY  07/01/1999    DR. RYAN RITCHIE   • EYE SURGERY     • FRACTURE SURGERY      HAND, BROKEN FINGERS  DR. MIKE CHRISTIANSON   • KNEE SURGERY  08/31/1999    DR. THADDEUS MENDES   • OOPHORECTOMY     • TUBAL ABDOMINAL LIGATION Bilateral 1962    DR. EDDA MANNING         Prior to Admission medications    Medication Sig Start Date End Date Taking? Authorizing Provider   atenolol (TENORMIN) 50 MG tablet Take 50 mg by mouth Daily.   Yes Provider, MD Christopher   Cholecalciferol (VITAMIN D3) 5000 units capsule capsule Take 5,000 Units by mouth Daily.   Yes Provider, MD Christopher   Multiple Vitamins-Minerals (ONE-A-DAY WOMENS 50 PLUS) tablet Take  by mouth.   Yes Provider, MD Christopher   amLODIPine (NORVASC) 5 MG  "tablet TAKE 1 TABLET BY MOUTH DAILY 3/15/21   Vanesa Connor MD   gabapentin (NEURONTIN) 300 MG capsule Take 1 capsule by mouth every night at bedtime. 3/30/21   Carlos Whelan MD       Allergies   Allergen Reactions   • Sulfa Antibiotics Anaphylaxis   • Lisinopril    • Olmesartan        Social History     Socioeconomic History   • Marital status: Single   Tobacco Use   • Smoking status: Never Smoker   • Smokeless tobacco: Never Used   Substance and Sexual Activity   • Alcohol use: No   • Drug use: No   • Sexual activity: Defer       Family History   Problem Relation Age of Onset   • Heart attack Mother    • Arthritis Mother    • Arthritis Father    • Arthritis Sister    • Arthritis Other    • Kidney disease Other    • Thyroid disease Other    • Diabetes Other    • Hypertension Other    • Heart disease Other    • Arthritis Maternal Grandmother        REVIEW OF SYSTEMS:   All other systems reviewed and negative.        Objective:     Vitals:    07/05/22 0751 07/05/22 0945 07/05/22 1004 07/05/22 1015   BP: 114/94 (!) 89/60 112/83 (!) 89/60   BP Location: Right arm Right arm     Patient Position: Lying Lying     Pulse: 81  71    Resp: 18      Temp: 98.2 °F (36.8 °C)      TempSrc: Oral      SpO2: 98%      Weight:   61.2 kg (135 lb)    Height:   167.6 cm (66\")      Body mass index is 21.79 kg/m².    Intake/Output Summary (Last 24 hours) at 7/5/2022 1044  Last data filed at 7/5/2022 0525  Gross per 24 hour   Intake 0 ml   Output --   Net 0 ml       Jory Kwan MD, [facility]  07/05/22  10:44 EDT      Lab Review:     Results from last 7 days   Lab Units 07/05/22  0607   SODIUM mmol/L 142   POTASSIUM mmol/L 4.1   CHLORIDE mmol/L 108*   CO2 mmol/L 23.0   BUN mg/dL 28*   CREATININE mg/dL 1.51*   CALCIUM mg/dL 9.1   BILIRUBIN mg/dL 1.9*   ALK PHOS U/L 56   ALT (SGPT) U/L 20   AST (SGOT) U/L 42*   GLUCOSE mg/dL 111*     Results from last 7 days   Lab Units 07/05/22  0607 07/04/22  1405   CK " TOTAL U/L 1,462* 1,398*   TROPONIN T ng/mL 0.066* 0.110*     Results from last 7 days   Lab Units 07/05/22  0607   WBC 10*3/mm3 9.57   HEMOGLOBIN g/dL 12.3   HEMATOCRIT % 35.6   PLATELETS 10*3/mm3 177     Results from last 7 days   Lab Units 07/04/22  1405   INR  1.27*   APTT seconds 27.6         Results from last 7 days   Lab Units 07/05/22  0607   CHOLESTEROL mg/dL 182   TRIGLYCERIDES mg/dL 81   HDL CHOL mg/dL 54   LDL CHOL mg/dL 113*       Admission EKG 07/04/2022:      Previous EKG 02/03/2021:                I personally viewed and interpreted the patient's EKG/Telemetry data.          Assessment and Plan:       Altered mental status/found down/COVID-positive.  In atrial fibrillation.  She is rate controlled.  Anticoagulate if appropriate.  No further heart testing necessary.    Jory Kwan MD  07/05/22  10:44 EDT

## 2022-07-05 NOTE — PLAN OF CARE
Goal Outcome Evaluation:  Plan of Care Reviewed With: patient           Outcome Evaluation: Pt is a 89 y/o F admitted to [facility] from home following a fall. Pt lives alone and family was not able to check on pt for at least 36 hours, unsure how long pt was on the ground. Pt has 1 GLADYS and no stairs, family assists with cooking and other ADL's. Today, pt was seen for PT/OT co-eval. Req's modAx1-2 for bed mobility, minAx2 for STS and to take 3 steps, and inc'd time needed to complete all activities. Pt demo's dec'd strength, endurance, an balanace. Unsafe for pt to return home at this time d/t high fall risk and dec'd functional mobility, rec D/C to SNF.

## 2022-07-05 NOTE — TELEPHONE ENCOUNTER
Unable to leave voicemail if patient call back ok for hub and  can read message    Covid test Positive  
162.56

## 2022-07-05 NOTE — H&P
HISTORY AND PHYSICAL   [facility]        Date of Admission: 2022  Patient Identification:  Name: Ryann Hernadez  Age: 89 y.o.  Sex: female  :  1931  MRN: [id number]                     Primary Care Physician: Vanesa Connor MD    Chief Complaint:  89 year old female who presented to the emergency room after she was found down by her daughter; she was last seen two days ago by another daughter; she lives alone and has been refusing to consider alternatives; presently the patient is sleeping; a daughter is present to give some history     History of Present Illness:   As above    Past Medical History:  Past Medical History:   Diagnosis Date   • A-fib (CMS/[facility])    • Acute deep vein thrombosis (DVT) of calf muscle vein of left lower extremity (CMS/[facility]) 2020   • Allergic rhinitis    • Anemia    • Aneurysm, ascending aorta (CMS/[facility]) 2016    5 CM   • Anxiety    • Ataxic gait    • Atypical chest pain    • BPPV (benign paroxysmal positional vertigo)    • Bradycardia    • Chronic cystitis    • Chronic headaches    • Chronic nonintractable headache 2016   • Chronic paroxysmal hemicrania    • Depression    • Esophagitis    • Gastritis    • GERD (gastroesophageal reflux disease)    • Hematuria    • High blood pressure    • History of cataract    • History of irritable bowel syndrome    • Hypertension    • IBS (irritable bowel syndrome)    • Influenza A    • Kidney lesion     LEFT KIDNEY CYSTIC   • Labyrinthitis 2014   • OA (osteoarthritis)    • Osteoporosis 2018   • Other abnormal clinical finding 2012    LEFT UTEROCELE   • Pain of thigh    • Personal history of gastric ulcer    • Renal disorder    • Renal insufficiency 2017   • TIA (transient ischemic attack) 2020     Past Surgical History:  Past Surgical History:   Procedure Laterality Date   • BREAST LUMPECTOMY     • BREAST SURGERY     • CATARACT EXTRACTION     • COLONOSCOPY N/A     DR. MARGARITA MENDOZA   •  ELBOW ARTHROTOMY  1999    DR. RYAN RITCHIE   • EYE SURGERY     • FRACTURE SURGERY      HAND, BROKEN FINGERS  DR. MIKE CHRISTIANSON   • KNEE SURGERY  1999    DR. THADDEUS MENDES   • OOPHORECTOMY     • TUBAL ABDOMINAL LIGATION Bilateral     DR. EDDA MANNING      Home Meds:  Medications Prior to Admission   Medication Sig Dispense Refill Last Dose   • atenolol (TENORMIN) 50 MG tablet Take 50 mg by mouth Daily.   7/3/2022 at Unknown time   • Cholecalciferol (VITAMIN D3) 5000 units capsule capsule Take 5,000 Units by mouth Daily.   7/3/2022 at Unknown time   • Multiple Vitamins-Minerals (ONE-A-DAY WOMENS 50 PLUS) tablet Take  by mouth.   7/3/2022 at Unknown time   • amLODIPine (NORVASC) 5 MG tablet TAKE 1 TABLET BY MOUTH DAILY 90 tablet 3    • gabapentin (NEURONTIN) 300 MG capsule Take 1 capsule by mouth every night at bedtime. 30 capsule 0        Allergies:  Allergies   Allergen Reactions   • Sulfa Antibiotics Anaphylaxis   • Lisinopril    • Olmesartan      Immunizations:  Immunization History   Administered Date(s) Administered   • COVID-19 (PFIZER) PURPLE CAP 2021, 2021, 2021     Social History:   Social History     Social History Narrative   • Not on file     Social History     Socioeconomic History   • Marital status: Single   Tobacco Use   • Smoking status: Never Smoker   • Smokeless tobacco: Never Used   Substance and Sexual Activity   • Alcohol use: No   • Drug use: No   • Sexual activity: Defer       Family History:  Family History   Problem Relation Age of Onset   • Heart attack Mother    • Arthritis Mother    • Arthritis Father    • Arthritis Sister    • Arthritis Other    • Kidney disease Other    • Thyroid disease Other    • Diabetes Other    • Hypertension Other    • Heart disease Other    • Arthritis Maternal Grandmother         Review of Systems  Not obtainable    Objective:  T Max 24 hrs: Temp (24hrs), Av.5 °F (36.9 °C), Min:98.3 °F (36.8 °C), Max:98.6 °F (37 °C)    Vitals  Ranges:   Temp:  [98.3 °F (36.8 °C)-98.6 °F (37 °C)] 98.6 °F (37 °C)  Heart Rate:  [85-90] 90  Resp:  [18-22] 18  BP: (132-150)/() 132/84      Exam:  /84 (BP Location: Right arm, Patient Position: Lying)   Pulse 90   Temp 98.6 °F (37 °C) (Oral)   Resp 18   SpO2 95%     General Appearance:    Sleeping; chronically ill appearing; frail, thin   Head:    Normocephalic, without obvious abnormality, atraumatic   Eyes:      both eyes closed   Ears:    Normal external ear canals, both ears   Nose:   Nares normal, septum midline, mucosa normal, no drainage    or sinus tenderness   Throat:   Lips, mucosa, and tongue normal   Neck:   Supple, symmetrical, trachea midline, no adenopathy;     thyroid:  no enlargement/tenderness/nodules; no carotid    bruit or JVD   Back:     Symmetric, no curvature, ROM normal, no CVA tenderness   Lungs:     Clear to auscultation bilaterally, respirations unlabored   Chest Wall:    No tenderness or deformity    Heart:    Regular rate and rhythm, S1 and S2 normal, no murmur, rub   or gallop   Abdomen:     Soft, nontender, bowel sounds active all four quadrants,     no masses, no hepatomegaly, no splenomegaly   Extremities:   Extremities normal, atraumatic, no cyanosis or edema   Pulses:   2+ and symmetric all extremities   Skin:   Skin color, texture, turgor normal, no rashes or lesions               .    Data Review:  Labs in chart were reviewed.  WBC   Date Value Ref Range Status   07/04/2022 8.98 3.40 - 10.80 10*3/mm3 Final     Hemoglobin   Date Value Ref Range Status   07/04/2022 14.5 12.0 - 15.9 g/dL Final     Hematocrit   Date Value Ref Range Status   07/04/2022 43.2 34.0 - 46.6 % Final     Platelets   Date Value Ref Range Status   07/04/2022 236 140 - 450 10*3/mm3 Final     Sodium   Date Value Ref Range Status   07/04/2022 140 136 - 145 mmol/L Final     Potassium   Date Value Ref Range Status   07/04/2022 4.4 3.5 - 5.2 mmol/L Final     Chloride   Date Value Ref Range Status    07/04/2022 102 98 - 107 mmol/L Final     CO2   Date Value Ref Range Status   07/04/2022 24.7 22.0 - 29.0 mmol/L Final     BUN   Date Value Ref Range Status   07/04/2022 29 (H) 8 - 23 mg/dL Final     Creatinine   Date Value Ref Range Status   07/04/2022 1.98 (H) 0.57 - 1.00 mg/dL Final     Glucose   Date Value Ref Range Status   07/04/2022 159 (H) 65 - 99 mg/dL Final     Calcium   Date Value Ref Range Status   07/04/2022 10.3 (H) 8.2 - 9.6 mg/dL Final     AST (SGOT)   Date Value Ref Range Status   07/04/2022 43 (H) 1 - 32 U/L Final     ALT (SGPT)   Date Value Ref Range Status   07/04/2022 28 1 - 33 U/L Final     Alkaline Phosphatase   Date Value Ref Range Status   07/04/2022 72 39 - 117 U/L Final                Imaging Results (All)     Procedure Component Value Units Date/Time    CT Head Without Contrast [[id number]] Collected: 07/04/22 1609     Updated: 07/04/22 1615    Narrative:      CT HEAD WITHOUT CONTRAST     HISTORY: Altered mental status with left-sided weakness.     TECHNIQUE:  Head CT includes axial imaging from the base of skull to the  vertex without intravenous contrast. Radiation dose reduction techniques  were utilized, including automated exposure control and exposure  modulation based on body size.     COMPARISON:CT head 12/08/2020, MRI brain 02/05/2020, CT angiogram head  02/05/2020.     FINDINGS: There is moderate chronic small vessel ischemic white matter  change as well as cerebral and cerebellar atrophy. There are no abnormal  areas of increased attenuation intra-axially to suggest hemorrhage. No  extra-axial fluid collection is observed. There is no evidence for mass  effect or shift of the midline structures. The mastoid air cells appear  clear. There is mild right inferior maxillary sinus mucoperiosteal  thickening.       Impression:      Moderate chronic small vessel ischemic white matter change  and atrophy. No evidence for acute intracranial abnormality.      This report was finalized on  7/4/2022 4:12 PM by Dr. Carlos Cardona M.D.       XR Chest 1 View [[id number]] Collected: 07/04/22 1458     Updated: 07/04/22 1502    Narrative:      CHEST SINGLE VIEW     HISTORY: Generalized weakness.     COMPARISON: AP chest 02/05/2020.     FINDINGS: Heart size is enlarged. Thoracic aorta is tortuous and appears  to be ectatic. There is pulmonary arterial enlargement. Pleural  parenchymal scarring is present in the medial right apex. Within the  right lung base there has developed hazy pulmonary opacity potentially  due to a very small area of infiltrate though difficult to definitively  differentiate from chronic change. There is no evidence for perihilar  edema or pneumothorax.       Impression:      Within the right lung base just above the right  hemidiaphragm there is a small area of hazy increased density that is  suspicious for a small infiltrate though it is difficult to definitively  differentiate from an area of chronic change. There is no evidence for  perihilar edema or other change when compared to previous imaging.     This report was finalized on 7/4/2022 2:59 PM by Dr. Carlos Cardona M.D.               Assessment:  Active Hospital Problems    Diagnosis  POA   • Fall at home [W19.XXXA, Y92.009]  Not Applicable      Resolved Hospital Problems   No resolved problems to display.   hypertension  Atrial fibrillation  Cognitive impairment  rhabdomyolysis    Plan:  Neuro and cardiology to see  Hold off on ac for now  Monitor on telemetry  Mri brain  Will likely need placement  D.w patient and ED provider    Erlinda Marcum MD  7/4/2022  21:30 EDT

## 2022-07-05 NOTE — PLAN OF CARE
Goal Outcome Evaluation:  Plan of Care Reviewed With: patient        Progress: no change  Outcome Evaluation: Pt is a 89 y/o F admitted to [facility] from home following a fall. Pt lives alone and family was not able to check on pt, unsure how long pt was on the ground. Pt also COVID (+). Pt has 1 GLADYS and no stairs, family assists with cooking/cleaning, however reports able to complete basic ADLs with mod (I). Pt seen this date for OT eval, A&Ox3, presenting with impaired weakness, L side weaker than R, impaired balance, UE strength, below ADL baseline, impaired func transfers and mobility. Bed mob completed with mod A x2 this date, EOB sitting balance at [facility]. STS with min A x2 and lateral steps with RWx support. Max vc's for technique as pt with difficulty sequencing lateral steps, however able to remain upright for increased time. Pt will continue to benefit from skilled OT to address deficits and promote return to prior level. D/c rec SNF.

## 2022-07-06 ENCOUNTER — APPOINTMENT (OUTPATIENT)
Dept: MRI IMAGING | Facility: HOSPITAL | Age: 87
End: 2022-07-06

## 2022-07-06 PROBLEM — I63.9 EMBOLIC STROKE (HCC): Status: ACTIVE | Noted: 2022-07-06

## 2022-07-06 LAB
ANION GAP SERPL CALCULATED.3IONS-SCNC: 9 MMOL/L (ref 5–15)
BUN SERPL-MCNC: 25 MG/DL (ref 8–23)
BUN/CREAT SERPL: 21 (ref 7–25)
CALCIUM SPEC-SCNC: 8.7 MG/DL (ref 8.2–9.6)
CHLORIDE SERPL-SCNC: 112 MMOL/L (ref 98–107)
CK SERPL-CCNC: 904 U/L (ref 20–180)
CO2 SERPL-SCNC: 21 MMOL/L (ref 22–29)
CREAT SERPL-MCNC: 1.19 MG/DL (ref 0.57–1)
EGFRCR SERPLBLD CKD-EPI 2021: 43.3 ML/MIN/1.73
GLUCOSE BLDC GLUCOMTR-MCNC: 131 MG/DL (ref 70–130)
GLUCOSE BLDC GLUCOMTR-MCNC: 84 MG/DL (ref 70–130)
GLUCOSE BLDC GLUCOMTR-MCNC: 91 MG/DL (ref 70–130)
GLUCOSE BLDC GLUCOMTR-MCNC: 94 MG/DL (ref 70–130)
GLUCOSE SERPL-MCNC: 120 MG/DL (ref 65–99)
POTASSIUM SERPL-SCNC: 3.8 MMOL/L (ref 3.5–5.2)
SODIUM SERPL-SCNC: 142 MMOL/L (ref 136–145)

## 2022-07-06 PROCEDURE — 82962 GLUCOSE BLOOD TEST: CPT

## 2022-07-06 PROCEDURE — 70544 MR ANGIOGRAPHY HEAD W/O DYE: CPT

## 2022-07-06 PROCEDURE — 25010000002 ENOXAPARIN PER 10 MG: Performed by: HOSPITALIST

## 2022-07-06 PROCEDURE — 0 GADOBENATE DIMEGLUMINE 529 MG/ML SOLUTION: Performed by: HOSPITALIST

## 2022-07-06 PROCEDURE — 70553 MRI BRAIN STEM W/O & W/DYE: CPT

## 2022-07-06 PROCEDURE — 99233 SBSQ HOSP IP/OBS HIGH 50: CPT | Performed by: PHYSICIAN ASSISTANT

## 2022-07-06 PROCEDURE — 82550 ASSAY OF CK (CPK): CPT | Performed by: HOSPITALIST

## 2022-07-06 PROCEDURE — A9577 INJ MULTIHANCE: HCPCS | Performed by: HOSPITALIST

## 2022-07-06 PROCEDURE — 70549 MR ANGIOGRAPH NECK W/O&W/DYE: CPT

## 2022-07-06 PROCEDURE — 80048 BASIC METABOLIC PNL TOTAL CA: CPT | Performed by: HOSPITALIST

## 2022-07-06 RX ORDER — TERAZOSIN 2 MG/1
2 CAPSULE ORAL NIGHTLY
Status: DISCONTINUED | OUTPATIENT
Start: 2022-07-06 | End: 2022-07-07

## 2022-07-06 RX ADMIN — ASPIRIN 325 MG: 325 TABLET ORAL at 09:35

## 2022-07-06 RX ADMIN — ATENOLOL 25 MG: 25 TABLET ORAL at 09:35

## 2022-07-06 RX ADMIN — GADOBENATE DIMEGLUMINE 12.04 ML: 529 INJECTION, SOLUTION INTRAVENOUS at 14:27

## 2022-07-06 RX ADMIN — SODIUM CHLORIDE 75 ML/HR: 9 INJECTION, SOLUTION INTRAVENOUS at 13:05

## 2022-07-06 RX ADMIN — MULTIPLE VITAMINS W/ MINERALS TAB 1 TABLET: TAB at 09:37

## 2022-07-06 RX ADMIN — ATORVASTATIN CALCIUM 80 MG: 80 TABLET, FILM COATED ORAL at 21:44

## 2022-07-06 RX ADMIN — Medication 5000 UNITS: at 09:36

## 2022-07-06 RX ADMIN — ACETAMINOPHEN 650 MG: 325 TABLET, FILM COATED ORAL at 09:42

## 2022-07-06 RX ADMIN — ENOXAPARIN SODIUM 30 MG: 30 INJECTION SUBCUTANEOUS at 13:04

## 2022-07-06 RX ADMIN — TERAZOSIN 2 MG: 2 CAPSULE ORAL at 21:44

## 2022-07-06 RX ADMIN — GABAPENTIN 300 MG: 300 CAPSULE ORAL at 21:44

## 2022-07-06 NOTE — PROGRESS NOTES
Name: Ryann Hernadez ADMIT: 2022   : 1931  PCP: Vanesa Connor MD    MRN: [id number] LOS: 2 days   AGE/SEX: 89 y.o. female  ROOM: [facility]     Subjective   Subjective   Patient doing better, more coherent and talkative.     Review of Systems     Objective   Objective   Vital Signs  Temp:  [97.5 °F (36.4 °C)-99 °F (37.2 °C)] 98.7 °F (37.1 °C)  Heart Rate:  [56-86] 56  Resp:  [18-19] 18  BP: (111-135)/(79-85) 133/79  SpO2:  [92 %-98 %] 97 %  on   ;   Device (Oxygen Therapy): room air  Body mass index is 21.43 kg/m².  Physical Exam  Vitals and nursing note reviewed.   Constitutional:       General: She is not in acute distress.     Comments: Frail/chronically ill-appearing   HENT:      Head: Normocephalic and atraumatic.      Mouth/Throat:      Mouth: Mucous membranes are moist.   Eyes:      General: No scleral icterus.  Neck:      Vascular: No JVD.   Cardiovascular:      Rate and Rhythm: Normal rate and regular rhythm.      Pulses: Normal pulses.      Heart sounds: Normal heart sounds. No murmur heard.  Pulmonary:      Effort: Pulmonary effort is normal. No respiratory distress.      Comments: Decreased breath sounds  Abdominal:      General: Bowel sounds are normal. There is no distension.      Palpations: Abdomen is soft.      Tenderness: There is no abdominal tenderness.   Musculoskeletal:         General: No swelling or tenderness.      Cervical back: Neck supple.   Skin:     General: Skin is warm and dry.      Coloration: Skin is not jaundiced.      Findings: No rash.   Neurological:      Mental Status: She is alert.      Comments: Globally weak   Psychiatric:         Mood and Affect: Mood normal.         Behavior: Behavior normal.         Results Review     I reviewed the patient's new clinical results.  Results from last 7 days   Lab Units 22  0607 22  1405   WBC 10*3/mm3 9.57 8.98   HEMOGLOBIN g/dL 12.3 14.5   PLATELETS 10*3/mm3 177 236     Results from last 7 days   Lab Units  07/06/22  1249 07/05/22  0607 07/04/22  1405   SODIUM mmol/L 142 142 140   POTASSIUM mmol/L 3.8 4.1 4.4   CHLORIDE mmol/L 112* 108* 102   CO2 mmol/L 21.0* 23.0 24.7   BUN mg/dL 25* 28* 29*   CREATININE mg/dL 1.19* 1.51* 1.98*   GLUCOSE mg/dL 120* 111* 159*   EGFR mL/min/1.73 43.3* 32.5* 23.5*     Results from last 7 days   Lab Units 07/05/22  0607 07/04/22  1405   ALBUMIN g/dL 3.30* 4.30   BILIRUBIN mg/dL 1.9* 1.6*   ALK PHOS U/L 56 72   AST (SGOT) U/L 42* 43*   ALT (SGPT) U/L 20 28     Results from last 7 days   Lab Units 07/06/22  1249 07/05/22  0607 07/04/22  1405   CALCIUM mg/dL 8.7 9.1 10.3*   ALBUMIN g/dL  --  3.30* 4.30       Hemoglobin A1C   Date/Time Value Ref Range Status   07/05/2022 0607 6.20 (H) 4.80 - 5.60 % Final     Glucose   Date/Time Value Ref Range Status   07/06/2022 1632 84 70 - 130 mg/dL Final     Comment:     Meter: [id number] : [id number] Manrique Late NA   07/06/2022 1154 94 70 - 130 mg/dL Final     Comment:     Meter: [id number] : [id number] Manrique Late NA   07/06/2022 0544 131 (H) 70 - 130 mg/dL Final     Comment:     Meter: [id number] : [id number] Mishra Debbie NA   07/05/2022 2121 210 (H) 70 - 130 mg/dL Final     Comment:     Meter: [id number] : [id number] Mishra Debbie NA   07/05/2022 1642 100 70 - 130 mg/dL Final     Comment:     Meter: [id number] : [id number] Crockett Marjan NA   07/05/2022 1042 90 70 - 130 mg/dL Final     Comment:     Meter: [id number] : [id number] Yao Tatum NA   07/05/2022 0615 90 70 - 130 mg/dL Final     Comment:     Meter: [id number] : [id number] Amber ADDISON       MRI Angiogram Head Without Contrast    Result Date: 7/6/2022  Multiple foci of acute infarction are identified within the medial and lateral aspect of the right frontal lobe within the right VENTURA and right MCA distribution as well as the right caudate, right operculum, and right lentiform nucleus within the right MCA distribution. No significant mass effect is seen as a  result of these multiple small infarcts. There is no evidence for hemorrhagic transformation.  There is a chronic infarct within the left perisylvian region involving the left superior temporal gyrus. This was evident on the prior exam. However, there are new chronic infarcts within both cerebellar hemispheres within both PICA distributions.  Moderate changes of chronic small vessel ischemic phenomena are again appreciated.  Incidental note is made of minimal chronic microhemorrhages as discussed in detail above. Some of these are seen in a distribution more suggestive of amyloid angiopathy whereas others are in a distribution suggestive of hypertensive chronic microhemorrhages. Please correlate with clinical data.  TECHNIQUE: MRA of the head was obtained with 3D time-of-flight imaging technique. Maximum intensity projection reconstructed images were obtained.  FINDINGS: There is normal flow-related enhancement within the internal carotid arteries, middle cerebral arteries, and anterior cerebral arteries. The vertebral arteries, basilar artery, and posterior cerebrals also have normal flow related enhancement.  IMPRESSION: Unremarkable MRA of the head.  TECHNIQUE: MRA of the neck was obtained with a combination of 3D time-of-flight imaging technique and a contrast enhanced MRA study. Maximum intensity projection reconstructed images were obtained.  FINDINGS: There is a classic configuration of the aortic arch. There is ectasia or mild aneurysmal dilatation of the ascending thoracic aorta and aortic arch although this is difficult to precisely measure. The ascending thoracic aorta may measure up to 4.5 cm in greatest diameter. The great vessel origins are not optimally evaluated, no significant stenosis is evident. The vertebral artery origins are again not optimally evaluated. There may be up to a moderate degree of stenosis involving the origin of the left vertebral artery. Otherwise, the vertebral arteries are  unremarkable in appearance. No significant NASCET stenosis is identified within either internal carotid.  IMPRESSION: The vertebral artery origins are not well evaluated although there may be up to a moderate degree of stenosis involving the origin of the left vertebral artery. There is ectasia or mild aneurysmal dilatation of the ascending thoracic aorta and aortic arch. However, the precise aortic measurement is difficult to assess on this exam. The ascending thoracic aorta may measure up to 4.5 cm in greatest diameter. Otherwise, the MR angiogram of the neck is unremarkable.  These findings were discussed with Dr. Charles on 07/06/2022 at approximately 3:23 PM.        MRI Angiogram Neck With & Without Contrast    Result Date: 7/6/2022  Multiple foci of acute infarction are identified within the medial and lateral aspect of the right frontal lobe within the right VENTURA and right MCA distribution as well as the right caudate, right operculum, and right lentiform nucleus within the right MCA distribution. No significant mass effect is seen as a result of these multiple small infarcts. There is no evidence for hemorrhagic transformation.  There is a chronic infarct within the left perisylvian region involving the left superior temporal gyrus. This was evident on the prior exam. However, there are new chronic infarcts within both cerebellar hemispheres within both PICA distributions.  Moderate changes of chronic small vessel ischemic phenomena are again appreciated.  Incidental note is made of minimal chronic microhemorrhages as discussed in detail above. Some of these are seen in a distribution more suggestive of amyloid angiopathy whereas others are in a distribution suggestive of hypertensive chronic microhemorrhages. Please correlate with clinical data.  TECHNIQUE: MRA of the head was obtained with 3D time-of-flight imaging technique. Maximum intensity projection reconstructed images were obtained.  FINDINGS: There is  normal flow-related enhancement within the internal carotid arteries, middle cerebral arteries, and anterior cerebral arteries. The vertebral arteries, basilar artery, and posterior cerebrals also have normal flow related enhancement.  IMPRESSION: Unremarkable MRA of the head.  TECHNIQUE: MRA of the neck was obtained with a combination of 3D time-of-flight imaging technique and a contrast enhanced MRA study. Maximum intensity projection reconstructed images were obtained.  FINDINGS: There is a classic configuration of the aortic arch. There is ectasia or mild aneurysmal dilatation of the ascending thoracic aorta and aortic arch although this is difficult to precisely measure. The ascending thoracic aorta may measure up to 4.5 cm in greatest diameter. The great vessel origins are not optimally evaluated, no significant stenosis is evident. The vertebral artery origins are again not optimally evaluated. There may be up to a moderate degree of stenosis involving the origin of the left vertebral artery. Otherwise, the vertebral arteries are unremarkable in appearance. No significant NASCET stenosis is identified within either internal carotid.  IMPRESSION: The vertebral artery origins are not well evaluated although there may be up to a moderate degree of stenosis involving the origin of the left vertebral artery. There is ectasia or mild aneurysmal dilatation of the ascending thoracic aorta and aortic arch. However, the precise aortic measurement is difficult to assess on this exam. The ascending thoracic aorta may measure up to 4.5 cm in greatest diameter. Otherwise, the MR angiogram of the neck is unremarkable.  These findings were discussed with Dr. Charles on 07/06/2022 at approximately 3:23 PM.        MRI Brain With & Without Contrast    Result Date: 7/6/2022  Multiple foci of acute infarction are identified within the medial and lateral aspect of the right frontal lobe within the right VENTURA and right MCA distribution  as well as the right caudate, right operculum, and right lentiform nucleus within the right MCA distribution. No significant mass effect is seen as a result of these multiple small infarcts. There is no evidence for hemorrhagic transformation.  There is a chronic infarct within the left perisylvian region involving the left superior temporal gyrus. This was evident on the prior exam. However, there are new chronic infarcts within both cerebellar hemispheres within both PICA distributions.  Moderate changes of chronic small vessel ischemic phenomena are again appreciated.  Incidental note is made of minimal chronic microhemorrhages as discussed in detail above. Some of these are seen in a distribution more suggestive of amyloid angiopathy whereas others are in a distribution suggestive of hypertensive chronic microhemorrhages. Please correlate with clinical data.  TECHNIQUE: MRA of the head was obtained with 3D time-of-flight imaging technique. Maximum intensity projection reconstructed images were obtained.  FINDINGS: There is normal flow-related enhancement within the internal carotid arteries, middle cerebral arteries, and anterior cerebral arteries. The vertebral arteries, basilar artery, and posterior cerebrals also have normal flow related enhancement.  IMPRESSION: Unremarkable MRA of the head.  TECHNIQUE: MRA of the neck was obtained with a combination of 3D time-of-flight imaging technique and a contrast enhanced MRA study. Maximum intensity projection reconstructed images were obtained.  FINDINGS: There is a classic configuration of the aortic arch. There is ectasia or mild aneurysmal dilatation of the ascending thoracic aorta and aortic arch although this is difficult to precisely measure. The ascending thoracic aorta may measure up to 4.5 cm in greatest diameter. The great vessel origins are not optimally evaluated, no significant stenosis is evident. The vertebral artery origins are again not optimally  evaluated. There may be up to a moderate degree of stenosis involving the origin of the left vertebral artery. Otherwise, the vertebral arteries are unremarkable in appearance. No significant NASCET stenosis is identified within either internal carotid.  IMPRESSION: The vertebral artery origins are not well evaluated although there may be up to a moderate degree of stenosis involving the origin of the left vertebral artery. There is ectasia or mild aneurysmal dilatation of the ascending thoracic aorta and aortic arch. However, the precise aortic measurement is difficult to assess on this exam. The ascending thoracic aorta may measure up to 4.5 cm in greatest diameter. Otherwise, the MR angiogram of the neck is unremarkable.  These findings were discussed with Dr. Charles on 07/06/2022 at approximately 3:23 PM.        Scheduled Medications  aspirin, 325 mg, Oral, Daily   Or  aspirin, 300 mg, Rectal, Daily  atenolol, 25 mg, Oral, Daily  atorvastatin, 80 mg, Oral, Nightly  cholecalciferol, 5,000 Units, Oral, Daily  enoxaparin, 30 mg, Subcutaneous, Q24H  gabapentin, 300 mg, Oral, Nightly  multivitamin with minerals, 1 tablet, Oral, Daily  terazosin, 2 mg, Oral, Nightly    Infusions   Diet  Diet Regular; Cardiac       Assessment/Plan     Active Hospital Problems    Diagnosis  POA   • Embolic stroke (HCC) [I63.9]  Unknown   • RUBEN (acute kidney injury) (HCC) [N17.9]  Yes   • Rhabdomyolysis [M62.82]  Yes   • Elevated troponin [R77.8]  Unknown   • COVID-19 virus detected [U07.1]  Yes   • Fall at home [W19.XXXA, Y92.009]  Not Applicable   • Paroxysmal atrial fibrillation (HCC) [I48.0]  Yes      Resolved Hospital Problems   No resolved problems to display.       89 y.o. female with history of PAF admitted after being found down, findings of mild rhabdo, RUBEN and mildly elevated troponin along with COVID-positive. Now MRI back showing acute CVA, likely cardioembolic related to atrial fibrillation    For CVA/PAF, already started on  ASA and statin. Ideally would be on full anticoagulation but obviously a fall risk and she has refused this for years from review of her chart.  - ?Watchman per neuro    COVID-19- hard to tell when symptom onset was. Not hypoxic currently or short of breath  - Without knowing onset of symptoms, doubt any role for remdesivir even though she does have multiple risk factors obviously  - Hold on Decadron unless she becomes hypoxic    RUBEN/rhabdo-Improving. D/c IVFs    Urinary retention- straight cath as needed. D/w RN  - Will try Hytrin to help but monitor BP. Cannot use tamsulosin due to anaphylactic sulfa allergy    Elevated troponin probably not significant given renal failure and muscle injury.  - Echocardiogram reviewed, relatively unremarkable.  Will not pursue CTA given RUBEN and lack of symptoms regarding aorta. Would not be a candidate for surgery for this anyway  - Started on aspirin      · Lovenox for DVT prophylaxis.  Obviously some risk but at the same time she does have PAF and COVID so high risk for clotting  · Dispo: SNF arranged, ?ready tomorrow      Julio Calzada MD  [facility]  07/06/22  17:44 EDT

## 2022-07-06 NOTE — NURSING NOTE
Patient has no urine output throughout the shift. Bladder scan resulted 565 ml in the bladder. Straight cath and was able to remove 500 ml.

## 2022-07-06 NOTE — PLAN OF CARE
Problem: Adult Inpatient Plan of Care  Goal: Plan of Care Review  Outcome: Ongoing, Progressing   Goal Outcome Evaluation:   Vss.Had MRIs done today, results pending.Bladder scan every 8 hours (287 ml on bladder scan at 1510)and straight cath per protocol.C/o generalized pain this am, prn tylenol given.Alert and oriented x's 4, but forgetful.Enhanced droplet/contact isolation maintained.No c/o n/v.

## 2022-07-06 NOTE — PLAN OF CARE
Goal Outcome Evaluation:              Outcome Evaluation: Pt SAMI in MRI, will follow to determine need for cognitive assessment.

## 2022-07-06 NOTE — PROGRESS NOTES
Continued Stay Note  [facility]     Patient Name: Ryann Hernadez  MRN: [id number]  Today's Date: 7/6/2022    Admit Date: 7/4/2022     Discharge Plan     Row Name 07/06/22 1547       Plan    Plan [facility] pending family approval    Plan Comments Spoke to Ezra today with [facility].  Pt has been approved at [facility] for skilled level of care.  Call placed to pt's daughter Ms. Angelo to confirm pt's plan and they are agreeable to [facility].  [facility] bed is available tomorrow 7/7/22.  CCP will follow up with pt's family to [facility] plan hazel be to [facility] and  confirm transporation.               Discharge Codes    No documentation.               Expected Discharge Date and Time     Expected Discharge Date Expected Discharge Time    Jul 7, 2022             TANA Loomis

## 2022-07-06 NOTE — PROGRESS NOTES
"DOS: 2022  NAME: Ryann Hernadez   : 1931  PCP: Vanesa Connor MD  Chief Complaint   Patient presents with   • Fall       Chief complaint: fall  Subjective: Patient lying in bed.  She is uncomfortable.  She needs to use a bedpan.  She reports pain on the left side and inability to move her left leg.  She does not want to have an MRI she says.    Objective:  Vital signs: /79 (BP Location: Right arm, Patient Position: Lying)   Pulse 56   Temp 98.7 °F (37.1 °C) (Oral)   Resp 18   Ht 167.6 cm (66\")   Wt 60.2 kg (132 lb 12.8 oz)   SpO2 97%   BMI 21.43 kg/m²        General: pt well appearing, no distress, mildly irritable  HEENT: Normocephalic, conjunctiva normal, external canals normal, no nasal discharge, moist mucous membranes  Neck: No lymphadenopathy, supple   CV: Regular rate and rhythm  Pulmonary: Normal respiratory effort  Mental: alert, conversant, oriented, good attention, fair insight, fair memory, no obvious neglect  CN:  PERRL, tracks, extraocular movements seem intact, no gaze palsy or nystagmus, intact facial sensation, no facial assymetry, intact hearing, symmetric palate elevation, tongue midline  Motor: no abnormal movements, antigravity of left upper extremity, 0 for 5 of left lower extremity normal tone  Sensory: normal sensation to crude touch, temperature, and vibration throughout  Coordination: no ataxia on finger-nose  Gait: Deferred    ROS:  No weakness, numbness  No fevers, chills      Laboratory results:  Lab Results   Component Value Date    GLUCOSE 120 (H) 2022    CALCIUM 8.7 2022     2022    K 3.8 2022    CO2 21.0 (L) 2022     (H) 2022    BUN 25 (H) 2022    CREATININE 1.19 (H) 2022    EGFRIFAFRI 72 2021    EGFRIFNONA 51 (L) 2020    BCR 21.0 2022    ANIONGAP 9.0 2022     Lab Results   Component Value Date    WBC 9.57 2022    HGB 12.3 2022    HCT 35.6 2022    MCV " 93.7 07/05/2022     07/05/2022     Lab Results   Component Value Date     (H) 07/05/2022    LDL 79 02/06/2020    LDL 91 03/14/2019         Lab 07/05/22  0607   HEMOGLOBIN A1C 6.20*        Review of labs: , hemoglobin A1c 6.2, TSH 0.457    Review and interpretation of imaging: Head CT on 7/4 shows moderate chronic small vessel ischemic white matter change and cerebral and cerebellar atrophy, no acute finding      Workup to date:  TTE 7/4  · Left ventricular wall thickness is consistent with mild concentric hypertrophy.  · Estimated left ventricular EF = 56% Left ventricular systolic function is normal.  · Left ventricular diastolic function was not assessed.  · Estimated right ventricular systolic pressure from tricuspid regurgitation is mildly elevated (35-45 mmHg).  · The ascending aorta appears dilated. I recommend CTA of the chest if clinically indicated.  · This was a limited study due to the patient's COVID status.         Diagnoses:  1. R MCA stroke  2. Covid infection  3. Atrial fibrillation not on anticoagulation    Impression: 89-year-old female history of PAF admitted after being found down.  Issues include mild rhabdomyolysis, RUBEN, elevated troponin, A. fib rhythm.  Clinically she has a right MCA stroke yesterday showing high cortical signs with extinction on double stimulation and right gaze preference.  She is weak of her left leg.  She is awaiting MRI.  TTE was done and EF is 56%.  CHADS2 score 4 if stroke is suspected.  Appears patient refused anticoagulation in the past.  She may be candidate for watchman device.  Cardiology already consulting    Plan:  1.  Follow-up on MRI/MRA  2.  Aspirin, high intensity statin as ordered  3.  Pt refused AC in the past-would she want Watchman device or be candidate   3.  PT/OT  4.  Defer treatment of acute and chronic to primary team    I spent at least 30 minutes interviewing, examining, and counseling patient.  I independently reviewed  documentation, laboratory and diagnostic findings, external documentation where applicable, and formulated treatment plan which was discussed with the patient.      Thank you for this consultation.  Discussed above plan with neuro attending, Dr. Charles who agrees with above plan.  Neurology team is available for concerns or questions.

## 2022-07-06 NOTE — PLAN OF CARE
Problem: Adult Inpatient Plan of Care  Goal: Plan of Care Review  Outcome: Ongoing, Progressing  Flowsheets (Taken 7/6/2022 0337)  Outcome Evaluation:   VSS. Patient at bedside the whole shift. Turned/weight shifted Q2. Called MRI and followed up the patient's schedule   as of yet, there are no known time, but patient's name in-line for MRI at 7/6/2022. No complaints during shift. Monitoring for urine output. Enhanced Isolation maintained. Will continue to monitor. To be endorsed accordingly.

## 2022-07-07 LAB
GLUCOSE BLDC GLUCOMTR-MCNC: 110 MG/DL (ref 70–130)
GLUCOSE BLDC GLUCOMTR-MCNC: 141 MG/DL (ref 70–130)
GLUCOSE BLDC GLUCOMTR-MCNC: 184 MG/DL (ref 70–130)
GLUCOSE BLDC GLUCOMTR-MCNC: 94 MG/DL (ref 70–130)

## 2022-07-07 PROCEDURE — 92523 SPEECH SOUND LANG COMPREHEN: CPT

## 2022-07-07 PROCEDURE — 97530 THERAPEUTIC ACTIVITIES: CPT

## 2022-07-07 PROCEDURE — 82962 GLUCOSE BLOOD TEST: CPT

## 2022-07-07 PROCEDURE — 99233 SBSQ HOSP IP/OBS HIGH 50: CPT | Performed by: PHYSICIAN ASSISTANT

## 2022-07-07 PROCEDURE — 25010000002 ENOXAPARIN PER 10 MG: Performed by: HOSPITALIST

## 2022-07-07 RX ADMIN — ASPIRIN 325 MG: 325 TABLET ORAL at 09:53

## 2022-07-07 RX ADMIN — Medication 5000 UNITS: at 09:53

## 2022-07-07 RX ADMIN — ATORVASTATIN CALCIUM 80 MG: 80 TABLET, FILM COATED ORAL at 21:11

## 2022-07-07 RX ADMIN — ATENOLOL 25 MG: 25 TABLET ORAL at 09:54

## 2022-07-07 RX ADMIN — MULTIPLE VITAMINS W/ MINERALS TAB 1 TABLET: TAB at 09:53

## 2022-07-07 RX ADMIN — GABAPENTIN 300 MG: 300 CAPSULE ORAL at 21:11

## 2022-07-07 RX ADMIN — ENOXAPARIN SODIUM 30 MG: 30 INJECTION SUBCUTANEOUS at 12:36

## 2022-07-07 NOTE — NURSING NOTE
Spoke with Gloria RN (cardiology), aware pt agreeable to Watchman procedure (per neurology APRN) and need Dr. Kwan to see pt regarding this procedure.Gloria to notify MD.

## 2022-07-07 NOTE — PROGRESS NOTES
"DOS: 2022  NAME: Ryann Hernadez   : 1931  PCP: Vanesa Connor MD  Chief Complaint   Patient presents with   • Fall       Chief complaint: Could not get up after fall  Subjective: Patient upset that her breakfast got cold while staff asking her questions.  She says that her left leg is stronger.  She has been working with it.  She says that she does not want to stay in bed and that she will only get stronger if she is working it out.  She thinks she has strong family support telling me that she raised 3 children on her own.  I reviewed MRI with her.  She tells me she is agreeable to watchman.  She seems reluctant still about taking oral anticoagulation but not against it    Objective:  Vital signs: BP 99/62 (BP Location: Right arm, Patient Position: Lying)   Pulse 60   Temp 98.3 °F (36.8 °C) (Oral)   Resp 18   Ht 167.6 cm (66\")   Wt 59.3 kg (130 lb 11.2 oz)   SpO2 (!) 88%   BMI 21.10 kg/m²      General: pt well appearing, no distress, mildly irritable  HEENT: Normocephalic, conjunctiva normal, external canals normal, no nasal discharge, moist mucous membranes  Neck: No lymphadenopathy, supple   CV: Regular rate and rhythm  Pulmonary: Normal respiratory effort  Mental: alert, conversant, oriented, good attention, fair insight, fair memory, no neglect on double stimulation  CN:  PERRL, tracks, extraocular movements seem intact, no gaze palsy or nystagmus, intact facial sensation, no facial assymetry, intact hearing, symmetric palate elevation, tongue midline  Motor: no abnormal movements,  4+ out of 5 upper extremity, 3/5 proximal left lower extremity, 4 out of 5 distal left lower extremity, lower extremity normal tone  Sensory: normal sensation to crude touch, temperature, and vibration throughout  Coordination: no ataxia on finger-nose  Gait: Deferred, able to transfer in bed to sitting position    ROS:  No weakness, numbness  No fevers, chills      Laboratory results:  Lab Results "   Component Value Date    GLUCOSE 120 (H) 07/06/2022    CALCIUM 8.7 07/06/2022     07/06/2022    K 3.8 07/06/2022    CO2 21.0 (L) 07/06/2022     (H) 07/06/2022    BUN 25 (H) 07/06/2022    CREATININE 1.19 (H) 07/06/2022    EGFRIFAFRI 72 03/30/2021    EGFRIFNONA 51 (L) 01/08/2020    BCR 21.0 07/06/2022    ANIONGAP 9.0 07/06/2022     Lab Results   Component Value Date    WBC 9.57 07/05/2022    HGB 12.3 07/05/2022    HCT 35.6 07/05/2022    MCV 93.7 07/05/2022     07/05/2022     Lab Results   Component Value Date     (H) 07/05/2022    LDL 79 02/06/2020    LDL 91 03/14/2019         Lab 07/05/22  0607   HEMOGLOBIN A1C 6.20*        Review of labs: , hemoglobin A1c 6.2, TSH 0.457    Review and interpretation of imaging: Head CT on 7/4 shows moderate chronic small vessel ischemic white matter change and cerebral and cerebellar atrophy, no acute finding  MRI brain w/o 7/6  IMPRESSION:  Multiple foci of acute infarction are identified within the medial and  lateral aspect of the right frontal lobe within the right VENTURA and right  MCA distribution as well as the right caudate, right operculum, and  right lentiform nucleus within the right MCA distribution. No  significant mass effect is seen as a result of these multiple small  infarcts. There is no evidence for hemorrhagic transformation.  There is a chronic infarct within the left perisylvian region extending  into the left superior temporal gyrus. This was evident on the prior  exam. However, there are new chronic infarcts within both cerebellar  hemispheres within both PICA distributions.  Moderate changes of chronic small vessel ischemic phenomena are again  appreciated.  Incidental note is made of multiple chronic microhemorrhages as  discussed in detail above. Some of these are seen in a distribution more  suggestive of amyloid angiopathy whereas others are in a distribution  suggestive of hypertensive chronic microhemorrhages. Please  correlate  with clinical data.     MRA head neck 7/6  There is normal flow-related enhancement within the internal carotid  arteries, middle cerebral arteries, and anterior cerebral arteries. The  vertebral arteries, basilar artery, and posterior cerebrals also have  normal flow related enhancement.  IMPRESSION:   The vertebral artery origins are not well evaluated although there may  be up to a moderate degree of stenosis involving the origin of the left  vertebral artery. There is ectasia or mild aneurysmal dilatation of the  ascending thoracic aorta and aortic arch. However, the precise aortic  measurement is difficult to assess on this exam. The ascending thoracic  aorta may measure up to 4.5 cm in greatest diameter. Otherwise, the MR  angiogram of the neck is unremarkable.  Workup to date: TTE 7/5  · Left ventricular wall thickness is consistent with mild concentric hypertrophy.  · Estimated left ventricular EF = 56% Left ventricular systolic function is normal.  · Left ventricular diastolic function was not assessed.  · Estimated right ventricular systolic pressure from tricuspid regurgitation is mildly elevated (35-45 mmHg).  · The ascending aorta appears dilated. I recommend CTA of the chest if clinically indicated.  · This was a limited study due to the patient's COVID status.        Diagnoses:  1. R MCA/VENTURA stroke  2. Covid infection  3. Atrial fibrillation not on anticoagulation     Impression: 89-year-old female history of PAF admitted after being found down. Issues include mild rhabdomyolysis, RUBEN, elevated troponin, A. fib rhythm. Clinically she has a right MCA stroke yesterday showing high cortical signs with extinction on double stimulation and right gaze preference.  She is weak of her left leg.      MRI affirmed right MCA/VENTURA embolic appearing strokes.  A. fib etiology strongly suspected  Her exam seems to have improved day by day.  She no longer has clinical findings.  She is stronger of her left  leg and is antigravity today.  We reviewed MRI together.  She had previously declined anticoagulation in the past.  She still seems reluctant though is not saying no.  She is more interested in watchman's device    Plan:  1.    Will see if candidate for watchman's  2.    Patient on aspirin and high intensity statin  3.    PT OT  4.   Follow-up in clinic for stroke follow-up appointment.  We will send message to office staff to schedule     I spent at least 30 minutes interviewing, examining, and counseling patient.  I independently reviewed documentation, laboratory and diagnostic findings, external documentation where applicable, and formulated treatment plan which was discussed with the patient.       Thank you for this consultation.  Discussed above plan with neuro attending, Dr. Charles who agrees with above plan.  We will sign off.  Neurology team is available for concerns or questions.

## 2022-07-07 NOTE — PROGRESS NOTES
Case Management Discharge Note      Final Note: Malachi Mercado [facility] ([facility] confirms bed today) via [facility] stretcher transport at 1:00 (ID # [id number]). Daughter (Antonia Angelo) updated at bedside, IMM provided. Pharmacy updated. Packet provided to ADRI Florez LCSW         Selected Continued Care - Admitted Since 7/4/2022     Destination Coordination complete.    Service Provider Selected Services Address Phone Fax Patient Preferred    [facility] [address] [place] [id number] [phone] --          Durable Medical Equipment    No services have been selected for the patient.              Dialysis/Infusion    No services have been selected for the patient.              Home Medical Care    No services have been selected for the patient.              Therapy    No services have been selected for the patient.              Community Resources    No services have been selected for the patient.              Community & DME    No services have been selected for the patient.                  Transportation Services  W/C Van: Count includes the [facility] (stretcher)    Final Discharge Disposition Code: 03 - skilled nursing facility (SNF)

## 2022-07-07 NOTE — THERAPY EVALUATION
Acute Care - Speech Language Pathology Initial Evaluation  [facility]     Patient Name: Ryann Hernadez  : 1931  MRN: [id number]  Today's Date: 2022               Admit Date: 2022     Visit Dx:    ICD-10-CM ICD-9-CM   1. Fall in home, initial encounter  W19.XXXA E888.9    Y92.009 E849.0   2. Non-traumatic rhabdomyolysis  M62.82 728.88   3. Left-sided weakness  R53.1 728.87   4. New onset atrial fibrillation (HCC)  I48.91 427.31   5. Elevated troponin  R77.8 790.6     Patient Active Problem List   Diagnosis   • Allergic rhinitis   • Gastroesophageal reflux disease   • Essential hypertension   • Ataxic gait   • Balance disorder   • Acute right otitis media   • Nonintractable episodic headache   • Facial swelling   • Dizziness   • Medicare annual wellness visit, initial   • Hospital discharge follow-up   • At high risk for falls   • Renal insufficiency   • Paroxysmal atrial fibrillation (HCC)   • Ureteral stone with hydronephrosis   • Hydronephrosis due to obstruction of ureter   • Aortic root dilatation ([facility])   • MCI (mild cognitive impairment)   • Age-related osteoporosis without current pathological fracture   • Corn of foot   • BPPV (benign paroxysmal positional vertigo), unspecified laterality   • TIA (transient ischemic attack)   • Ascending aortic aneurysm ([facility])   • Acute deep vein thrombosis (DVT) of calf muscle vein of left lower extremity ([facility])   • Medicare annual wellness visit, subsequent   • Paroxysmal atrial fibrillation (HCC)   • Fall at home   • RUBEN (acute kidney injury) ([facility])   • Rhabdomyolysis   • Elevated troponin   • COVID-19 virus detected   • Embolic stroke ([facility])     Past Medical History:   Diagnosis Date   • A-fib ([facility])    • Acute deep vein thrombosis (DVT) of calf muscle vein of left lower extremity ([facility]) 2020   • Allergic rhinitis    • Anemia    • Aneurysm, ascending aorta ([facility]) 2016    5 CM   • Anxiety    • Ataxic gait    • Atypical chest pain    • BPPV (benign  paroxysmal positional vertigo)    • Bradycardia    • Chronic cystitis    • Chronic headaches    • Chronic nonintractable headache 9/14/2016   • Chronic paroxysmal hemicrania    • Depression    • Esophagitis    • Gastritis    • GERD (gastroesophageal reflux disease)    • Hematuria    • High blood pressure    • History of cataract    • History of irritable bowel syndrome    • Hypertension    • IBS (irritable bowel syndrome)    • Influenza A    • Kidney lesion     LEFT KIDNEY CYSTIC   • Labyrinthitis 07/28/2014   • OA (osteoarthritis)    • Osteoporosis 9/13/2018   • Other abnormal clinical finding 09/21/2012    LEFT UTEROCELE   • Pain of thigh    • Personal history of gastric ulcer    • Renal disorder    • Renal insufficiency 8/7/2017   • Rhabdomyolysis 7/5/2022   • TIA (transient ischemic attack) 2/5/2020     Past Surgical History:   Procedure Laterality Date   • BREAST LUMPECTOMY     • BREAST SURGERY     • CATARACT EXTRACTION     • COLONOSCOPY N/A     DR. MARGARITA MENDOZA   • ELBOW ARTHROTOMY  07/01/1999    DR. RYAN RITCHIE   • EYE SURGERY     • FRACTURE SURGERY      HAND, BROKEN FINGERS  DR. MIKE CHRISTIANSON   • KNEE SURGERY  08/31/1999    DR. THADDEUS MENDES   • OOPHORECTOMY     • TUBAL ABDOMINAL LIGATION Bilateral 1962    DR. EDDA MANNING       SLP Recommendation and Plan  SLP Diagnosis: Severe cognitive-linguistic deficits (07/07/22 1000)        SLC Criteria for Skilled Therapy Interventions Met: yes (07/07/22 1000)  Anticipated Discharge Disposition (SLP): skilled nursing facility (07/07/22 1000)        Predicted Duration Therapy Intervention (Days): until discharge (07/07/22 1000)           Communication Strategy Suggestions: eliminate distractions when speaking with patient, avoid multi-step instructions (07/07/22 1000)        Plan of Care Reviewed With: patient (07/07/22 1152)  Outcome Evaluation: Speech/cognitive evaluation completed. The patient received a score of 4/30 on the SLUMS. Hearing and visual impairment in  combination with COVID PPE donned by clinician may have played a role in extent of severity. Pt is able to follow 1 step commands with 100% accuracy, though only 33% with 2 steps (impacted by memory). She is reliable with yes/no questions, though does seem to lack insight into her current condition. She is oriented to person and place, but not time or situation. Most significant deficits appear to be in memory and executive functioning. Recommend SLP services in hospital as well as at next level of care. (07/07/22 1152)      SLP EVALUATION (last 72 hours)     SLP SLC Evaluation     Row Name 07/07/22 1000                   Communication Assessment/Intervention    Document Type evaluation  -CP        Subjective Information no complaints  -CP        Patient Observations alert;cooperative  -CP        Patient Effort good  -CP        Symptoms Noted During/After Treatment none  -CP                  General Information    Patient Profile Reviewed yes  -CP        Pertinent History Of Current Problem Admitted s/p fall with COVID, rhabdo, L weakness. MRI positive for multiple infarcts in R MCA and VENTURA territories.  -CP        Precautions/Limitations, Vision vision impairment, left  -CP        Precautions/Limitations, Hearing hearing impairment, bilaterally  -CP        Patient Level of Education unknown  -CP        Prior Level of Function-Communication unknown  -CP        Plans/Goals Discussed with patient;agreed upon  -CP        Barriers to Rehab none identified  -CP        Patient's Goals for Discharge patient did not state  -CP        Standardized Assessment Used SLUMS  -CP                  Pain    Additional Documentation Pain Scale: FACES Pre/Post-Treatment (Group)  -CP                  Pain Scale: FACES Pre/Post-Treatment    Pain: FACES Scale, Pretreatment 0-->no hurt  -CP        Posttreatment Pain Rating 0-->no hurt  -CP                  Motor Speech Assessment/Intervention    Motor Speech Function WFL  -CP                   Cognitive Assessment Intervention- SLP    Cognitive Function (Cognition) severe impairment  -CP        Orientation Status (Cognition) time;situation;moderate impairment  -CP        Memory (Cognitive) immediate;moderate impairment;short-term;severe impairment  -CP        Attention (Cognitive) moderate impairment  -CP        Thought Organization (Cognitive) concrete divergent;moderate impairment  -CP        Problem Solving (Cognitive) simple;moderate impairment;severe impairment  -CP        Functional Math (Cognitive) simple;severe impairment  -CP        Executive Function (Cognition) moderate impairment;severe impairment  -CP        Right Hemisphere Function visuo-spatial;visuo-perceptual;left neglect;moderate impairment  -CP                  Standardized Tests    Cognitive/Memory Tests SLUMS: [facility] Mental Status Examination  -CP                  SLUMS: [facility] Status Examination    SLUMS Score 4  -CP        SLUMS Range 1-20: Dementia (High school education or higher)  -CP        SLUMS Comments 4/30 on SLUMS. Hearing and visual impairment in combination with COVID PPE donned by clinician may have played a role in extent of severity. Pt is able to follow 1 step commands with 100% accuracy, though only 33% with 2 steps (impacted by memory). She is reliable with yes/no questions, though does seem to lack insight into her current condition. She is oriented to person and place, but not time or situation. Most significant deficits appear to be in memory and executive functioning. Recommend SLP services in hospital as well as at next level of care.  -CP                  SLP Evaluation Clinical Impressions    SLP Diagnosis Severe cognitive-linguistic deficits  -CP        Rehab Potential/Prognosis good  -CP        SLC Criteria for Skilled Therapy Interventions Met yes  -CP        Functional Impact functional impact in ADLs;needs 24 hour supervision;Poor Judgement;decreased ability to  respond to situations safely  -CP                  Recommendations    Therapy Frequency (SLP SLC) PRN  -CP        Predicted Duration Therapy Intervention (Days) until discharge  -CP        Anticipated Discharge Disposition (SLP) skilled nursing facility  -CP        Communication Strategy Suggestions eliminate distractions when speaking with patient;avoid multi-step instructions  -CP                  Cognitive Linguistic Treatment Objectives    Attention Selection attention, SLP goal 1  -CP        Orientation Selection orientation, SLP goal 1  -CP        Memory Skills Selection memory skills, SLP goal 1  -CP        Organizational Skills Selection organizational skills, SLP goal 1  -CP        Problem Solving Selection problem solving, SLP goal 1  -CP        Executive Function Skills Selection executive function skills, SLP goal 1  -CP                  Attention Goal 1 (SLP)    Improve Attention by Goal 1 (SLP) complete sustained attention task;80%;with minimal cues (75-90%)  -CP        Time Frame (Attention Goal 1, SLP) by discharge  -CP                  Orientation Goal 1 (SLP)    Improve Orientation Through Goal 1 (SLP) demonstrating orientation to month;demonstrating orientation to year;demonstrating orientation to disease/impairment;80%;with minimal cues (75-90%)  -CP        Time Frame (Orientation Goal 1, SLP) by discharge  -CP                  Memory Skills Goal 1 (SLP)    Improve Memory Skills Through Goal 1 (SLP) recalling unrelated word lists with an imposed delay;listen to a paragraph and answer questions;recall details of the day;use memory strategies;80%;with minimal cues (75-90%)  -CP        Time Frame (Memory Skills Goal 1, SLP) by discharge  -CP                  Organizational Skills Goal 1 (SLP)    Improve Thought Organization Through Goal 1 (SLP) completing a divergent naming task;80%;with minimal cues (75-90%)  -CP        Time Frame (Thought Organization Skills Goal 1, SLP) by discharge  -CP                   Functional Problem Solving Skills Goal 1 (SLP)    Improve Problem Solving Through Goal 1 (SLP) answer questions about ADL problems;80%;with minimal cues (75-90%)  -CP        Time Frame (Problem Solving Goal 1, SLP) by discharge  -CP                  Executive Functional Skills Goal 1 (SLP)    Improve Executive Function Skills Goal 1 (SLP) demonstrate awareness of deficit;identify anticipated needs;80%;with minimal cues (75-90%)  -CP        Time Frame (Executive Function Skills Goal 1, SLP) by discharge  -CP              User Key  (r) = Recorded By, (t) = Taken By, (c) = Cosigned By    Initials Name Effective Dates    CP Bernie Mahmood, MS CCC-SLP 06/16/21 -                    EDUCATION  The patient has been educated in the following areas:     Cognitive Impairment.           SLP GOALS     Row Name 07/07/22 1000             Attention Goal 1 (SLP)    Improve Attention by Goal 1 (SLP) complete sustained attention task;80%;with minimal cues (75-90%)  -CP      Time Frame (Attention Goal 1, SLP) by discharge  -CP              Orientation Goal 1 (SLP)    Improve Orientation Through Goal 1 (SLP) demonstrating orientation to month;demonstrating orientation to year;demonstrating orientation to disease/impairment;80%;with minimal cues (75-90%)  -CP      Time Frame (Orientation Goal 1, SLP) by discharge  -CP              Memory Skills Goal 1 (SLP)    Improve Memory Skills Through Goal 1 (SLP) recalling unrelated word lists with an imposed delay;listen to a paragraph and answer questions;recall details of the day;use memory strategies;80%;with minimal cues (75-90%)  -CP      Time Frame (Memory Skills Goal 1, SLP) by discharge  -CP              Organizational Skills Goal 1 (SLP)    Improve Thought Organization Through Goal 1 (SLP) completing a divergent naming task;80%;with minimal cues (75-90%)  -CP      Time Frame (Thought Organization Skills Goal 1, SLP) by discharge  -CP              Functional Problem Solving  Skills Goal 1 (SLP)    Improve Problem Solving Through Goal 1 (SLP) answer questions about ADL problems;80%;with minimal cues (75-90%)  -CP      Time Frame (Problem Solving Goal 1, SLP) by discharge  -CP              Executive Functional Skills Goal 1 (SLP)    Improve Executive Function Skills Goal 1 (SLP) demonstrate awareness of deficit;identify anticipated needs;80%;with minimal cues (75-90%)  -CP      Time Frame (Executive Function Skills Goal 1, SLP) by discharge  -CP            User Key  (r) = Recorded By, (t) = Taken By, (c) = Cosigned By    Initials Name Provider Type    CP Bernie Mahmood MS CCC-CAIN Speech and Language Pathologist                        Time Calculation:      Time Calculation- SLP     Row Name 07/07/22 1154             Time Calculation- SLP    SLP Start Time 0830  -CP      SLP Received On 07/07/22  -CP              Untimed Charges    SLP Eval/Re-eval  ST Eval Speech and Production w/ Language - [id number]  -CP      [id number] Eval Speech and Production w/ Language Minutes 75  -CP              Total Minutes    Untimed Charges Total Minutes 75  -CP       Total Minutes 75  -CP            User Key  (r) = Recorded By, (t) = Taken By, (c) = Cosigned By    Initials Name Provider Type    Bernie Bermudez MS CCC-SLP Speech and Language Pathologist                Therapy Charges for Today     Code Description Service Date Service Provider Modifiers Qty    [id number] HC ST EVAL SPEECH AND PROD W LANG  5 7/7/2022 Bernie Mahmood MS CCC-SLP GN 1          Patient was not wearing a face mask during this therapy encounter. Therapist used appropriate personal protective equipment including gown, eye protection, mask and gloves.  Mask used was N95/duckbill. Appropriate PPE was worn during the entire therapy session. Hand hygiene was completed before and after therapy session. Patient is in enhanced droplet precautions.              MS MARY Diallo  7/7/2022

## 2022-07-07 NOTE — PLAN OF CARE
Goal Outcome Evaluation:  Plan of Care Reviewed With: patient             Problem: Adult Inpatient Plan of Care  Goal: Plan of Care Review  Flowsheets (Taken 7/7/2022 1152)  Plan of Care Reviewed With: patient  Outcome Evaluation: Speech/cognitive evaluation completed. The patient received a score of 4/30 on the SLUMS. Hearing and visual impairment in combination with COVID PPE donned by clinician may have played a role in extent of severity. Pt is able to follow 1 step commands with 100% accuracy, though only 33% with 2 steps (impacted by memory). She is reliable with yes/no questions, though does seem to lack insight into her current condition. She is oriented to person and place, but not time or situation. Most significant deficits appear to be in memory and executive functioning. Recommend SLP services in hospital as well as at next level of care.

## 2022-07-07 NOTE — PLAN OF CARE
Problem: Adult Inpatient Plan of Care  Goal: Plan of Care Review  Outcome: Ongoing, Progressing   Goal Outcome Evaluation:   Vss.up with PT, had bm per PT.Bladder scan 55 ml, pt did void in bsc per NA.Cardiology to see r/t Watchman procedure.No c/o pain or n/v.

## 2022-07-07 NOTE — PLAN OF CARE
Goal Outcome Evaluation:  Plan of Care Reviewed With: patient        Progress: improving  Outcome Evaluation: Pt in bed at beg of PT session. Pt sat up to EOB req CGA. Pt stood then transferred to BSC req CGA/min A and use of fww. Pt able to perform self cleanup after toileting w/ pt req CGA and 1 hand on fww for dyn bal. Pt amb 35' w/ step to pattern req cga/min A and use of fww. Pt very slow w/ step to pattern noted as well as decr step length and wt shift for LLE. Pt req more assist w/ distance and amb very fwd flexed at hips unable to correct. Pt performed BLE ther ex at eob before returning to bed w/ SV and extra time. PT will prog as pt ondina.    Patient was not wearing a face mask during this therapy encounter. Therapist used appropriate personal protective equipment including gown, eye protection, mask and gloves.  Mask used was N95/duckbill. Appropriate PPE was worn during the entire therapy session. Hand hygiene was completed before and after therapy session. Patient is in enhanced droplet precautions.

## 2022-07-07 NOTE — THERAPY TREATMENT NOTE
Patient Name: Ryann Hernadez  : 1931    MRN: [id number]                              Today's Date: 2022       Admit Date: 2022    Visit Dx:     ICD-10-CM ICD-9-CM   1. Fall in home, initial encounter  W19.XXXA E888.9    Y92.009 E849.0   2. Non-traumatic rhabdomyolysis  M62.82 728.88   3. Left-sided weakness  R53.1 728.87   4. New onset atrial fibrillation (HCC)  I48.91 427.31   5. Elevated troponin  R77.8 790.6     Patient Active Problem List   Diagnosis   • Allergic rhinitis   • Gastroesophageal reflux disease   • Essential hypertension   • Ataxic gait   • Balance disorder   • Acute right otitis media   • Nonintractable episodic headache   • Facial swelling   • Dizziness   • Medicare annual wellness visit, initial   • Hospital discharge follow-up   • At high risk for falls   • Renal insufficiency   • Paroxysmal atrial fibrillation ([facility])   • Ureteral stone with hydronephrosis   • Hydronephrosis due to obstruction of ureter   • Aortic root dilatation ([facility])   • MCI (mild cognitive impairment)   • Age-related osteoporosis without current pathological fracture   • Corn of foot   • BPPV (benign paroxysmal positional vertigo), unspecified laterality   • TIA (transient ischemic attack)   • Ascending aortic aneurysm ([facility])   • Acute deep vein thrombosis (DVT) of calf muscle vein of left lower extremity ([facility])   • Medicare annual wellness visit, subsequent   • Paroxysmal atrial fibrillation (HCC)   • Fall at home   • RUBEN (acute kidney injury) ([facility])   • Rhabdomyolysis   • Elevated troponin   • COVID-19 virus detected   • Embolic stroke ([facility])     Past Medical History:   Diagnosis Date   • A-fib ([facility])    • Acute deep vein thrombosis (DVT) of calf muscle vein of left lower extremity ([facility]) 2020   • Allergic rhinitis    • Anemia    • Aneurysm, ascending aorta ([facility]) 2016    5 CM   • Anxiety    • Ataxic gait    • Atypical chest pain    • BPPV (benign paroxysmal positional vertigo)    • Bradycardia    •  Chronic cystitis    • Chronic headaches    • Chronic nonintractable headache 9/14/2016   • Chronic paroxysmal hemicrania    • Depression    • Esophagitis    • Gastritis    • GERD (gastroesophageal reflux disease)    • Hematuria    • High blood pressure    • History of cataract    • History of irritable bowel syndrome    • Hypertension    • IBS (irritable bowel syndrome)    • Influenza A    • Kidney lesion     LEFT KIDNEY CYSTIC   • Labyrinthitis 07/28/2014   • OA (osteoarthritis)    • Osteoporosis 9/13/2018   • Other abnormal clinical finding 09/21/2012    LEFT UTEROCELE   • Pain of thigh    • Personal history of gastric ulcer    • Renal disorder    • Renal insufficiency 8/7/2017   • Rhabdomyolysis 7/5/2022   • TIA (transient ischemic attack) 2/5/2020     Past Surgical History:   Procedure Laterality Date   • BREAST LUMPECTOMY     • BREAST SURGERY     • CATARACT EXTRACTION     • COLONOSCOPY N/A     DR. MARGARITA MENDOZA   • ELBOW ARTHROTOMY  07/01/1999    DR. RYAN RITCHIE   • EYE SURGERY     • FRACTURE SURGERY      HAND, BROKEN FINGERS  DR. MIKE CHRISTIANSON   • KNEE SURGERY  08/31/1999    DR. THADDEUS MENDES   • OOPHORECTOMY     • TUBAL ABDOMINAL LIGATION Bilateral 1962    DR. EDDA MANNING      General Information     Row Name 07/07/22 1533          Physical Therapy Time and Intention    Document Type therapy note (daily note)  -PH     Mode of Treatment physical therapy  -PH     Row Name 07/07/22 1533          General Information    Existing Precautions/Restrictions fall  -PH     Row Name 07/07/22 1533          Safety Issues, Functional Mobility    Impairments Affecting Function (Mobility) balance;endurance/activity tolerance;strength;postural/trunk control;range of motion (ROM)  -PH     Comment, Safety Issues/Impairments (Mobility) gt belt and non skid socks worn for pt safety  -PH           User Key  (r) = Recorded By, (t) = Taken By, (c) = Cosigned By    Initials Name Provider Type    PH Huckenkameronbler, Gris, PTA Physical  Therapist Assistant               Mobility     Row Name 07/07/22 1533          Bed Mobility    Bed Mobility supine-sit  -PH     Supine-Sit [place] (Bed Mobility) contact guard;verbal cues;nonverbal cues (demo/gesture)  -PH     Sit-Supine [place] (Bed Mobility) supervision;verbal cues  -PH     Assistive Device (Bed Mobility) bed rails;head of bed elevated  -PH     Comment, (Bed Mobility) extra time for pt to assist her BLE to bed  -PH     Row Name 07/07/22 1533          Transfers    Comment, (Transfers) pt transferred from bed to BSC approx 1' req min A/CGA and use of fww. Pt very fwd flexed at hips and unable to correct w/ cues.  -PH     Row Name 07/07/22 1533          Sit-Stand Transfer    Sit-Stand [place] (Transfers) verbal cues;minimum assist (75% patient effort)  -PH     Assistive Device (Sit-Stand Transfers) walker, front-wheeled  -PH     Comment, (Sit-Stand Transfer) STS x 2  -PH     Row Name 07/07/22 1533          Gait/Stairs (Locomotion)    [place] Level (Gait) minimum assist (75% patient effort);contact guard;verbal cues;nonverbal cues (demo/gesture)  -PH     Assistive Device (Gait) walker, front-wheeled  -PH     Distance in Feet (Gait) 30'  -PH     Deviations/Abnormal Patterns (Gait) ena decreased;gait speed decreased;festinating/shuffling;stride length decreased  -PH     Bilateral Gait Deviations forward flexed posture;heel strike decreased  -PH     Left Sided Gait Deviations weight shift ability decreased  -PH     [place] Level (Stairs) unable to assess  -PH     Comment, (Gait/Stairs) cues for upright posture w/ step to pattern noted; deficits noted in advancing LLE and wt shifting on L side; pt noted w/ incr unsteadiness w/ distance and extremely decreased step length.  -PH           User Key  (r) = Recorded By, (t) = Taken By, (c) = Cosigned By    Initials Name Provider Type    PH Gris Coker PTA Physical Therapist Assistant               Obj/Interventions      Row Name 07/07/22 1538          Motor Skills    Therapeutic Exercise other (see comments)  BAP, LAQ, seated march; x 10 reps all; vc to incr ROM for LLE  -PH     Row Name 07/07/22 1538          Balance    Balance Assessment sitting static balance;standing static balance;standing dynamic balance  -PH     Static Sitting Balance standby assist  -PH     Static Standing Balance contact guard;verbal cues  -PH     Dynamic Standing Balance verbal cues  -PH     Position/Device Used, Standing Balance walker, front-wheeled  -PH     Comment, Balance pt able to perform clean up herself after toileting req CGA and w/ 1 hand on fww .  -PH           User Key  (r) = Recorded By, (t) = Taken By, (c) = Cosigned By    Initials Name Provider Type    PH Gris Coker PTA Physical Therapist Assistant               Goals/Plan    No documentation.                Clinical Impression     Row Name 07/07/22 1539          Pain    Pretreatment Pain Rating 0/10 - no pain  -PH     Posttreatment Pain Rating 0/10 - no pain  -PH     Additional Documentation Pain Scale: Numbers Pre/Post-Treatment (Group)  -PH     Row Name 07/07/22 1539          Plan of Care Review    Plan of Care Reviewed With patient  -PH     Progress improving  -PH     Outcome Evaluation Pt in bed at beg of PT session. Pt sat up to EOB req CGA. Pt stood then transferred to C req CGA/min A and use of fww. Pt able to perform self cleanup after toileting w/ pt req CGA and 1 hand on fww for dyn bal. Pt amb 35' w/ step to pattern req cga/min A and use of fww. Pt very slow w/ step to pattern noted as well as decr step length and wt shift for LLE. Pt req more assist w/ distance and amb very fwd flexed at hips unable to correct. Pt performed BLE ther ex at eob before returning to bed w/ SV and extra time. PT will prog as pt ondina.  -PH     Row Name 07/07/22 1539          Positioning and Restraints    Pre-Treatment Position in bed  -PH     Post Treatment Position bed  -PH     In  Bed fowlers;call light within reach;encouraged to call for assist;exit alarm on  -PH           User Key  (r) = Recorded By, (t) = Taken By, (c) = Cosigned By    Initials Name Provider Type     Gris Coker PTA Physical Therapist Assistant               Outcome Measures     Row Name 07/07/22 1542          How much help from another person do you currently need...    Turning from your back to your side while in flat bed without using bedrails? 3  -PH     Moving from lying on back to sitting on the side of a flat bed without bedrails? 3  -PH     Moving to and from a bed to a chair (including a wheelchair)? 3  -PH     Standing up from a chair using your arms (e.g., wheelchair, bedside chair)? 3  -PH     Climbing 3-5 steps with a railing? 2  -PH     To walk in hospital room? 3  -PH     AM-PAC 6 Clicks Score (PT) 17  -PH     Highest level of mobility 5 --> Static standing  -PH     Row Name 07/07/22 1542          Functional Assessment    Outcome Measure Options AM-PAC 6 Clicks Basic Mobility (PT)  -PH           User Key  (r) = Recorded By, (t) = Taken By, (c) = Cosigned By    Initials Name Provider Type     Gris Coker PTA Physical Therapist Assistant                             Physical Therapy Education                 Title: PT OT SLP Therapies (In Progress)     Topic: Physical Therapy (Done)     Point: Mobility training (Done)     Learning Progress Summary           Patient Acceptance, E,D, VU,NR by  at 7/7/2022 1543    Acceptance, E, NR by DB at 7/5/2022 1319                   Point: Home exercise program (Done)     Learning Progress Summary           Patient Acceptance, E,D, VU,NR by  at 7/7/2022 1543    Acceptance, E, NR by DB at 7/5/2022 1319                   Point: Body mechanics (Done)     Learning Progress Summary           Patient Acceptance, E,D, VU,NR by  at 7/7/2022 1543    Acceptance, E, NR by DB at 7/5/2022 1319                   Point: Precautions (Done)     Learning  Progress Summary           Patient Acceptance, E,D, VU,NR by  at 7/7/2022 1543    Acceptance, E, NR by  at 7/5/2022 1319                               User Key     Initials Effective Dates Name Provider Type Discipline    DB 06/16/21 -  Elsie Moralez, PT Physical Therapist PT     06/16/21 -  Gris Coker PTA Physical Therapist Assistant PT              PT Recommendation and Plan     Plan of Care Reviewed With: patient  Progress: improving  Outcome Evaluation: Pt in bed at beg of PT session. Pt sat up to EOB req CGA. Pt stood then transferred to BSC req CGA/min A and use of fww. Pt able to perform self cleanup after toileting w/ pt req CGA and 1 hand on fww for dyn bal. Pt amb 35' w/ step to pattern req cga/min A and use of fww. Pt very slow w/ step to pattern noted as well as decr step length and wt shift for LLE. Pt req more assist w/ distance and amb very fwd flexed at hips unable to correct. Pt performed BLE ther ex at eob before returning to bed w/ SV and extra time. PT will prog as pt ondina.     Time Calculation:    PT Charges     Row Name 07/07/22 1543             Time Calculation    Start Time 1426  -PH      Stop Time 1458  -PH      Time Calculation (min) 32 min  -PH      PT Received On 07/07/22  -PH      PT - Next Appointment 07/08/22  -PH              Timed Charges    90920 - PT Therapeutic Exercise Minutes 7  -PH      49617 - PT Therapeutic Activity Minutes 25  -PH              Total Minutes    Timed Charges Total Minutes 32  -PH       Total Minutes 32  -PH            User Key  (r) = Recorded By, (t) = Taken By, (c) = Cosigned By    Initials Name Provider Type    PH Gris Coker PTA Physical Therapist Assistant              Therapy Charges for Today     Code Description Service Date Service Provider Modifiers Qty    [id number]  PT THERAPEUTIC ACT EA 15 MIN 7/7/2022 Gris Coker PTA GP 2          PT G-Codes  Outcome Measure Options: AM-PAC 6 Clicks Basic Mobility  (PT)  AM-PAC 6 Clicks Score (PT): 17  AM-PAC 6 Clicks Score (OT): 15  Modified [place] Scale: 4 - Moderately severe disability.  Unable to walk without assistance, and unable to attend to own bodily needs without assistance.    Gris Coker, PTA  7/7/2022

## 2022-07-07 NOTE — PROGRESS NOTES
[facility] Acute Rehab Stroke Screening Note     Following progress.  Arrangements have been made for subacute rehab at discharge.  Will go ahead and sign off at this time.      Thank you,   Vanesa Wood RN  Rehab Admission Nurse   [phone]

## 2022-07-07 NOTE — PROGRESS NOTES
Name: Ryann Hernadez ADMIT: 2022   : 1931  PCP: Vanesa Connor MD    MRN: [id number] LOS: 3 days   AGE/SEX: 89 y.o. female  ROOM: [facility]     Subjective   Subjective   Patient doing about the same.  Seems to have limited understanding of what is going on with her medically    Review of Systems     Objective   Objective   Vital Signs  Temp:  [98.2 °F (36.8 °C)-99.1 °F (37.3 °C)] 98.3 °F (36.8 °C)  Heart Rate:  [48-88] 60  Resp:  [16-18] 18  BP: ()/(62-89) 99/62  SpO2:  [88 %-98 %] 88 %  on   ;   Device (Oxygen Therapy): room air  Body mass index is 21.1 kg/m².  Physical Exam  Vitals and nursing note reviewed.   Constitutional:       General: She is not in acute distress.     Comments: Frail/chronically ill-appearing   HENT:      Head: Normocephalic and atraumatic.      Mouth/Throat:      Mouth: Mucous membranes are moist.   Eyes:      General: No scleral icterus.  Neck:      Vascular: No JVD.   Cardiovascular:      Rate and Rhythm: Normal rate and regular rhythm.      Pulses: Normal pulses.      Heart sounds: Normal heart sounds. No murmur heard.  Pulmonary:      Effort: Pulmonary effort is normal. No respiratory distress.      Comments: Decreased breath sounds  Abdominal:      General: Bowel sounds are normal. There is no distension.      Palpations: Abdomen is soft.      Tenderness: There is no abdominal tenderness.   Musculoskeletal:         General: No swelling or tenderness.      Cervical back: Neck supple.   Skin:     General: Skin is warm and dry.      Coloration: Skin is not jaundiced.      Findings: No rash.   Neurological:      Mental Status: She is alert.      Comments: Globally weak   Psychiatric:         Mood and Affect: Mood normal.         Behavior: Behavior normal.         Results Review     I reviewed the patient's new clinical results.  Results from last 7 days   Lab Units 22  0607 22  1405   WBC 10*3/mm3 9.57 8.98   HEMOGLOBIN g/dL 12.3 14.5   PLATELETS  10*3/mm3 177 236     Results from last 7 days   Lab Units 07/06/22  1249 07/05/22  0607 07/04/22  1405   SODIUM mmol/L 142 142 140   POTASSIUM mmol/L 3.8 4.1 4.4   CHLORIDE mmol/L 112* 108* 102   CO2 mmol/L 21.0* 23.0 24.7   BUN mg/dL 25* 28* 29*   CREATININE mg/dL 1.19* 1.51* 1.98*   GLUCOSE mg/dL 120* 111* 159*   EGFR mL/min/1.73 43.3* 32.5* 23.5*     Results from last 7 days   Lab Units 07/05/22  0607 07/04/22  1405   ALBUMIN g/dL 3.30* 4.30   BILIRUBIN mg/dL 1.9* 1.6*   ALK PHOS U/L 56 72   AST (SGOT) U/L 42* 43*   ALT (SGPT) U/L 20 28     Results from last 7 days   Lab Units 07/06/22  1249 07/05/22  0607 07/04/22  1405   CALCIUM mg/dL 8.7 9.1 10.3*   ALBUMIN g/dL  --  3.30* 4.30       Hemoglobin A1C   Date/Time Value Ref Range Status   07/05/2022 0607 6.20 (H) 4.80 - 5.60 % Final     Glucose   Date/Time Value Ref Range Status   07/07/2022 1629 141 (H) 70 - 130 mg/dL Final     Comment:     Meter: [id number] : [id number] Cro[facility]   07/07/2022 1116 94 70 - 130 mg/dL Final     Comment:     Meter: [id number] : [id number] Crockett Marjan NA   07/07/2022 0606 110 70 - 130 mg/dL Final     Comment:     Meter: [id number] : [id number] Andrew Kaye NA   07/06/2022 2019 91 70 - 130 mg/dL Final     Comment:     Meter: [id number] : [id number] Andrew Kaye NA   07/06/2022 1632 84 70 - 130 mg/dL Final     Comment:     Meter: [id number] : [id number] Burton Styles    07/06/2022 1154 94 70 - 130 mg/dL Final     Comment:     Meter: [id number] : [id number] Burton ADDISON   07/06/2022 0544 131 (H) 70 - 130 mg/dL Final     Comment:     Meter: [id number] : [id number] Tad ADDISON       MRI Angiogram Head Without Contrast    Result Date: 7/7/2022   Multiple foci of acute infarction are identified within the medial and lateral aspect of the right frontal lobe within the right VENTURA and right MCA distribution as well as the right caudate, right operculum, and right lentiform nucleus within the right  MCA distribution. No significant mass effect is seen as a result of these multiple small infarcts. There is no evidence for hemorrhagic transformation.  There is a chronic infarct within the left perisylvian region extending into the left superior temporal gyrus. This was evident on the prior exam. However, there are new chronic infarcts within both cerebellar hemispheres within both PICA distributions.  Moderate changes of chronic small vessel ischemic phenomena are again appreciated.  Incidental note is made of multiple chronic microhemorrhages as discussed in detail above. Some of these are seen in a distribution more suggestive of amyloid angiopathy whereas others are in a distribution suggestive of hypertensive chronic microhemorrhages. Please correlate with clinical data.   TECHNIQUE: MRA of the head was obtained with 3D time-of-flight imaging technique. Maximum intensity projection reconstructed images were obtained.  FINDINGS:  There is normal flow-related enhancement within the internal carotid arteries, middle cerebral arteries, and anterior cerebral arteries. The vertebral arteries, basilar artery, and posterior cerebrals also have normal flow related enhancement.  IMPRESSION:  Unremarkable MRA of the head.   TECHNIQUE: MRA of the neck was obtained with a combination of 3D time-of-flight imaging technique and a contrast enhanced MRA study. Maximum intensity projection reconstructed images were obtained.  FINDINGS:  There is a classic configuration of the aortic arch. There is ectasia or mild aneurysmal dilatation of the ascending thoracic aorta and aortic arch although this is difficult to precisely measure. The ascending thoracic aorta may measure up to 4.5 cm in greatest diameter. The great vessel origins are not optimally evaluated, no significant stenosis is evident. The vertebral artery origins are again not optimally evaluated. There may be up to a moderate degree of stenosis involving the origin of  the left vertebral artery. Otherwise, the vertebral arteries are unremarkable in appearance. No significant NASCET stenosis is identified within either internal carotid.  IMPRESSION:  The vertebral artery origins are not well evaluated although there may be up to a moderate degree of stenosis involving the origin of the left vertebral artery. There is ectasia or mild aneurysmal dilatation of the ascending thoracic aorta and aortic arch. However, the precise aortic measurement is difficult to assess on this exam. The ascending thoracic aorta may measure up to 4.5 cm in greatest diameter. Otherwise, the MR angiogram of the neck is unremarkable.  These findings were discussed with Dr. Charles on 07/06/2022 at approximately 3:23 PM.  This report was finalized on 7/7/2022 7:12 AM by Dr. Martínez Lay M.D.      MRI Angiogram Neck With & Without Contrast    Result Date: 7/7/2022   Multiple foci of acute infarction are identified within the medial and lateral aspect of the right frontal lobe within the right VENTURA and right MCA distribution as well as the right caudate, right operculum, and right lentiform nucleus within the right MCA distribution. No significant mass effect is seen as a result of these multiple small infarcts. There is no evidence for hemorrhagic transformation.  There is a chronic infarct within the left perisylvian region extending into the left superior temporal gyrus. This was evident on the prior exam. However, there are new chronic infarcts within both cerebellar hemispheres within both PICA distributions.  Moderate changes of chronic small vessel ischemic phenomena are again appreciated.  Incidental note is made of multiple chronic microhemorrhages as discussed in detail above. Some of these are seen in a distribution more suggestive of amyloid angiopathy whereas others are in a distribution suggestive of hypertensive chronic microhemorrhages. Please correlate with clinical data.   TECHNIQUE: MRA of the  head was obtained with 3D time-of-flight imaging technique. Maximum intensity projection reconstructed images were obtained.  FINDINGS:  There is normal flow-related enhancement within the internal carotid arteries, middle cerebral arteries, and anterior cerebral arteries. The vertebral arteries, basilar artery, and posterior cerebrals also have normal flow related enhancement.  IMPRESSION:  Unremarkable MRA of the head.   TECHNIQUE: MRA of the neck was obtained with a combination of 3D time-of-flight imaging technique and a contrast enhanced MRA study. Maximum intensity projection reconstructed images were obtained.  FINDINGS:  There is a classic configuration of the aortic arch. There is ectasia or mild aneurysmal dilatation of the ascending thoracic aorta and aortic arch although this is difficult to precisely measure. The ascending thoracic aorta may measure up to 4.5 cm in greatest diameter. The great vessel origins are not optimally evaluated, no significant stenosis is evident. The vertebral artery origins are again not optimally evaluated. There may be up to a moderate degree of stenosis involving the origin of the left vertebral artery. Otherwise, the vertebral arteries are unremarkable in appearance. No significant NASCET stenosis is identified within either internal carotid.  IMPRESSION:  The vertebral artery origins are not well evaluated although there may be up to a moderate degree of stenosis involving the origin of the left vertebral artery. There is ectasia or mild aneurysmal dilatation of the ascending thoracic aorta and aortic arch. However, the precise aortic measurement is difficult to assess on this exam. The ascending thoracic aorta may measure up to 4.5 cm in greatest diameter. Otherwise, the MR angiogram of the neck is unremarkable.  These findings were discussed with Dr. Charles on 07/06/2022 at approximately 3:23 PM.  This report was finalized on 7/7/2022 7:12 AM by Dr. Martínez Lay M.D.       MRI Brain With & Without Contrast    Result Date: 7/7/2022   Multiple foci of acute infarction are identified within the medial and lateral aspect of the right frontal lobe within the right VENTURA and right MCA distribution as well as the right caudate, right operculum, and right lentiform nucleus within the right MCA distribution. No significant mass effect is seen as a result of these multiple small infarcts. There is no evidence for hemorrhagic transformation.  There is a chronic infarct within the left perisylvian region extending into the left superior temporal gyrus. This was evident on the prior exam. However, there are new chronic infarcts within both cerebellar hemispheres within both PICA distributions.  Moderate changes of chronic small vessel ischemic phenomena are again appreciated.  Incidental note is made of multiple chronic microhemorrhages as discussed in detail above. Some of these are seen in a distribution more suggestive of amyloid angiopathy whereas others are in a distribution suggestive of hypertensive chronic microhemorrhages. Please correlate with clinical data.   TECHNIQUE: MRA of the head was obtained with 3D time-of-flight imaging technique. Maximum intensity projection reconstructed images were obtained.  FINDINGS:  There is normal flow-related enhancement within the internal carotid arteries, middle cerebral arteries, and anterior cerebral arteries. The vertebral arteries, basilar artery, and posterior cerebrals also have normal flow related enhancement.  IMPRESSION:  Unremarkable MRA of the head.   TECHNIQUE: MRA of the neck was obtained with a combination of 3D time-of-flight imaging technique and a contrast enhanced MRA study. Maximum intensity projection reconstructed images were obtained.  FINDINGS:  There is a classic configuration of the aortic arch. There is ectasia or mild aneurysmal dilatation of the ascending thoracic aorta and aortic arch although this is difficult to  precisely measure. The ascending thoracic aorta may measure up to 4.5 cm in greatest diameter. The great vessel origins are not optimally evaluated, no significant stenosis is evident. The vertebral artery origins are again not optimally evaluated. There may be up to a moderate degree of stenosis involving the origin of the left vertebral artery. Otherwise, the vertebral arteries are unremarkable in appearance. No significant NASCET stenosis is identified within either internal carotid.  IMPRESSION:  The vertebral artery origins are not well evaluated although there may be up to a moderate degree of stenosis involving the origin of the left vertebral artery. There is ectasia or mild aneurysmal dilatation of the ascending thoracic aorta and aortic arch. However, the precise aortic measurement is difficult to assess on this exam. The ascending thoracic aorta may measure up to 4.5 cm in greatest diameter. Otherwise, the MR angiogram of the neck is unremarkable.  These findings were discussed with Dr. Charles on 07/06/2022 at approximately 3:23 PM.  This report was finalized on 7/7/2022 7:12 AM by Dr. Martínez Lay M.D.      Scheduled Medications  aspirin, 325 mg, Oral, Daily   Or  aspirin, 300 mg, Rectal, Daily  atenolol, 25 mg, Oral, Daily  atorvastatin, 80 mg, Oral, Nightly  cholecalciferol, 5,000 Units, Oral, Daily  enoxaparin, 30 mg, Subcutaneous, Q24H  gabapentin, 300 mg, Oral, Nightly  multivitamin with minerals, 1 tablet, Oral, Daily  terazosin, 2 mg, Oral, Nightly    Infusions   Diet  Diet Regular; Cardiac       Assessment/Plan     Active Hospital Problems    Diagnosis  POA   • Embolic stroke (HCC) [I63.9]  Unknown   • RUBEN (acute kidney injury) (HCC) [N17.9]  Yes   • Rhabdomyolysis [M62.82]  Yes   • Elevated troponin [R77.8]  Unknown   • COVID-19 virus detected [U07.1]  Yes   • Fall at home [W19.XXXA, Y92.009]  Not Applicable   • Paroxysmal atrial fibrillation (HCC) [I48.0]  Yes      Resolved Hospital Problems   No  resolved problems to display.       89 y.o. female with history of PAF admitted after being found down, findings of mild rhabdo, RUBEN and mildly elevated troponin along with COVID-positive. Now MRI showing acute CVA, likely cardioembolic related to atrial fibrillation    For CVA/PAF, already started on ASA and statin. Ideally would be on full anticoagulation but obviously a fall risk and she has refused this for years from review of her chart.  I talked with her about this again today with her daughter at bedside and both were noncommittal about AC.  Not sure she is a candidate for watchman device at her age and I am not sure if anyone here even does this procedure now.  Cardiology has been reconsulted to see the patient for further recommendations and discussion.  Discussed with Ms. Gonzales from neurology as well    COVID-19- symptom onset unknown.  Not hypoxic currently or short of breath  - Without knowing onset of symptoms, no role for remdesivir even though she does have multiple risk factors obviously  - Hold on Decadron unless she becomes hypoxic    RUBEN/rhabdo-Improving. D/c IVFs     Urinary retention-continue straight cath as needed. D/w RN  -Hytrin added to try to help bladder emptying though BP is pretty low so I do not think she is going to tolerate it.  Will DC    Elevated troponin probably not significant given renal failure and muscle injury.  - Echocardiogram reviewed, relatively unremarkable.  Will not pursue CTA given RUBEN and lack of symptoms regarding aorta. Would not be a candidate for surgery for this anyway  - Started on aspirin      · Lovenox for DVT prophylaxis.    · Dispo: SNF arranged, ready for discharge when cleared by cards and neuro      Julio Calzada MD  [facility]  07/07/22  16:36 EDT

## 2022-07-07 NOTE — PROGRESS NOTES
Continued Stay Note  [facility]     Patient Name: Ryann Hernadez  MRN: [id number]  Today's Date: 7/7/2022    Admit Date: 7/4/2022     Discharge Plan     Row Name 07/07/22 1422       Plan    Plan [facility]    Patient/Family in Agreement with Plan yes    Plan Comments Per Mal[facility] Duque can accept pt for skilled rehab with a bed available today or tomorrow. Daughter (Antonia Angelo) updated at bedside, IMM provided. Pharmacy updated. Packet in CCP office. Karolyn Florez LCSW               Discharge Codes    No documentation.               Expected Discharge Date and Time     Expected Discharge Date Expected Discharge Time    Jul 8, 2022             Esme Florez LCSW

## 2022-07-07 NOTE — PLAN OF CARE
Problem: Adult Inpatient Plan of Care  Goal: Plan of Care Review  Outcome: Ongoing, Progressing  Flowsheets (Taken 7/7/2022 0340)  Outcome Evaluation: VSS. Patient on enhance isolation-maintained. No acute events during shift. Started on Hytrin medication for hypertension and urinary retention. Bladder scanned at 2200, scanned 628 ml, straight cath per protocol, extracted 650 ml of urine. Will continue to monitor. To be endorsed accordingly.

## 2022-07-08 ENCOUNTER — TELEPHONE (OUTPATIENT)
Dept: CARDIOLOGY | Facility: CLINIC | Age: 87
End: 2022-07-08

## 2022-07-08 VITALS
HEART RATE: 82 BPM | TEMPERATURE: 98.4 F | RESPIRATION RATE: 16 BRPM | HEIGHT: 66 IN | BODY MASS INDEX: 21.44 KG/M2 | SYSTOLIC BLOOD PRESSURE: 126 MMHG | OXYGEN SATURATION: 98 % | WEIGHT: 133.4 LBS | DIASTOLIC BLOOD PRESSURE: 78 MMHG
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
## 2022-07-08 PROBLEM — N17.9 AKI (ACUTE KIDNEY INJURY): Status: RESOLVED | Noted: 2022-07-05 | Resolved: 2022-07-08

## 2022-07-08 PROBLEM — M62.82 RHABDOMYOLYSIS: Status: RESOLVED | Noted: 2022-07-05 | Resolved: 2022-07-08

## 2022-07-08 LAB
ANION GAP SERPL CALCULATED.3IONS-SCNC: 7.2 MMOL/L (ref 5–15)
BUN SERPL-MCNC: 21 MG/DL (ref 8–23)
BUN/CREAT SERPL: 20 (ref 7–25)
CALCIUM SPEC-SCNC: 8.8 MG/DL (ref 8.2–9.6)
CHLORIDE SERPL-SCNC: 113 MMOL/L (ref 98–107)
CO2 SERPL-SCNC: 23.8 MMOL/L (ref 22–29)
CREAT SERPL-MCNC: 1.05 MG/DL (ref 0.57–1)
DEPRECATED RDW RBC AUTO: 47.1 FL (ref 37–54)
EGFRCR SERPLBLD CKD-EPI 2021: 50.3 ML/MIN/1.73
ERYTHROCYTE [DISTWIDTH] IN BLOOD BY AUTOMATED COUNT: 13.2 % (ref 12.3–15.4)
GLUCOSE BLDC GLUCOMTR-MCNC: 107 MG/DL (ref 70–130)
GLUCOSE BLDC GLUCOMTR-MCNC: 109 MG/DL (ref 70–130)
GLUCOSE SERPL-MCNC: 119 MG/DL (ref 65–99)
HCT VFR BLD AUTO: 30.6 % (ref 34–46.6)
HGB BLD-MCNC: 10 G/DL (ref 12–15.9)
MCH RBC QN AUTO: 31.7 PG (ref 26.6–33)
MCHC RBC AUTO-ENTMCNC: 32.7 G/DL (ref 31.5–35.7)
MCV RBC AUTO: 97.1 FL (ref 79–97)
PLATELET # BLD AUTO: 165 10*3/MM3 (ref 140–450)
PMV BLD AUTO: 10.4 FL (ref 6–12)
POTASSIUM SERPL-SCNC: 3.4 MMOL/L (ref 3.5–5.2)
RBC # BLD AUTO: 3.15 10*6/MM3 (ref 3.77–5.28)
SODIUM SERPL-SCNC: 144 MMOL/L (ref 136–145)
WBC NRBC COR # BLD: 8.44 10*3/MM3 (ref 3.4–10.8)

## 2022-07-08 PROCEDURE — 85027 COMPLETE CBC AUTOMATED: CPT | Performed by: HOSPITALIST

## 2022-07-08 PROCEDURE — 80048 BASIC METABOLIC PNL TOTAL CA: CPT | Performed by: HOSPITALIST

## 2022-07-08 PROCEDURE — 99222 1ST HOSP IP/OBS MODERATE 55: CPT | Performed by: INTERNAL MEDICINE

## 2022-07-08 PROCEDURE — 82962 GLUCOSE BLOOD TEST: CPT

## 2022-07-08 RX ORDER — GABAPENTIN 300 MG/1
300 CAPSULE ORAL
Qty: 3 CAPSULE | Refills: 0 | Status: SHIPPED | OUTPATIENT
Start: 2022-07-08 | End: 2022-10-17

## 2022-07-08 RX ORDER — ATORVASTATIN CALCIUM 80 MG/1
80 TABLET, FILM COATED ORAL NIGHTLY
Qty: 30 TABLET | Refills: 1 | Status: SHIPPED | OUTPATIENT
Start: 2022-07-08 | End: 2022-07-08 | Stop reason: SDUPTHER

## 2022-07-08 RX ORDER — GABAPENTIN 300 MG/1
300 CAPSULE ORAL
Qty: 3 CAPSULE | Refills: 0 | Status: SHIPPED | OUTPATIENT
Start: 2022-07-08 | End: 2022-07-08 | Stop reason: SDUPTHER

## 2022-07-08 RX ORDER — ATORVASTATIN CALCIUM 80 MG/1
80 TABLET, FILM COATED ORAL NIGHTLY
Qty: 30 TABLET | Refills: 1 | Status: SHIPPED | OUTPATIENT
Start: 2022-07-08 | End: 2022-08-12

## 2022-07-08 RX ORDER — ATENOLOL 25 MG/1
25 TABLET ORAL DAILY
Qty: 30 TABLET | Refills: 1 | Status: SHIPPED | OUTPATIENT
Start: 2022-07-09 | End: 2023-03-22 | Stop reason: DRUGHIGH

## 2022-07-08 RX ORDER — ATENOLOL 25 MG/1
25 TABLET ORAL DAILY
Qty: 30 TABLET | Refills: 1 | Status: SHIPPED | OUTPATIENT
Start: 2022-07-09 | End: 2022-07-08 | Stop reason: SDUPTHER

## 2022-07-08 RX ADMIN — MULTIPLE VITAMINS W/ MINERALS TAB 1 TABLET: TAB at 09:33

## 2022-07-08 RX ADMIN — APIXABAN 2.5 MG: 2.5 TABLET, FILM COATED ORAL at 13:32

## 2022-07-08 RX ADMIN — ASPIRIN 325 MG: 325 TABLET ORAL at 09:33

## 2022-07-08 RX ADMIN — Medication 5000 UNITS: at 09:33

## 2022-07-08 RX ADMIN — ATENOLOL 25 MG: 25 TABLET ORAL at 09:33

## 2022-07-08 NOTE — CONSULTS
[facility] Consult Note       Encounter Date:22  Patient:Ryann Hernadez  :1931  MRN:[id number]    Date of Admission: 2022  Date of Encounter Visit: 22  Encounter Provider: Kody Howe MD  Referring Provider: Erlinda Marcum MD  Place of Service: [facility]  Patient Care Team:  Vanesa Connor MD as PCP - General (Family Medicine)      Consulted for:  Structural heart consult for Watchman evaluation    Chief Complaint: Atrial fibrillation/Fall at home       History of Presenting Illness:      Ms. Hernadez is a 89 y.o. woman who follows with Dr. Matamoros with a past medical history notable for paroxysmal atrial fibrillation previous on anticoagulation but she had stopped on her own due to concerns of bleeding and has been on aspirin therapy alone, hypertension, a sending aortic aneurysm, moderate pulmonary hypertension, and a prior TIA back in  who presented to the hospital with fall and being found down at home.  She was altered on arrival.  Further evaluation this admission has identified a new stroke.  We were asked to evaluate her for possible watchman candidacy.    This morning she is actually doing fairly well in talking with me.  Last few days she had been fairly altered.  She was also noted to have COVID on arrival.    She is active and doing fairly well at home the outside of this fall she is pretty functional.  She has not had any prior bleeding issues.      Review of Systems:  Review of Systems   Constitutional: Positive for malaise/fatigue.   HENT: Negative.    Eyes: Negative.    Cardiovascular: Positive for palpitations.   Respiratory: Positive for shortness of breath.    Endocrine: Negative.    Hematologic/Lymphatic: Negative.    Skin: Negative.    Musculoskeletal: Negative.    Gastrointestinal: Negative.    Genitourinary: Negative.    Neurological: Positive for focal weakness.   Psychiatric/Behavioral:  Negative.    Allergic/Immunologic: Negative.        Medications:    Current Facility-Administered Medications:   •  acetaminophen (TYLENOL) tablet 650 mg, 650 mg, Oral, Q4H PRN **OR** acetaminophen (TYLENOL) suppository 650 mg, 650 mg, Rectal, Q4H PRN, Erlinda Marcum MD  •  acetaminophen (TYLENOL) tablet 650 mg, 650 mg, Oral, Q4H PRN, Erlinda Marcum MD, 650 mg at 07/06/22 0942  •  aspirin tablet 325 mg, 325 mg, Oral, Daily, 325 mg at 07/07/22 0953 **OR** aspirin suppository 300 mg, 300 mg, Rectal, Daily, Erlinda Marcum MD  •  atenolol (TENORMIN) tablet 25 mg, 25 mg, Oral, Daily, Julio Calzada MD, 25 mg at 07/07/22 0954  •  atorvastatin (LIPITOR) tablet 80 mg, 80 mg, Oral, Nightly, Erlinda Marcum MD, 80 mg at 07/07/22 2111  •  bisacodyl (DULCOLAX) suppository 10 mg, 10 mg, Rectal, Daily PRN, Erlinda Marcum MD  •  cholecalciferol (VITAMIN D3) tablet 5,000 Units, 5,000 Units, Oral, Daily, Erlinda Marcum MD, 5,000 Units at 07/07/22 0953  •  Enoxaparin Sodium (LOVENOX) syringe 30 mg, 30 mg, Subcutaneous, Q24H, Julio Calzada MD, 30 mg at 07/07/22 1236  •  gabapentin (NEURONTIN) capsule 300 mg, 300 mg, Oral, Nightly, Erlinda Marcum MD, 300 mg at 07/07/22 2111  •  melatonin tablet 3 mg, 3 mg, Oral, Nightly PRN, Erlinda Marcum MD  •  multivitamin with minerals 1 tablet, 1 tablet, Oral, Daily, Erlinda Marcum MD, 1 tablet at 07/07/22 0953  •  nitroglycerin (NITROSTAT) SL tablet 0.4 mg, 0.4 mg, Sublingual, Q5 Min PRN, Erlinda Marcum MD  •  ondansetron (ZOFRAN) tablet 4 mg, 4 mg, Oral, Q6H PRN **OR** ondansetron (ZOFRAN) injection 4 mg, 4 mg, Intravenous, Q6H PRN, Erlinda Marcum MD  •  ondansetron (ZOFRAN) injection 4 mg, 4 mg, Intravenous, Q6H PRN, Erlinda Marcum MD  •  [COMPLETED] Insert peripheral IV, , , Once **AND** sodium chloride 0.9 % flush 10 mL, 10 mL, Intravenous, PRN, Lloyd Kirk,  MD    Allergies   Allergen Reactions   • Sulfa Antibiotics Anaphylaxis   • Lisinopril    • Olmesartan        Past Medical History:   Diagnosis Date   • A-fib ([facility])    • Acute deep vein thrombosis (DVT) of calf muscle vein of left lower extremity ([facility]) 2/6/2020   • Allergic rhinitis    • Anemia    • Aneurysm, ascending aorta ([facility]) 03/22/2016    5 CM   • Anxiety    • Ataxic gait    • Atypical chest pain    • BPPV (benign paroxysmal positional vertigo)    • Bradycardia    • Chronic cystitis    • Chronic headaches    • Chronic nonintractable headache 9/14/2016   • Chronic paroxysmal hemicrania    • Depression    • Esophagitis    • Gastritis    • GERD (gastroesophageal reflux disease)    • Hematuria    • High blood pressure    • History of cataract    • History of irritable bowel syndrome    • Hypertension    • IBS (irritable bowel syndrome)    • Influenza A    • Kidney lesion     LEFT KIDNEY CYSTIC   • Labyrinthitis 07/28/2014   • OA (osteoarthritis)    • Osteoporosis 9/13/2018   • Other abnormal clinical finding 09/21/2012    LEFT UTEROCELE   • Pain of thigh    • Personal history of gastric ulcer    • Renal disorder    • Renal insufficiency 8/7/2017   • Rhabdomyolysis 7/5/2022   • TIA (transient ischemic attack) 2/5/2020       Past Surgical History:   Procedure Laterality Date   • BREAST LUMPECTOMY     • BREAST SURGERY     • CATARACT EXTRACTION     • COLONOSCOPY N/A     DR. MARGARITA MENDOZA   • ELBOW ARTHROTOMY  07/01/1999    DR. RYAN RITCHIE   • EYE SURGERY     • FRACTURE SURGERY      HAND, BROKEN FINGERS  DR. MIKE CHRISTIANSON   • KNEE SURGERY  08/31/1999    DR. THADDEUS MENDES   • OOPHORECTOMY     • TUBAL ABDOMINAL LIGATION Bilateral 1962    DR. EDDA MANNING       Social History     Socioeconomic History   • Marital status: Single   Tobacco Use   • Smoking status: Never Smoker   • Smokeless tobacco: Never Used   Substance and Sexual Activity   • Alcohol use: No   • Drug use: No   • Sexual activity: Defer       Family History    Problem Relation Age of Onset   • Heart attack Mother    • Arthritis Mother    • Arthritis Father    • Arthritis Sister    • Arthritis Other    • Kidney disease Other    • Thyroid disease Other    • Diabetes Other    • Hypertension Other    • Heart disease Other    • Arthritis Maternal Grandmother        The following portions of the patient's history were reviewed and updated as appropriate: allergies, current medications, past family history, past medical history, past social history, past surgical history and problem list.         Objective:      Temp:  [97.3 °F (36.3 °C)-98.3 °F (36.8 °C)] 97.3 °F (36.3 °C)  Heart Rate:  [60-88] 73  Resp:  [16-18] 16  BP: ()/(62-97) 127/97     Intake/Output Summary (Last 24 hours) at 7/8/2022 0829  Last data filed at 7/8/2022 0628  Gross per 24 hour   Intake 720 ml   Output 628 ml   Net 92 ml     Body mass index is 21.53 kg/m².      07/06/22  0500 07/07/22  0546 07/08/22  0600   Weight: 60.2 kg (132 lb 12.8 oz) 59.3 kg (130 lb 11.2 oz) 60.5 kg (133 lb 6.4 oz)           Physical Exam:  Constitutional: Well appearing, well developed, no acute distress   HENT: Oropharynx clear and membrane moist  Eyes: Normal conjunctiva, no sclera icterus.  Neck: Supple, no carotid bruit bilaterally.  Cardiovascular: Irregularly irregular rate and rhythm, No Murmur, No bilateral lower extremity edema.  Pulmonary: Normal respiratory effort, normal lung sounds, no wheezing.  Abdominal: Soft, nontender, no hepatosplenomegaly, liver is non-pulsatile.  Neurological: Alert and orient x 3.   Skin: Warm, dry, no ecchymosis, no rash.  Psych: Appropriate mood and affect. Normal judgment and insight.         Lab Review:   Results from last 7 days   Lab Units 07/08/22  0525 07/06/22  1249 07/05/22  0607 07/04/22  1405   SODIUM mmol/L 144 142 142 140   POTASSIUM mmol/L 3.4* 3.8 4.1 4.4   CHLORIDE mmol/L 113* 112* 108* 102   CO2 mmol/L 23.8 21.0* 23.0 24.7   BUN mg/dL 21 25* 28* 29*   CREATININE mg/dL  1.05* 1.19* 1.51* 1.98*   GLUCOSE mg/dL 119* 120* 111* 159*   CALCIUM mg/dL 8.8 8.7 9.1 10.3*   AST (SGOT) U/L  --   --  42* 43*   ALT (SGPT) U/L  --   --  20 28     Results from last 7 days   Lab Units 07/06/22  1249 07/05/22  0607 07/04/22  1405   CK TOTAL U/L 904* 1,462* 1,398*   TROPONIN T ng/mL  --  0.066* 0.110*     Results from last 7 days   Lab Units 07/08/22  0525 07/05/22  0607 07/04/22  1405   WBC 10*3/mm3 8.44 9.57 8.98   HEMOGLOBIN g/dL 10.0* 12.3 14.5   HEMATOCRIT % 30.6* 35.6 43.2   PLATELETS 10*3/mm3 165 177 236     Results from last 7 days   Lab Units 07/04/22  1405   INR  1.27*   APTT seconds 27.6         Results from last 7 days   Lab Units 07/05/22  0607   CHOLESTEROL mg/dL 182   TRIGLYCERIDES mg/dL 81   HDL CHOL mg/dL 54     The ASCVD Risk score (Delhi DC Jr., et al., 2013) failed to calculate for the following reasons:    The 2013 ASCVD risk score is only valid for ages 40 to 79    The patient has a prior MI or stroke diagnosis     Results from last 7 days   Lab Units 07/04/22  1405   PROBNP pg/mL 21,291.0*     Results from last 7 days   Lab Units 07/05/22  0607   TSH uIU/mL 0.457             Telemetry      EKG this admission      Reviewed EKG above which demonstrates atrial fibrillation    Echocardiogram 7/5/2022:  · Left ventricular wall thickness is consistent with mild concentric hypertrophy.  · Estimated left ventricular EF = 56% Left ventricular systolic function is normal.  · Left ventricular diastolic function was not assessed.  · Estimated right ventricular systolic pressure from tricuspid regurgitation is mildly elevated (35-45 mmHg).  · The ascending aorta appears dilated. I recommend CTA of the chest if clinically indicated.  · This was a limited study due to the patient's COVID status.         Assessment:           Paroxysmal atrial fibrillation (HCC)    Fall at home    RUBEN (acute kidney injury) (HCC)    Rhabdomyolysis    Elevated troponin    COVID-19 virus detected    Embolic  stroke (HCC)         Plan:       Ms. Hernadez is a 89 y.o. woman who follows with Dr. Matamoros with a past medical history notable for paroxysmal atrial fibrillation previous on anticoagulation but she had stopped on her own due to concerns of bleeding and has been on aspirin therapy alone, hypertension, a sending aortic aneurysm, moderate pulmonary hypertension, and a prior TIA back in 2020 who presented to the hospital with fall and being found down at home.  She has been started on anticoagulation which is appropriate given her recurrent stroke.  With regards to her overall risk for stroke she does have an elevated chads Vascor and again agree with anticoagulation.  Outside of this fall she has not had any other bleeding issues.  She does have a has bled score of 4 which does make her little bit higher risk for bleeding events mainly driven by her age.  In general I would like for her to recover from her COVID-19 and discuss watchman with her further.  I did briefly explain the procedure and the screening involved.  She is a little concerned that this might be a lot of testing and currently would prefer just to be on anticoagulation but we will follow-up with her and her family as an outpatient for consideration for watchman.    Paroxysmal atrial fibrillation:  · Currently on reduced dose anticoagulation given age and renal clearance  · Does have an elevated has bled score of 4 and given prior hesitancy to being on long-term anticoagulation and prior discontinuation of anticoagulation would be reasonable to consider watchman  · Would have her fully recover from her COVID and we will see her as an outpatient to discuss further and have a shared decision making with patient and family regarding continued anticoagulation versus consideration of watchman.  · I will arrange for her to be seen in our office for further watchman consideration.        Thank you for allowing me to participate in the care of Ryann GASTON  Satya. Feel free to contact me directly with any further questions or concerns.    Kody Howe MD  [facility]  07/08/22  08:29 EDT

## 2022-07-08 NOTE — PROGRESS NOTES
Case Management Discharge Note      Final Note: Per Malachi Duque [facility] can accept pt to covid unit today with plans for eventual transition to [facility]. Daughter aware of plan. Pharmacy updated and packet on pt chart. [facility] stretcher transport scheduled at 2:00 PM (# [id number]). Karolyn Florez LCSW         Selected Continued Care - Admitted Since 7/4/2022     Destination Coordination complete.    Service Provider Selected Services Address Phone Fax Patient Preferred    [facility]  Skilled [address] [address] [phone] --          Durable Medical Equipment    No services have been selected for the patient.              Dialysis/Infusion    No services have been selected for the patient.              Home Medical Care    No services have been selected for the patient.              Therapy    No services have been selected for the patient.              Community Resources    No services have been selected for the patient.              Community & DME    No services have been selected for the patient.                  Transportation Services  W/C Van: [facility] (stretcher)    Final Discharge Disposition Code: 03 - skilled nursing facility (SNF)

## 2022-07-08 NOTE — TELEPHONE ENCOUNTER
Attempted to call patient but no answer and no option to leave VM. Will continue to try and reach patient.     Lynne Luciano RN  Triage [facility]

## 2022-07-08 NOTE — TELEPHONE ENCOUNTER
----- Message from Chyna Cardona RN sent at 7/8/2022  1:23 PM EDT -----  Regarding: follow up  This patient will  need a 2 week follow up with Dr Howe to discuss Watchman again.  He saw this patient in the hospital but wants to follow up    Thanks     Chyna     nasal septal bone and cartilage

## 2022-07-08 NOTE — PLAN OF CARE
Goal Outcome Evaluation:         VS WNL.Pt continues on isolation for COVID-19.Bladder scan 308CC & 314 CC this shift.,reports feeling full,pt with dry brief through the night,straight cath with total of 320 CC UOP.To see cardiology today r/t watchman's device placement WCTM.

## 2022-07-08 NOTE — DISCHARGE SUMMARY
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 Hernadez is a 89 y.o. female with a history of hypertension and longstanding PAF who has previously refused anticoagulation steadfastly, presented to [facility] initially  after a fall.  Please see the admitting history and physical for further details.  She was found to have mild acute kidney injury with rhabdomyolysis.  She was started on IV fluids and rehydrated with relatively quick resolution of her renal dysfunction.  CK level peaked at 1462 and was down to 904 last check.  She was initially pretty confused but that improved with hydration.  She was seen by neurology who was concerned about potential stroke based on her physical exam findings so they ordered MRI of the brain which indeed did confirm multiple foci of acute infarction in the medial and lateral right frontal lobe within the right VENTURA and MCA distribution.  There was a chronic infarct in the left perisylvian region and new chronic infarcts in the cerebellar hemispheres, all of which were concerning for cardioembolic source presumably from A. fib.  Patient has been well documented to refuse anticoagulation on numerous prior cardiology visits in the outpatient chart.  We talked with her about options this time and initially she was still  reluctant so cardiology was consulted to discuss further.  There was some discussion of placing a watchman device but after discussing with the patient she has agreed to allow Eliquis at this time.  Based on her weight and age she meets criteria for lower dose Eliquis at 2.5 twice daily.  She did incidentally have COVID-19 on admission.  She seems to be completely asymptomatic with that although it could certainly have made her a bit thrombophilic to provoke the stroke.  She has not hypoxic or having any respiratory complaints at all so she was not treated with any medications for COVID.  She appears to be stable for discharge to skilled nursing facility as per her family's wishes.        Day of Discharge     Subjective:  No complaints.  Discussed AC with patient and she is agreeable     Physical Exam:  Temp:  [97.3 °F (36.3 °C)-98.8 °F (37.1 °C)] 98.8 °F (37.1 °C)  Heart Rate:  [60-73] 67  Resp:  [16-18] 16  BP: ()/(62-97) 126/85  Body mass index is 21.53 kg/m².  Physical Exam  Vitals and nursing note reviewed.   Constitutional:       General: She is not in acute distress.     Comments: Frail/chronically ill-appearing   HENT:      Head: Normocephalic and atraumatic.      Mouth/Throat:      Mouth: Mucous membranes are moist.   Eyes:      General: No scleral icterus.  Neck:      Vascular: No JVD.   Cardiovascular:      Rate and Rhythm: Normal rate and regular rhythm.      Pulses: Normal pulses.      Heart sounds: Normal heart sounds. No murmur heard.  Pulmonary:      Effort: Pulmonary effort is normal. No respiratory distress.      Comments: Decreased breath sounds  Abdominal:      General: Bowel sounds are normal. There is no distension.      Palpations: Abdomen is soft.      Tenderness: There is no abdominal tenderness.   Musculoskeletal:         General: No swelling or tenderness.      Cervical back: Neck supple.   Skin:     General: Skin is warm and dry.      Coloration: Skin is not jaundiced.       Findings: No rash.   Neurological:      Mental Status: She is alert.      Comments: Globally weak   Psychiatric:         Mood and Affect: Mood normal.         Behavior: Behavior normal.         Consultants     Consult Orders (all) (From admission, onward)     Start     Ordered    07/05/22 0716  Inpatient Case Management  Consult  Once        Provider:  (Not yet assigned)    07/05/22 0716 07/04/22 2136  Notify Stroke Coordinator  Once        Provider:  (Not yet assigned)    07/04/22 2136 07/04/22 2136  Inpatient Rehab Admission Consult  Once        Provider:  (Not yet assigned)    07/04/22 2136 07/04/22 2136  Consult to Case Management   Once        Provider:  (Not yet assigned)    07/04/22 2136 07/04/22 2136  Consult to Diabetes Educator  Once,   Status:  Canceled        Provider:  (Not yet assigned)    07/04/22 2136 07/04/22 2136  Inpatient Neurology Consult Stroke  Once        Specialty:  Neurology  Provider:  Radu Charles MD    07/04/22 2136 07/04/22 2136  Inpatient Cardiology Consult  Once        Specialty:  Cardiology  Provider:  Romi Borjas MD    07/04/22 2136 07/04/22 1646  Cardiology (on-call MD unless specified)  Once        Specialty:  Cardiology  Provider:  (Not yet assigned)    07/04/22 1645    07/04/22 1630  [facility] (on-call MD unless specified) Details  Once        Specialty:  Hospitalist  Provider:  (Not yet assigned)    07/04/22 1629              Procedures     Imaging Results (All)     Procedure Component Value Units Date/Time    MRI Angiogram Neck With & Without Contrast [[id number]] Collected: 07/06/22 1618     Updated: 07/07/22 0715    Narrative:      MRI OF THE BRAIN WITH AND WITHOUT CONTRAST, MRA OF THE HEAD WITHOUT  CONTRAST, AND MRA OF THE NECK WITH AND WITHOUT CONTRAST     CLINICAL HISTORY: COVID positive. Left-sided weakness.     TECHNIQUE: MRI of the brain was obtained with sagittal pre and post  contrast T1, axial pre and postcontrast  T1, coronal postcontrast T1,  axial FLAIR, axial T2, axial diffusion, and axial susceptibility  weighted images.     COMPARISON: Comparison is made to previous MRI of the brain dated  02/05/2020.     FINDINGS:     This study is compromised by motion artifact.     There are multiple foci of acute infarction within the brain parenchyma  that are noted within the right middle and anterior cerebral artery  distributions. These infarcts are seen within the medial and lateral  aspects of the right frontal lobe involving the right cingulate gyrus,  right superior frontal gyrus, right middle frontal gyrus, right  precentral gyrus, and right paracentral lobule. Additionally, there are  foci of acute infarction noted within the right operculum, the right  caudate, and the right lentiform nucleus. The largest contiguous area of  infarction measures up to approximately 1.4 x 0.8 cm in greatest axial  dimensions. There is no evidence for hemorrhagic transformation. No  significant mass effect is seen as a result of these infarcts.     Punctate areas of susceptibility artifact are noted within the  corticomedullary interface of the right temporal lobe, right occipital  lobe, and right frontal lobe and that are likely representative of  multiple chronic microhemorrhages secondary to amyloid angiopathy.  Additional areas of punctate chronic microhemorrhage are noted within  the left thalamus and left lentiform nucleus that are in a pattern more  suggestive of chronic microhemorrhages secondary to hypertension.  Progression of these chronic microhemorrhages cannot be assessed as a  less sensitive gradient echo sequence was performed on the prior MRI of  the brain dated 02/05/2020.     Stable moderate changes of chronic small vessel ischemic phenomena are  appreciated. The major intracranial flow related signal voids are  unremarkable. Multiple enlarged perivascular spaces are seen within the  thalami and the lentiform nuclei. The  major intracranial flow related  signal voids are within normal limits. No abnormal areas of contrast  enhancement are noted.     There are chronic infarcts within both cerebellar hemispheres within the  PICA distributions. These chronic infarcts are identified within both  cerebellar hemispheres. The chronic infarct within the right cerebellar  hemisphere measures up to 14 x 3 mm in greatest axial dimensions whereas  the chronic infarct within the left cerebellar hemisphere measures up to  13 x 2 mm in greatest axial dimensions. Additionally, there is a chronic  infarct within the left perisylvian region extending into the left  superior temporal gyrus measuring up to approximately 1.3 cm in greatest  AP dimensions. This is within the left MCA distribution. When compared  to the prior study, the chronic infarcts within the cerebellar  hemispheres are new. However, the infarct within the left perisylvian  region was evident on the previous exam.     Changes of inflammatory paranasal sinus disease are incidentally noted  within the sphenoid sinus, ethmoid air cells, and right maxillary sinus.  Some fluid is seen within the right aspect of the sphenoid sinus which  may be representative of acute changes of inflammatory paranasal sinus  disease.       Impression:         Multiple foci of acute infarction are identified within the medial and  lateral aspect of the right frontal lobe within the right VENTURA and right  MCA distribution as well as the right caudate, right operculum, and  right lentiform nucleus within the right MCA distribution. No  significant mass effect is seen as a result of these multiple small  infarcts. There is no evidence for hemorrhagic transformation.     There is a chronic infarct within the left perisylvian region extending  into the left superior temporal gyrus. This was evident on the prior  exam. However, there are new chronic infarcts within both cerebellar  hemispheres within both PICA  distributions.     Moderate changes of chronic small vessel ischemic phenomena are again  appreciated.     Incidental note is made of multiple chronic microhemorrhages as  discussed in detail above. Some of these are seen in a distribution more  suggestive of amyloid angiopathy whereas others are in a distribution  suggestive of hypertensive chronic microhemorrhages. Please correlate  with clinical data.        TECHNIQUE: MRA of the head was obtained with 3D time-of-flight imaging  technique. Maximum intensity projection reconstructed images were  obtained.     FINDINGS:     There is normal flow-related enhancement within the internal carotid  arteries, middle cerebral arteries, and anterior cerebral arteries. The  vertebral arteries, basilar artery, and posterior cerebrals also have  normal flow related enhancement.     IMPRESSION:      Unremarkable MRA of the head.        TECHNIQUE: MRA of the neck was obtained with a combination of 3D  time-of-flight imaging technique and a contrast enhanced MRA study.  Maximum intensity projection reconstructed images were obtained.     FINDINGS:     There is a classic configuration of the aortic arch. There is ectasia or  mild aneurysmal dilatation of the ascending thoracic aorta and aortic  arch although this is difficult to precisely measure. The ascending  thoracic aorta may measure up to 4.5 cm in greatest diameter. The great  vessel origins are not optimally evaluated, no significant stenosis is  evident. The vertebral artery origins are again not optimally evaluated.  There may be up to a moderate degree of stenosis involving the origin of  the left vertebral artery. Otherwise, the vertebral arteries are  unremarkable in appearance. No significant NASCET stenosis is identified  within either internal carotid.     IMPRESSION:     The vertebral artery origins are not well evaluated although there may  be up to a moderate degree of stenosis involving the origin of the  left  vertebral artery. There is ectasia or mild aneurysmal dilatation of the  ascending thoracic aorta and aortic arch. However, the precise aortic  measurement is difficult to assess on this exam. The ascending thoracic  aorta may measure up to 4.5 cm in greatest diameter. Otherwise, the MR  angiogram of the neck is unremarkable.     These findings were discussed with Dr. Charles on 07/06/2022 at  approximately 3:23 PM.     This report was finalized on 7/7/2022 7:12 AM by Dr. Martínez Lay M.D.       MRI Angiogram Head Without Contrast [[id number]] Collected: 07/06/22 1618     Updated: 07/07/22 0715    Narrative:      MRI OF THE BRAIN WITH AND WITHOUT CONTRAST, MRA OF THE HEAD WITHOUT  CONTRAST, AND MRA OF THE NECK WITH AND WITHOUT CONTRAST     CLINICAL HISTORY: COVID positive. Left-sided weakness.     TECHNIQUE: MRI of the brain was obtained with sagittal pre and post  contrast T1, axial pre and postcontrast T1, coronal postcontrast T1,  axial FLAIR, axial T2, axial diffusion, and axial susceptibility  weighted images.     COMPARISON: Comparison is made to previous MRI of the brain dated  02/05/2020.     FINDINGS:     This study is compromised by motion artifact.     There are multiple foci of acute infarction within the brain parenchyma  that are noted within the right middle and anterior cerebral artery  distributions. These infarcts are seen within the medial and lateral  aspects of the right frontal lobe involving the right cingulate gyrus,  right superior frontal gyrus, right middle frontal gyrus, right  precentral gyrus, and right paracentral lobule. Additionally, there are  foci of acute infarction noted within the right operculum, the right  caudate, and the right lentiform nucleus. The largest contiguous area of  infarction measures up to approximately 1.4 x 0.8 cm in greatest axial  dimensions. There is no evidence for hemorrhagic transformation. No  significant mass effect is seen as a result of these  infarcts.     Punctate areas of susceptibility artifact are noted within the  corticomedullary interface of the right temporal lobe, right occipital  lobe, and right frontal lobe and that are likely representative of  multiple chronic microhemorrhages secondary to amyloid angiopathy.  Additional areas of punctate chronic microhemorrhage are noted within  the left thalamus and left lentiform nucleus that are in a pattern more  suggestive of chronic microhemorrhages secondary to hypertension.  Progression of these chronic microhemorrhages cannot be assessed as a  less sensitive gradient echo sequence was performed on the prior MRI of  the brain dated 02/05/2020.     Stable moderate changes of chronic small vessel ischemic phenomena are  appreciated. The major intracranial flow related signal voids are  unremarkable. Multiple enlarged perivascular spaces are seen within the  thalami and the lentiform nuclei. The major intracranial flow related  signal voids are within normal limits. No abnormal areas of contrast  enhancement are noted.     There are chronic infarcts within both cerebellar hemispheres within the  PICA distributions. These chronic infarcts are identified within both  cerebellar hemispheres. The chronic infarct within the right cerebellar  hemisphere measures up to 14 x 3 mm in greatest axial dimensions whereas  the chronic infarct within the left cerebellar hemisphere measures up to  13 x 2 mm in greatest axial dimensions. Additionally, there is a chronic  infarct within the left perisylvian region extending into the left  superior temporal gyrus measuring up to approximately 1.3 cm in greatest  AP dimensions. This is within the left MCA distribution. When compared  to the prior study, the chronic infarcts within the cerebellar  hemispheres are new. However, the infarct within the left perisylvian  region was evident on the previous exam.     Changes of inflammatory paranasal sinus disease are  incidentally noted  within the sphenoid sinus, ethmoid air cells, and right maxillary sinus.  Some fluid is seen within the right aspect of the sphenoid sinus which  may be representative of acute changes of inflammatory paranasal sinus  disease.       Impression:         Multiple foci of acute infarction are identified within the medial and  lateral aspect of the right frontal lobe within the right VENTURA and right  MCA distribution as well as the right caudate, right operculum, and  right lentiform nucleus within the right MCA distribution. No  significant mass effect is seen as a result of these multiple small  infarcts. There is no evidence for hemorrhagic transformation.     There is a chronic infarct within the left perisylvian region extending  into the left superior temporal gyrus. This was evident on the prior  exam. However, there are new chronic infarcts within both cerebellar  hemispheres within both PICA distributions.     Moderate changes of chronic small vessel ischemic phenomena are again  appreciated.     Incidental note is made of multiple chronic microhemorrhages as  discussed in detail above. Some of these are seen in a distribution more  suggestive of amyloid angiopathy whereas others are in a distribution  suggestive of hypertensive chronic microhemorrhages. Please correlate  with clinical data.        TECHNIQUE: MRA of the head was obtained with 3D time-of-flight imaging  technique. Maximum intensity projection reconstructed images were  obtained.     FINDINGS:     There is normal flow-related enhancement within the internal carotid  arteries, middle cerebral arteries, and anterior cerebral arteries. The  vertebral arteries, basilar artery, and posterior cerebrals also have  normal flow related enhancement.     IMPRESSION:      Unremarkable MRA of the head.        TECHNIQUE: MRA of the neck was obtained with a combination of 3D  time-of-flight imaging technique and a contrast enhanced MRA  study.  Maximum intensity projection reconstructed images were obtained.     FINDINGS:     There is a classic configuration of the aortic arch. There is ectasia or  mild aneurysmal dilatation of the ascending thoracic aorta and aortic  arch although this is difficult to precisely measure. The ascending  thoracic aorta may measure up to 4.5 cm in greatest diameter. The great  vessel origins are not optimally evaluated, no significant stenosis is  evident. The vertebral artery origins are again not optimally evaluated.  There may be up to a moderate degree of stenosis involving the origin of  the left vertebral artery. Otherwise, the vertebral arteries are  unremarkable in appearance. No significant NASCET stenosis is identified  within either internal carotid.     IMPRESSION:     The vertebral artery origins are not well evaluated although there may  be up to a moderate degree of stenosis involving the origin of the left  vertebral artery. There is ectasia or mild aneurysmal dilatation of the  ascending thoracic aorta and aortic arch. However, the precise aortic  measurement is difficult to assess on this exam. The ascending thoracic  aorta may measure up to 4.5 cm in greatest diameter. Otherwise, the MR  angiogram of the neck is unremarkable.     These findings were discussed with Dr. Charles on 07/06/2022 at  approximately 3:23 PM.     This report was finalized on 7/7/2022 7:12 AM by Dr. Martínez Lay M.D.       MRI Brain With & Without Contrast [[id number]] Collected: 07/06/22 1618     Updated: 07/07/22 0715    Narrative:      MRI OF THE BRAIN WITH AND WITHOUT CONTRAST, MRA OF THE HEAD WITHOUT  CONTRAST, AND MRA OF THE NECK WITH AND WITHOUT CONTRAST     CLINICAL HISTORY: COVID positive. Left-sided weakness.     TECHNIQUE: MRI of the brain was obtained with sagittal pre and post  contrast T1, axial pre and postcontrast T1, coronal postcontrast T1,  axial FLAIR, axial T2, axial diffusion, and axial  susceptibility  weighted images.     COMPARISON: Comparison is made to previous MRI of the brain dated  02/05/2020.     FINDINGS:     This study is compromised by motion artifact.     There are multiple foci of acute infarction within the brain parenchyma  that are noted within the right middle and anterior cerebral artery  distributions. These infarcts are seen within the medial and lateral  aspects of the right frontal lobe involving the right cingulate gyrus,  right superior frontal gyrus, right middle frontal gyrus, right  precentral gyrus, and right paracentral lobule. Additionally, there are  foci of acute infarction noted within the right operculum, the right  caudate, and the right lentiform nucleus. The largest contiguous area of  infarction measures up to approximately 1.4 x 0.8 cm in greatest axial  dimensions. There is no evidence for hemorrhagic transformation. No  significant mass effect is seen as a result of these infarcts.     Punctate areas of susceptibility artifact are noted within the  corticomedullary interface of the right temporal lobe, right occipital  lobe, and right frontal lobe and that are likely representative of  multiple chronic microhemorrhages secondary to amyloid angiopathy.  Additional areas of punctate chronic microhemorrhage are noted within  the left thalamus and left lentiform nucleus that are in a pattern more  suggestive of chronic microhemorrhages secondary to hypertension.  Progression of these chronic microhemorrhages cannot be assessed as a  less sensitive gradient echo sequence was performed on the prior MRI of  the brain dated 02/05/2020.     Stable moderate changes of chronic small vessel ischemic phenomena are  appreciated. The major intracranial flow related signal voids are  unremarkable. Multiple enlarged perivascular spaces are seen within the  thalami and the lentiform nuclei. The major intracranial flow related  signal voids are within normal limits. No  abnormal areas of contrast  enhancement are noted.     There are chronic infarcts within both cerebellar hemispheres within the  PICA distributions. These chronic infarcts are identified within both  cerebellar hemispheres. The chronic infarct within the right cerebellar  hemisphere measures up to 14 x 3 mm in greatest axial dimensions whereas  the chronic infarct within the left cerebellar hemisphere measures up to  13 x 2 mm in greatest axial dimensions. Additionally, there is a chronic  infarct within the left perisylvian region extending into the left  superior temporal gyrus measuring up to approximately 1.3 cm in greatest  AP dimensions. This is within the left MCA distribution. When compared  to the prior study, the chronic infarcts within the cerebellar  hemispheres are new. However, the infarct within the left perisylvian  region was evident on the previous exam.     Changes of inflammatory paranasal sinus disease are incidentally noted  within the sphenoid sinus, ethmoid air cells, and right maxillary sinus.  Some fluid is seen within the right aspect of the sphenoid sinus which  may be representative of acute changes of inflammatory paranasal sinus  disease.       Impression:         Multiple foci of acute infarction are identified within the medial and  lateral aspect of the right frontal lobe within the right VENTURA and right  MCA distribution as well as the right caudate, right operculum, and  right lentiform nucleus within the right MCA distribution. No  significant mass effect is seen as a result of these multiple small  infarcts. There is no evidence for hemorrhagic transformation.     There is a chronic infarct within the left perisylvian region extending  into the left superior temporal gyrus. This was evident on the prior  exam. However, there are new chronic infarcts within both cerebellar  hemispheres within both PICA distributions.     Moderate changes of chronic small vessel ischemic phenomena  are again  appreciated.     Incidental note is made of multiple chronic microhemorrhages as  discussed in detail above. Some of these are seen in a distribution more  suggestive of amyloid angiopathy whereas others are in a distribution  suggestive of hypertensive chronic microhemorrhages. Please correlate  with clinical data.        TECHNIQUE: MRA of the head was obtained with 3D time-of-flight imaging  technique. Maximum intensity projection reconstructed images were  obtained.     FINDINGS:     There is normal flow-related enhancement within the internal carotid  arteries, middle cerebral arteries, and anterior cerebral arteries. The  vertebral arteries, basilar artery, and posterior cerebrals also have  normal flow related enhancement.     IMPRESSION:      Unremarkable MRA of the head.        TECHNIQUE: MRA of the neck was obtained with a combination of 3D  time-of-flight imaging technique and a contrast enhanced MRA study.  Maximum intensity projection reconstructed images were obtained.     FINDINGS:     There is a classic configuration of the aortic arch. There is ectasia or  mild aneurysmal dilatation of the ascending thoracic aorta and aortic  arch although this is difficult to precisely measure. The ascending  thoracic aorta may measure up to 4.5 cm in greatest diameter. The great  vessel origins are not optimally evaluated, no significant stenosis is  evident. The vertebral artery origins are again not optimally evaluated.  There may be up to a moderate degree of stenosis involving the origin of  the left vertebral artery. Otherwise, the vertebral arteries are  unremarkable in appearance. No significant NASCET stenosis is identified  within either internal carotid.     IMPRESSION:     The vertebral artery origins are not well evaluated although there may  be up to a moderate degree of stenosis involving the origin of the left  vertebral artery. There is ectasia or mild aneurysmal dilatation of  the  ascending thoracic aorta and aortic arch. However, the precise aortic  measurement is difficult to assess on this exam. The ascending thoracic  aorta may measure up to 4.5 cm in greatest diameter. Otherwise, the MR  angiogram of the neck is unremarkable.     These findings were discussed with Dr. Charles on 07/06/2022 at  approximately 3:23 PM.     This report was finalized on 7/7/2022 7:12 AM by Dr. Martínez Lay M.D.       CT Head Without Contrast [[id number]] Collected: 07/04/22 1609     Updated: 07/04/22 1615    Narrative:      CT HEAD WITHOUT CONTRAST     HISTORY: Altered mental status with left-sided weakness.     TECHNIQUE:  Head CT includes axial imaging from the base of skull to the  vertex without intravenous contrast. Radiation dose reduction techniques  were utilized, including automated exposure control and exposure  modulation based on body size.     COMPARISON:CT head 12/08/2020, MRI brain 02/05/2020, CT angiogram head  02/05/2020.     FINDINGS: There is moderate chronic small vessel ischemic white matter  change as well as cerebral and cerebellar atrophy. There are no abnormal  areas of increased attenuation intra-axially to suggest hemorrhage. No  extra-axial fluid collection is observed. There is no evidence for mass  effect or shift of the midline structures. The mastoid air cells appear  clear. There is mild right inferior maxillary sinus mucoperiosteal  thickening.       Impression:      Moderate chronic small vessel ischemic white matter change  and atrophy. No evidence for acute intracranial abnormality.      This report was finalized on 7/4/2022 4:12 PM by Dr. Carlos Cardona M.D.       XR Chest 1 View [[id number]] Collected: 07/04/22 1458     Updated: 07/04/22 1502    Narrative:      CHEST SINGLE VIEW     HISTORY: Generalized weakness.     COMPARISON: AP chest 02/05/2020.     FINDINGS: Heart size is enlarged. Thoracic aorta is tortuous and appears  to be ectatic. There is pulmonary arterial  enlargement. Pleural  parenchymal scarring is present in the medial right apex. Within the  right lung base there has developed hazy pulmonary opacity potentially  due to a very small area of infiltrate though difficult to definitively  differentiate from chronic change. There is no evidence for perihilar  edema or pneumothorax.       Impression:      Within the right lung base just above the right  hemidiaphragm there is a small area of hazy increased density that is  suspicious for a small infiltrate though it is difficult to definitively  differentiate from an area of chronic change. There is no evidence for  perihilar edema or other change when compared to previous imaging.     This report was finalized on 7/4/2022 2:59 PM by Dr. Carlos Cardona M.D.             Results for orders placed during the hospital encounter of 07/04/22    Adult Transthoracic Echo Limited W/ Cont if Necessary Per Protocol    Interpretation Summary  · Left ventricular wall thickness is consistent with mild concentric hypertrophy.  · Estimated left ventricular EF = 56% Left ventricular systolic function is normal.  · Left ventricular diastolic function was not assessed.  · Estimated right ventricular systolic pressure from tricuspid regurgitation is mildly elevated (35-45 mmHg).  · The ascending aorta appears dilated. I recommend CTA of the chest if clinically indicated.  · This was a limited study due to the patient's COVID status.    Pertinent Labs     Results from last 7 days   Lab Units 07/08/22  0525 07/05/22  0607 07/04/22  1405   WBC 10*3/mm3 8.44 9.57 8.98   HEMOGLOBIN g/dL 10.0* 12.3 14.5   PLATELETS 10*3/mm3 165 177 236     Results from last 7 days   Lab Units 07/08/22  0525 07/06/22  1249 07/05/22  0607 07/04/22  1405   SODIUM mmol/L 144 142 142 140   POTASSIUM mmol/L 3.4* 3.8 4.1 4.4   CHLORIDE mmol/L 113* 112* 108* 102   CO2 mmol/L 23.8 21.0* 23.0 24.7   BUN mg/dL 21 25* 28* 29*   CREATININE mg/dL 1.05* 1.19* 1.51* 1.98*    GLUCOSE mg/dL 119* 120* 111* 159*   EGFR mL/min/1.73 50.3* 43.3* 32.5* 23.5*     Results from last 7 days   Lab Units 07/05/22  0607 07/04/22  1405   ALBUMIN g/dL 3.30* 4.30   BILIRUBIN mg/dL 1.9* 1.6*   ALK PHOS U/L 56 72   AST (SGOT) U/L 42* 43*   ALT (SGPT) U/L 20 28     Results from last 7 days   Lab Units 07/08/22  0525 07/06/22  1249 07/05/22  0607 07/04/22  1405   CALCIUM mg/dL 8.8 8.7 9.1 10.3*   ALBUMIN g/dL  --   --  3.30* 4.30       Results from last 7 days   Lab Units 07/06/22  1249 07/05/22  0607 07/04/22  1405   CK TOTAL U/L 904* 1,462* 1,398*   TROPONIN T ng/mL  --  0.066* 0.110*   PROBNP pg/mL  --   --  21,291.0*     Results from last 7 days   Lab Units 07/05/22  0958   URINE MYOGLOBIN, QUALITATIVE  Positive*     Results from last 7 days   Lab Units 07/05/22  0607   CHOLESTEROL mg/dL 182   TRIGLYCERIDES mg/dL 81   HDL CHOL mg/dL 54   LDL CHOL mg/dL 113*         Results from last 7 days   Lab Units 07/04/22  1653   COVID19  Detected*       Test Results Pending at Discharge       Discharge Details        Discharge Medications      New Medications      Instructions Start Date   apixaban 2.5 MG tablet tablet  Commonly known as: ELIQUIS   2.5 mg, Oral, Every 12 Hours Scheduled      atorvastatin 80 MG tablet  Commonly known as: LIPITOR   80 mg, Oral, Nightly         Changes to Medications      Instructions Start Date   atenolol 25 MG tablet  Commonly known as: TENORMIN  What changed:   · medication strength  · how much to take   25 mg, Oral, Daily   Start Date: July 9, 2022        Continue These Medications      Instructions Start Date   amLODIPine 5 MG tablet  Commonly known as: NORVASC   5 mg, Oral, Daily      gabapentin 300 MG capsule  Commonly known as: NEURONTIN   300 mg, Oral, Every Night at Bedtime      One-A-Day Womens 50 Plus tablet tablet  Generic drug: multivitamin with minerals   Oral      vitamin D3 125 MCG (5000 UT) capsule capsule   5,000 Units, Oral, Daily             Allergies   Allergen  Reactions   • Sulfa Antibiotics Anaphylaxis   • Lisinopril    • Olmesartan        Discharge Disposition:  Skilled Nursing Facility (DC - External)      Discharge Diet:  Diet Order   Procedures   • Diet Regular; Cardiac       Discharge Activity:       CODE STATUS:    Code Status and Medical Interventions:   Ordered at: 07/05/22 1320     Medical Intervention Limits:    NO intubation (DNI)    NO artificial nutrition     Code Status (Patient has no pulse and is not breathing):    No CPR (Do Not Attempt to Resuscitate)     Medical Interventions (Patient has pulse or is breathing):    Limited Support       Future Appointments   Date Time Provider Department Center   10/17/2022  1:00 PM Laura Gonzales PA [facility]      Contact information for follow-up providers     Vanesa Connor MD .    Specialty: Family Medicine  Contact information:  [address] [address] [address] [phone]                   Contact information for after-discharge care     Destination     [facility] .    Service: Skilled Nursing  Contact information:  [address]  [facility] [address] [phone]                             Time Spent on Discharge:  Greater than 30 minutes      Julio Calzada MD  [facility]  07/08/22  12:31 EDT

## 2022-07-08 NOTE — PLAN OF CARE
Goal Outcome Evaluation:              Outcome Evaluation: Pt is medically ready for D/C. A&Ox4, VSS and no c/o pain. Pt being D/C'd via [facility] van to [facility]. Report called to Gloria AMARO.

## 2022-07-08 NOTE — PLAN OF CARE
Goal Outcome Evaluation:              Outcome Evaluation: Pt is medically ready for D/C. A&Ox4, VSS and no c/o pain. Pt being D/C'd via wheelchair van to [facility]. Report called to Gloria FRAGOSO  Problem: Adult Inpatient Plan of Care  Goal: Plan of Care Review  7/8/2022 1427 by April Pendleton, RN  Outcome: Met  7/8/2022 1426 by April Pendleton, RN  Flowsheets  Taken 7/8/2022 1426 by April Pendleton RN  Outcome Evaluation: Pt is medically ready for D/C. A&Ox4, VSS and no c/o pain. Pt being D/C'd via wheelchair van to [facility]. Report called to Gloria AMARO.  Taken 7/7/2022 1539 by Gris Coker PTA  Progress: improving  Plan of Care Reviewed With: patient  Goal: Patient-Specific Goal (Individualized)  Outcome: Met  Goal: Absence of Hospital-Acquired Illness or Injury  Outcome: Met  Intervention: Identify and Manage Fall Risk  Recent Flowsheet Documentation  Taken 7/8/2022 1425 by April Pendleton, RN  Safety Promotion/Fall Prevention:   activity supervised   assistive device/personal items within reach   clutter free environment maintained   fall prevention program maintained   lighting adjusted   nonskid shoes/slippers when out of bed   room organization consistent   safety round/check completed  Taken 7/8/2022 1204 by April Pendleton, RN  Safety Promotion/Fall Prevention:   activity supervised   assistive device/personal items within reach   clutter free environment maintained   fall prevention program maintained   lighting adjusted   nonskid shoes/slippers when out of bed   room organization consistent   safety round/check completed  Taken 7/8/2022 1030 by April Pendleton, RN  Safety Promotion/Fall Prevention:   activity supervised   assistive device/personal items within reach   clutter free environment maintained   fall prevention program maintained   lighting adjusted   nonskid shoes/slippers when out of bed   room organization consistent   safety round/check completed  Taken 7/8/2022 0930  by Crush, April M, RN  Safety Promotion/Fall Prevention:   activity supervised   assistive device/personal items within reach   clutter free environment maintained   fall prevention program maintained   lighting adjusted   nonskid shoes/slippers when out of bed   room organization consistent   safety round/check completed  Taken 7/8/2022 0800 by April Pendleton RN  Safety Promotion/Fall Prevention:   activity supervised   assistive device/personal items within reach   clutter free environment maintained   fall prevention program maintained   lighting adjusted   nonskid shoes/slippers when out of bed   room organization consistent   safety round/check completed  Intervention: Prevent and Manage VTE (Venous Thromboembolism) Risk  Recent Flowsheet Documentation  Taken 7/8/2022 1425 by April Pendleton, RN  Activity Management: activity adjusted per tolerance  Taken 7/8/2022 1204 by April Pendleton RN  Activity Management: activity adjusted per tolerance  Taken 7/8/2022 1030 by April Pendleton RN  Activity Management: activity adjusted per tolerance  Taken 7/8/2022 0930 by April Pendleton RN  Activity Management: activity adjusted per tolerance  VTE Prevention/Management: (subQ lovenox)   bilateral   dorsiflexion/plantar flexion performed   other (see comments)  Taken 7/8/2022 0800 by April Pendleton, RN  Activity Management: activity adjusted per tolerance  Intervention: Prevent Infection  Recent Flowsheet Documentation  Taken 7/8/2022 1425 by April Pendleton RN  Infection Prevention:   hand hygiene promoted   personal protective equipment utilized   rest/sleep promoted   single patient room provided   visitors restricted/screened  Taken 7/8/2022 1204 by April Pendleton RN  Infection Prevention:   hand hygiene promoted   rest/sleep promoted   personal protective equipment utilized   single patient room provided   visitors restricted/screened  Taken 7/8/2022 1030 by April Pendleton RN  Infection Prevention:    hand hygiene promoted   personal protective equipment utilized   rest/sleep promoted   single patient room provided   visitors restricted/screened  Taken 7/8/2022 0930 by April Pendleton, RN  Infection Prevention:   hand hygiene promoted   personal protective equipment utilized   rest/sleep promoted   single patient room provided   visitors restricted/screened  Taken 7/8/2022 0800 by April Pendleton, RN  Infection Prevention:   hand hygiene promoted   personal protective equipment utilized   rest/sleep promoted   single patient room provided   visitors restricted/screened  Goal: Optimal Comfort and Wellbeing  Outcome: Met  Intervention: Provide Person-Centered Care  Recent Flowsheet Documentation  Taken 7/8/2022 0930 by April Pendleton, RN  Trust Relationship/Rapport:   care explained   choices provided   emotional support provided   empathic listening provided   questions answered   questions encouraged   reassurance provided   thoughts/feelings acknowledged  Goal: Readiness for Transition of Care  Outcome: Met

## 2022-07-22 NOTE — TELEPHONE ENCOUNTER
I spoke to Dr. Howe's MANNY Motley. She states that Mela with scheduling is aware of this patient needing an appointment and Mela has been attempting to contact her.     Lynne Luciano RN  Triage [facility]

## 2022-08-12 ENCOUNTER — HOSPITAL ENCOUNTER (OUTPATIENT)
Dept: CARDIOLOGY | Facility: HOSPITAL | Age: 87
Discharge: HOME | End: 2022-08-12
Admitting: INTERNAL MEDICINE

## 2022-08-12 ENCOUNTER — OFFICE VISIT (OUTPATIENT)
Dept: CARDIOLOGY | Facility: CLINIC | Age: 87
End: 2022-08-12

## 2022-08-12 VITALS
BODY MASS INDEX: 21.38 KG/M2 | RESPIRATION RATE: 16 BRPM | HEART RATE: 65 BPM | OXYGEN SATURATION: 97 % | DIASTOLIC BLOOD PRESSURE: 80 MMHG | SYSTOLIC BLOOD PRESSURE: 136 MMHG | HEIGHT: 66 IN | WEIGHT: 133 LBS

## 2022-08-12 DIAGNOSIS — I10 ESSENTIAL HYPERTENSION: ICD-10-CM

## 2022-08-12 DIAGNOSIS — I48.19 ATRIAL FIBRILLATION, PERSISTENT: Primary | ICD-10-CM

## 2022-08-12 LAB
ANION GAP SERPL CALCULATED.3IONS-SCNC: 13.3 MMOL/L (ref 5–15)
BUN SERPL-MCNC: 13 MG/DL (ref 8–23)
BUN/CREAT SERPL: 12.1 (ref 7–25)
CALCIUM SPEC-SCNC: 10 MG/DL (ref 8.2–9.6)
CHLORIDE SERPL-SCNC: 105 MMOL/L (ref 98–107)
CO2 SERPL-SCNC: 25.7 MMOL/L (ref 22–29)
CREAT SERPL-MCNC: 1.07 MG/DL (ref 0.57–1)
EGFRCR SERPLBLD CKD-EPI 2021: 49.1 ML/MIN/1.73
GLUCOSE SERPL-MCNC: 122 MG/DL (ref 65–99)
POTASSIUM SERPL-SCNC: 3.9 MMOL/L (ref 3.5–5.2)
SODIUM SERPL-SCNC: 144 MMOL/L (ref 136–145)

## 2022-08-12 PROCEDURE — 93000 ELECTROCARDIOGRAM COMPLETE: CPT | Performed by: INTERNAL MEDICINE

## 2022-08-12 PROCEDURE — 99214 OFFICE O/P EST MOD 30 MIN: CPT | Performed by: INTERNAL MEDICINE

## 2022-08-12 PROCEDURE — 80048 BASIC METABOLIC PNL TOTAL CA: CPT | Performed by: INTERNAL MEDICINE

## 2022-08-12 PROCEDURE — 36415 COLL VENOUS BLD VENIPUNCTURE: CPT

## 2022-08-12 RX ORDER — ATORVASTATIN CALCIUM 40 MG/1
40 TABLET, FILM COATED ORAL DAILY
Qty: 90 TABLET | Refills: 3 | Status: SHIPPED | OUTPATIENT
Start: 2022-08-12

## 2022-08-12 RX ORDER — AMLODIPINE BESYLATE 5 MG/1
5 TABLET ORAL DAILY
Qty: 90 TABLET | Refills: 3 | Status: SHIPPED | OUTPATIENT
Start: 2022-08-12

## 2022-08-12 NOTE — PROGRESS NOTES
PATIENTINFORMATION    Date of Office Visit: 2022  Encounter Provider: Yossi Pastor MD  Place of Service: [facility] CARDIOLOGY  Patient Name: Ryann Hernadez  : 1931    Subjective:     Encounter Date:2022      Patient ID: Ryann Hernadez is a 89 y.o. female.    Chief Complaint   Patient presents with   • Heart Problem     Follow up  Patient states that Eliquis is too Expensive and she was told by [facility] that she is not a candidate for the watchman device.      HPI  Ms. Hernadez is a pleasant 89 years old lady who came to cardiac clinic for follow-up visit.  She was admitted in 2022 following fall accident and noted to have COVID infection.  She was noted to have elevated troponin and T wave inversions but echocardiogram was normal.  She was released to rehab.  She is almost back to back to baseline symptoms.  She still lives independently and ambulates using a walker.  No falls since after discharge.  She has been taking Eliquis since after discharge but cost is formidable.  She is getting samples.  No bleeding complications.    No significant new complaints today.  She was accompanied by her daughter today.      ROS  All systems reviewed and negative except as noted in HPI.    Past Medical History:   Diagnosis Date   • A-fib ([facility])    • Acute deep vein thrombosis (DVT) of calf muscle vein of left lower extremity ([facility]) 2020   • Allergic rhinitis    • Anemia    • Aneurysm, ascending aorta ([facility]) 2016    5 CM   • Anxiety    • Ataxic gait    • Atypical chest pain    • BPPV (benign paroxysmal positional vertigo)    • Bradycardia    • Chronic cystitis    • Chronic headaches    • Chronic nonintractable headache 2016   • Chronic paroxysmal hemicrania    • Depression    • Esophagitis    • Gastritis    • GERD (gastroesophageal reflux disease)    • Hematuria    • High blood pressure    • History of cataract    • History of irritable bowel syndrome    •  Hypertension    • IBS (irritable bowel syndrome)    • Influenza A    • Kidney lesion     LEFT KIDNEY CYSTIC   • Labyrinthitis 07/28/2014   • OA (osteoarthritis)    • Osteoporosis 9/13/2018   • Other abnormal clinical finding 09/21/2012    LEFT UTEROCELE   • Pain of thigh    • Personal history of gastric ulcer    • Renal disorder    • Renal insufficiency 8/7/2017   • Rhabdomyolysis 7/5/2022   • TIA (transient ischemic attack) 2/5/2020       Past Surgical History:   Procedure Laterality Date   • BREAST LUMPECTOMY     • BREAST SURGERY     • CATARACT EXTRACTION     • COLONOSCOPY N/A     DR. MARGARITA MENDOZA   • ELBOW ARTHROTOMY  07/01/1999    DR. RYAN RITCHIE   • EYE SURGERY     • FRACTURE SURGERY      HAND, BROKEN FINGERS  DR. MIKE CHRISTIANSON   • KNEE SURGERY  08/31/1999    DR. THADDEUS MENDES   • OOPHORECTOMY     • TUBAL ABDOMINAL LIGATION Bilateral 1962    DR. EDDA MANNING       Social History     Socioeconomic History   • Marital status: Single   Tobacco Use   • Smoking status: Never Smoker   • Smokeless tobacco: Never Used   Vaping Use   • Vaping Use: Never used   Substance and Sexual Activity   • Alcohol use: No   • Drug use: No   • Sexual activity: Defer       Family History   Problem Relation Age of Onset   • Heart attack Mother    • Arthritis Mother    • Arthritis Father    • Arthritis Sister    • Arthritis Other    • Kidney disease Other    • Thyroid disease Other    • Diabetes Other    • Hypertension Other    • Heart disease Other    • Arthritis Maternal Grandmother            ECG 12 Lead    Date/Time: 8/12/2022 10:39 AM  Performed by: Yossi Pastor MD  Authorized by: Yossi Pastor MD   Comparison: compared with previous ECG from 7/4/2022  Similar to previous ECG  Rhythm: atrial fibrillation  Rate: normal  Conduction: left anterior fascicular block  ST Segments: ST segments normal  T Waves: T waves normal  QRS axis: normal  Other: no other findings    Clinical impression: abnormal EKG              "  Objective:     /80 (BP Location: Left arm, Patient Position: Sitting, Cuff Size: Adult)   Pulse 65   Resp 16   Ht 167.6 cm (66\")   Wt 60.3 kg (133 lb)   SpO2 97%   BMI 21.47 kg/m²  Body mass index is 21.47 kg/m².     Constitutional:       General: Not in acute distress.     Appearance: Well-developed. Not diaphoretic.   Eyes:      Pupils: Pupils are equal, round, and reactive to light.   HENT:      Head: Normocephalic and atraumatic.   Neck:      Thyroid: No thyromegaly.   Pulmonary:      Effort: Pulmonary effort is normal. No respiratory distress.      Breath sounds: Normal breath sounds. No wheezing. No rales.   Chest:      Chest wall: Not tender to palpatation.   Cardiovascular:      Normal rate. Irregularly irregular rhythm.      No gallop.   Pulses:     Intact distal pulses.   Abdominal:      General: Bowel sounds are normal. There is no distension.      Palpations: Abdomen is soft.      Tenderness: There is no guarding.   Musculoskeletal: Normal range of motion.         General: No deformity.      Cervical back: Normal range of motion and neck supple. Skin:     General: Skin is warm and dry.      Findings: No rash.   Neurological:      Mental Status: Alert and oriented to person, place, and time.      Cranial Nerves: No cranial nerve deficit.      Deep Tendon Reflexes: Reflexes are normal and symmetric.   Psychiatric:         Judgment: Judgment normal.         Review Of Data: I have reviewed pertinent recent labs, images and documents and pertinent findings included in HPI or assessment below.    Lipid Panel    Lipid Panel 7/5/22   Total Cholesterol 182   Triglycerides 81   HDL Cholesterol 54   VLDL Cholesterol 15   LDL Cholesterol  113 (A)   LDL/HDL Ratio 2.07   (A) Abnormal value                Assessment/Plan:         1.  Persistent atrial fibrillation that is rate controlled on atenolol 25 mg p.o. daily  -Deep precordial T wave inversions involving V4 to V6 -known since July 2022.  -Elevated " troponin noted during admission for COVID/fall in July 2022-unremarkable echo  -[id number] Vasc score of 4( age, female and HTN)   -Preserved left ventricular ejection fraction prior cardiogram in July 2022  -Started anticoagulation since after discharge from hospital.  Declined anticoagulation in the past.        2. Hypertension   -Well-controlled on atenolol and amlodipine.     3. Ascending aortic aneurysm  -3.9 cm on echo.  No further work-up considering her age and after discussing with her.  -will fu on CT     4. Pulmonary hypertension   -Moderate  -No clinical evidence for right side heart failure      5.  Hypercholesterolemia on statin.    6.  Mild anemia-new.  Noted since hospital admission last months.    Check CBC and BMP.  Eliquis samples provided  Return to clinic in 3 months or sooner with any concerning symptoms.        Diagnosis and plan of care discussed with patient and verbalized understanding.            Your medication list          Accurate as of August 12, 2022 11:11 AM. If you have any questions, ask your nurse or doctor.            CHANGE how you take these medications      Instructions Last Dose Given Next Dose Due   atorvastatin 40 MG tablet  Commonly known as: LIPITOR  What changed:   · medication strength  · how much to take  · when to take this  Changed by: Yossi Pastor MD      Take 1 tablet by mouth Daily.          CONTINUE taking these medications      Instructions Last Dose Given Next Dose Due   amLODIPine 5 MG tablet  Commonly known as: NORVASC      Take 1 tablet by mouth Daily.       apixaban 2.5 MG tablet tablet  Commonly known as: ELIQUIS      Take 1 tablet by mouth Every 12 (Twelve) Hours. Indications: Atrial Fibrillation       atenolol 25 MG tablet  Commonly known as: TENORMIN      Take 1 tablet by mouth Daily.       gabapentin 300 MG capsule  Commonly known as: NEURONTIN      Take 1 capsule by mouth every night at bedtime.       One-A-Day Womens 50 Plus tablet  tablet  Generic drug: multivitamin with minerals      Take  by mouth.       vitamin D3 125 MCG (5000 UT) capsule capsule      Take 5,000 Units by mouth Daily.             Where to Get Your Medications      These medications were sent to [id number] - [place], [place] - [address] [facility] [facility] - [phone]  - [phone] FX  [address] [facility] [address]    Phone: [phone]   · amLODIPine 5 MG tablet  · atorvastatin 40 MG tablet             Yossi Pastor MD  08/12/22  11:11 EDT

## 2022-10-17 ENCOUNTER — OFFICE VISIT (OUTPATIENT)
Dept: NEUROLOGY | Facility: CLINIC | Age: 87
End: 2022-10-17

## 2022-10-17 ENCOUNTER — HOSPITAL ENCOUNTER (OUTPATIENT)
Dept: GENERAL RADIOLOGY | Facility: HOSPITAL | Age: 87
Discharge: HOME | End: 2022-10-17
Admitting: PHYSICIAN ASSISTANT

## 2022-10-17 VITALS
SYSTOLIC BLOOD PRESSURE: 128 MMHG | BODY MASS INDEX: 19.91 KG/M2 | OXYGEN SATURATION: 98 % | DIASTOLIC BLOOD PRESSURE: 80 MMHG | WEIGHT: 123.9 LBS | HEART RATE: 71 BPM | HEIGHT: 66 IN

## 2022-10-17 DIAGNOSIS — I48.0 PAROXYSMAL ATRIAL FIBRILLATION: ICD-10-CM

## 2022-10-17 DIAGNOSIS — M53.3 SACRAL PAIN: ICD-10-CM

## 2022-10-17 DIAGNOSIS — Z86.73 HISTORY OF STROKE: ICD-10-CM

## 2022-10-17 DIAGNOSIS — R52 PAIN: Primary | ICD-10-CM

## 2022-10-17 PROCEDURE — 99214 OFFICE O/P EST MOD 30 MIN: CPT | Performed by: PHYSICIAN ASSISTANT

## 2022-10-17 PROCEDURE — 72220 X-RAY EXAM SACRUM TAILBONE: CPT

## 2022-10-17 NOTE — PATIENT INSTRUCTIONS
Continue your Blood Pressure medications atenolol and norvasc    Continue your Eliquis.  If you have a fall or bleeding please call me and we will revisit this decision.    Stop you Lipitor    -Laura

## 2022-10-17 NOTE — PROGRESS NOTES
CC: Hospital follow-up    HPI:  Ryann Hernadez is a  89 y.o.  R-handed female who is here for hospital follow-up.  She is here with her daughter.  In July she suffered acute stroke of right frontal lobe.  She was also found to have history of A. fib but not on anticoagulation.  During that hospitalization she was treated for rhabdomyolysis and COVID-pneumonia.  On presentation she was found down between the toilet and bathtub by family members.  At discharge she went to acute rehab.  She has been initiated on Eliquis 2.5 twice daily.    Since discharge she has been having a lot of issues with pain in her sacrum. In fact this is mostly what she speaks of this encounter.  She describes it as tailbone pain.  It is still ongoing after 3 months.  She reports that she is taking a bath even though she admits she shouldn't be doing this unsupervised.  She says  'I am 89 years old and I have been through a lot'  She is ready 'to go to [facility]' She takes 5 daily medications and wishes to take less.    No further neurologic symptoms to report.  She is doing quite well from stroke recovery standpoint.  She had 3 and 4 out of 5 for strength of the left side in the hospital.  She was walking with a cane and only has slight drift of the left arm      Social history: lives alone, 4 children,     Family history:      Pain Scale:        ROS:  Review of Systems   Constitutional: Negative for chills, fatigue and fever.   HENT: Positive for hearing loss. Negative for tinnitus and trouble swallowing.    Eyes: Negative for photophobia, redness and visual disturbance.   Respiratory: Negative for cough, shortness of breath and wheezing.    Cardiovascular: Positive for leg swelling. Negative for chest pain and palpitations.   Gastrointestinal: Negative for diarrhea, nausea and vomiting.   Endocrine: Negative for cold intolerance, heat intolerance and polydipsia.   Genitourinary: Negative for decreased urine volume and urgency.  "  Musculoskeletal: Positive for back pain (LOWER ) and gait problem. Negative for neck pain and neck stiffness.   Skin: Negative for color change, rash and wound.   Allergic/Immunologic: Negative for environmental allergies, food allergies and immunocompromised state.   Neurological: Negative for dizziness, tremors, seizures, syncope, facial asymmetry, speech difficulty, weakness, light-headedness, numbness and headaches.   Hematological: Negative for adenopathy. Does not bruise/bleed easily.   Psychiatric/Behavioral: Positive for confusion, decreased concentration and sleep disturbance. The patient is nervous/anxious.          Reviewed ROS conducted by MA and luiz      Physical Exam:  Vitals:    10/17/22 1304   Weight: 56.2 kg (123 lb 14.4 oz)   Height: 167.6 cm (66\")     Orthostatic BP:    Body mass index is 20 kg/m².        General: pt well appearing, no distress  HEENT: Normocephalic, conjunctiva normal, external canals normal, no nasal discharge, moist mucous membranes  Neck: No lymphadenopathy, no JVD  CV: Regular rate, pedal edema +2  Pulmonary: Normal respiratory effort, clear to auscultation bilaterally  Extremities: no edema, bruising, or skin lesions  Skin: intact, very small open area above gluteal cleft, no drainage or sign of infection  Pysch: good eye contact, cooperative, full affect, euthymic mood, good attention, good insight, no si/hi  Mental: alert, conversant, AOx3, provides history,  Language fluent, names, repeats.  CN: CN II-XII intact, PERRL, EOMi, no gaze palsy or nystagmus, intact facial sensation, no facial assymetry, intact hearing, symmetric palate elevation, tongue midline, good SCM strength  Motor: Pronator drift of left upper extremity, 4+ of left iliopsoas and tibialis anterior   sensory: normal sensation to crude touch, temperature  Reflexes: 2+ throughout, neg babinski  Coordination: no ataxia on finger-nose  Gait: normal gait, cautious walks with cane, no ataxia, did not do " tandem      Results:      Lab Results   Component Value Date    GLUCOSE 122 (H) 08/12/2022    BUN 13 08/12/2022    CREATININE 1.07 (H) 08/12/2022    EGFRIFNONA 51 (L) 01/08/2020    EGFRIFAFRI 72 03/30/2021    BCR 12.1 08/12/2022    CO2 25.7 08/12/2022    CALCIUM 10.0 (H) 08/12/2022    PROTENTOTREF 7.9 01/08/2020    ALBUMIN 3.30 (L) 07/05/2022    LABIL2 1.3 01/08/2020    AST 42 (H) 07/05/2022    ALT 20 07/05/2022       Lab Results   Component Value Date    WBC 8.44 07/08/2022    HGB 10.0 (L) 07/08/2022    HCT 30.6 (L) 07/08/2022    MCV 97.1 (H) 07/08/2022     07/08/2022         .No results found for: RPR      Lab Results   Component Value Date    TSH 0.457 07/05/2022    R6FLIPE 8.1 04/02/2018         No results found for: [id number]      No results found for: FOLATE      Lab Results   Component Value Date    HGBA1C 6.20 (H) 07/05/2022         Lab Results   Component Value Date    GLUCOSE 122 (H) 08/12/2022    BUN 13 08/12/2022    CREATININE 1.07 (H) 08/12/2022    EGFRIFNONA 51 (L) 01/08/2020    EGFRIFAFRI 72 03/30/2021    BCR 12.1 08/12/2022    K 3.9 08/12/2022    CO2 25.7 08/12/2022    CALCIUM 10.0 (H) 08/12/2022    PROTENTOTREF 7.9 01/08/2020    ALBUMIN 3.30 (L) 07/05/2022    LABIL2 1.3 01/08/2020    AST 42 (H) 07/05/2022    ALT 20 07/05/2022         Diagnosis:  1. H/o R MCA/VENTURA stroke  2. Afib  3. Coccydynia    Impression: 89-year-old female with history of PAF admitted after being found down and found to have a right MCA stroke showing high cortical signs with extinction on double stimulation and right gaze preference and was farily weak of her left leg.  While she has history of PAF she had declined anticoagulation in the past but agreed at time of her acute stroke.  At discharge she was initiated on Eliquis 2.5 twice daily.  We spent a lot of time discussing her medications today.  She does not want to take as many medications in all.  She expresses that she only wants to take the ones that she knows the  indications for.  She can tell me what it means if we stop her Eliquis including high risk for stroke including death at which point she says she is ready to go to [facility].  We discussed the side effects of anticoagulation including increased risk of bleeding,not limited to GI bleed, hemorrhagic stroke.  She has not had any falls and I do not see absolute contraindication to this.  At this time we will continue Eliquis and she is in agreement.  Though this will be an ongoing consideration.  I do not see that it is worthwhile to continue the Lipitor given her age and explicit want to be on less medications.      Plan:  1. Continue Eliquis 2.5 bid  2. Given age and no significant atherosclerosis on MRA it is not unreasonable to d/c lipitor given patient's goal of less medication.    3. Sacral xray ordered though conservative treatment is likely all she will want, it has been persistent over 3 months, and she is interested in w/u  4. Encouraged more social interaction like getting back to [employer].  We discussed her driving ability; she does not have hard restrictions that I see now, but she  should prepare for this and have family ride with her for better evaluation  5. Placed order for home health  given she mentioned several time children not always available    F/u in 4 months    I spent at least 30 minutes interviewing, examining, and counseling patient.  I independently reviewed documentation, laboratory and diagnostic findings, external documentation where applicable, and formulated treatment plan which was discussed with the patient.

## 2022-10-24 ENCOUNTER — HOME HEALTH ADMISSION (OUTPATIENT)
Dept: HOME HEALTH SERVICES | Facility: HOME HEALTHCARE | Age: 87
End: 2022-10-24

## 2022-12-01 ENCOUNTER — TELEPHONE (OUTPATIENT)
Dept: CARDIOLOGY | Facility: CLINIC | Age: 87
End: 2022-12-01

## 2022-12-01 NOTE — TELEPHONE ENCOUNTER
Pt's daughter called requesting samples of eliquis 2.5mg.  Can you advise?    [facility]

## 2022-12-06 ENCOUNTER — TELEPHONE (OUTPATIENT)
Dept: NEUROLOGY | Facility: CLINIC | Age: 87
End: 2022-12-06

## 2022-12-06 NOTE — TELEPHONE ENCOUNTER
Provider: VETO SCHWARTZ APRN    Caller: ALBA LARSON    Relationship to Patient: [facility]       Phone Number: [phone]      Reason for Call: CALLING REGARDING THE REFERRAL FOR PHYSICAL THERAPY.  STATED PT HAD NO IDEA SHE WAS COMING TO THE HOUSE.  I CHECKED REFERRAL ON 10-17-22.  THEY WERE ASKING IF IT FOR HOME MONITORING ALSO.  STATED PT HAD ANOTHER REFERRAL ON 10-2-22.    DOES PT NEED PHYSICAL THERAPY OR THE HOME MONITORING?     STATED INSURANCE WILL NOT COVER THE HOME MONITORING.   ALBA ALSO STATED PT IS DOING EVERYTHING BY HERSELF.        PLEASE ADVISE

## 2022-12-28 ENCOUNTER — TELEPHONE (OUTPATIENT)
Dept: CARDIOLOGY | Facility: CLINIC | Age: 87
End: 2022-12-28

## 2022-12-28 NOTE — TELEPHONE ENCOUNTER
[phone]    Pt's daughter called and lm asking for samples of eliquis 2.5mg.  Please advise.    [facility]

## 2023-03-22 ENCOUNTER — OFFICE VISIT (OUTPATIENT)
Dept: CARDIOLOGY | Facility: CLINIC | Age: 88
End: 2023-03-22
Payer: MEDICARE

## 2023-03-22 VITALS
HEART RATE: 59 BPM | BODY MASS INDEX: 21.69 KG/M2 | WEIGHT: 134.4 LBS | SYSTOLIC BLOOD PRESSURE: 114 MMHG | DIASTOLIC BLOOD PRESSURE: 78 MMHG | OXYGEN SATURATION: 92 %

## 2023-03-22 DIAGNOSIS — I48.0 PAROXYSMAL ATRIAL FIBRILLATION: Primary | ICD-10-CM

## 2023-03-22 DIAGNOSIS — I71.21 ANEURYSM OF ASCENDING AORTA WITHOUT RUPTURE: ICD-10-CM

## 2023-03-22 PROCEDURE — 93000 ELECTROCARDIOGRAM COMPLETE: CPT | Performed by: INTERNAL MEDICINE

## 2023-03-22 PROCEDURE — 99214 OFFICE O/P EST MOD 30 MIN: CPT | Performed by: INTERNAL MEDICINE

## 2023-03-22 RX ORDER — ATENOLOL 50 MG/1
1 TABLET ORAL DAILY
COMMUNITY
Start: 2022-12-28

## 2023-03-22 RX ORDER — DONEPEZIL HYDROCHLORIDE 5 MG/1
1 TABLET, FILM COATED ORAL DAILY
COMMUNITY
Start: 2023-01-21

## 2023-03-22 NOTE — PROGRESS NOTES
PATIENTINFORMATION    Date of Office Visit: 2023  Encounter Provider: Yossi Pastor MD  Place of Service: [facility] CARDIOLOGY  Patient Name: Ryann Hernadez  : 1931    Subjective:     Encounter Date:2023      Patient ID: Ryann Hernadez is a 89 y.o. female.    Chief Complaint   Patient presents with   • Atrial Fibrillation     HPI  Ms. Hernadez is a pleasant 89 years old lady who came to cardiac clinic for follow-up visit.  She was accompanied by daughter.  She still lives independently by herself.  She denies any rest or exertional chest pain, shortness of breath.  No orthopnea, PND, palpitations, presyncope or syncope,  extremity swelling, falls.  Ambulates using a cane.  Compliant with current medications without significant side effects.    No ER visits or hospitalizations since last clinic visit      ROS  All systems reviewed and negative except as noted in HPI.    Past Medical History:   Diagnosis Date   • A-fib ([facility])    • Acute deep vein thrombosis (DVT) of calf muscle vein of left lower extremity ([facility]) 2020   • Allergic rhinitis    • Anemia    • Aneurysm, ascending aorta 2016    5 CM   • Anxiety    • Ataxic gait    • Atypical chest pain    • BPPV (benign paroxysmal positional vertigo)    • Bradycardia    • Chronic cystitis    • Chronic headaches    • Chronic nonintractable headache 2016   • Chronic paroxysmal hemicrania    • Depression    • Esophagitis    • Gastritis    • GERD (gastroesophageal reflux disease)    • Hematuria    • High blood pressure    • History of cataract    • History of irritable bowel syndrome    • Hypertension    • IBS (irritable bowel syndrome)    • Influenza A    • Kidney lesion     LEFT KIDNEY CYSTIC   • Labyrinthitis 2014   • OA (osteoarthritis)    • Osteoporosis 2018   • Other abnormal clinical finding 2012    LEFT UTEROCELE   • Pain of thigh    • Personal history of gastric ulcer    • Renal  disorder    • Renal insufficiency 8/7/2017   • Rhabdomyolysis 7/5/2022   • TIA (transient ischemic attack) 2/5/2020       Past Surgical History:   Procedure Laterality Date   • BREAST LUMPECTOMY     • BREAST SURGERY     • CATARACT EXTRACTION     • COLONOSCOPY N/A     DR. MARGARITA MENDOZA   • ELBOW ARTHROTOMY  07/01/1999    DR. RYAN RITCHIE   • EYE SURGERY     • FRACTURE SURGERY      HAND, BROKEN FINGERS  DR. MIKE CHRISTIANSON   • KNEE SURGERY  08/31/1999    DR. THADDEUS MENDES   • OOPHORECTOMY     • TUBAL ABDOMINAL LIGATION Bilateral 1962    DR. EDDA MANNING       Social History     Socioeconomic History   • Marital status: Single   Tobacco Use   • Smoking status: Never   • Smokeless tobacco: Never   Vaping Use   • Vaping Use: Never used   Substance and Sexual Activity   • Alcohol use: No   • Drug use: No   • Sexual activity: Defer       Family History   Problem Relation Age of Onset   • Heart attack Mother    • Arthritis Mother    • Arthritis Father    • Arthritis Sister    • Arthritis Other    • Kidney disease Other    • Thyroid disease Other    • Diabetes Other    • Hypertension Other    • Heart disease Other    • Arthritis Maternal Grandmother            ECG 12 Lead    Date/Time: 3/22/2023 10:35 AM  Performed by: Yossi Pastor MD  Authorized by: Yossi Pastor MD   Comparison: compared with previous ECG from 8/12/2022  Comparison to previous ECG: Sinus rhythm replaces afib on this tracing   Rhythm: sinus rhythm  Rate: normal  Conduction: conduction normal  ST Segments: ST segments normal  T Waves: T waves normal  QRS axis: normal  Other findings: T wave abnormality    Clinical impression: abnormal EKG               Objective:     /78   Pulse 59   Wt 61 kg (134 lb 6.4 oz)   SpO2 92%   BMI 21.69 kg/m²  Body mass index is 21.69 kg/m².     Constitutional:       General: Not in acute distress.     Appearance: Well-developed. Not diaphoretic.   Eyes:      Pupils: Pupils are equal, round, and reactive to  light.   HENT:      Head: Normocephalic and atraumatic.   Neck:      Thyroid: No thyromegaly.   Pulmonary:      Effort: Pulmonary effort is normal. No respiratory distress.      Breath sounds: Normal breath sounds. No wheezing. No rales.   Chest:      Chest wall: Not tender to palpatation.   Cardiovascular:      Normal rate.      No gallop.   Pulses:     Intact distal pulses.   Edema:     Peripheral edema absent.   Abdominal:      General: Bowel sounds are normal. There is no distension.      Palpations: Abdomen is soft.      Tenderness: There is no guarding.   Musculoskeletal: Normal range of motion.         General: No deformity.      Cervical back: Normal range of motion and neck supple. Skin:     General: Skin is warm and dry.      Findings: No rash.   Neurological:      Mental Status: Alert and oriented to person, place, and time.      Cranial Nerves: No cranial nerve deficit.      Deep Tendon Reflexes: Reflexes are normal and symmetric.   Psychiatric:         Judgment: Judgment normal.         Review Of Data: I have reviewed pertinent recent labs, images and documents and pertinent findings included in HPI or assessment below.    Lipid Panel    Lipid Panel 7/5/22   Total Cholesterol 182   Triglycerides 81   HDL Cholesterol 54   VLDL Cholesterol 15   LDL Cholesterol  113 (A)   LDL/HDL Ratio 2.07   (A) Abnormal value                Assessment/Plan:         1.  History of persistent atrial fibrillation but since last clinic visit she has converted to sinus rhythm spontaneously.  -Deep precordial T wave inversions involving V4 to V6 -known since July 2022-improved Deon on EKG done today.  -Elevated troponin noted during admission for COVID/fall in July 2022-unremarkable echo  -[id number] Vasc score of 4( age, female and HTN)   -Preserved left ventricular ejection fraction prior cardiogram in July 2022  -Continue atenolol and Eliquis.       2. Hypertension   -Excellent control with current regimen.     3. Ascending  aortic aneurysm  I do not think she is a candidate for intervention but I will check with echocardiogram     4. Pulmonary hypertension   -Moderate  -No clinical evidence for right side heart failure      5.  Hypercholesterolemia on statin.     6.    History of mild anemia    Ms. Hernadez's daughter concerned about her mom living by herself but Ms. Hernadez is adamant and she says she is fine living by herself.  She will return to clinic in 6 months or sooner with any concerning symptoms  Diagnosis and plan of care discussed with patient and verbalized understanding.            Your medication list          Accurate as of March 22, 2023  4:17 PM. If you have any questions, ask your nurse or doctor.            CHANGE how you take these medications      Instructions Last Dose Given Next Dose Due   atenolol 50 MG tablet  Commonly known as: TENORMIN  What changed: Another medication with the same name was removed. Continue taking this medication, and follow the directions you see here.  Changed by: Yossi Pastor MD      Take 1 tablet by mouth Daily.          CONTINUE taking these medications      Instructions Last Dose Given Next Dose Due   amLODIPine 5 MG tablet  Commonly known as: NORVASC      Take 1 tablet by mouth Daily.       apixaban 2.5 MG tablet tablet  Commonly known as: ELIQUIS      Take 1 tablet by mouth Every 12 (Twelve) Hours. Indications: Atrial Fibrillation       atorvastatin 40 MG tablet  Commonly known as: LIPITOR      Take 1 tablet by mouth Daily.       donepezil 5 MG tablet  Commonly known as: ARICEPT      Take 1 tablet by mouth Daily.       One-A-Day Womens 50 Plus tablet tablet  Generic drug: multivitamin with minerals      Take  by mouth.       vitamin D3 125 MCG (5000 UT) capsule capsule      Take 1 capsule by mouth Daily.                  Yossi Pastor MD  03/22/23  16:17 EDT

## 2023-04-04 ENCOUNTER — HOSPITAL ENCOUNTER (OUTPATIENT)
Dept: CARDIOLOGY | Facility: HOSPITAL | Age: 88
Discharge: HOME | End: 2023-04-04
Admitting: INTERNAL MEDICINE
Payer: MEDICARE

## 2023-04-04 VITALS
BODY MASS INDEX: 21.53 KG/M2 | HEART RATE: 50 BPM | WEIGHT: 134 LBS | DIASTOLIC BLOOD PRESSURE: 86 MMHG | SYSTOLIC BLOOD PRESSURE: 134 MMHG | HEIGHT: 66 IN

## 2023-04-04 DIAGNOSIS — I71.21 ANEURYSM OF ASCENDING AORTA WITHOUT RUPTURE: ICD-10-CM

## 2023-04-04 LAB
AORTIC ARCH: 3.4 CM
ASCENDING AORTA: 5.3 CM
AV VENA CONTRACTA: 0.41 CM
BH CV ECHO MEAS - ACS: 1.47 CM
BH CV ECHO MEAS - AI P1/2T: 1221 MSEC
BH CV ECHO MEAS - AO MAX PG: 9 MMHG
BH CV ECHO MEAS - AO MEAN PG: 5.5 MMHG
BH CV ECHO MEAS - AO ROOT DIAM: 3.6 CM
BH CV ECHO MEAS - AO V2 MAX: 149.9 CM/SEC
BH CV ECHO MEAS - AO V2 VTI: 36.6 CM
BH CV ECHO MEAS - AVA(I,D): 1.83 CM2
BH CV ECHO MEAS - EDV(CUBED): 157.9 ML
BH CV ECHO MEAS - EDV(MOD-SP2): 97 ML
BH CV ECHO MEAS - EDV(MOD-SP4): 77 ML
BH CV ECHO MEAS - EF(MOD-BP): 65.5 %
BH CV ECHO MEAS - EF(MOD-SP2): 66 %
BH CV ECHO MEAS - EF(MOD-SP4): 66.2 %
BH CV ECHO MEAS - ESV(CUBED): 17.9 ML
BH CV ECHO MEAS - ESV(MOD-SP2): 33 ML
BH CV ECHO MEAS - ESV(MOD-SP4): 26 ML
BH CV ECHO MEAS - FS: 51.6 %
BH CV ECHO MEAS - IVS/LVPW: 0.96 CM
BH CV ECHO MEAS - IVSD: 1.05 CM
BH CV ECHO MEAS - LAT PEAK E' VEL: 4.6 CM/SEC
BH CV ECHO MEAS - LV DIASTOLIC VOL/BSA (35-75): 45.6 CM2
BH CV ECHO MEAS - LV MASS(C)D: 227.8 GRAMS
BH CV ECHO MEAS - LV MAX PG: 3.6 MMHG
BH CV ECHO MEAS - LV MEAN PG: 1.97 MMHG
BH CV ECHO MEAS - LV SYSTOLIC VOL/BSA (12-30): 15.4 CM2
BH CV ECHO MEAS - LV V1 MAX: 94.7 CM/SEC
BH CV ECHO MEAS - LV V1 VTI: 22.2 CM
BH CV ECHO MEAS - LVIDD: 5.4 CM
BH CV ECHO MEAS - LVIDS: 2.6 CM
BH CV ECHO MEAS - LVOT AREA: 3 CM2
BH CV ECHO MEAS - LVOT DIAM: 1.96 CM
BH CV ECHO MEAS - LVPWD: 1.09 CM
BH CV ECHO MEAS - MED PEAK E' VEL: 3.9 CM/SEC
BH CV ECHO MEAS - MR MAX PG: 106 MMHG
BH CV ECHO MEAS - MR MAX VEL: 514.7 CM/SEC
BH CV ECHO MEAS - MV A DUR: 0.13 SEC
BH CV ECHO MEAS - MV A MAX VEL: 87 CM/SEC
BH CV ECHO MEAS - MV DEC SLOPE: 191 CM/SEC2
BH CV ECHO MEAS - MV DEC TIME: 0.24 MSEC
BH CV ECHO MEAS - MV E MAX VEL: 76.7 CM/SEC
BH CV ECHO MEAS - MV E/A: 0.88
BH CV ECHO MEAS - MV MAX PG: 4.9 MMHG
BH CV ECHO MEAS - MV MEAN PG: 1.21 MMHG
BH CV ECHO MEAS - MV P1/2T: 163.2 MSEC
BH CV ECHO MEAS - MV V2 VTI: 49.7 CM
BH CV ECHO MEAS - MVA(P1/2T): 1.35 CM2
BH CV ECHO MEAS - MVA(VTI): 1.35 CM2
BH CV ECHO MEAS - PA ACC TIME: 0.13 SEC
BH CV ECHO MEAS - PA PR(ACCEL): 22 MMHG
BH CV ECHO MEAS - PA V2 MAX: 142.5 CM/SEC
BH CV ECHO MEAS - PI END-D VEL: 143.9 CM/SEC
BH CV ECHO MEAS - PULM A REVS DUR: 0.14 SEC
BH CV ECHO MEAS - PULM A REVS VEL: 22.3 CM/SEC
BH CV ECHO MEAS - PULM DIAS VEL: 52.3 CM/SEC
BH CV ECHO MEAS - PULM S/D: 1.21
BH CV ECHO MEAS - PULM SYS VEL: 63.4 CM/SEC
BH CV ECHO MEAS - QP/QS: 1.18
BH CV ECHO MEAS - RAP SYSTOLE: 15 MMHG
BH CV ECHO MEAS - RV MAX PG: 2.8 MMHG
BH CV ECHO MEAS - RV V1 MAX: 83.6 CM/SEC
BH CV ECHO MEAS - RV V1 VTI: 20.2 CM
BH CV ECHO MEAS - RVOT DIAM: 2.23 CM
BH CV ECHO MEAS - RVSP: 57.5 MMHG
BH CV ECHO MEAS - SI(MOD-SP2): 37.9 ML/M2
BH CV ECHO MEAS - SI(MOD-SP4): 30.2 ML/M2
BH CV ECHO MEAS - SUP REN AO DIAM: 2.6 CM
BH CV ECHO MEAS - SV(LVOT): 66.9 ML
BH CV ECHO MEAS - SV(MOD-SP2): 64 ML
BH CV ECHO MEAS - SV(MOD-SP4): 51 ML
BH CV ECHO MEAS - SV(RVOT): 78.8 ML
BH CV ECHO MEAS - TAPSE (>1.6): 2.04 CM
BH CV ECHO MEAS - TR MAX PG: 42.5 MMHG
BH CV ECHO MEAS - TR MAX VEL: 325.8 CM/SEC
BH CV ECHO MEASUREMENTS AVERAGE E/E' RATIO: 18.05
BH CV XLRA - RV BASE: 2.7 CM
BH CV XLRA - RV LENGTH: 6.3 CM
BH CV XLRA - RV MID: 2.5 CM
BH CV XLRA - TDI S': 11.4 CM/SEC
LEFT ATRIUM VOLUME INDEX: 55.4 ML/M2
MAXIMAL PREDICTED HEART RATE: 129 BPM
SINUS: 3.3 CM
STJ: 3 CM
STRESS TARGET HR: 110 BPM

## 2023-04-04 PROCEDURE — 93306 TTE W/DOPPLER COMPLETE: CPT | Performed by: STUDENT IN AN ORGANIZED HEALTH CARE EDUCATION/TRAINING PROGRAM

## 2023-04-04 PROCEDURE — 93306 TTE W/DOPPLER COMPLETE: CPT

## 2023-04-12 NOTE — PROGRESS NOTES
I have called and discussed results with her daughter.  Patient had declined patient had declined surgical referral in the past and considering her age I did not think she is a candidate for surgery.  Daughter will discuss with her mom and call me with updates.  In the meantime continue medical therapy with beta-blocker.  Her blood pressure and heart rate are at goal currently.

## 2023-04-28 ENCOUNTER — TELEPHONE (OUTPATIENT)
Dept: CARDIOLOGY | Facility: CLINIC | Age: 88
End: 2023-04-28
Payer: MEDICARE

## 2023-04-28 NOTE — TELEPHONE ENCOUNTER
Pt left a VM stating she needed to have a tooth surgery and was being seen by [facility] today.  Pt seen [facility] 3 wks ago and needs direction on her blood thinner so she can have the surgery.    Clearance can be faxed to [phone]

## 2023-05-25 NOTE — SIGNIFICANT NOTE
02/06/20 1023   Rehab Treatment   Discipline speech language pathologist   Reason Treatment Not Performed other (see comments)  (Discussed with RN. MRI negative, passed swallow screen, and tolerating regular and thin. Per RN no apparent speech/language/cognitive deficits. ST to s/o, please re-consult as warranted.)      162.56

## 2023-06-19 ENCOUNTER — TELEPHONE (OUTPATIENT)
Dept: CARDIOLOGY | Facility: CLINIC | Age: 88
End: 2023-06-19
Payer: MEDICARE

## 2023-06-19 NOTE — TELEPHONE ENCOUNTER
Pt requested samples eliquis 2.5mg to be picked up at [facility].  Please advise if there are samples.  [facility]

## 2023-08-04 DIAGNOSIS — I10 ESSENTIAL HYPERTENSION: ICD-10-CM

## 2023-08-04 RX ORDER — AMLODIPINE BESYLATE 5 MG/1
5 TABLET ORAL DAILY
Qty: 90 TABLET | Refills: 3 | Status: SHIPPED | OUTPATIENT
Start: 2023-08-04

## 2024-02-07 ENCOUNTER — TELEPHONE (OUTPATIENT)
Dept: CARDIOLOGY | Facility: CLINIC | Age: 89
End: 2024-02-07
Payer: MEDICARE

## 2024-02-07 NOTE — TELEPHONE ENCOUNTER
Caller: Rhea Angelo    Relationship to patient: Emergency Contact    Best call back number:  [phone]    Patient is needing: PATIENT'S DAUGHTER REQUESTING SAMPLES OF ELIQUIS 2.5 MG

## 2024-02-29 ENCOUNTER — APPOINTMENT (OUTPATIENT)
Dept: GENERAL RADIOLOGY | Facility: HOSPITAL | Age: 89
DRG: 605 | End: 2024-02-29
Payer: MEDICARE

## 2024-02-29 ENCOUNTER — HOSPITAL ENCOUNTER (INPATIENT)
Facility: HOSPITAL | Age: 89
LOS: 4 days | Discharge: NURSING, REHAB OR LONG-TERM CARE | DRG: 605 | End: 2024-03-05
Attending: EMERGENCY MEDICINE | Admitting: INTERNAL MEDICINE
Payer: MEDICARE

## 2024-02-29 ENCOUNTER — APPOINTMENT (OUTPATIENT)
Dept: CT IMAGING | Facility: HOSPITAL | Age: 89
DRG: 605 | End: 2024-02-29
Payer: MEDICARE

## 2024-02-29 DIAGNOSIS — S09.90XA INJURY OF HEAD, INITIAL ENCOUNTER: ICD-10-CM

## 2024-02-29 DIAGNOSIS — R29.6 UNWITNESSED FALL: Primary | ICD-10-CM

## 2024-02-29 DIAGNOSIS — Z78.9 DEFICIT IN ACTIVITIES OF DAILY LIVING (ADL): ICD-10-CM

## 2024-02-29 DIAGNOSIS — M25.551 ACUTE RIGHT HIP PAIN: ICD-10-CM

## 2024-02-29 DIAGNOSIS — Z86.59 HISTORY OF DEMENTIA: ICD-10-CM

## 2024-02-29 LAB
ALBUMIN SERPL-MCNC: 4 G/DL (ref 3.5–5.2)
ALBUMIN/GLOB SERPL: 1.3 G/DL
ALP SERPL-CCNC: 85 U/L (ref 39–117)
ALT SERPL W P-5'-P-CCNC: 18 U/L (ref 1–33)
ANION GAP SERPL CALCULATED.3IONS-SCNC: 13.8 MMOL/L (ref 5–15)
APTT PPP: 30.4 SECONDS (ref 22.7–35.4)
AST SERPL-CCNC: 23 U/L (ref 1–32)
BASOPHILS # BLD AUTO: 0.03 10*3/MM3 (ref 0–0.2)
BASOPHILS NFR BLD AUTO: 0.4 % (ref 0–1.5)
BILIRUB SERPL-MCNC: 1.9 MG/DL (ref 0–1.2)
BUN SERPL-MCNC: 11 MG/DL (ref 8–23)
BUN/CREAT SERPL: 10.5 (ref 7–25)
CALCIUM SPEC-SCNC: 9.9 MG/DL (ref 8.2–9.6)
CHLORIDE SERPL-SCNC: 105 MMOL/L (ref 98–107)
CO2 SERPL-SCNC: 26.2 MMOL/L (ref 22–29)
CREAT SERPL-MCNC: 1.05 MG/DL (ref 0.57–1)
DEPRECATED RDW RBC AUTO: 46 FL (ref 37–54)
EGFRCR SERPLBLD CKD-EPI 2021: 50 ML/MIN/1.73
EOSINOPHIL # BLD AUTO: 0.02 10*3/MM3 (ref 0–0.4)
EOSINOPHIL NFR BLD AUTO: 0.3 % (ref 0.3–6.2)
ERYTHROCYTE [DISTWIDTH] IN BLOOD BY AUTOMATED COUNT: 13 % (ref 12.3–15.4)
GLOBULIN UR ELPH-MCNC: 3.1 GM/DL
GLUCOSE SERPL-MCNC: 123 MG/DL (ref 65–99)
HCT VFR BLD AUTO: 37.7 % (ref 34–46.6)
HGB BLD-MCNC: 12.3 G/DL (ref 12–15.9)
IMM GRANULOCYTES # BLD AUTO: 0.03 10*3/MM3 (ref 0–0.05)
IMM GRANULOCYTES NFR BLD AUTO: 0.4 % (ref 0–0.5)
INR PPP: 1.14 (ref 0.9–1.1)
LYMPHOCYTES # BLD AUTO: 0.9 10*3/MM3 (ref 0.7–3.1)
LYMPHOCYTES NFR BLD AUTO: 11.8 % (ref 19.6–45.3)
MCH RBC QN AUTO: 31.8 PG (ref 26.6–33)
MCHC RBC AUTO-ENTMCNC: 32.6 G/DL (ref 31.5–35.7)
MCV RBC AUTO: 97.4 FL (ref 79–97)
MONOCYTES # BLD AUTO: 0.5 10*3/MM3 (ref 0.1–0.9)
MONOCYTES NFR BLD AUTO: 6.5 % (ref 5–12)
NEUTROPHILS NFR BLD AUTO: 6.17 10*3/MM3 (ref 1.7–7)
NEUTROPHILS NFR BLD AUTO: 80.6 % (ref 42.7–76)
NRBC BLD AUTO-RTO: 0 /100 WBC (ref 0–0.2)
PLATELET # BLD AUTO: 184 10*3/MM3 (ref 140–450)
PMV BLD AUTO: 10.3 FL (ref 6–12)
POTASSIUM SERPL-SCNC: 3.9 MMOL/L (ref 3.5–5.2)
PROT SERPL-MCNC: 7.1 G/DL (ref 6–8.5)
PROTHROMBIN TIME: 14.8 SECONDS (ref 11.7–14.2)
RBC # BLD AUTO: 3.87 10*6/MM3 (ref 3.77–5.28)
SODIUM SERPL-SCNC: 145 MMOL/L (ref 136–145)
WBC NRBC COR # BLD AUTO: 7.65 10*3/MM3 (ref 3.4–10.8)

## 2024-02-29 PROCEDURE — 80053 COMPREHEN METABOLIC PANEL: CPT | Performed by: PHYSICIAN ASSISTANT

## 2024-02-29 PROCEDURE — 72131 CT LUMBAR SPINE W/O DYE: CPT

## 2024-02-29 PROCEDURE — 99285 EMERGENCY DEPT VISIT HI MDM: CPT

## 2024-02-29 PROCEDURE — 85610 PROTHROMBIN TIME: CPT | Performed by: PHYSICIAN ASSISTANT

## 2024-02-29 PROCEDURE — 85025 COMPLETE CBC W/AUTO DIFF WBC: CPT | Performed by: PHYSICIAN ASSISTANT

## 2024-02-29 PROCEDURE — 72192 CT PELVIS W/O DYE: CPT

## 2024-02-29 PROCEDURE — G0378 HOSPITAL OBSERVATION PER HR: HCPCS

## 2024-02-29 PROCEDURE — 70450 CT HEAD/BRAIN W/O DYE: CPT

## 2024-02-29 PROCEDURE — 85730 THROMBOPLASTIN TIME PARTIAL: CPT | Performed by: PHYSICIAN ASSISTANT

## 2024-02-29 PROCEDURE — 72125 CT NECK SPINE W/O DYE: CPT

## 2024-02-29 PROCEDURE — 73502 X-RAY EXAM HIP UNI 2-3 VIEWS: CPT

## 2024-02-29 RX ORDER — SODIUM CHLORIDE 0.9 % (FLUSH) 0.9 %
10 SYRINGE (ML) INJECTION AS NEEDED
Status: DISCONTINUED | OUTPATIENT
Start: 2024-02-29 | End: 2024-03-05 | Stop reason: HOSPADM

## 2024-02-29 RX ORDER — AMLODIPINE BESYLATE 5 MG/1
5 TABLET ORAL DAILY
Status: DISCONTINUED | OUTPATIENT
Start: 2024-03-01 | End: 2024-03-05 | Stop reason: HOSPADM

## 2024-02-29 RX ORDER — CHLORAL HYDRATE 500 MG
CAPSULE ORAL
COMMUNITY

## 2024-02-29 RX ORDER — ONDANSETRON 2 MG/ML
4 INJECTION INTRAMUSCULAR; INTRAVENOUS ONCE
Status: DISCONTINUED | OUTPATIENT
Start: 2024-02-29 | End: 2024-03-05 | Stop reason: HOSPADM

## 2024-02-29 RX ORDER — TRIAMCINOLONE ACETONIDE 1 MG/G
OINTMENT TOPICAL
COMMUNITY
Start: 2024-02-06

## 2024-02-29 RX ORDER — ACETAMINOPHEN 160 MG/5ML
650 SOLUTION ORAL EVERY 4 HOURS PRN
Status: DISCONTINUED | OUTPATIENT
Start: 2024-02-29 | End: 2024-03-05 | Stop reason: HOSPADM

## 2024-02-29 RX ORDER — FUROSEMIDE 20 MG/1
20 TABLET ORAL DAILY
COMMUNITY
Start: 2023-11-17 | End: 2024-11-16

## 2024-02-29 RX ORDER — ACETAMINOPHEN 500 MG
500 TABLET ORAL EVERY 6 HOURS PRN
COMMUNITY

## 2024-02-29 RX ORDER — FUROSEMIDE 20 MG/1
20 TABLET ORAL DAILY
Status: DISCONTINUED | OUTPATIENT
Start: 2024-03-01 | End: 2024-03-05 | Stop reason: HOSPADM

## 2024-02-29 RX ORDER — BISACODYL 10 MG
10 SUPPOSITORY, RECTAL RECTAL DAILY PRN
Status: DISCONTINUED | OUTPATIENT
Start: 2024-02-29 | End: 2024-03-05 | Stop reason: HOSPADM

## 2024-02-29 RX ORDER — POLYETHYLENE GLYCOL 3350 17 G/17G
17 POWDER, FOR SOLUTION ORAL DAILY PRN
Status: DISCONTINUED | OUTPATIENT
Start: 2024-02-29 | End: 2024-03-05 | Stop reason: HOSPADM

## 2024-02-29 RX ORDER — ACETAMINOPHEN 325 MG/1
650 TABLET ORAL EVERY 4 HOURS PRN
Status: DISCONTINUED | OUTPATIENT
Start: 2024-02-29 | End: 2024-03-05 | Stop reason: HOSPADM

## 2024-02-29 RX ORDER — MORPHINE SULFATE 2 MG/ML
2 INJECTION, SOLUTION INTRAMUSCULAR; INTRAVENOUS ONCE
Status: DISCONTINUED | OUTPATIENT
Start: 2024-02-29 | End: 2024-03-05 | Stop reason: HOSPADM

## 2024-02-29 RX ORDER — BISACODYL 5 MG/1
5 TABLET, DELAYED RELEASE ORAL DAILY PRN
Status: DISCONTINUED | OUTPATIENT
Start: 2024-02-29 | End: 2024-03-05 | Stop reason: HOSPADM

## 2024-02-29 RX ORDER — ONDANSETRON 2 MG/ML
4 INJECTION INTRAMUSCULAR; INTRAVENOUS EVERY 6 HOURS PRN
Status: DISCONTINUED | OUTPATIENT
Start: 2024-02-29 | End: 2024-03-05 | Stop reason: HOSPADM

## 2024-02-29 RX ORDER — ACETAMINOPHEN 650 MG/1
650 SUPPOSITORY RECTAL EVERY 4 HOURS PRN
Status: DISCONTINUED | OUTPATIENT
Start: 2024-02-29 | End: 2024-03-05 | Stop reason: HOSPADM

## 2024-02-29 RX ORDER — ACETAMINOPHEN 500 MG
1000 TABLET ORAL ONCE
Status: COMPLETED | OUTPATIENT
Start: 2024-02-29 | End: 2024-02-29

## 2024-02-29 RX ORDER — ATENOLOL 50 MG/1
50 TABLET ORAL DAILY
Status: DISCONTINUED | OUTPATIENT
Start: 2024-03-01 | End: 2024-03-03

## 2024-02-29 RX ORDER — MELATONIN
5000 DAILY
Status: DISCONTINUED | OUTPATIENT
Start: 2024-03-01 | End: 2024-03-05 | Stop reason: HOSPADM

## 2024-02-29 RX ORDER — DONEPEZIL HYDROCHLORIDE 5 MG/1
5 TABLET, FILM COATED ORAL DAILY
Status: DISCONTINUED | OUTPATIENT
Start: 2024-03-01 | End: 2024-03-05 | Stop reason: HOSPADM

## 2024-02-29 RX ORDER — AMOXICILLIN 250 MG
2 CAPSULE ORAL 2 TIMES DAILY PRN
Status: DISCONTINUED | OUTPATIENT
Start: 2024-02-29 | End: 2024-03-05 | Stop reason: HOSPADM

## 2024-02-29 RX ADMIN — APIXABAN 2.5 MG: 2.5 TABLET, FILM COATED ORAL at 23:20

## 2024-02-29 RX ADMIN — ACETAMINOPHEN 1000 MG: 500 TABLET ORAL at 15:02

## 2024-02-29 NOTE — ED PROVIDER NOTES
Brief history of present illness: 89-year-old female accompanied by family has a history of hypertension, paroxysmal atrial fibrillation for which she is chronically anticoagulated lives at home alone presents to the emergency department today for acute right hip pain status post fall.  Patient cannot really tell me why she fell at midnight last night but she was able to get up and get back into her own bed.  This morning family noted that she is not getting around very well.  Patient otherwise reports no ongoing headache, chest pain or shortness of breath.  She denies syncope as a cause of her fall last night.      Physical Exam   Constitutional: No distress.  Nontoxic.  Very pleasant and talkative.  Slightly hard of hearing.  HENT:  Head: Normocephalic and atraumatic.   Oropharynx: Mucous membranes are moist.   Eyes: . No scleral icterus. No conjunctival pallor.  Neck: Normal range of motion. Neck supple.   Cardiovascular: Pink warm and well perfused throughout.    Pulmonary/Chest: No respiratory distress.  No tachypnea or increased work of breathing appreciated.    Abdominal: Soft. There is no tenderness. There is no rebound and no guarding.  Benign exam  Musculoskeletal: While the patient is able to move bilateral lower extremities she has discomfort with motion bilateral hips and knees but worse on the right.  Neurological: Alert and oriented.  No acute focal deficit appreciated.  Skin: Skin is pink, warm, and dry.   Psychiatric: Mood and affect normal.   Nursing note and vitals reviewed.      MDM:   My diagnosis for lower extremity pain and injury includes but is not limited to hip fracture, femur fracture, hip dislocation, hip contusion, hip sprain, hip strain, pelvic fracture, ischio-tibial band pain, ischio-tibial band bursitis, knee sprain, patella dislocation, knee dislocation, internal derangement of knee, fractures of the femur, tibia, fibula, ankle, foot and digits, ankle sprain, ankle dislocation,  Lisfranc fracture, fracture dislocations of the digits, pulmonary embolism, claudication, peripheral vascular disease, gout, osteoarthritis, rheumatoid arthritis, bursitis, septic joint, poly-rheumatica, polyarthralgia and other inflammatory or infectious disease processes.      RADIOLOGY      Study: Noncontrast CT head  Findings: Atrophy without large volume intracranial hemorrhage appreciated  I independently viewed and interpreted these images contemporaneously with treatment.         I have seen and personally evaluated this patient, discussed the case with the treating advanced practice provider, and reviewed their note. I was involved in the medical decision making during the evaluation, testing and disposition planning for this patient.    Progress:  Plan laboratory and radiologic evaluation to assess for acute life threats.  Thereafter we will need to reevaluate the patient's stability for discharge to live at home by herself.    Final diagnoses:   Unwitnessed fall   History of dementia   Deficit in activities of daily living (ADL)   Acute right hip pain   Injury of head, initial encounter       Disposition:  ADMISSION    Discussed treatment plan and reason for admission with pt/family and admitting physician.  Pt/family voiced understanding of the plan for admission for further testing/treatment as needed.        Mark Manrique MD  02/29/24 [id number]

## 2024-02-29 NOTE — PLAN OF CARE
Goal Outcome Evaluation:      Pt fell at home, complaints of R hip pain on admission. Vss, pain controlled. Educated on HTN meds and monitoring.

## 2024-02-29 NOTE — ED NOTES
Nursing report ED to floor  Ryann Hernadez  89 y.o.  female    89-year-old female accompanied by family has a history of hypertension, paroxysmal atrial fibrillation for which she is chronically anticoagulated lives at home alone presents to the emergency department today for acute right hip pain status post fall.  Patient cannot really tell me why she fell at midnight last night but she was able to get up and get back into her own bed.  This morning family noted that she is not getting around very well.  Patient otherwise reports no ongoing headache, chest pain or shortness of breath.  She denies syncope as a cause of her fall last night.       HPI :  HPI (Adult)  Stated Reason for Visit: fall/ hip pain    Chief Complaint  Chief Complaint   Patient presents with    Fall    Hip Pain       Admitting doctor:   Erlinda Marcum MD    Admitting diagnosis:   The primary encounter diagnosis was Unwitnessed fall. Diagnoses of History of dementia, Deficit in activities of daily living (ADL), Acute right hip pain, and Injury of head, initial encounter were also pertinent to this visit.    Code status:   Current Code Status       Date Active Code Status Order ID Comments User Context       Prior            Allergies:   Sulfa antibiotics, Lisinopril, and Olmesartan    Isolation:   No active isolations    Intake and Output  No intake or output data in the 24 hours ending 02/29/24 1603    Weight:       02/29/24  1158   Weight: 60.8 kg (134 lb)       Most recent vitals:   Vitals:    02/29/24 1251 02/29/24 1252 02/29/24 1409 02/29/24 1551   BP: 149/90  134/97 126/89   Patient Position:       Pulse: 68 77 66 58   Resp:       Temp:       SpO2: 92% 96% 98% 93%   Weight:       Height:           Active LDAs/IV Access:   Lines, Drains & Airways       Active LDAs       Name Placement date Placement time Site Days    Peripheral IV 02/29/24 1249 Right Antecubital 02/29/24  1249  Antecubital  less than 1                    Labs  (abnormal labs have a star):   Labs Reviewed   COMPREHENSIVE METABOLIC PANEL - Abnormal; Notable for the following components:       Result Value    Glucose 123 (*)     Creatinine 1.05 (*)     Calcium 9.9 (*)     Total Bilirubin 1.9 (*)     eGFR 50.0 (*)     All other components within normal limits    Narrative:     GFR Normal >60  Chronic Kidney Disease <60  Kidney Failure <15    The GFR formula is only valid for adults with stable renal function between ages 18 and 70.   PROTIME-INR - Abnormal; Notable for the following components:    Protime 14.8 (*)     INR 1.14 (*)     All other components within normal limits   CBC WITH AUTO DIFFERENTIAL - Abnormal; Notable for the following components:    MCV 97.4 (*)     Neutrophil % 80.6 (*)     Lymphocyte % 11.8 (*)     All other components within normal limits   APTT - Normal   CBC AND DIFFERENTIAL    Narrative:     The following orders were created for panel order CBC & Differential.  Procedure                               Abnormality         Status                     ---------                               -----------         ------                     CBC Auto Differential[[id number]]        Abnormal            Final result                 Please view results for these tests on the individual orders.       EKG:   No orders to display       Meds given in ED:   Medications   sodium chloride 0.9 % flush 10 mL (has no administration in time range)   ondansetron (ZOFRAN) injection 4 mg (4 mg Intravenous Not Given 2/29/24 1233)   morphine injection 2 mg (2 mg Intravenous Not Given 2/29/24 1233)   acetaminophen (TYLENOL) tablet 1,000 mg (1,000 mg Oral Given 2/29/24 1502)       Imaging results:  CT Head Without Contrast    Result Date: 2/29/2024  There is no evidence of fracture or of intracranial hemorrhage. Atrophy, small vessel ischemic disease and moderate to severe vascular calcification is noted, similar in appearance as compared to prior study. Further evaluation  could be performed with MRI examination of the brain as indicated.   CT EXAMINATION OF THE CERVICAL SPINE WITHOUT CONTRAST:  There is a grade 1 retrolisthesis of C3 upon C4 estimated to be 2 to 3 mm in severity. There is no evidence or prevertebral edema or a fracture.  C2-3: Small central disc osteophyte complex is present.  C3-4: There is mild canal stenosis secondary to the retrolisthesis of C3 upon C4 and a small central disc osteophyte complex.  C4-5: A mild central broad-based disc osteophyte complex is present with no evidence of herniation.  C5-6: A small central disc osteophyte complex is present.  C6-7: A left paracentral disc osteophyte complex is present which results in mild flattening of the ventral surface of the thecal sac. Abuts and mildly flattens the anterolateral aspect the cord to the left. It is similar to minimally more prominent as compared to the CT angiogram of the neck performed on 02/05/2020.  C7-T1: There is no evidence of a disc bulge or herniation.  IMPRESSION: There is no evidence of the cervical fracture. Multilevel degenerative disease involving cervical spine is noted as described above including a left paracentral disc osteophyte complex at C6-7 which abuts and minimally flattens the ventral surface of the cord. It is similar to minimally more prominent as compared to 02/05/2020. See above.    Radiation dose reduction techniques were utilized, including automated exposure control and exposure modulation based on body size.   This report was finalized on 2/29/2024 3:17 PM by Dr. Ruddy Delatorre M.D on Workstation: [id number]      CT Cervical Spine Without Contrast    Result Date: 2/29/2024  There is no evidence of fracture or of intracranial hemorrhage. Atrophy, small vessel ischemic disease and moderate to severe vascular calcification is noted, similar in appearance as compared to prior study. Further evaluation could be performed with MRI examination of the brain as indicated.   CT  EXAMINATION OF THE CERVICAL SPINE WITHOUT CONTRAST:  There is a grade 1 retrolisthesis of C3 upon C4 estimated to be 2 to 3 mm in severity. There is no evidence or prevertebral edema or a fracture.  C2-3: Small central disc osteophyte complex is present.  C3-4: There is mild canal stenosis secondary to the retrolisthesis of C3 upon C4 and a small central disc osteophyte complex.  C4-5: A mild central broad-based disc osteophyte complex is present with no evidence of herniation.  C5-6: A small central disc osteophyte complex is present.  C6-7: A left paracentral disc osteophyte complex is present which results in mild flattening of the ventral surface of the thecal sac. Abuts and mildly flattens the anterolateral aspect the cord to the left. It is similar to minimally more prominent as compared to the CT angiogram of the neck performed on 02/05/2020.  C7-T1: There is no evidence of a disc bulge or herniation.  IMPRESSION: There is no evidence of the cervical fracture. Multilevel degenerative disease involving cervical spine is noted as described above including a left paracentral disc osteophyte complex at C6-7 which abuts and minimally flattens the ventral surface of the cord. It is similar to minimally more prominent as compared to 02/05/2020. See above.    Radiation dose reduction techniques were utilized, including automated exposure control and exposure modulation based on body size.   This report was finalized on 2/29/2024 3:17 PM by Dr. Ruddy Delatorre M.D on Workstation: [id number]       Ambulatory status:   - with assist    Social issues:   Social History     Socioeconomic History    Marital status:    Tobacco Use    Smoking status: Never    Smokeless tobacco: Never   Vaping Use    Vaping Use: Never used   Substance and Sexual Activity    Alcohol use: No    Drug use: No    Sexual activity: Defer       Peripheral Neurovascular  Peripheral Neurovascular (Adult)  Peripheral Neurovascular WDL: .WDL  except  Pulse Assessment: dorsalis pedis  Additional Documentation: Edema (Group)  Edema  Edema: leg, left, leg, right  Leg, Left Edema: 3+ (Moderate)  Leg, Right Edema: 3+ (Moderate)    Neuro Cognitive  Neuro Cognitive (Adult)  Cognitive/Neuro/Behavioral WDL: WDL, orientation  Orientation: oriented x 4    Learning  Learning Assessment (Adult)  Learning Readiness and Ability: no barriers identified  Education Provided  Person Taught: patient  Teaching Method: verbal instruction  Teaching Focus: symptom/problem overview  Education Outcome Evaluation: eager to learn, acceptance expressed    Respiratory  Respiratory WDL  Respiratory WDL: WDL    Abdominal Pain       Pain Assessments  Pain (Adult)  (0-10) Pain Rating: Rest: 2  (0-10) Pain Rating: Activity: 5    NIH Stroke Scale       Paco Walden RN  02/29/24 16:03 EST

## 2024-02-29 NOTE — ED TRIAGE NOTES
"Patient to er with family at side. Patient reported she fell around midnight while getting some food in the kitchen. Patient stated she thinks she lost her balance.\" Patient stated no head injury. Family reported she is on blood thinners. Patient having pain in right hip and thigh area move with movement and lower back pain.   "

## 2024-02-29 NOTE — ED PROVIDER NOTES
EMERGENCY DEPARTMENT ENCOUNTER  Room Number:  26/26  PCP: Ruddy Sinha MD  Independent Historians: Patient      HPI:  Chief Complaint: had concerns including Fall and Hip Pain.     A complete HPI/ROS/PMH/PSH/SH/FH are unobtainable due to: Dementia    Chronic or social conditions impacting patient care (Social Determinants of Health): None      Context: Ryann Hernadez is a 89 y.o. female with a medical history of GERD, hypertension, balance disorder, paroxysmal atrial fibrillation, TIA, anxiety, and dementia who presents to the ED c/o acute right hip pain.  Patient lives at home alone.  Patient's daughter has camera access to patient's house.  Patient normally walks out to get her garbage can every morning.  Daughter does not see her mother walk out to get the garbage can.  She tried calling and patient did not answer.  When she called again the patient answered and stated she had fallen.  Given complaints of right hip pain, patient's daughter brought her to the emergency department for evaluation.  Unknown if patient had head injury or LOC.  She is anticoagulated on Eliquis.  No other systemic complaints at this time.  History otherwise limited secondary to dementia.      Review of prior external notes (non-ED) -and- Review of prior external test results outside of this encounter:  Patient seen in office by neurology on 2/16/2024 for late onset Alzheimer's disease.  Reviewed assessment and plan.  Patient to continue donepezil.  Reviewed labs collected on 2/1/2024.  BMP with creatinine 1.06, lipid panel with total cholesterol 167.    Prescription drug monitoring program review:     N/A    PAST MEDICAL HISTORY  Active Ambulatory Problems     Diagnosis Date Noted    Allergic rhinitis 01/15/2014    Gastroesophageal reflux disease 01/15/2014    Essential hypertension 01/09/2013    Ataxic gait 08/12/2016    Balance disorder 08/12/2016    Acute right otitis media 08/12/2016    Nonintractable episodic headache  09/14/2016    Facial swelling 02/23/2017    Dizziness 02/23/2017    Medicare annual wellness visit, initial 03/06/2017    Hospital discharge follow-up 03/06/2017    At high risk for falls 08/07/2017    Renal insufficiency 08/07/2017    Paroxysmal atrial fibrillation 09/22/2019    Ureteral stone with hydronephrosis 09/22/2019    Hydronephrosis due to obstruction of ureter 09/24/2019    Aortic root dilatation 10/10/2019    MCI (mild cognitive impairment) 09/26/2018    Age-related osteoporosis without current pathological fracture 09/13/2018    [place] of foot 11/05/2019    BPPV (benign paroxysmal positional vertigo), unspecified laterality 01/08/2020    TIA (transient ischemic attack) 02/05/2020    Ascending aortic aneurysm 02/06/2020    Acute deep vein thrombosis (DVT) of calf muscle vein of left lower extremity 02/06/2020    Medicare annual wellness visit, subsequent 01/20/2021    Paroxysmal atrial fibrillation 02/03/2021    Elevated troponin 07/05/2022    COVID-19 virus detected 07/05/2022    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     Resolved Ambulatory Problems     Diagnosis Date Noted    Acute recurrent maxillary sinusitis 02/23/2017    Fall at home 07/04/2022    RUBEN (acute kidney injury) 07/05/2022    Rhabdomyolysis 07/05/2022     Past Medical History:   Diagnosis Date    A-fib     Anemia     Aneurysm, ascending aorta 03/22/2016    Anxiety     Atypical chest pain     BPPV (benign paroxysmal positional vertigo)     Bradycardia     Chronic cystitis     Chronic headaches     Chronic nonintractable headache 9/14/2016    Chronic paroxysmal hemicrania     Depression     Esophagitis     Gastritis     GERD (gastroesophageal reflux disease)     Hematuria     High blood pressure     History of cataract     History of irritable bowel syndrome     Hypertension     IBS (irritable bowel syndrome)     Influenza A     Kidney lesion     Labyrinthitis 07/28/2014    OA (osteoarthritis)     Osteoporosis 9/13/2018    Other abnormal clinical  finding 09/21/2012    Pain of thigh     Personal history of gastric ulcer     Renal disorder          PAST SURGICAL HISTORY  Past Surgical History:   Procedure Laterality Date    BREAST LUMPECTOMY      BREAST SURGERY      CATARACT EXTRACTION      COLONOSCOPY N/A     DR. MARGARITA MENDOZA    ELBOW ARTHROTOMY  07/01/1999    DR. RYAN RITCHIE    EYE SURGERY      FRACTURE SURGERY      HAND, BROKEN FINGERS  DR. MIKE CHRISTIANSON    KNEE SURGERY  08/31/1999    DR. THADDEUS MENDES    OOPHORECTOMY      TUBAL ABDOMINAL LIGATION Bilateral 1962    DR. EDDA MANNING         FAMILY HISTORY  Family History   Problem Relation Age of Onset    Heart attack Mother     Arthritis Mother     Arthritis Father     Arthritis Sister     Arthritis Other     Kidney disease Other     Thyroid disease Other     Diabetes Other     Hypertension Other     Heart disease Other     Arthritis Maternal Grandmother          SOCIAL HISTORY  Social History     Socioeconomic History    Marital status:    Tobacco Use    Smoking status: Never    Smokeless tobacco: Never   Vaping Use    Vaping Use: Never used   Substance and Sexual Activity    Alcohol use: No    Drug use: No    Sexual activity: Defer         ALLERGIES  Sulfa antibiotics, Lisinopril, and Olmesartan      REVIEW OF SYSTEMS  Review of Systems   Constitutional:  Negative for chills and fever.   HENT:  Negative for ear pain and sore throat.    Respiratory:  Negative for cough and shortness of breath.    Cardiovascular:  Negative for chest pain and palpitations.   Gastrointestinal:  Negative for abdominal pain and vomiting.   Genitourinary:  Negative for dysuria and hematuria.   Musculoskeletal:  Positive for arthralgias. Negative for joint swelling.   Skin:  Negative for pallor and rash.   Neurological:  Negative for numbness and headaches.   Psychiatric/Behavioral:  Negative for confusion and hallucinations.      Included in HPI  All systems reviewed and negative except for those discussed in  HPI.      PHYSICAL EXAM    I have reviewed the triage vital signs and nursing notes.    ED Triage Vitals [02/29/24 1142]   Temp Heart Rate Resp BP SpO2   98 °F (36.7 °C) 83 18 -- 98 %      Temp src Heart Rate Source Patient Position BP Location FiO2 (%)   -- -- -- -- --       Physical Exam  Constitutional:       General: She is not in acute distress.     Appearance: She is well-developed.   HENT:      Head: Normocephalic and atraumatic.   Eyes:      Extraocular Movements: Extraocular movements intact.   Cardiovascular:      Rate and Rhythm: Normal rate and regular rhythm.      Pulses:           Posterior tibial pulses are 2+ on the right side and 2+ on the left side.      Heart sounds: Normal heart sounds.   Pulmonary:      Effort: Pulmonary effort is normal.      Breath sounds: Normal breath sounds.   Abdominal:      General: There is no distension.   Musculoskeletal:      Comments: Patient does have some proximal right femur tenderness.  Leg is not shortened or externally rotated.  Limited ROM secondary to pain.   Skin:     General: Skin is warm.   Neurological:      General: No focal deficit present.      Mental Status: She is alert and oriented to person, place, and time.   Psychiatric:         Mood and Affect: Mood normal.           LAB RESULTS  Recent Results (from the past 24 hour(s))   Comprehensive Metabolic Panel    Collection Time: 02/29/24 12:49 PM    Specimen: Blood   Result Value Ref Range    Glucose 123 (H) 65 - 99 mg/dL    BUN 11 8 - 23 mg/dL    Creatinine 1.05 (H) 0.57 - 1.00 mg/dL    Sodium 145 136 - 145 mmol/L    Potassium 3.9 3.5 - 5.2 mmol/L    Chloride 105 98 - 107 mmol/L    CO2 26.2 22.0 - 29.0 mmol/L    Calcium 9.9 (H) 8.2 - 9.6 mg/dL    Total Protein 7.1 6.0 - 8.5 g/dL    Albumin 4.0 3.5 - 5.2 g/dL    ALT (SGPT) 18 1 - 33 U/L    AST (SGOT) 23 1 - 32 U/L    Alkaline Phosphatase 85 39 - 117 U/L    Total Bilirubin 1.9 (H) 0.0 - 1.2 mg/dL    Globulin 3.1 gm/dL    A/G Ratio 1.3 g/dL     BUN/Creatinine Ratio 10.5 7.0 - 25.0    Anion Gap 13.8 5.0 - 15.0 mmol/L    eGFR 50.0 (L) >60.0 mL/min/1.73   Protime-INR    Collection Time: 02/29/24 12:49 PM    Specimen: Blood   Result Value Ref Range    Protime 14.8 (H) 11.7 - 14.2 Seconds    INR 1.14 (H) 0.90 - 1.10   aPTT    Collection Time: 02/29/24 12:49 PM    Specimen: Blood   Result Value Ref Range    PTT 30.4 22.7 - 35.4 seconds   CBC Auto Differential    Collection Time: 02/29/24 12:49 PM    Specimen: Blood   Result Value Ref Range    WBC 7.65 3.40 - 10.80 10*3/mm3    RBC 3.87 3.77 - 5.28 10*6/mm3    Hemoglobin 12.3 12.0 - 15.9 g/dL    Hematocrit 37.7 34.0 - 46.6 %    MCV 97.4 (H) 79.0 - 97.0 fL    MCH 31.8 26.6 - 33.0 pg    MCHC 32.6 31.5 - 35.7 g/dL    RDW 13.0 12.3 - 15.4 %    RDW-SD 46.0 37.0 - 54.0 fl    MPV 10.3 6.0 - 12.0 fL    Platelets 184 140 - 450 10*3/mm3    Neutrophil % 80.6 (H) 42.7 - 76.0 %    Lymphocyte % 11.8 (L) 19.6 - 45.3 %    Monocyte % 6.5 5.0 - 12.0 %    Eosinophil % 0.3 0.3 - 6.2 %    Basophil % 0.4 0.0 - 1.5 %    Immature Grans % 0.4 0.0 - 0.5 %    Neutrophils, Absolute 6.17 1.70 - 7.00 10*3/mm3    Lymphocytes, Absolute 0.90 0.70 - 3.10 10*3/mm3    Monocytes, Absolute 0.50 0.10 - 0.90 10*3/mm3    Eosinophils, Absolute 0.02 0.00 - 0.40 10*3/mm3    Basophils, Absolute 0.03 0.00 - 0.20 10*3/mm3    Immature Grans, Absolute 0.03 0.00 - 0.05 10*3/mm3    nRBC 0.0 0.0 - 0.2 /100 WBC         RADIOLOGY  CT Head Without Contrast, CT Cervical Spine Without Contrast    Result Date: 2/29/2024  CT HEAD AND CERVICAL SPINE WITHOUT CONTRAST  HISTORY: Fall, on blood thinners, dementia.  COMPARISON: MRI brain 07/06/2022, CT head 12/08/2020 and CT angiogram of the neck and head 02/05/2020.  CT HEAD WITHOUT CONTRAST:  FINDINGS: There is age-appropriate atrophy. Moderate to severe small vessel ischemic disease is noted. There is no evidence of intracranial hemorrhage, hydrocephalus or of a focal area of decreased attenuation to suggest acute  infarction. Bone windows showed no evidence of a calvarial fracture.      There is no evidence of fracture or of intracranial hemorrhage. Atrophy, small vessel ischemic disease and moderate to severe vascular calcification is noted, similar in appearance as compared to prior study. Further evaluation could be performed with MRI examination of the brain as indicated.   CT EXAMINATION OF THE CERVICAL SPINE WITHOUT CONTRAST:  There is a grade 1 retrolisthesis of C3 upon C4 estimated to be 2 to 3 mm in severity. There is no evidence or prevertebral edema or a fracture.  C2-3: Small central disc osteophyte complex is present.  C3-4: There is mild canal stenosis secondary to the retrolisthesis of C3 upon C4 and a small central disc osteophyte complex.  C4-5: A mild central broad-based disc osteophyte complex is present with no evidence of herniation.  C5-6: A small central disc osteophyte complex is present.  C6-7: A left paracentral disc osteophyte complex is present which results in mild flattening of the ventral surface of the thecal sac. Abuts and mildly flattens the anterolateral aspect the cord to the left. It is similar to minimally more prominent as compared to the CT angiogram of the neck performed on 02/05/2020.  C7-T1: There is no evidence of a disc bulge or herniation.  IMPRESSION: There is no evidence of the cervical fracture. Multilevel degenerative disease involving cervical spine is noted as described above including a left paracentral disc osteophyte complex at C6-7 which abuts and minimally flattens the ventral surface of the cord. It is similar to minimally more prominent as compared to 02/05/2020. See above.    Radiation dose reduction techniques were utilized, including automated exposure control and exposure modulation based on body size.   This report was finalized on 2/29/2024 3:17 PM by Dr. Ruddy Delatorre M.D on Workstation: [id number]      XR Hip With or Without Pelvis 2 - 3 View Right    Result  Date: 2/29/2024  XR HIP W OR WO PELVIS 2-3 VIEW RIGHT-  Clinical: Fell, pain  FINDINGS: Bilateral hip joint narrowing, this slightly greater on the right than the left. No acute osseous abnormality of the right hemipelvis nor the right hip. No avascular necrosis or bone lesion seen. Vascular calcifications within the soft tissues.  CONCLUSION: No acute osseous abnormality.  This report was finalized on 2/29/2024 2:06 PM by Dr. Girma Ulloa M.D on Workstation: [id number]         MEDICATIONS GIVEN IN ER  Medications   sodium chloride 0.9 % flush 10 mL (has no administration in time range)   ondansetron (ZOFRAN) injection 4 mg (4 mg Intravenous Not Given 2/29/24 1233)   morphine injection 2 mg (2 mg Intravenous Not Given 2/29/24 1233)   acetaminophen (TYLENOL) tablet 1,000 mg (1,000 mg Oral Given 2/29/24 1502)         ORDERS PLACED DURING THIS VISIT:  Orders Placed This Encounter   Procedures    CT Head Without Contrast    CT Cervical Spine Without Contrast    XR Hip With or Without Pelvis 2 - 3 View Right    CT Pelvis Without Contrast    CT Lumbar Spine Without Contrast    Comprehensive Metabolic Panel    Protime-INR    aPTT    CBC Auto Differential    Ambulate patient    [facility] (on-call MD unless specified) Details    Insert Peripheral IV    Initiate Observation Status    CBC & Differential         OUTPATIENT MEDICATION MANAGEMENT:  Current Facility-Administered Medications Ordered in Epic   Medication Dose Route Frequency Provider Last Rate Last Admin    morphine injection 2 mg  2 mg Intravenous Once Mark Manrique MD        ondansetron (ZOFRAN) injection 4 mg  4 mg Intravenous Once Tabby Marshall PA-C        sodium chloride 0.9 % flush 10 mL  10 mL Intravenous PRN Tabby Marshall PA-C         Current Outpatient Medications Ordered in Epic   Medication Sig Dispense Refill    acetaminophen (TYLENOL) 500 MG tablet Take 1 tablet by mouth Every 6 (Six) Hours As Needed for Mild Pain.      amLODIPine (NORVASC) 5 MG  tablet TAKE 1 TABLET BY MOUTH DAILY 90 tablet 3    apixaban (ELIQUIS) 2.5 MG tablet tablet Take 1 tablet by mouth Every 12 (Twelve) Hours. Indications: Atrial Fibrillation 28 tablet 0    atenolol (TENORMIN) 50 MG tablet Take 1 tablet by mouth Daily.      Cholecalciferol (VITAMIN D3) 5000 units capsule capsule Take 1 capsule by mouth Daily.      donepezil (ARICEPT) 5 MG tablet Take 1 tablet by mouth Daily.      furosemide (LASIX) 20 MG tablet Take 1 tablet by mouth Daily.      mupirocin (BACTROBAN) 2 % ointment as needed PRN.      Omega-3 Fatty Acids (fish oil) 1000 MG capsule capsule Take  by mouth Daily With Breakfast.      triamcinolone (KENALOG) 0.1 % ointment APPLY TO THE AFFECTED AREA DAILY      atorvastatin (LIPITOR) 40 MG tablet Take 1 tablet by mouth Daily. 90 tablet 3    Multiple Vitamins-Minerals (ONE-A-DAY WOMENS 50 PLUS) tablet Take  by mouth.               PROGRESS, DATA ANALYSIS, CONSULTS, AND MEDICAL DECISION MAKING  All labs have been independently interpreted by me.  All radiology studies have been reviewed by me. All EKG's have been independently viewed and interpreted by me.  Discussion below represents my analysis of pertinent findings related to patient's condition, differential diagnosis, treatment plan and final disposition.    Differential diagnosis includes but is not limited to pubic ramus fracture, femur fracture, hip contusion, closed head injury, intracranial bleed.        ED Course as of 02/29/24 1534   Thu Feb 29, 2024   1530 Patient did not ambulate well.  Will add on pelvis and lumbar spine CT, plan to admit the patient. [MP]   1533 I spoke with Dr. Marcum with [facility].  Reviewed patient presentation and ED findings.  She agrees admit patient to a [facility] observation bed, CT pelvis and lumbar spine pending at time of admission. [MP]      ED Course User Index  [MP] Tabby Marshall PA-C             AS OF 15:34 EST VITALS:    BP - 134/97  HR - 66  TEMP - 98 °F (36.7 °C)  O2 SATS -  98%    COMPLEXITY OF CARE  The patient requires admission.      DIAGNOSIS  Final diagnoses:   Unwitnessed fall   History of dementia   Deficit in activities of daily living (ADL)   Acute right hip pain   Injury of head, initial encounter         DISPOSITION  ED Disposition       ED Disposition   Decision to Admit    Condition   --    Comment   Level of Care: Med/Surg [1]   Diagnosis: Right hip pain [763442]   Admitting Physician: BARRY ELIZONDO [[id number]]   Attending Physician: BARRY ELIZONDO [[id number]]                  Please note that portions of this document were completed with a voice recognition program.    Note Disclaimer: At [facility], we believe that sharing information builds trust and better relationships. You are receiving this note because you recently visited [facility]. It is possible you will see health information before a provider has talked with you about it. This kind of information can be easy to misunderstand. To help you fully understand what it means for your health, we urge you to discuss this note with your provider.         Tabby Marshall PA-C  02/29/24 1534

## 2024-02-29 NOTE — CASE MANAGEMENT/SOCIAL WORK
Discharge Planning Assessment  [facility]     Patient Name: Ryann Hernadez  MRN: [id number]  Today's Date: 2/29/2024    Admit Date: 2/29/2024        Discharge Needs Assessment    No documentation.                  Discharge Plan       Row Name 02/29/24 1225       Plan    Plan Comments Patient with legal guardian on file, listed as her daughter, Rhea Angelo. Her daughter is at bedside and states she does not have the guardianship paperwork with her, though it has been requested by registration. [facility] is present upon chart review. Consent obtained. Disposition from ER pending. Mei GAMEZ RN CCP manager updated, per policy. RAULITO Ortiz RN                  Continued Care and Services - Admitted Since 2/29/2024    Coordination has not been started for this encounter.          Demographic Summary    No documentation.                  Functional Status    No documentation.                  Psychosocial    No documentation.                  Abuse/Neglect    No documentation.                  Legal    No documentation.                  Substance Abuse    No documentation.                  Patient Forms    No documentation.                     Thang Ortiz RN

## 2024-03-01 PROBLEM — R33.8 ACUTE URINARY RETENTION: Status: ACTIVE | Noted: 2024-03-01

## 2024-03-01 PROBLEM — Z86.73 HISTORY OF STROKE: Status: ACTIVE | Noted: 2024-03-01

## 2024-03-01 PROBLEM — F02.80 ALZHEIMER'S DEMENTIA: Status: ACTIVE | Noted: 2018-09-26

## 2024-03-01 PROBLEM — N18.30 CKD (CHRONIC KIDNEY DISEASE) STAGE 3, GFR 30-59 ML/MIN: Status: ACTIVE | Noted: 2024-03-01

## 2024-03-01 PROBLEM — G30.9 ALZHEIMER'S DEMENTIA: Status: ACTIVE | Noted: 2018-09-26

## 2024-03-01 LAB
ANION GAP SERPL CALCULATED.3IONS-SCNC: 11 MMOL/L (ref 5–15)
BACTERIA UR QL AUTO: ABNORMAL /HPF
BASOPHILS # BLD AUTO: 0.03 10*3/MM3 (ref 0–0.2)
BASOPHILS NFR BLD AUTO: 0.4 % (ref 0–1.5)
BILIRUB UR QL STRIP: NEGATIVE
BUN SERPL-MCNC: 11 MG/DL (ref 8–23)
BUN/CREAT SERPL: 9.7 (ref 7–25)
CALCIUM SPEC-SCNC: 9.5 MG/DL (ref 8.2–9.6)
CHLORIDE SERPL-SCNC: 107 MMOL/L (ref 98–107)
CLARITY UR: ABNORMAL
CO2 SERPL-SCNC: 26 MMOL/L (ref 22–29)
COLOR UR: YELLOW
CREAT SERPL-MCNC: 1.13 MG/DL (ref 0.57–1)
DEPRECATED RDW RBC AUTO: 45.7 FL (ref 37–54)
EGFRCR SERPLBLD CKD-EPI 2021: 45.7 ML/MIN/1.73
EOSINOPHIL # BLD AUTO: 0.26 10*3/MM3 (ref 0–0.4)
EOSINOPHIL NFR BLD AUTO: 3.9 % (ref 0.3–6.2)
ERYTHROCYTE [DISTWIDTH] IN BLOOD BY AUTOMATED COUNT: 13 % (ref 12.3–15.4)
FOLATE SERPL-MCNC: 14.5 NG/ML (ref 4.78–24.2)
GLUCOSE SERPL-MCNC: 109 MG/DL (ref 65–99)
GLUCOSE UR STRIP-MCNC: NEGATIVE MG/DL
HCT VFR BLD AUTO: 37.2 % (ref 34–46.6)
HGB BLD-MCNC: 12.3 G/DL (ref 12–15.9)
HGB UR QL STRIP.AUTO: ABNORMAL
HYALINE CASTS UR QL AUTO: ABNORMAL /LPF
IMM GRANULOCYTES # BLD AUTO: 0.04 10*3/MM3 (ref 0–0.05)
IMM GRANULOCYTES NFR BLD AUTO: 0.6 % (ref 0–0.5)
KETONES UR QL STRIP: NEGATIVE
LEUKOCYTE ESTERASE UR QL STRIP.AUTO: ABNORMAL
LYMPHOCYTES # BLD AUTO: 1.29 10*3/MM3 (ref 0.7–3.1)
LYMPHOCYTES NFR BLD AUTO: 19.2 % (ref 19.6–45.3)
MCH RBC QN AUTO: 31.8 PG (ref 26.6–33)
MCHC RBC AUTO-ENTMCNC: 33.1 G/DL (ref 31.5–35.7)
MCV RBC AUTO: 96.1 FL (ref 79–97)
MONOCYTES # BLD AUTO: 0.64 10*3/MM3 (ref 0.1–0.9)
MONOCYTES NFR BLD AUTO: 9.5 % (ref 5–12)
NEUTROPHILS NFR BLD AUTO: 4.45 10*3/MM3 (ref 1.7–7)
NEUTROPHILS NFR BLD AUTO: 66.4 % (ref 42.7–76)
NITRITE UR QL STRIP: POSITIVE
NRBC BLD AUTO-RTO: 0 /100 WBC (ref 0–0.2)
PH UR STRIP.AUTO: 6.5 [PH] (ref 5–8)
PLATELET # BLD AUTO: 179 10*3/MM3 (ref 140–450)
PMV BLD AUTO: 10.7 FL (ref 6–12)
POTASSIUM SERPL-SCNC: 3.7 MMOL/L (ref 3.5–5.2)
PROT UR QL STRIP: ABNORMAL
RBC # BLD AUTO: 3.87 10*6/MM3 (ref 3.77–5.28)
RBC # UR STRIP: ABNORMAL /HPF
REF LAB TEST METHOD: ABNORMAL
SODIUM SERPL-SCNC: 144 MMOL/L (ref 136–145)
SP GR UR STRIP: 1.01 (ref 1–1.03)
SQUAMOUS #/AREA URNS HPF: ABNORMAL /HPF
TSH SERPL DL<=0.05 MIU/L-ACNC: 1.09 UIU/ML (ref 0.27–4.2)
UROBILINOGEN UR QL STRIP: ABNORMAL
VIT B12 BLD-MCNC: 173 PG/ML (ref 211–946)
WBC # UR STRIP: ABNORMAL /HPF
WBC NRBC COR # BLD AUTO: 6.71 10*3/MM3 (ref 3.4–10.8)

## 2024-03-01 PROCEDURE — 82607 VITAMIN B-12: CPT | Performed by: INTERNAL MEDICINE

## 2024-03-01 PROCEDURE — 81001 URINALYSIS AUTO W/SCOPE: CPT | Performed by: INTERNAL MEDICINE

## 2024-03-01 PROCEDURE — 80048 BASIC METABOLIC PNL TOTAL CA: CPT | Performed by: INTERNAL MEDICINE

## 2024-03-01 PROCEDURE — 82746 ASSAY OF FOLIC ACID SERUM: CPT | Performed by: INTERNAL MEDICINE

## 2024-03-01 PROCEDURE — 97530 THERAPEUTIC ACTIVITIES: CPT

## 2024-03-01 PROCEDURE — 84443 ASSAY THYROID STIM HORMONE: CPT | Performed by: INTERNAL MEDICINE

## 2024-03-01 PROCEDURE — 97165 OT EVAL LOW COMPLEX 30 MIN: CPT

## 2024-03-01 PROCEDURE — 86592 SYPHILIS TEST NON-TREP QUAL: CPT | Performed by: INTERNAL MEDICINE

## 2024-03-01 PROCEDURE — 85025 COMPLETE CBC W/AUTO DIFF WBC: CPT | Performed by: INTERNAL MEDICINE

## 2024-03-01 PROCEDURE — 97161 PT EVAL LOW COMPLEX 20 MIN: CPT

## 2024-03-01 RX ORDER — LIDOCAINE 4 G/G
1 PATCH TOPICAL
Status: DISCONTINUED | OUTPATIENT
Start: 2024-03-01 | End: 2024-03-05 | Stop reason: HOSPADM

## 2024-03-01 RX ADMIN — DONEPEZIL HYDROCHLORIDE 5 MG: 5 TABLET, FILM COATED ORAL at 08:36

## 2024-03-01 RX ADMIN — APIXABAN 2.5 MG: 2.5 TABLET, FILM COATED ORAL at 08:35

## 2024-03-01 RX ADMIN — AMLODIPINE BESYLATE 5 MG: 5 TABLET ORAL at 08:36

## 2024-03-01 RX ADMIN — FUROSEMIDE 20 MG: 20 TABLET ORAL at 08:36

## 2024-03-01 RX ADMIN — APIXABAN 2.5 MG: 2.5 TABLET, FILM COATED ORAL at 21:35

## 2024-03-01 RX ADMIN — ATENOLOL 50 MG: 50 TABLET ORAL at 08:36

## 2024-03-01 RX ADMIN — LIDOCAINE 1 PATCH: 4 PATCH TOPICAL at 16:38

## 2024-03-01 RX ADMIN — Medication 5000 UNITS: at 08:36

## 2024-03-01 RX ADMIN — ACETAMINOPHEN 325MG 650 MG: 325 TABLET ORAL at 08:36

## 2024-03-01 NOTE — THERAPY EVALUATION
Patient Name: Ryann Hernadez  : 1931    MRN: [id number]                              Today's Date: 3/1/2024       Admit Date: 2024    Visit Dx:     ICD-10-CM ICD-9-CM   1. Unwitnessed fall  R29.6 [id number]   2. History of dementia  Z86.59 V11.8   3. Deficit in activities of daily living (ADL)  Z78.9 V49.89   4. Acute right hip pain  M25.551 719.45   5. Injury of head, initial encounter  S09.90XA 959.01     Patient Active Problem List   Diagnosis    Allergic rhinitis    Gastroesophageal reflux disease    Essential hypertension    Ataxic gait    Balance disorder    Acute right otitis media    Nonintractable episodic headache    Facial swelling    Dizziness    Medicare annual wellness visit, initial    Hospital discharge follow-up    At high risk for falls    Renal insufficiency    Paroxysmal atrial fibrillation    Ureteral stone with hydronephrosis    Hydronephrosis due to obstruction of ureter    Aortic root dilatation    Alzheimer's dementia    Age-related osteoporosis without current pathological fracture    Corn of foot    BPPV (benign paroxysmal positional vertigo), unspecified laterality    TIA (transient ischemic attack)    Ascending aortic aneurysm    Acute deep vein thrombosis (DVT) of calf muscle vein of left lower extremity    Medicare annual wellness visit, subsequent    Paroxysmal atrial fibrillation    Elevated troponin    COVID-19 virus detected    Embolic stroke    Right hip pain    Acute urinary retention    History of stroke    CKD (chronic kidney disease) stage 3, GFR 30-59 ml/min     Past Medical History:   Diagnosis Date    A-fib     Acute deep vein thrombosis (DVT) of calf muscle vein of left lower extremity 2020    Allergic rhinitis     Anemia     Aneurysm, ascending aorta 2016    5 CM    Anxiety     Ataxic gait     Atypical chest pain     BPPV (benign paroxysmal positional vertigo)     Bradycardia     Chronic cystitis     Chronic headaches     Chronic nonintractable  headache 9/14/2016    Chronic paroxysmal hemicrania     Depression     Esophagitis     Gastritis     GERD (gastroesophageal reflux disease)     Hematuria     High blood pressure     History of cataract     History of irritable bowel syndrome     Hypertension     IBS (irritable bowel syndrome)     Influenza A     Kidney lesion     LEFT KIDNEY CYSTIC    Labyrinthitis 07/28/2014    OA (osteoarthritis)     Osteoporosis 9/13/2018    Other abnormal clinical finding 09/21/2012    LEFT UTEROCELE    Pain of thigh     Personal history of gastric ulcer     Renal disorder     Renal insufficiency 8/7/2017    Rhabdomyolysis 7/5/2022    TIA (transient ischemic attack) 2/5/2020     Past Surgical History:   Procedure Laterality Date    BREAST LUMPECTOMY      BREAST SURGERY      CATARACT EXTRACTION      COLONOSCOPY N/A     DR. MARGARITA MENDOZA    ELBOW ARTHROTOMY  07/01/1999    DR. RYAN RITCHIE    EYE SURGERY      FRACTURE SURGERY      HAND, BROKEN FINGERS  DR. MIKE CHRISTIANSON    KNEE SURGERY  08/31/1999    DR. THADDEUS MENDES    OOPHORECTOMY      TUBAL ABDOMINAL LIGATION Bilateral 1962    DR. EDDA MANNING      General Information       Row Name 03/01/24 1345          Physical Therapy Time and Intention    Document Type evaluation  -CW     Mode of Treatment co-treatment;physical therapy;occupational therapy;other (see comments)  -CW       Row Name 03/01/24 1345          General Information    Patient Profile Reviewed yes  -CW     Prior Level of Function independent:;transfer;bed mobility;all household mobility  pt reports furniture crawling within home  -CW     Existing Precautions/Restrictions fall  -CW       Row Name 03/01/24 1345          Living Environment    People in Home alone  -CW       Row Name 03/01/24 1345          Home Main Entrance    Number of Stairs, Main Entrance none  -CW       Row Name 03/01/24 1345          Stairs Within Home, Primary    Number of Stairs, Within Home, Primary none  -CW       Row Name 03/01/24 1345           Cognition    Orientation Status (Cognition) oriented x 3;other (see comments)  requires frequent redirection to stay on task  -CW       Row Name 03/01/24 1345          Safety Issues, Functional Mobility    Impairments Affecting Function (Mobility) balance;cognition;endurance/activity tolerance;strength;range of motion (ROM);pain  -CW     Comment, Safety Issues/Impairments (Mobility) Co treatment medically appropriate and necessary due to patient acuity level, activity tolerance and safety of patient and staff. Evaluation established to achieve all goals in POC.  -CW               User Key  (r) = Recorded By, (t) = Taken By, (c) = Cosigned By      Initials Name Provider Type    CW Fabiola Awad, PT Physical Therapist                   Mobility       Row Name 03/01/24 1347          Bed Mobility    Bed Mobility supine-sit  -CW     Supine-Sit [place] (Bed Mobility) verbal cues;minimum assist (75% patient effort)  -CW     Assistive Device (Bed Mobility) head of bed elevated;bed rails  -CW     Comment, (Bed Mobility) Increased time to complete task  -CW       Row Name 03/01/24 1347          Sit-Stand Transfer    Sit-Stand [place] (Transfers) verbal cues;minimum assist (75% patient effort);moderate assist (50% patient effort);2 person assist;nonverbal cues (demo/gesture)  -CW     Assistive Device (Sit-Stand Transfers) walker, front-wheeled  -CW     Comment, (Sit-Stand Transfer) Many cues for hand placement  -CW       Row Name 03/01/24 1347          Gait/Stairs (Locomotion)    [place] Level (Gait) verbal cues;minimum assist (75% patient effort);1 person assist  -CW     Assistive Device (Gait) walker, front-wheeled  -CW     Distance in Feet (Gait) 3' to chair  -CW     Deviations/Abnormal Patterns (Gait) ena decreased;gait speed decreased;antalgic  -CW     Bilateral Gait Deviations forward flexed posture  -CW     Right Sided Gait Deviations weight shift ability decreased  -CW     Comment, (Gait/Stairs)  Distance limited due to R hip pain, cues for hand placement on walker and to not cross feet during transfer  -CW       Row Name 03/01/24 1347          Mobility    Extremity Weight-bearing Status right lower extremity  -CW     Right Lower Extremity (Weight-bearing Status) weight-bearing as tolerated (WBAT)  -CW               User Key  (r) = Recorded By, (t) = Taken By, (c) = Cosigned By      Initials Name Provider Type    CW Fabiola Awad PT Physical Therapist                   Obj/Interventions       Row Name 03/01/24 1349          Range of Motion Comprehensive    Comment, General Range of Motion R hip limited 2/2 pain  -CW       Row Name 03/01/24 1349          Strength Comprehensive (MMT)    Comment, General Manual Muscle Testing (MMT) Assessment Generalized weakness present RLE  -CW       Row Name 03/01/24 1349          Balance    Balance Assessment standing static balance;standing dynamic balance  -CW     Static Standing Balance minimal assist  -CW     Dynamic Standing Balance minimal assist  -CW     Position/Device Used, Standing Balance supported;walker, front-wheeled  -CW       Row Name 03/01/24 1349          Sensory Assessment (Somatosensory)    Sensory Assessment (Somatosensory) sensation intact  -CW               User Key  (r) = Recorded By, (t) = Taken By, (c) = Cosigned By      Initials Name Provider Type    CW Fabiola Awad PT Physical Therapist                   Goals/Plan       Row Name 03/01/24 1350          Bed Mobility Goal 1 (PT)    Activity/Assistive Device (Bed Mobility Goal 1, PT) bed mobility activities, all  -CW     [place] Level/Cues Needed (Bed Mobility Goal 1, PT) standby assist  -CW     Time Frame (Bed Mobility Goal 1, PT) 1 week  -CW       Row Name 03/01/24 1350          Transfer Goal 1 (PT)    Activity/Assistive Device (Transfer Goal 1, PT) sit-to-stand/stand-to-sit;bed-to-chair/chair-to-bed  -CW     [place] Level/Cues Needed (Transfer Goal 1, PT) contact guard  required  -CW     Time Frame (Transfer Goal 1, PT) 1 week  -CW       Row Name 03/01/24 1350          Gait Training Goal 1 (PT)    Activity/Assistive Device (Gait Training Goal 1, PT) gait (walking locomotion);walker, rolling  -CW     [place] Level (Gait Training Goal 1, PT) contact guard required  -CW     Distance (Gait Training Goal 1, PT) 50'  -CW     Time Frame (Gait Training Goal 1, PT) 1 week  -CW       Row Name 03/01/24 1350          Therapy Assessment/Plan (PT)    Planned Therapy Interventions (PT) balance training;bed mobility training;gait training;ROM (range of motion);strengthening;patient/family education;transfer training  -CW               User Key  (r) = Recorded By, (t) = Taken By, (c) = Cosigned By      Initials Name Provider Type    CW Fabiola Awad, PT Physical Therapist                   Clinical Impression       Row Name 03/01/24 1506          Pain    Pain Location - Side/Orientation Right  -CW     Pain Location lower  -CW     Pain Location - extremity  -CW     Pre/Posttreatment Pain Comment Does not give numerical value but indicates pain in R hip with all mobility  -CW     Pain Intervention(s) Repositioned;Ambulation/increased activity;Rest  -CW       Row Name 03/01/24 1506          Plan of Care Review    Plan of Care Reviewed With patient  -CW     Outcome Evaluation Pt is a 89 yo female admitted with R hip pain after mechanical fall at home. Imaging negative for acute fx. Pt lives alone, has family check in on her. She denies use of assistive device but reports furniture surfing when navigating her house. Pt with hx of dementia, is oriented x3 today but requires frequent redirection and cues to stay on task. Pt completed bed mobility with min A, Sit<>stand with min/mod A x2, and ambulated 3' to the chair min A with walker. Cues for step sequencing required and pt reporting significant pain with RLE. Pt may benefit from ongoing skilled PT services to address functional mobility  deficits including strength, balance, endurance. Recommending DC to SNF at this time.  -CW       Row Name 03/01/24 1506          Therapy Assessment/Plan (PT)    Rehab Potential (PT) good, to achieve stated therapy goals  -CW     Criteria for Skilled Interventions Met (PT) yes  -CW     Therapy Frequency (PT) 6 times/wk  -CW       Row Name 03/01/24 1506          Vital Signs    O2 Delivery Pre Treatment room air  -CW     O2 Delivery Intra Treatment room air  -CW     O2 Delivery Post Treatment room air  -CW       Row Name 03/01/24 1506          Positioning and Restraints    Pre-Treatment Position in bed  -CW     Post Treatment Position chair  -CW     In Chair reclined;call light within reach;encouraged to call for assist;exit alarm on;notified nsg;legs elevated  -CW               User Key  (r) = Recorded By, (t) = Taken By, (c) = Cosigned By      Initials Name Provider Type    CW Fabiola Awad, PT Physical Therapist                   Outcome Measures       Row Name 03/01/24 1350 03/01/24 0836       How much help from another person do you currently need...    Turning from your back to your side while in flat bed without using bedrails? 3  -CW 3  -EE    Moving from lying on back to sitting on the side of a flat bed without bedrails? 3  -CW 3  -EE    Moving to and from a bed to a chair (including a wheelchair)? 3  -CW 2  -EE    Standing up from a chair using your arms (e.g., wheelchair, bedside chair)? 2  -CW 2  -EE    Climbing 3-5 steps with a railing? 1  -CW 2  -EE    To walk in hospital room? 3  -CW 2  -EE    AM-PAC 6 Clicks Score (PT) 15  -CW 14  -EE    Highest Level of Mobility Goal 4 --> Transfer to chair/commode  -CW 4 --> Transfer to chair/commode  -EE      Row Name 03/01/24 1350          Functional Assessment    Outcome Measure Options AM-PAC 6 Clicks Basic Mobility (PT)  -CW               User Key  (r) = Recorded By, (t) = Taken By, (c) = Cosigned By      Initials Name Provider Type    Karla Cardozo RN  Registered Nurse    Fabiola Patel, PT Physical Therapist                                 Physical Therapy Education       Title: PT OT SLP Therapies (In Progress)       Topic: Physical Therapy (In Progress)       Point: Mobility training (Done)       Learning Progress Summary             Patient Acceptance, E, VU,NR by ALTHEA at 3/1/2024 1351                         Point: Home exercise program (Not Started)       Learner Progress:  Not documented in this visit.              Point: Body mechanics (Done)       Learning Progress Summary             Patient Acceptance, E, VU,NR by ALTHEA at 3/1/2024 1351                         Point: Precautions (Done)       Learning Progress Summary             Patient Acceptance, E, VU,NR by ALTHEA at 3/1/2024 1351                                         User Key       Initials Effective Dates Name Provider Type Discipline    ALTHEA 12/13/22 -  Fabiola Awad PT Physical Therapist PT                  PT Recommendation and Plan  Planned Therapy Interventions (PT): balance training, bed mobility training, gait training, ROM (range of motion), strengthening, patient/family education, transfer training  Plan of Care Reviewed With: patient  Outcome Evaluation: Pt is a 89 yo female admitted with R hip pain after mechanical fall at home. Imaging negative for acute fx. Pt lives alone, has family check in on her. She denies use of assistive device but reports furniture surfing when navigating her house. Pt with hx of dementia, is oriented x3 today but requires frequent redirection and cues to stay on task. Pt completed bed mobility with min A, Sit<>stand with min/mod A x2, and ambulated 3' to the chair min A with walker. Cues for step sequencing required and pt reporting significant pain with RLE. Pt may benefit from ongoing skilled PT services to address functional mobility deficits including strength, balance, endurance. Recommending DC to SNF at this time.     Time Calculation:         PT  Charges       Row Name 03/01/24 1520             Time Calculation    Start Time 1052  -CW      Stop Time 1108  -CW      Time Calculation (min) 16 min  -CW      PT Received On 03/01/24  -CW      PT - Next Appointment 03/04/24  -CW      PT Goal Re-Cert Due Date 03/08/24  -CW         Time Calculation- PT    Total Timed Code Minutes- PT 8 minute(s)  -CW         Timed Charges    64550 - PT Therapeutic Activity Minutes 8  -CW         Total Minutes    Timed Charges Total Minutes 8  -CW       Total Minutes 8  -CW                User Key  (r) = Recorded By, (t) = Taken By, (c) = Cosigned By      Initials Name Provider Type    CW Fabiola Awad, PT Physical Therapist                  Therapy Charges for Today       Code Description Service Date Service Provider Modifiers Qty    48601506157 HC PT EVAL LOW COMPLEXITY 2 3/1/2024 Fabiola Awad, PT GP 1    00975673541 HC PT THERAPEUTIC ACT EA 15 MIN 3/1/2024 Fabiola Awad, PT GP 1            PT G-Codes  Outcome Measure Options: AM-PAC 6 Clicks Basic Mobility (PT)  AM-PAC 6 Clicks Score (PT): 15  PT Discharge Summary  Anticipated Discharge Disposition (PT): skilled nursing facility    Fabiola Awad PT  3/1/2024

## 2024-03-01 NOTE — H&P
HISTORY AND PHYSICAL   [facility]        Date of Admission: 2024  Patient Identification:  Name: Ryann Hernadez  Age: 89 y.o.  Sex: female  :  1931  MRN: [id number]                     Primary Care Physician: Ruddy Sinha MD    Chief Complaint:  89 year old female who presented to the emergency room with pain of her right hip; she had a fall at home; she lives alone and has some cognitive impairment; the daughter was not able to reach the patient this morning and went to check on her; she denies pain at this time; she was not able to walk when attempted in the ED     History of Present Illness:   As above    Past Medical History:  Past Medical History:   Diagnosis Date    A-fib     Acute deep vein thrombosis (DVT) of calf muscle vein of left lower extremity 2020    Allergic rhinitis     Anemia     Aneurysm, ascending aorta 2016    5 CM    Anxiety     Ataxic gait     Atypical chest pain     BPPV (benign paroxysmal positional vertigo)     Bradycardia     Chronic cystitis     Chronic headaches     Chronic nonintractable headache 2016    Chronic paroxysmal hemicrania     Depression     Esophagitis     Gastritis     GERD (gastroesophageal reflux disease)     Hematuria     High blood pressure     History of cataract     History of irritable bowel syndrome     Hypertension     IBS (irritable bowel syndrome)     Influenza A     Kidney lesion     LEFT KIDNEY CYSTIC    Labyrinthitis 2014    OA (osteoarthritis)     Osteoporosis 2018    Other abnormal clinical finding 2012    LEFT UTEROCELE    Pain of thigh     Personal history of gastric ulcer     Renal disorder     Renal insufficiency 2017    Rhabdomyolysis 2022    TIA (transient ischemic attack) 2020     Past Surgical History:  Past Surgical History:   Procedure Laterality Date    BREAST LUMPECTOMY      BREAST SURGERY      CATARACT EXTRACTION      COLONOSCOPY N/A     DR. MARGARITA ARCHER  ARTHROTOMY  07/01/1999    DR. RYAN RITCHIE    EYE SURGERY      FRACTURE SURGERY      HAND, BROKEN FINGERS  DR. MIKE CHRISTIANSON    KNEE SURGERY  08/31/1999    DR. THADDEUS MENDES    OOPHORECTOMY      TUBAL ABDOMINAL LIGATION Bilateral 1962    DR. EDDA MANNING      Home Meds:  Medications Prior to Admission   Medication Sig Dispense Refill Last Dose    acetaminophen (TYLENOL) 500 MG tablet Take 1 tablet by mouth Every 6 (Six) Hours As Needed for Mild Pain.   Past Week    amLODIPine (NORVASC) 5 MG tablet TAKE 1 TABLET BY MOUTH DAILY 90 tablet 3 2/28/2024    apixaban (ELIQUIS) 2.5 MG tablet tablet Take 1 tablet by mouth Every 12 (Twelve) Hours. Indications: Atrial Fibrillation 28 tablet 0 2/28/2024    atenolol (TENORMIN) 50 MG tablet Take 1 tablet by mouth Daily.   2/28/2024    Cholecalciferol (VITAMIN D3) 5000 units capsule capsule Take 1 capsule by mouth Daily.   2/28/2024    donepezil (ARICEPT) 5 MG tablet Take 1 tablet by mouth Daily.   2/28/2024    furosemide (LASIX) 20 MG tablet Take 1 tablet by mouth Daily.   2/28/2024    mupirocin (BACTROBAN) 2 % ointment as needed PRN.   Past Week    Omega-3 Fatty Acids (fish oil) 1000 MG capsule capsule Take  by mouth Daily With Breakfast.   2/28/2024    triamcinolone (KENALOG) 0.1 % ointment APPLY TO THE AFFECTED AREA DAILY   Past Week    atorvastatin (LIPITOR) 40 MG tablet Take 1 tablet by mouth Daily. 90 tablet 3     Multiple Vitamins-Minerals (ONE-A-DAY WOMENS 50 PLUS) tablet Take  by mouth.          Allergies:  Allergies   Allergen Reactions    Sulfa Antibiotics Anaphylaxis    Lisinopril     Olmesartan      Immunizations:  Immunization History   Administered Date(s) Administered    COVID-19 (PFIZER) BIVALENT 12+YRS 10/24/2022    COVID-19 (PFIZER) Purple Cap Monovalent 02/21/2021, 03/14/2021, 11/03/2021    COVID-19 F23 (PFIZER) 12YRS+ (COMIRNATY) 11/09/2023     Social History:   Social History     Social History Narrative    Not on file     Social History     Socioeconomic  "History    Marital status:    Tobacco Use    Smoking status: Never    Smokeless tobacco: Never   Vaping Use    Vaping Use: Never used   Substance and Sexual Activity    Alcohol use: No    Drug use: No    Sexual activity: Defer       Family History:  Family History   Problem Relation Age of Onset    Heart attack Mother     Arthritis Mother     Arthritis Father     Arthritis Sister     Arthritis Other     Kidney disease Other     Thyroid disease Other     Diabetes Other     Hypertension Other     Heart disease Other     Arthritis Maternal Grandmother         Review of Systems  See history of present illness and past medical history.  Patient denies headache, dizziness, syncope, falls, trauma, change in vision, change in hearing, change in taste, changes in weight, changes in appetite, focal weakness, numbness, or paresthesia.  Patient denies chest pain, palpitations, dyspnea, orthopnea, PND, cough, sinus pressure, rhinorrhea, epistaxis, hemoptysis, nausea, vomiting,hematemesis, diarrhea, constipation or hematochezia.  Denies cold or heat intolerance, polydipsia, polyuria, polyphagia. Denies hematuria, pyuria, dysuria, hesitancy, frequency or urgency. Denies consumption of raw and under cooked meats foods or change in water source.  Denies fever, chills, sweats, night sweats.      Objective:  T Max 24 hrs: Temp (24hrs), Av.3 °F (36.8 °C), Min:98 °F (36.7 °C), Max:98.9 °F (37.2 °C)    Vitals Ranges:   Temp:  [98 °F (36.7 °C)-98.9 °F (37.2 °C)] 98 °F (36.7 °C)  Heart Rate:  [58-83] 65  Resp:  [16-18] 16  BP: (125-151)/(79-97) 125/83      Exam:  /83 (BP Location: Right arm, Patient Position: Lying)   Pulse 65   Temp 98 °F (36.7 °C) (Oral)   Resp 16   Ht 167.6 cm (66\")   Wt 60.8 kg (134 lb)   SpO2 94%   BMI 21.63 kg/m²     General Appearance:    Alert, cooperative, no distress, appears stated age   Head:    Normocephalic, without obvious abnormality, atraumatic   Eyes:    PERRL, conjunctivae/corneas " clear, EOM's intact, both eyes   Ears:    Normal external ear canals, both ears   Nose:   Nares normal, septum midline, mucosa normal, no drainage    or sinus tenderness   Throat:   Lips, mucosa, and tongue normal   Neck:   Supple, symmetrical, trachea midline, no adenopathy;     thyroid:  no enlargement/tenderness/nodules; no carotid    bruit or JVD   Back:     Symmetric, no curvature, ROM normal, no CVA tenderness   Lungs:     Decreased breath sounds bilaterally, respirations unlabored   Chest Wall:    No tenderness or deformity    Heart:    Regular rate and rhythm, S1 and S2 normal, no murmur, rub   or gallop   Abdomen:     Soft, nontender, bowel sounds active all four quadrants,     no masses, no hepatomegaly, no splenomegaly   Extremities:   Extremities normal, atraumatic, no cyanosis or edema                       .    Data Review:  Labs in chart were reviewed.  WBC   Date Value Ref Range Status   02/29/2024 7.65 3.40 - 10.80 10*3/mm3 Final     Hemoglobin   Date Value Ref Range Status   02/29/2024 12.3 12.0 - 15.9 g/dL Final     Hematocrit   Date Value Ref Range Status   02/29/2024 37.7 34.0 - 46.6 % Final     Platelets   Date Value Ref Range Status   02/29/2024 184 140 - 450 10*3/mm3 Final     Sodium   Date Value Ref Range Status   02/29/2024 145 136 - 145 mmol/L Final     Potassium   Date Value Ref Range Status   02/29/2024 3.9 3.5 - 5.2 mmol/L Final     Chloride   Date Value Ref Range Status   02/29/2024 105 98 - 107 mmol/L Final     CO2   Date Value Ref Range Status   02/29/2024 26.2 22.0 - 29.0 mmol/L Final     BUN   Date Value Ref Range Status   02/29/2024 11 8 - 23 mg/dL Final     Creatinine   Date Value Ref Range Status   02/29/2024 1.05 (H) 0.57 - 1.00 mg/dL Final     Glucose   Date Value Ref Range Status   02/29/2024 123 (H) 65 - 99 mg/dL Final     Calcium   Date Value Ref Range Status   02/29/2024 9.9 (H) 8.2 - 9.6 mg/dL Final     AST (SGOT)   Date Value Ref Range Status   02/29/2024 23 1 - 32 U/L  Final     ALT (SGPT)   Date Value Ref Range Status   02/29/2024 18 1 - 33 U/L Final     Alkaline Phosphatase   Date Value Ref Range Status   02/29/2024 85 39 - 117 U/L Final                Imaging Results (All)       Procedure Component Value Units Date/Time    CT Pelvis Without Contrast [[id number]] Collected: 02/29/24 1758     Updated: 02/29/24 1758    Narrative:      CT OF THE PELVIS WITHOUT CONTRAST 02/29/2024     HISTORY: Right hip pain.     TECHNIQUE: Axial images were obtained through the pelvis.     FINDINGS: No pelvic fractures are seen including no evidence of sacral  fracture or bilateral hip fractures. Urinary bladder is moderately  distended. There is aortoiliac calcification. There are degenerative  changes of the spine. Transitional anatomy at the lumbosacral junction  is seen.       Impression:      1. No acute bony abnormality is seen. No fractures are seen.  2. Distended urinary bladder.     Radiation dose reduction techniques were utilized, including automated  exposure control and exposure modulation based on body size.          CT Lumbar Spine Without Contrast [[id number]] Collected: 02/29/24 1737     Updated: 02/29/24 1741    Narrative:      CT SCAN OF THE LUMBAR SPINE WITHOUT CONTRAST     CLINICAL HISTORY: Pain after fall.     TECHNIQUE: CT scan of the lumbar spine was obtained with a combination  of 3 , 2, and 1 mm axial images. Bone and soft tissue algorithm images  were obtained with sagittal and coronal reconstructed images.     FINDINGS:     There is no evidence for acute fracture or bony malalignment involving  the lumbar spine.     There is transitional anatomy at the level of the lumbosacral junction.  The transitional vertebral body has been enumerated as a prominently  lumbarized S1 segment for the purposes of this report. Please take  careful note of this enumeration system at the time of any potential  intervention.     At L1-2, there is vacuum disc phenomena. However, no significant  canal  or foraminal stenosis is noted.     At L2-3, there is a disc bulge which mildly narrows the neural foramina.  No significant canal stenosis is noted. Vacuum disc phenomena are again  appreciated.     At L3-4, there is vacuum disc phenomena. Bulging disc material results  in mild to moderate bilateral foraminal narrowing. There is mild canal  narrowing secondary to the disc bulge.     At L4-5, disc bulge results in mild to moderate bilateral foraminal  narrowing. Mild central canal stenosis is seen secondary to bulging disc  material.     At L5-S1, again there are advanced degenerative disc changes. Vacuum  disc phenomena are noted. There is mild to moderate bilateral foraminal  narrowing secondary to loss of foraminal height and a disc osteophyte  complex. No significant canal stenosis is noted.     There is incidental prominent calcification within the periphery of the  thecal sac at the level of the sacrum that is presumably reflective of  dural calcification.     Incidental note is made of a prominently distended urinary bladder.  Nonobstructive intracalyceal calculi are noted within the right kidney.       Impression:         No evidence for acute fracture or bony malalignment involving the lumbar  spine.     Multilevel degenerative phenomena are incidentally noted discussed in  detail above.     There is transitional anatomy at the level of the lumbosacral junction  and the transitional vertebral body has been enumerated as a prominently  lumbarized S1 segment for the purposes of this report. Please take  careful note of this enumeration system at the time of any potential  intervention.     Incidental note is made of a prominently distended urinary bladder.        Radiation dose reduction techniques were utilized, including automated  exposure control and exposure modulation based on body size.     This report was finalized on 2/29/2024 5:38 PM by Dr. Martínez Lay M.D  on Workstation: [id number]       CT  Head Without Contrast [[id number]] Collected: 02/29/24 1259     Updated: 02/29/24 1520    Narrative:      CT HEAD AND CERVICAL SPINE WITHOUT CONTRAST     HISTORY: Fall, on blood thinners, dementia.     COMPARISON: MRI brain 07/06/2022, CT head 12/08/2020 and CT angiogram of  the neck and head 02/05/2020.     CT HEAD WITHOUT CONTRAST:     FINDINGS: There is age-appropriate atrophy. Moderate to severe small  vessel ischemic disease is noted. There is no evidence of intracranial  hemorrhage, hydrocephalus or of a focal area of decreased attenuation to  suggest acute infarction. Bone windows showed no evidence of a calvarial  fracture.       Impression:      There is no evidence of fracture or of intracranial  hemorrhage. Atrophy, small vessel ischemic disease and moderate to  severe vascular calcification is noted, similar in appearance as  compared to prior study. Further evaluation could be performed with MRI  examination of the brain as indicated.        CT EXAMINATION OF THE CERVICAL SPINE WITHOUT CONTRAST:      There is a grade 1 retrolisthesis of C3 upon C4 estimated to be 2 to 3  mm in severity. There is no evidence or prevertebral edema or a  fracture.     C2-3: Small central disc osteophyte complex is present.     C3-4: There is mild canal stenosis secondary to the retrolisthesis of C3  upon C4 and a small central disc osteophyte complex.     C4-5: A mild central broad-based disc osteophyte complex is present with  no evidence of herniation.     C5-6: A small central disc osteophyte complex is present.     C6-7: A left paracentral disc osteophyte complex is present which  results in mild flattening of the ventral surface of the thecal sac.  Abuts and mildly flattens the anterolateral aspect the cord to the left.  It is similar to minimally more prominent as compared to the CT  angiogram of the neck performed on 02/05/2020.     C7-T1: There is no evidence of a disc bulge or herniation.     IMPRESSION: There is  no evidence of the cervical fracture. Multilevel  degenerative disease involving cervical spine is noted as described  above including a left paracentral disc osteophyte complex at C6-7 which  abuts and minimally flattens the ventral surface of the cord. It is  similar to minimally more prominent as compared to 02/05/2020. See  above.           Radiation dose reduction techniques were utilized, including automated  exposure control and exposure modulation based on body size.        This report was finalized on 2/29/2024 3:17 PM by Dr. Ruddy Delatorre M.D  on Workstation: [id number]       CT Cervical Spine Without Contrast [[id number]] Collected: 02/29/24 1259     Updated: 02/29/24 1520    Narrative:      CT HEAD AND CERVICAL SPINE WITHOUT CONTRAST     HISTORY: Fall, on blood thinners, dementia.     COMPARISON: MRI brain 07/06/2022, CT head 12/08/2020 and CT angiogram of  the neck and head 02/05/2020.     CT HEAD WITHOUT CONTRAST:     FINDINGS: There is age-appropriate atrophy. Moderate to severe small  vessel ischemic disease is noted. There is no evidence of intracranial  hemorrhage, hydrocephalus or of a focal area of decreased attenuation to  suggest acute infarction. Bone windows showed no evidence of a calvarial  fracture.       Impression:      There is no evidence of fracture or of intracranial  hemorrhage. Atrophy, small vessel ischemic disease and moderate to  severe vascular calcification is noted, similar in appearance as  compared to prior study. Further evaluation could be performed with MRI  examination of the brain as indicated.        CT EXAMINATION OF THE CERVICAL SPINE WITHOUT CONTRAST:      There is a grade 1 retrolisthesis of C3 upon C4 estimated to be 2 to 3  mm in severity. There is no evidence or prevertebral edema or a  fracture.     C2-3: Small central disc osteophyte complex is present.     C3-4: There is mild canal stenosis secondary to the retrolisthesis of C3  upon C4 and a small central  disc osteophyte complex.     C4-5: A mild central broad-based disc osteophyte complex is present with  no evidence of herniation.     C5-6: A small central disc osteophyte complex is present.     C6-7: A left paracentral disc osteophyte complex is present which  results in mild flattening of the ventral surface of the thecal sac.  Abuts and mildly flattens the anterolateral aspect the cord to the left.  It is similar to minimally more prominent as compared to the CT  angiogram of the neck performed on 02/05/2020.     C7-T1: There is no evidence of a disc bulge or herniation.     IMPRESSION: There is no evidence of the cervical fracture. Multilevel  degenerative disease involving cervical spine is noted as described  above including a left paracentral disc osteophyte complex at C6-7 which  abuts and minimally flattens the ventral surface of the cord. It is  similar to minimally more prominent as compared to 02/05/2020. See  above.           Radiation dose reduction techniques were utilized, including automated  exposure control and exposure modulation based on body size.        This report was finalized on 2/29/2024 3:17 PM by Dr. Ruddy Delatorre M.D  on Workstation: [id number]       XR Hip With or Without Pelvis 2 - 3 View Right [[id number]] Collected: 02/29/24 1405     Updated: 02/29/24 1409    Narrative:      XR HIP W OR WO PELVIS 2-3 VIEW RIGHT-     Clinical: Fell, pain     FINDINGS: Bilateral hip joint narrowing, this slightly greater on the  right than the left. No acute osseous abnormality of the right  hemipelvis nor the right hip. No avascular necrosis or bone lesion seen.  Vascular calcifications within the soft tissues.     CONCLUSION: No acute osseous abnormality.     This report was finalized on 2/29/2024 2:06 PM by Dr. Girma Ulloa M.D  on Workstation: [id number]                 Assessment:  Active Hospital Problems    Diagnosis  POA    **Right hip pain [M25.551]  Yes      Resolved Hospital Problems   No  resolved problems to display.   Fall  Cognitive impairment  Hypertension  A fib  Hyperglycemia  Ckd3      Plan:  Will continue pt.ot  Will likely not be able to live alone at this point  The daughter is aware  Trend labs  Dw patient and ed provider    Erlinda Marcum MD  2/29/2024  22:00 EST

## 2024-03-01 NOTE — PLAN OF CARE
Goal Outcome Evaluation:  Plan of Care Reviewed With: patient      Vss, nvi, prn tylenol given x1, ambulating assist x1, A&O x2-3, requires frequent reorientation, bm this shift, voiding per pw/brp, unable to educate d/t confusion, plans to d/c to SNF  Progress: improving

## 2024-03-01 NOTE — PLAN OF CARE
Goal Outcome Evaluation:  Plan of Care Reviewed With: patient           Outcome Evaluation: Pt is a 89y/o F admitted to [facility] with R hip pain s/p fall at home. Work up and imaging negative for acute fx. PMHx significant for Dementia, TIA, A-fib. Pt lives at home alone and IND with ADLs with no AD but reports cruising along furniture at home. Pt supine in bed on OT arrival and agreeable to evaluation this AM. Pt presents to OT with confusion and RLE/hip pain and deficits in BUE/BLE strength and decreased endurance, act ondina, and safety awareness and skilled OT services are medically indicated in order to maximize IND and safety with ADLs and fxl mobility. OT recommending SNF at [place].      Anticipated Discharge Disposition (OT): skilled nursing facility

## 2024-03-01 NOTE — PLAN OF CARE
Goal Outcome Evaluation:  Plan of Care Reviewed With: patient        Progress: no change  Outcome Evaluation: VSS, RA, SL, voiding per purewick, no complaints of pain, slept well, rehab VS LTC on DC

## 2024-03-01 NOTE — PROGRESS NOTES
Name: Ryann Hernadez ADMIT: 2024   : 1931  PCP: Ruddy Sinha MD    MRN: [id number] LOS: 0 days   AGE/SEX: 89 y.o. female  ROOM: [facility]/     Subjective   Subjective   Resting in bed.  No family currently at bedside.  Patient states that she fell in her kitchen around 11:00 last night.  She is continuing to report right hip/right thigh pain.  States that is difficult to move her leg without assistance.  Has pain with any movement.  She denies any recent chest pain, dyspnea, cough, fever or chills.  Denies any nausea, vomiting or abdominal pain.     Objective   Objective   Vital Signs  Temp:  [97.6 °F (36.4 °C)-98.9 °F (37.2 °C)] 97.6 °F (36.4 °C)  Heart Rate:  [58-75] 75  Resp:  [16-18] 16  BP: (125-147)/(75-97) 128/75  SpO2:  [93 %-98 %] 95 %  on   ;   Device (Oxygen Therapy): room air  Body mass index is 21.63 kg/m².    Physical Exam  Vitals and nursing note reviewed.   Constitutional:       Appearance: She is not toxic-appearing.   Cardiovascular:      Rate and Rhythm: Normal rate. Rhythm irregular.      Pulses: Normal pulses.      Heart sounds: Murmur heard.   Pulmonary:      Effort: Pulmonary effort is normal. No respiratory distress.      Breath sounds: Normal breath sounds.   Abdominal:      General: Bowel sounds are normal. There is no distension.      Palpations: Abdomen is soft.      Tenderness: There is no abdominal tenderness.   Musculoskeletal:         General: Swelling (mild LLE (has a healing skin abrasion to the shin)) and tenderness present. Normal range of motion.      Cervical back: Normal range of motion and neck supple.   Skin:     General: Skin is warm and dry.      Findings: No bruising.   Neurological:      Mental Status: She is alert.      Sensory: No sensory deficit.      Motor: Weakness present.      Coordination: Coordination normal.      Comments: Confused to year.   Psychiatric:         Mood and Affect: Mood normal.         Behavior: Behavior normal.  "      Results Review:       I reviewed the patient's new clinical results.  Results from last 7 days   Lab Units 03/01/24  0613 02/29/24  1249   WBC 10*3/mm3 6.71 7.65   HEMOGLOBIN g/dL 12.3 12.3   PLATELETS 10*3/mm3 179 184     Results from last 7 days   Lab Units 03/01/24  0613 02/29/24  1249   SODIUM mmol/L 144 145   POTASSIUM mmol/L 3.7 3.9   CHLORIDE mmol/L 107 105   CO2 mmol/L 26.0 26.2   BUN mg/dL 11 11   CREATININE mg/dL 1.13* 1.05*   GLUCOSE mg/dL 109* 123*   Estimated Creatinine Clearance: 30.5 mL/min (A) (by C-G formula based on SCr of 1.13 mg/dL (H)).  Results from last 7 days   Lab Units 02/29/24  1249   ALBUMIN g/dL 4.0   BILIRUBIN mg/dL 1.9*   ALK PHOS U/L 85   AST (SGOT) U/L 23   ALT (SGPT) U/L 18     Results from last 7 days   Lab Units 03/01/24  0613 02/29/24  1249   CALCIUM mg/dL 9.5 9.9*   ALBUMIN g/dL  --  4.0       No results found for: \"HGBA1C\", \"POCGLU\"    amLODIPine, 5 mg, Oral, Daily  apixaban, 2.5 mg, Oral, Q12H  atenolol, 50 mg, Oral, Daily  cholecalciferol, 5,000 Units, Oral, Daily  donepezil, 5 mg, Oral, Daily  furosemide, 20 mg, Oral, Daily  Lidocaine, 1 patch, Transdermal, Q24H  Morphine, 2 mg, Intravenous, Once  ondansetron, 4 mg, Intravenous, Once       Diet: Regular/House Diet; Texture: Regular Texture (IDDSI 7); Fluid Consistency: Thin (IDDSI 0)       Assessment/Plan     Active Hospital Problems    Diagnosis  POA    **Right hip pain [M25.551]  Yes    Acute urinary retention [R33.8]  Yes    History of stroke [Z86.73]  Not Applicable    CKD (chronic kidney disease) stage 3, GFR 30-59 ml/min [N18.30]  Unknown    Ascending aortic aneurysm [I71.21]  Yes    Paroxysmal atrial fibrillation [I48.0]  Yes    Alzheimer's dementia [G30.9, F02.80]  Yes    Gastroesophageal reflux disease [K21.9]  Yes    Essential hypertension [I10]  Yes      Resolved Hospital Problems   No resolved problems to display.     Ms. Hernadez is a 89 y.o. female with a history of cognitive impairment that currently " lives alone with family checking on her via monitors.  The patient apparently suffered a fall while in her kitchen the night prior to admission and brought to the hospital for further care after she was noticed to be ambulating poorly the next day.  She has had some right hip pain, but CT of the pelvis was negative for any acute fracture.  She was admitted for further placement as she was not safe to return home.    Right hip pain  Fall at home  -CT head, C-spine and lumbar spine without any acute fracture/injury.  -CT pelvis negative for any fractures with incidental distended urinary bladder.  -Suspect musculoskeletal versus deep bruising as a source of her pain.  -Continue Tylenol and add Lidoderm.  -PT/OT have been consulted.  -Ultimately, will need placement given her underlying mentation issues.    Acute urinary retention  -Last bladder scan around 600 with distended bladder on imaging.  -Nursing states that she is voiding and will follow-up on her postvoid residual here soon.  -Will plan to In-N-Out cath as needed.    Alzheimer's dementia  -Followed by [facility].  -Seems to be advancing in the recent history.  Apparently daughter had open APS case as home health was concerned about a gas stove that was left on.  There has been ongoing conversations in the outpatient setting to place her in a facility.  Daughter is working on guardianship.  -Likely needs SNF at discharge and would transition to either LTAC or memory care.  -Resume Aricept.    CKD3  -Creatinine near baseline. Monitor trends.    PAF  AAA  -Followed by Dr. Pastor with [facility]  -Currently rate controlled. Resume atenolol.   -Has a history of stroke followed by [facility]-on Eliquis 2.5 mg BID.  -Last echo 4/2023 with ascending aorta 5.3- this was ordered by her cardiologist, but she was not felt to be candidate for intervention.   -Given unwitnessed fall, will obtain repeat echo for now.  -Continue home Lasix and  monitor.    Discussed with patient and nurse. Will attempt to call daughter later today to discuss the above.    VTE Prophylaxis - SCDs  Code Status - Full code  Disposition - Anticipate discharge TBD. Needs LTC/SNF/memory care.      JENNA Patel  [facility]  03/01/24  14:02 EST    Addendum: 1416 discussed with patient's guardian/daughter Antonia via the phone.  Confirmed patient is a DO NOT RESUSCITATE and will adjust CODE STATUS to no CPR/no intubation.  Patient's daughter aware of aortic dilatation on prior echo.  She is agreeable to repeating echocardiogram at this time and states that she was told in the past her mother is not a candidate for treatment given her age and comorbidities.  However, we will repeat echo to ensure stability at this time.  She states that she would like for her mother to go to rehab and get stronger and eventually return home with increased services in the home.  She is not overly interested in memory care at this time.

## 2024-03-01 NOTE — THERAPY EVALUATION
Patient Name: Ryann Hernadez  : 1931    MRN: [id number]                              Today's Date: 3/1/2024       Admit Date: 2024    Visit Dx:     ICD-10-CM ICD-9-CM   1. Unwitnessed fall  R29.6 [id number]   2. History of dementia  Z86.59 V11.8   3. Deficit in activities of daily living (ADL)  Z78.9 V49.89   4. Acute right hip pain  M25.551 719.45   5. Injury of head, initial encounter  S09.90XA 959.01     Patient Active Problem List   Diagnosis    Allergic rhinitis    Gastroesophageal reflux disease    Essential hypertension    Ataxic gait    Balance disorder    Acute right otitis media    Nonintractable episodic headache    Facial swelling    Dizziness    Medicare annual wellness visit, initial    Hospital discharge follow-up    At high risk for falls    Renal insufficiency    Paroxysmal atrial fibrillation    Ureteral stone with hydronephrosis    Hydronephrosis due to obstruction of ureter    Aortic root dilatation    Alzheimer's dementia    Age-related osteoporosis without current pathological fracture    Corn of foot    BPPV (benign paroxysmal positional vertigo), unspecified laterality    TIA (transient ischemic attack)    Ascending aortic aneurysm    Acute deep vein thrombosis (DVT) of calf muscle vein of left lower extremity    Medicare annual wellness visit, subsequent    Paroxysmal atrial fibrillation    Elevated troponin    COVID-19 virus detected    Embolic stroke    Right hip pain    Acute urinary retention    History of stroke    CKD (chronic kidney disease) stage 3, GFR 30-59 ml/min     Past Medical History:   Diagnosis Date    A-fib     Acute deep vein thrombosis (DVT) of calf muscle vein of left lower extremity 2020    Allergic rhinitis     Anemia     Aneurysm, ascending aorta 2016    5 CM    Anxiety     Ataxic gait     Atypical chest pain     BPPV (benign paroxysmal positional vertigo)     Bradycardia     Chronic cystitis     Chronic headaches     Chronic nonintractable  headache 9/14/2016    Chronic paroxysmal hemicrania     Depression     Esophagitis     Gastritis     GERD (gastroesophageal reflux disease)     Hematuria     High blood pressure     History of cataract     History of irritable bowel syndrome     Hypertension     IBS (irritable bowel syndrome)     Influenza A     Kidney lesion     LEFT KIDNEY CYSTIC    Labyrinthitis 07/28/2014    OA (osteoarthritis)     Osteoporosis 9/13/2018    Other abnormal clinical finding 09/21/2012    LEFT UTEROCELE    Pain of thigh     Personal history of gastric ulcer     Renal disorder     Renal insufficiency 8/7/2017    Rhabdomyolysis 7/5/2022    TIA (transient ischemic attack) 2/5/2020     Past Surgical History:   Procedure Laterality Date    BREAST LUMPECTOMY      BREAST SURGERY      CATARACT EXTRACTION      COLONOSCOPY N/A     DR. MARGARITA MENDOZA    ELBOW ARTHROTOMY  07/01/1999    DR. RYAN RITCHIE    EYE SURGERY      FRACTURE SURGERY      HAND, BROKEN FINGERS  DR. MIKE CHRISTIANSON    KNEE SURGERY  08/31/1999    DR. THADDEUS MENDES    OOPHORECTOMY      TUBAL ABDOMINAL LIGATION Bilateral 1962    DR. EDDA MANNING      General Information       Row Name 03/01/24 1544          OT Time and Intention    Document Type evaluation  -RUY     Mode of Treatment co-treatment;physical therapy;occupational therapy  -RUY       Row Name 03/01/24 1544          General Information    Patient Profile Reviewed yes  -RUY     Prior Level of Function independent:;ADL's;all household mobility  Pt reports cruising on furniture at home  -RUY     Existing Precautions/Restrictions fall  -RUY     Barriers to Rehab cognitive status  -RUY       Row Name 03/01/24 1544          Living Environment    People in Home alone  -RUY       Row Name 03/01/24 1544          Cognition    Orientation Status (Cognition) oriented x 3;verbal cues/prompts needed for orientation  req freq cues for redirection and follow through with tasks.  -RUY       Row Name 03/01/24 154          Safety Issues, Functional  Mobility    Impairments Affecting Function (Mobility) balance;cognition;endurance/activity tolerance;strength;range of motion (ROM);pain  -RUY     Cognitive Impairments, Mobility Safety/Performance attention;judgment;safety precaution follow-through;awareness, need for assistance  -RUY     Comment, Safety Issues/Impairments (Mobility) Co-treatment medically necessary and appropriate d/t pt's acuity level, decreased endurance and activity tolerance, and safety of patient and staff. Evaluation established to achieve all goals in POC. Gait belt and non-skid socks worn.  -RUY               User Key  (r) = Recorded By, (t) = Taken By, (c) = Cosigned By      Initials Name Provider Type    RUY Megan Enriquez OT Occupational Therapist                     Mobility/ADL's       Row Name 03/01/24 1546          Bed Mobility    Bed Mobility supine-sit  -RUY     Supine-Sit [place] (Bed Mobility) verbal cues;minimum assist (75% patient effort)  -RUY     Assistive Device (Bed Mobility) head of bed elevated;bed rails  -RUY     Comment, (Bed Mobility) Inc time to complete  -RUY       Row Name 03/01/24 1546          Transfers    Transfers sit-stand transfer  -RUY       Row Name 03/01/24 1546          Sit-Stand Transfer    Sit-Stand [place] (Transfers) verbal cues;minimum assist (75% patient effort);moderate assist (50% patient effort);2 person assist;nonverbal cues (demo/gesture)  -RUY     Assistive Device (Sit-Stand Transfers) walker, front-wheeled  -RUY     Comment, (Sit-Stand Transfer) cues for upright posture and hand placement  -RUY       Row Name 03/01/24 1546          Functional Mobility    Functional Mobility- Ind. Level minimum assist (75% patient effort);verbal cues required  -RUY     Functional Mobility- Device walker, front-wheeled  -RUY     Functional Mobility- Comment Cues for sequencing and posture to walk from EOB to chair  -RUY       Row Name 03/01/24 1546          Activities of Daily Living    BADL Assessment/Intervention  lower body dressing  -RUY       Row Name 03/01/24 1546          Mobility    Extremity Weight-bearing Status right lower extremity  -RUY     Right Lower Extremity (Weight-bearing Status) weight-bearing as tolerated (WBAT)  -RUY       Row Name 03/01/24 1546          Lower Body Dressing Assessment/Training    [place] Level (Lower Body Dressing) lower body dressing skills;don;doff;socks;maximum assist (25% patient effort);dependent (less than 25% patient effort);verbal cues;nonverbal cues (demo/gesture)  -RUY     Position (Lower Body Dressing) edge of bed sitting  -RUY     Comment, (Lower Body Dressing) Inc time and cues for redirection to task and body mechanics  -RUY               User Key  (r) = Recorded By, (t) = Taken By, (c) = Cosigned By      Initials Name Provider Type    Megan Lundberg OT Occupational Therapist                   Obj/Interventions       Row Name 03/01/24 1548          Sensory Assessment (Somatosensory)    Sensory Assessment (Somatosensory) sensation intact  -RUY       [facility] Name 03/01/24 1548          Vision Assessment/Intervention    Visual Impairment/Limitations WFL  -RUY       Row Name 03/01/24 1548          Range of Motion Comprehensive    General Range of Motion bilateral upper extremity ROM WFL  -RUY       [facility] Name 03/01/24 1548          Strength Comprehensive (MMT)    Comment, General Manual Muscle Testing (MMT) Assessment Generalized weakness; BUE grossly 3+/5  -RUY       [facility] Name 03/01/24 1548          Motor Skills    Motor Skills functional endurance  -RUY     Functional Endurance Fair-  -RUY       Row Name 03/01/24 1548          Balance    Balance Assessment sitting static balance;sitting dynamic balance;standing static balance;standing dynamic balance  -RUY     Static Sitting Balance contact guard;verbal cues;non-verbal cues (demo/gesture)  -RUY     Dynamic Sitting Balance contact guard;minimal assist;verbal cues;non-verbal cues (demo/gesture)  -RUY     Position, Sitting Balance sitting edge  of bed;sitting in chair  -RUY     Static Standing Balance minimal assist;verbal cues;non-verbal cues (demo/gesture)  -RUY     Dynamic Standing Balance minimal assist;verbal cues;non-verbal cues (demo/gesture)  -RUY     Position/Device Used, Standing Balance supported;walker, front-wheeled  -RUY     Balance Interventions sitting;standing;static;dynamic;occupation based/functional task  -RUY               User Key  (r) = Recorded By, (t) = Taken By, (c) = Cosigned By      Initials Name Provider Type    Megan Lundberg OT Occupational Therapist                   Goals/Plan       Row Name 03/01/24 1557          Bed Mobility Goal 1 (OT)    Activity/Assistive Device (Bed Mobility Goal 1, OT) bed mobility activities, all  -RUY     [place] Level/Cues Needed (Bed Mobility Goal 1, OT) contact guard required;standby assist;verbal cues required  -RUY     Time Frame (Bed Mobility Goal 1, OT) short term goal (STG);2 weeks  -RUY     Progress/Outcomes (Bed Mobility Goal 1, OT) new goal  -RUY       Row Name 03/01/24 1557          Transfer Goal 1 (OT)    Activity/Assistive Device (Transfer Goal 1, OT) transfers, all  -RUY     [place] Level/Cues Needed (Transfer Goal 1, OT) minimum assist (75% or more patient effort);verbal cues required;nonverbal cues (demo/gesture) required;contact guard required  -RUY     Time Frame (Transfer Goal 1, OT) short term goal (STG);2 weeks  -RUY     Progress/Outcome (Transfer Goal 1, OT) new goal  -RUY       Row Name 03/01/24 1557          Dressing Goal 1 (OT)    Activity/Device (Dressing Goal 1, OT) dressing skills, all;upper body dressing;lower body dressing  -RUY     [place]/Cues Needed (Dressing Goal 1, OT) minimum assist (75% or more patient effort);moderate assist (50-74% patient effort);verbal cues required;nonverbal cues (demo/gesture) required  -RUY     Time Frame (Dressing Goal 1, OT) short term goal (STG);2 weeks  -RUY     Progress/Outcome (Dressing Goal 1, OT) new goal  -RUY       Row Name  03/01/24 5763          Toileting Goal 1 (OT)    Activity/Device (Toileting Goal 1, OT) toileting skills, all  -RUY     [place] Level/Cues Needed (Toileting Goal 1, OT) moderate assist (50-74% patient effort);maximum assist (25-49% patient effort);verbal cues required  -RUY     Time Frame (Toileting Goal 1, OT) short term goal (STG);2 weeks  -RUY     Progress/Outcome (Toileting Goal 1, OT) new goal  -RUY       Row Name 03/01/24 3921          Grooming Goal 1 (OT)    Activity/Device (Grooming Goal 1, OT) grooming skills, all  -RUY     [place] (Grooming Goal 1, OT) set-up required;verbal cues required;minimum assist (75% or more patient effort)  -URY     Time Frame (Grooming Goal 1, OT) short term goal (STG);2 weeks  -RUY     Progress/Outcome (Grooming Goal 1, OT) new goal  -RUY       Row Name 03/01/24 5144          Therapy Assessment/Plan (OT)    Planned Therapy Interventions (OT) activity tolerance training;BADL retraining;functional balance retraining;occupation/activity based interventions;patient/caregiver education/training;strengthening exercise;transfer/mobility retraining;adaptive equipment training;neuromuscular control/coordination retraining;ROM/therapeutic exercise  -RUY               User Key  (r) = Recorded By, (t) = Taken By, (c) = Cosigned By      Initials Name Provider Type    Megan Lundberg OT Occupational Therapist                   Clinical Impression       Row Name 03/01/24 1099          Pain Assessment    Pain Location - Side/Orientation Right  -RUY     Pain Location lower  -RUY     Pain Location - extremity;hip  -RUY     Pre/Posttreatment Pain Comment c/o pain in RLE/hip but did not rate on numerical scale  -RUY     Pain Intervention(s) Repositioned;Ambulation/increased activity;Rest  -RUY       Row Name 03/01/24 0800          Plan of Care Review    Plan of Care Reviewed With patient  -RUY     Outcome Evaluation Pt is a 89y/o F admitted to [facility] with R hip pain s/p fall at home. Work up and imaging  negative for acute fx. PMHx significant for Dementia, TIA, A-fib. Pt lives at home alone and IND with ADLs with no AD but reports cruising along furniture at home. Pt supine in bed on OT arrival and agreeable to evaluation this AM. Pt presents to OT with confusion and RLE/hip pain and deficits in BUE/BLE strength and decreased endurance, act ondina, and safety awareness and skilled OT services are medically indicated in order to maximize IND and safety with ADLs and fxl mobility. OT recommending SNF at [place].  -RUY       Row Name 03/01/24 1550          Therapy Assessment/Plan (OT)    Rehab Potential (OT) good, to achieve stated therapy goals  -RUY     Criteria for Skilled Therapeutic Interventions Met (OT) yes;skilled treatment is necessary  -RUY     Therapy Frequency (OT) 5 times/wk  -RUY       Row Name 03/01/24 1550          Therapy Plan Review/Discharge Plan (OT)    Anticipated Discharge Disposition (OT) skilled nursing facility  -RUY       Row Name 03/01/24 1550          Vital Signs    O2 Delivery Pre Treatment room air  -RUY     O2 Delivery Intra Treatment room air  -RUY     O2 Delivery Post Treatment room air  -RUY       Row Name 03/01/24 1550          Positioning and Restraints    Pre-Treatment Position in bed  -RUY     Post Treatment Position chair  -RUY     In Chair reclined;with nsg;call light within reach;encouraged to call for assist;exit alarm on;legs elevated  -RUY               User Key  (r) = Recorded By, (t) = Taken By, (c) = Cosigned By      Initials Name Provider Type    RUYMegan Garcia, LIANNA Occupational Therapist                   Outcome Measures       Row Name 03/01/24 4001          How much help from another is currently needed...    Putting on and taking off regular lower body clothing? 1  -RUY     Bathing (including washing, rinsing, and drying) 2  -RUY     Toileting (which includes using toilet bed pan or urinal) 2  -RUY     Putting on and taking off regular upper body clothing 3  -RUY     Taking care of  personal grooming (such as brushing teeth) 3  -RUY     Eating meals 4  -RUY     AM-PAC 6 Clicks Score (OT) 15  -RUY       Row Name 03/01/24 1350 03/01/24 0836       How much help from another person do you currently need...    Turning from your back to your side while in flat bed without using bedrails? 3  -CW 3  -EE    Moving from lying on back to sitting on the side of a flat bed without bedrails? 3  -CW 3  -EE    Moving to and from a bed to a chair (including a wheelchair)? 3  -CW 2  -EE    Standing up from a chair using your arms (e.g., wheelchair, bedside chair)? 2  -CW 2  -EE    Climbing 3-5 steps with a railing? 1  -CW 2  -EE    To walk in hospital room? 3  -CW 2  -EE    AM-PAC 6 Clicks Score (PT) 15  -CW 14  -EE    Highest Level of Mobility Goal 4 --> Transfer to chair/commode  -CW 4 --> Transfer to chair/commode  -EE      Row Name 03/01/24 1558          Modified [place] Scale    Modified [place] Scale 4 - Moderately severe disability.  Unable to walk without assistance, and unable to attend to own bodily needs without assistance.  -RUY       Row Name 03/01/24 1558 03/01/24 1350       Functional Assessment    Outcome Measure Options AM-PAC 6 Clicks Daily Activity (OT);Modified [place]  -RUY AM-PAC 6 Clicks Basic Mobility (PT)  -CW              User Key  (r) = Recorded By, (t) = Taken By, (c) = Cosigned By      Initials Name Provider Type    Karla Cardozo, RN Registered Nurse    Fabiola Patel, PT Physical Therapist    Megan Lundberg OT Occupational Therapist                    Occupational Therapy Education       Title: PT OT SLP Therapies (In Progress)       Topic: Occupational Therapy (Done)       Point: ADL training (Done)       Description:   Instruct learner(s) on proper safety adaptation and remediation techniques during self care or transfers.   Instruct in proper use of assistive devices.                  Learning Progress Summary             Patient Acceptance, RUPERT, VU by RUY at 3/1/2024 0439     Comment: Role of OT and safety techs                         Point: Home exercise program (Done)       Description:   Instruct learner(s) on appropriate technique for monitoring, assisting and/or progressing therapeutic exercises/activities.                  Learning Progress Summary             Patient Acceptance, E, VU by RUY at 3/1/2024 1559    Comment: Role of OT and safety techs                         Point: Precautions (Done)       Description:   Instruct learner(s) on prescribed precautions during self-care and functional transfers.                  Learning Progress Summary             Patient Acceptance, E, VU by RUY at 3/1/2024 1559    Comment: Role of OT and safety techs                         Point: Body mechanics (Done)       Description:   Instruct learner(s) on proper positioning and spine alignment during self-care, functional mobility activities and/or exercises.                  Learning Progress Summary             Patient Acceptance, E, VU by RUY at 3/1/2024 1559    Comment: Role of OT and safety techs                                         User Key       Initials Effective Dates Name Provider Type Discipline    RUY 10/12/23 -  Megan Enriquez OT Occupational Therapist OT                  OT Recommendation and Plan  Planned Therapy Interventions (OT): activity tolerance training, BADL retraining, functional balance retraining, occupation/activity based interventions, patient/caregiver education/training, strengthening exercise, transfer/mobility retraining, adaptive equipment training, neuromuscular control/coordination retraining, ROM/therapeutic exercise  Therapy Frequency (OT): 5 times/wk  Plan of Care Review  Plan of Care Reviewed With: patient  Outcome Evaluation: [facility] 89[facility] s/p fall at home. Work up and imaging negative for acute fx. PMHx significant for Dementia, TIA, A-fib. Pt lives at home alone and IND with ADLs with no AD but reports cruising along  furniture at home. Pt supine in bed on OT arrival and agreeable to evaluation this AM. Pt presents to OT with confusion and RLE/hip pain and deficits in BUE/BLE strength and decreased endurance, act ondina, and safety awareness and skilled OT services are medically indicated in order to maximize IND and safety with ADLs and fxl mobility. OT recommending SNF at [place].     Time Calculation:   Evaluation Complexity (OT)  Review Occupational Profile/Medical/Therapy History Complexity: expanded/moderate complexity  Assessment, Occupational Performance/Identification of Deficit Complexity: 3-5 performance deficits  Clinical Decision Making Complexity (OT): detailed assessment/moderate complexity  Overall Complexity of Evaluation (OT): moderate complexity     Time Calculation- OT       Row Name 03/01/24 1559             Time Calculation- OT    OT Start Time 1052  -RUY      OT Stop Time 1107  -RUY      OT Time Calculation (min) 15 min  -RUY      OT Received On 03/01/24  -RUY      OT - Next Appointment 03/04/24  -RUY      OT Goal Re-Cert Due Date 03/15/24  -RUY         Untimed Charges    OT Eval/Re-eval Minutes 15  -RUY         Total Minutes    Untimed Charges Total Minutes 15  -RUY       Total Minutes 15  -RUY                User Key  (r) = Recorded By, (t) = Taken By, (c) = Cosigned By      Initials Name Provider Type    RUY Megan Enriquez OT Occupational Therapist                  Therapy Charges for Today       Code Description Service Date Service Provider Modifiers Qty    [id number] HC OT EVAL LOW COMPLEXITY 3 3/1/2024 Megan Enriquez OT GO 1                 Megan Enriquez OT  3/1/2024

## 2024-03-01 NOTE — PLAN OF CARE
Goal Outcome Evaluation:  Plan of Care Reviewed With: patient           Outcome Evaluation: Pt is a 89 yo female admitted with R hip pain after mechanical fall at home. Imaging negative for acute fx. Pt lives alone, has family check in on her. She denies use of assistive device but reports furniture surfing when navigating her house. Pt with hx of dementia, is oriented x3 today but requires frequent redirection and cues to stay on task. Pt completed bed mobility with min A, Sit<>stand with min/mod A x2, and ambulated 3' to the chair min A with walker. Cues for step sequencing required and pt reporting significant pain with RLE. Pt may benefit from ongoing skilled PT services to address functional mobility deficits including strength, balance, endurance. Recommending DC to SNF at this time.      Anticipated Discharge Disposition (PT): skilled nursing facility

## 2024-03-02 ENCOUNTER — APPOINTMENT (OUTPATIENT)
Dept: CARDIOLOGY | Facility: HOSPITAL | Age: 89
DRG: 605 | End: 2024-03-02
Payer: MEDICARE

## 2024-03-02 ENCOUNTER — APPOINTMENT (OUTPATIENT)
Dept: GENERAL RADIOLOGY | Facility: HOSPITAL | Age: 89
DRG: 605 | End: 2024-03-02
Payer: MEDICARE

## 2024-03-02 PROBLEM — N39.0 UTI (URINARY TRACT INFECTION): Status: ACTIVE | Noted: 2024-03-02

## 2024-03-02 PROBLEM — E53.8 B12 DEFICIENCY: Status: ACTIVE | Noted: 2024-03-02

## 2024-03-02 LAB
ALBUMIN SERPL-MCNC: 3.6 G/DL (ref 3.5–5.2)
ALBUMIN/GLOB SERPL: 1.4 G/DL
ALP SERPL-CCNC: 76 U/L (ref 39–117)
ALT SERPL W P-5'-P-CCNC: 16 U/L (ref 1–33)
ANION GAP SERPL CALCULATED.3IONS-SCNC: 9 MMOL/L (ref 5–15)
ASCENDING AORTA: 5.2 CM
AST SERPL-CCNC: 19 U/L (ref 1–32)
BASOPHILS # BLD AUTO: 0.03 10*3/MM3 (ref 0–0.2)
BASOPHILS NFR BLD AUTO: 0.4 % (ref 0–1.5)
BH CV ECHO MEAS - ACS: 0.82 CM
BH CV ECHO MEAS - AI P1/2T: 546.9 MSEC
BH CV ECHO MEAS - AO MAX PG: 8 MMHG
BH CV ECHO MEAS - AO MEAN PG: 4.3 MMHG
BH CV ECHO MEAS - AO ROOT DIAM: 3.3 CM
BH CV ECHO MEAS - AO V2 MAX: 141.2 CM/SEC
BH CV ECHO MEAS - AO V2 VTI: 22.6 CM
BH CV ECHO MEAS - AVA(I,D): 2.8 CM2
BH CV ECHO MEAS - EDV(CUBED): 68.9 ML
BH CV ECHO MEAS - EDV(MOD-SP2): 61 ML
BH CV ECHO MEAS - EDV(MOD-SP4): 45 ML
BH CV ECHO MEAS - EF(MOD-BP): 59.1 %
BH CV ECHO MEAS - EF(MOD-SP2): 59 %
BH CV ECHO MEAS - EF(MOD-SP4): 60 %
BH CV ECHO MEAS - ESV(CUBED): 12.4 ML
BH CV ECHO MEAS - ESV(MOD-SP2): 25 ML
BH CV ECHO MEAS - ESV(MOD-SP4): 18 ML
BH CV ECHO MEAS - FS: 43.6 %
BH CV ECHO MEAS - IVS/LVPW: 1.08 CM
BH CV ECHO MEAS - IVSD: 1.3 CM
BH CV ECHO MEAS - LAT PEAK E' VEL: 10.9 CM/SEC
BH CV ECHO MEAS - LV MASS(C)D: 182.5 GRAMS
BH CV ECHO MEAS - LV MAX PG: 6.8 MMHG
BH CV ECHO MEAS - LV MEAN PG: 2.8 MMHG
BH CV ECHO MEAS - LV V1 MAX: 130.5 CM/SEC
BH CV ECHO MEAS - LV V1 VTI: 22.4 CM
BH CV ECHO MEAS - LVIDD: 4.1 CM
BH CV ECHO MEAS - LVIDS: 2.31 CM
BH CV ECHO MEAS - LVOT AREA: 2.8 CM2
BH CV ECHO MEAS - LVOT DIAM: 1.9 CM
BH CV ECHO MEAS - LVPWD: 1.2 CM
BH CV ECHO MEAS - MED PEAK E' VEL: 6.1 CM/SEC
BH CV ECHO MEAS - MV DEC SLOPE: 567.5 CM/SEC2
BH CV ECHO MEAS - MV DEC TIME: 0.19 SEC
BH CV ECHO MEAS - MV E MAX VEL: 93.8 CM/SEC
BH CV ECHO MEAS - MV MAX PG: 4.1 MMHG
BH CV ECHO MEAS - MV MEAN PG: 1.44 MMHG
BH CV ECHO MEAS - MV P1/2T: 58 MSEC
BH CV ECHO MEAS - MV V2 VTI: 18.9 CM
BH CV ECHO MEAS - MVA(P1/2T): 3.8 CM2
BH CV ECHO MEAS - MVA(VTI): 3.4 CM2
BH CV ECHO MEAS - PA ACC TIME: 0.13 SEC
BH CV ECHO MEAS - PA V2 MAX: 145.4 CM/SEC
BH CV ECHO MEAS - QP/QS: 1.08
BH CV ECHO MEAS - RAP SYSTOLE: 3 MMHG
BH CV ECHO MEAS - RV MAX PG: 1.91 MMHG
BH CV ECHO MEAS - RV V1 MAX: 69.2 CM/SEC
BH CV ECHO MEAS - RV V1 VTI: 11.7 CM
BH CV ECHO MEAS - RVOT DIAM: 2.7 CM
BH CV ECHO MEAS - RVSP: 40.6 MMHG
BH CV ECHO MEAS - SUP REN AO DIAM: 2.5 CM
BH CV ECHO MEAS - SV(LVOT): 63.3 ML
BH CV ECHO MEAS - SV(MOD-SP2): 36 ML
BH CV ECHO MEAS - SV(MOD-SP4): 27 ML
BH CV ECHO MEAS - SV(RVOT): 68.6 ML
BH CV ECHO MEAS - TAPSE (>1.6): 1.03 CM
BH CV ECHO MEAS - TR MAX PG: 37.6 MMHG
BH CV ECHO MEAS - TR MAX VEL: 306.4 CM/SEC
BH CV ECHO MEASUREMENTS AVERAGE E/E' RATIO: 11.04
BH CV XLRA - RV BASE: 2.9 CM
BH CV XLRA - RV LENGTH: 2.8 CM
BH CV XLRA - RV MID: 3 CM
BH CV XLRA - TDI S': 7.7 CM/SEC
BILIRUB SERPL-MCNC: 1.9 MG/DL (ref 0–1.2)
BUN SERPL-MCNC: 12 MG/DL (ref 8–23)
BUN/CREAT SERPL: 11.2 (ref 7–25)
CALCIUM SPEC-SCNC: 9.2 MG/DL (ref 8.2–9.6)
CHLORIDE SERPL-SCNC: 107 MMOL/L (ref 98–107)
CO2 SERPL-SCNC: 27 MMOL/L (ref 22–29)
CREAT SERPL-MCNC: 1.07 MG/DL (ref 0.57–1)
DEPRECATED RDW RBC AUTO: 47.7 FL (ref 37–54)
EGFRCR SERPLBLD CKD-EPI 2021: 48.8 ML/MIN/1.73
EOSINOPHIL # BLD AUTO: 0.31 10*3/MM3 (ref 0–0.4)
EOSINOPHIL NFR BLD AUTO: 4.5 % (ref 0.3–6.2)
ERYTHROCYTE [DISTWIDTH] IN BLOOD BY AUTOMATED COUNT: 13.3 % (ref 12.3–15.4)
GLOBULIN UR ELPH-MCNC: 2.6 GM/DL
GLUCOSE SERPL-MCNC: 114 MG/DL (ref 65–99)
HCT VFR BLD AUTO: 38.3 % (ref 34–46.6)
HGB BLD-MCNC: 12.4 G/DL (ref 12–15.9)
IMM GRANULOCYTES # BLD AUTO: 0.03 10*3/MM3 (ref 0–0.05)
IMM GRANULOCYTES NFR BLD AUTO: 0.4 % (ref 0–0.5)
LYMPHOCYTES # BLD AUTO: 1.38 10*3/MM3 (ref 0.7–3.1)
LYMPHOCYTES NFR BLD AUTO: 20 % (ref 19.6–45.3)
MCH RBC QN AUTO: 31.7 PG (ref 26.6–33)
MCHC RBC AUTO-ENTMCNC: 32.4 G/DL (ref 31.5–35.7)
MCV RBC AUTO: 98 FL (ref 79–97)
MONOCYTES # BLD AUTO: 0.73 10*3/MM3 (ref 0.1–0.9)
MONOCYTES NFR BLD AUTO: 10.6 % (ref 5–12)
NEUTROPHILS NFR BLD AUTO: 4.42 10*3/MM3 (ref 1.7–7)
NEUTROPHILS NFR BLD AUTO: 64.1 % (ref 42.7–76)
NRBC BLD AUTO-RTO: 0 /100 WBC (ref 0–0.2)
PLATELET # BLD AUTO: 170 10*3/MM3 (ref 140–450)
PMV BLD AUTO: 10.6 FL (ref 6–12)
POTASSIUM SERPL-SCNC: 3.8 MMOL/L (ref 3.5–5.2)
PROT SERPL-MCNC: 6.2 G/DL (ref 6–8.5)
RBC # BLD AUTO: 3.91 10*6/MM3 (ref 3.77–5.28)
RPR SER QL: NORMAL
SINUS: 3.4 CM
SODIUM SERPL-SCNC: 143 MMOL/L (ref 136–145)
STJ: 2.6 CM
WBC NRBC COR # BLD AUTO: 6.9 10*3/MM3 (ref 3.4–10.8)

## 2024-03-02 PROCEDURE — 86340 INTRINSIC FACTOR ANTIBODY: CPT | Performed by: INTERNAL MEDICINE

## 2024-03-02 PROCEDURE — 93306 TTE W/DOPPLER COMPLETE: CPT | Performed by: INTERNAL MEDICINE

## 2024-03-02 PROCEDURE — 85025 COMPLETE CBC W/AUTO DIFF WBC: CPT | Performed by: INTERNAL MEDICINE

## 2024-03-02 PROCEDURE — 93306 TTE W/DOPPLER COMPLETE: CPT

## 2024-03-02 PROCEDURE — 25010000002 CEFTRIAXONE PER 250 MG: Performed by: INTERNAL MEDICINE

## 2024-03-02 PROCEDURE — 73552 X-RAY EXAM OF FEMUR 2/>: CPT

## 2024-03-02 PROCEDURE — 25010000002 CYANOCOBALAMIN PER 1000 MCG: Performed by: INTERNAL MEDICINE

## 2024-03-02 PROCEDURE — 80053 COMPREHEN METABOLIC PANEL: CPT | Performed by: INTERNAL MEDICINE

## 2024-03-02 PROCEDURE — 73560 X-RAY EXAM OF KNEE 1 OR 2: CPT

## 2024-03-02 RX ORDER — CYANOCOBALAMIN 1000 UG/ML
1000 INJECTION, SOLUTION INTRAMUSCULAR; SUBCUTANEOUS DAILY
Status: COMPLETED | OUTPATIENT
Start: 2024-03-02 | End: 2024-03-04

## 2024-03-02 RX ADMIN — CYANOCOBALAMIN 1000 MCG: 1000 INJECTION, SOLUTION INTRAMUSCULAR; SUBCUTANEOUS at 17:55

## 2024-03-02 RX ADMIN — DONEPEZIL HYDROCHLORIDE 5 MG: 5 TABLET, FILM COATED ORAL at 09:16

## 2024-03-02 RX ADMIN — LIDOCAINE 1 PATCH: 4 PATCH TOPICAL at 09:16

## 2024-03-02 RX ADMIN — APIXABAN 2.5 MG: 2.5 TABLET, FILM COATED ORAL at 09:15

## 2024-03-02 RX ADMIN — ATENOLOL 50 MG: 50 TABLET ORAL at 09:15

## 2024-03-02 RX ADMIN — APIXABAN 2.5 MG: 2.5 TABLET, FILM COATED ORAL at 20:04

## 2024-03-02 RX ADMIN — ACETAMINOPHEN 325MG 650 MG: 325 TABLET ORAL at 20:04

## 2024-03-02 RX ADMIN — FUROSEMIDE 20 MG: 20 TABLET ORAL at 09:15

## 2024-03-02 RX ADMIN — CEFTRIAXONE SODIUM 1000 MG: 1 INJECTION, POWDER, FOR SOLUTION INTRAMUSCULAR; INTRAVENOUS at 17:55

## 2024-03-02 RX ADMIN — ACETAMINOPHEN 325MG 650 MG: 325 TABLET ORAL at 12:45

## 2024-03-02 RX ADMIN — Medication 5000 UNITS: at 09:15

## 2024-03-02 NOTE — PLAN OF CARE
Goal Outcome Evaluation:  Plan of Care Reviewed With: patient        Progress: improving  Outcome Evaluation: VSS. NVI. disoriented to time. PW and breif on. pain managed with PO meds. x1 assist with walker. frequently attempt to exit bed. DNR. plan to d/c to long term care when ready.

## 2024-03-02 NOTE — PLAN OF CARE
Goal Outcome Evaluation:  Plan of Care Reviewed With: patient        Progress: improving  Outcome Evaluation: VSS, RA, SL, no complaints of pain, confused at times, reorientated easily, voiding per omid, BM 3/1, rehab VS LTC on DC when stable

## 2024-03-02 NOTE — PROGRESS NOTES
Name: Ryann Hernadez ADMIT: 2024   : 1931  PCP: Ruddy Sinha MD    MRN: [id number] LOS: 1 days   AGE/SEX: 89 y.o. female  ROOM: [facility]     Subjective   Subjective   Patient now complaining of right thigh and right knee pain.  No lower extremity tingling or numbness.  No pain elsewhere except right hip pain.  No fever or chills.    Review of Systems  Cardiovascular/respiratory.  No chest pain/no palpitations/no cough/no shortness of breath  .  No dysuria or hematuria and able to pass urine.  Did not have to have a In-N-Out cath.  GI.  No abdominal pain.  No nausea or vomiting.  CNS.  No headache or dizziness and no focal neurological symptoms.       Objective   Objective   Vital Signs  Temp:  [97.5 °F (36.4 °C)-98.2 °F (36.8 °C)] 97.6 °F (36.4 °C)  Heart Rate:  [] 59  Resp:  [16] 16  BP: (121-147)/(55-84) 128/84  SpO2:  [92 %-95 %] 92 %  on   ;   Device (Oxygen Therapy): room air    Intake/Output Summary (Last 24 hours) at 3/2/2024 1652  Last data filed at 3/2/2024 1300  Gross per 24 hour   Intake 640 ml   Output 300 ml   Net 340 ml     Body mass index is 21.51 kg/m².      24  1158 24  1736 24  0845   Weight: 60.8 kg (134 lb) 60.8 kg (134 lb) 60.7 kg (133 lb 13.1 oz)     Physical Exam  General.  Elderly female.  She is alert and pleasantly confused.  Oriented x 2.  In no apparent pain/distress/diaphoresis.  Normal mood and affect.  HEENT..  No external head injury.  Pupils equal round and reactive.  Status post bilateral cataract surgery.  Positive bilateral arcus senalis.  No pallor or jaundice.  Moist mucous membrane.  Neck.  Supple.  No C-spine tenderness.  No JVD.  No lymphadenopathy or thyromegaly.  Cardiovascular.  Bradycardia and grade 3  systolic murmur.  Chest.  Clear to auscultation bilaterally with few scattered left-sided rhonchi.  Abdomen.  Soft lax.  No tenderness.  No organomegaly.  No guarding or rebound.  Extremities.  No clubbing/cyanosis/edema.  " Sequential compression devices in place.  No lower extremity neurodeficits.  Musculoskeletal.  Tenderness over the right hip/right femur/right knee especially with movement..  Decreased right knee range of movement with no effusion.      Results Review:      Results from last 7 days   Lab Units 03/02/24  0504 03/01/24  0613 02/29/24  1249   SODIUM mmol/L 143 144 145   POTASSIUM mmol/L 3.8 3.7 3.9   CHLORIDE mmol/L 107 107 105   CO2 mmol/L 27.0 26.0 26.2   BUN mg/dL 12 11 11   CREATININE mg/dL 1.07* 1.13* 1.05*   GLUCOSE mg/dL 114* 109* 123*   CALCIUM mg/dL 9.2 9.5 9.9*   AST (SGOT) U/L 19  --  23   ALT (SGPT) U/L 16  --  18     Estimated Creatinine Clearance: 32.1 mL/min (A) (by C-G formula based on SCr of 1.07 mg/dL (H)).                  Results from last 7 days   Lab Units 03/01/24  1647   TSH uIU/mL 1.090               Invalid input(s): \"LDLCALC\"  Results from last 7 days   Lab Units 03/02/24  0504 03/01/24  0613 02/29/24  1249   WBC 10*3/mm3 6.90 6.71 7.65   HEMOGLOBIN g/dL 12.4 12.3 12.3   HEMATOCRIT % 38.3 37.2 37.7   PLATELETS 10*3/mm3 170 179 184   MCV fL 98.0* 96.1 97.4*   MCH pg 31.7 31.8 31.8   MCHC g/dL 32.4 33.1 32.6   RDW % 13.3 13.0 13.0   RDW-SD fl 47.7 45.7 46.0   MPV fL 10.6 10.7 10.3   NEUTROPHIL % % 64.1 66.4 80.6*   LYMPHOCYTE % % 20.0 19.2* 11.8*   MONOCYTES % % 10.6 9.5 6.5   EOSINOPHIL % % 4.5 3.9 0.3   BASOPHIL % % 0.4 0.4 0.4   IMM GRAN % % 0.4 0.6* 0.4   NEUTROS ABS 10*3/mm3 4.42 4.45 6.17   LYMPHS ABS 10*3/mm3 1.38 1.29 0.90   MONOS ABS 10*3/mm3 0.73 0.64 0.50   EOS ABS 10*3/mm3 0.31 0.26 0.02   BASOS ABS 10*3/mm3 0.03 0.03 0.03   IMMATURE GRANS (ABS) 10*3/mm3 0.03 0.04 0.03   NRBC /100 WBC 0.0 0.0 0.0     Results from last 7 days   Lab Units 02/29/24  1249   INR  1.14*   APTT seconds 30.4                             Results from last 7 days   Lab Units 03/01/24  2136   NITRITE UA  Positive*   WBC UA /HPF 21-50*   BACTERIA UA /HPF 4+*   SQUAM EPITHEL UA /HPF 0-2       "     Imaging:  Imaging Results (Last 24 Hours)       ** No results found for the last 24 hours. **               I reviewed the patient's new clinical results / labs / tests / procedures      Assessment/Plan     Active Hospital Problems    Diagnosis  POA    **Right hip pain [M25.551]  Yes    B12 deficiency [E53.8]  Yes    UTI (urinary tract infection) [N39.0]  Yes    Acute urinary retention [R33.8]  Yes    History of stroke [Z86.73]  Not Applicable    CKD (chronic kidney disease) stage 3, GFR 30-59 ml/min [N18.30]  Yes    Ascending aortic aneurysm [I71.21]  Yes    Paroxysmal atrial fibrillation [I48.0]  Yes    Alzheimer's dementia [G30.9, F02.80]  Yes    Gastroesophageal reflux disease [K21.9]  Yes    Essential hypertension [I10]  Yes      Resolved Hospital Problems   No resolved problems to display.         1.  Mechanical fall leading to right hip contusion/difficulty ambulation.  CT scan of the brain without acute abnormality CT scan of the C-spine with degenerative disc disease but no fracture.  CT scan of the pelvis revealed no fracture but distended urinary bladder.  X-ray of the right hip was negative.  Now hurting in the right femur and right knee.  Appears comfortable.  Continue Lidoderm patch and Tylenol.  PT and OT evaluated the patient and recommended skilled facility.  No nonsteroidal anti-inflammatory medication secondary to renal failure will ask CCP to see.  Will check right femur and right knee x-ray.  2.  Alzheimer's dementia/history of CVA.  CNS examination without focal deficits.  Mentation seems to be at baseline.  Normal TSH/folate/RPR.  B12 is low at was substituted (look below).  CT scan of the brain with atrophy and small vessel disease.  Will continue Aricept and Eliquis.  3.  Vitamin B-12 deficiency.  Normal hemoglobin.  Will check intrinsic factor and give IM B12.  4.  Acute urinary retention/UTI.  Urinary retention has resolved.  Will initiate 3 days of Rocephin.  4.  Stage IIIa chronic  renal failure.  Baseline creatinine between 1.05 and 1.14.  Patient appears to be clinically euvolemic.  Renal function about the baseline.  5.  History of hypertension/atrial fibrillation/ascending thoracic aneurysm/AR/MR.  Last echo on 4/2023 revealing a 5.3 cm dilatation of the ascending aorta, normal ejection fraction, left ventricular hypertrophy, right atrial enlargement, pulmonary hypertension.  There is no evidence of angina or congestive heart failure.  Good blood pressure control.  Continue Eliquis/Tenormin/Norvasc/Lasix.  Repeat 2D echo shows normal ejection fraction and no change in the ascending aorta dilatation with moderate AR and mild MR...  6.  Hyperbilirubinemia.  Old/stable.  The mostly Godbole disease.  6.  DVT prophylaxis.  Patient anticoagulated.  7.  DNR status.  Patient with previous wishes of a DO NOT RESUSCITATE.     Discussed my findings and plan of treatment with the patient/nurse.  Disposition.  To SNF.        Светлана Casas MD  [facility]  03/02/24  16:52 EST

## 2024-03-03 LAB
ALBUMIN SERPL-MCNC: 3.2 G/DL (ref 3.5–5.2)
ALBUMIN/GLOB SERPL: 1.1 G/DL
ALP SERPL-CCNC: 66 U/L (ref 39–117)
ALT SERPL W P-5'-P-CCNC: 13 U/L (ref 1–33)
ANION GAP SERPL CALCULATED.3IONS-SCNC: 12.4 MMOL/L (ref 5–15)
AST SERPL-CCNC: 19 U/L (ref 1–32)
BASOPHILS # BLD AUTO: 0.02 10*3/MM3 (ref 0–0.2)
BASOPHILS NFR BLD AUTO: 0.4 % (ref 0–1.5)
BILIRUB SERPL-MCNC: 1.1 MG/DL (ref 0–1.2)
BUN SERPL-MCNC: 18 MG/DL (ref 8–23)
BUN/CREAT SERPL: 16.8 (ref 7–25)
CALCIUM SPEC-SCNC: 8.7 MG/DL (ref 8.2–9.6)
CHLORIDE SERPL-SCNC: 109 MMOL/L (ref 98–107)
CO2 SERPL-SCNC: 20.6 MMOL/L (ref 22–29)
CREAT SERPL-MCNC: 1.07 MG/DL (ref 0.57–1)
DEPRECATED RDW RBC AUTO: 46.5 FL (ref 37–54)
EGFRCR SERPLBLD CKD-EPI 2021: 48.8 ML/MIN/1.73
EOSINOPHIL # BLD AUTO: 0.42 10*3/MM3 (ref 0–0.4)
EOSINOPHIL NFR BLD AUTO: 8 % (ref 0.3–6.2)
ERYTHROCYTE [DISTWIDTH] IN BLOOD BY AUTOMATED COUNT: 13 % (ref 12.3–15.4)
GLOBULIN UR ELPH-MCNC: 2.9 GM/DL
GLUCOSE SERPL-MCNC: 107 MG/DL (ref 65–99)
HCT VFR BLD AUTO: 37.4 % (ref 34–46.6)
HGB BLD-MCNC: 12 G/DL (ref 12–15.9)
IMM GRANULOCYTES # BLD AUTO: 0.04 10*3/MM3 (ref 0–0.05)
IMM GRANULOCYTES NFR BLD AUTO: 0.8 % (ref 0–0.5)
LYMPHOCYTES # BLD AUTO: 1.51 10*3/MM3 (ref 0.7–3.1)
LYMPHOCYTES NFR BLD AUTO: 28.7 % (ref 19.6–45.3)
MCH RBC QN AUTO: 31.1 PG (ref 26.6–33)
MCHC RBC AUTO-ENTMCNC: 32.1 G/DL (ref 31.5–35.7)
MCV RBC AUTO: 96.9 FL (ref 79–97)
MONOCYTES # BLD AUTO: 0.73 10*3/MM3 (ref 0.1–0.9)
MONOCYTES NFR BLD AUTO: 13.9 % (ref 5–12)
NEUTROPHILS NFR BLD AUTO: 2.54 10*3/MM3 (ref 1.7–7)
NEUTROPHILS NFR BLD AUTO: 48.2 % (ref 42.7–76)
NRBC BLD AUTO-RTO: 0 /100 WBC (ref 0–0.2)
PLATELET # BLD AUTO: 167 10*3/MM3 (ref 140–450)
PMV BLD AUTO: 10.5 FL (ref 6–12)
POTASSIUM SERPL-SCNC: 3.6 MMOL/L (ref 3.5–5.2)
PROT SERPL-MCNC: 6.1 G/DL (ref 6–8.5)
RBC # BLD AUTO: 3.86 10*6/MM3 (ref 3.77–5.28)
SODIUM SERPL-SCNC: 142 MMOL/L (ref 136–145)
WBC NRBC COR # BLD AUTO: 5.26 10*3/MM3 (ref 3.4–10.8)

## 2024-03-03 PROCEDURE — 25010000002 CEFTRIAXONE PER 250 MG: Performed by: INTERNAL MEDICINE

## 2024-03-03 PROCEDURE — 25010000002 CYANOCOBALAMIN PER 1000 MCG: Performed by: INTERNAL MEDICINE

## 2024-03-03 PROCEDURE — 85025 COMPLETE CBC W/AUTO DIFF WBC: CPT | Performed by: INTERNAL MEDICINE

## 2024-03-03 PROCEDURE — 80053 COMPREHEN METABOLIC PANEL: CPT | Performed by: INTERNAL MEDICINE

## 2024-03-03 RX ORDER — ATENOLOL 25 MG/1
25 TABLET ORAL DAILY
Status: DISCONTINUED | OUTPATIENT
Start: 2024-03-04 | End: 2024-03-05 | Stop reason: HOSPADM

## 2024-03-03 RX ADMIN — CYANOCOBALAMIN 1000 MCG: 1000 INJECTION, SOLUTION INTRAMUSCULAR; SUBCUTANEOUS at 09:43

## 2024-03-03 RX ADMIN — Medication 5000 UNITS: at 09:43

## 2024-03-03 RX ADMIN — LIDOCAINE 1 PATCH: 4 PATCH TOPICAL at 09:43

## 2024-03-03 RX ADMIN — DONEPEZIL HYDROCHLORIDE 5 MG: 5 TABLET, FILM COATED ORAL at 09:43

## 2024-03-03 RX ADMIN — FUROSEMIDE 20 MG: 20 TABLET ORAL at 09:43

## 2024-03-03 RX ADMIN — ATENOLOL 50 MG: 50 TABLET ORAL at 09:43

## 2024-03-03 RX ADMIN — CEFTRIAXONE SODIUM 1000 MG: 1 INJECTION, POWDER, FOR SOLUTION INTRAMUSCULAR; INTRAVENOUS at 16:49

## 2024-03-03 RX ADMIN — APIXABAN 2.5 MG: 2.5 TABLET, FILM COATED ORAL at 20:11

## 2024-03-03 RX ADMIN — ACETAMINOPHEN 325MG 650 MG: 325 TABLET ORAL at 20:11

## 2024-03-03 RX ADMIN — APIXABAN 2.5 MG: 2.5 TABLET, FILM COATED ORAL at 09:43

## 2024-03-03 NOTE — PLAN OF CARE
Goal Outcome Evaluation:  Plan of Care Reviewed With: patient        Progress: improving  Outcome Evaluation: VSS. NVI. pain managed with PO meds. x1 assist BRP. PW in place. disorineted to time and forgetful of limitations. SL. BM yesterday. plan to d/c to rehab.

## 2024-03-03 NOTE — PLAN OF CARE
Goal Outcome Evaluation:  Plan of Care Reviewed With: patient        Progress: improving  Outcome Evaluation: VSS, RA, SL, up with assist, Tylenol given  for pain, BM 3/2, slept well, no attempts out of bed, ABX started, rehab VS LTC on DC when stable

## 2024-03-03 NOTE — PROGRESS NOTES
Name: Ryann Hernadez ADMIT: 2024   : 1931  PCP: Ruddy Sinha MD    MRN: [id number] LOS: 2 days   AGE/SEX: 89 y.o. female  ROOM: [facility]     Subjective   Subjective   Patient reports mild improvement of the right lower extremity pain and right hip pain.  No lower extremity tingling or numbness.    No fever or chills.    Review of Systems  Cardiovascular/respiratory.  No chest pain/no palpitations/no cough/no shortness of breath  .  No dysuria or hematuria and able to pass urine. GI.  No abdominal pain.  No nausea or vomiting.  CNS.  No headache or dizziness and no focal neurological symptoms.       Objective   Objective   Vital Signs  Temp:  [97.3 °F (36.3 °C)-98.4 °F (36.9 °C)] 97.3 °F (36.3 °C)  Heart Rate:  [49-59] 49  Resp:  [16] 16  BP: (110-140)/(71-82) 130/73  SpO2:  [94 %-96 %] 96 %  on   ;   Device (Oxygen Therapy): room air    Intake/Output Summary (Last 24 hours) at 3/3/2024 1706  Last data filed at 3/3/2024 1418  Gross per 24 hour   Intake 600 ml   Output 1400 ml   Net -800 ml     Body mass index is 21.51 kg/m².      24  1158 24  1736 24  0845   Weight: 60.8 kg (134 lb) 60.8 kg (134 lb) 60.7 kg (133 lb 13.1 oz)     Physical Exam  General.  Elderly female.  She is alert and pleasantly confused.  Oriented x 2.  In no apparent pain/distress/diaphoresis.  Normal mood and affect.  HEENT..  No external head injury.  Pupils equal round and reactive.  Status post bilateral cataract surgery.  Positive bilateral arcus senalis.  No pallor or jaundice.  Moist mucous membrane.  Neck.  Supple.  No C-spine tenderness.  No JVD.  No lymphadenopathy or thyromegaly.  Cardiovascular.  Bradycardia and grade 3  systolic murmur.  Chest.  Clear to auscultation bilaterally with few scattered left-sided rhonchi.  Abdomen.  Soft lax.  No tenderness.  No organomegaly.  No guarding or rebound.  Extremities.  No clubbing/cyanosis/edema.  Sequential compression devices in place.  No lower  "extremity neurodeficits.  Musculoskeletal.  Tenderness over the right hip/right femur/right knee especially with movement..  Decreased right knee range of movement with no effusion.      Results Review:      Results from last 7 days   Lab Units 03/03/24  0334 03/02/24  0504 03/01/24  0613 02/29/24  1249   SODIUM mmol/L 142 143 144 145   POTASSIUM mmol/L 3.6 3.8 3.7 3.9   CHLORIDE mmol/L 109* 107 107 105   CO2 mmol/L 20.6* 27.0 26.0 26.2   BUN mg/dL 18 12 11 11   CREATININE mg/dL 1.07* 1.07* 1.13* 1.05*   GLUCOSE mg/dL 107* 114* 109* 123*   CALCIUM mg/dL 8.7 9.2 9.5 9.9*   AST (SGOT) U/L 19 19  --  23   ALT (SGPT) U/L 13 16  --  18     Estimated Creatinine Clearance: 32.1 mL/min (A) (by C-G formula based on SCr of 1.07 mg/dL (H)).                  Results from last 7 days   Lab Units 03/01/24  1647   TSH uIU/mL 1.090               Invalid input(s): \"LDLCALC\"  Results from last 7 days   Lab Units 03/03/24  0333 03/02/24  0504 03/01/24  0613 02/29/24  1249   WBC 10*3/mm3 5.26 6.90 6.71 7.65   HEMOGLOBIN g/dL 12.0 12.4 12.3 12.3   HEMATOCRIT % 37.4 38.3 37.2 37.7   PLATELETS 10*3/mm3 167 170 179 184   MCV fL 96.9 98.0* 96.1 97.4*   MCH pg 31.1 31.7 31.8 31.8   MCHC g/dL 32.1 32.4 33.1 32.6   RDW % 13.0 13.3 13.0 13.0   RDW-SD fl 46.5 47.7 45.7 46.0   MPV fL 10.5 10.6 10.7 10.3   NEUTROPHIL % % 48.2 64.1 66.4 80.6*   LYMPHOCYTE % % 28.7 20.0 19.2* 11.8*   MONOCYTES % % 13.9* 10.6 9.5 6.5   EOSINOPHIL % % 8.0* 4.5 3.9 0.3   BASOPHIL % % 0.4 0.4 0.4 0.4   IMM GRAN % % 0.8* 0.4 0.6* 0.4   NEUTROS ABS 10*3/mm3 2.54 4.42 4.45 6.17   LYMPHS ABS 10*3/mm3 1.51 1.38 1.29 0.90   MONOS ABS 10*3/mm3 0.73 0.73 0.64 0.50   EOS ABS 10*3/mm3 0.42* 0.31 0.26 0.02   BASOS ABS 10*3/mm3 0.02 0.03 0.03 0.03   IMMATURE GRANS (ABS) 10*3/mm3 0.04 0.03 0.04 0.03   NRBC /100 WBC 0.0 0.0 0.0 0.0     Results from last 7 days   Lab Units 02/29/24  1249   INR  1.14*   APTT seconds 30.4                             Results from last 7 days   Lab Units " 03/01/24 2136   NITRITE UA  Positive*   WBC UA /HPF 21-50*   BACTERIA UA /HPF 4+*   SQUAM EPITHEL UA /HPF 0-2           Imaging:  Imaging Results (Last 24 Hours)       Procedure Component Value Units Date/Time    XR Femur 2 View Right [[id number]] Collected: 03/02/24 1800     Updated: 03/02/24 1800    Narrative:      XR FEMUR 2 VW RIGHT-, XR KNEE 1 OR 2 VW RIGHT-     HISTORY: 89-year-old female status post fall. Right leg and knee pain.     FINDINGS: There is no evidence for fracture or malalignment. There is no  evidence for a right knee effusion. There is chondrocalcinosis and at  least a moderate degree of degenerative change at the knee joint.  Moderate degenerative changes are noted at the right hip joint. The  right hip appears stable since recent radiographs and pelvic bone CT  02/29/2024.       XR Knee 1 or 2 View Right [[id number]] Collected: 03/02/24 1800     Updated: 03/02/24 1800    Narrative:      XR FEMUR 2 VW RIGHT-, XR KNEE 1 OR 2 VW RIGHT-     HISTORY: 89-year-old female status post fall. Right leg and knee pain.     FINDINGS: There is no evidence for fracture or malalignment. There is no  evidence for a right knee effusion. There is chondrocalcinosis and at  least a moderate degree of degenerative change at the knee joint.  Moderate degenerative changes are noted at the right hip joint. The  right hip appears stable since recent radiographs and pelvic bone CT  02/29/2024.                  I reviewed the patient's new clinical results / labs / tests / procedures      Assessment/Plan     Active Hospital Problems    Diagnosis  POA    **Right hip pain [M25.551]  Yes    B12 deficiency [E53.8]  Yes    UTI (urinary tract infection) [N39.0]  Yes    Acute urinary retention [R33.8]  Yes    History of stroke [Z86.73]  Not Applicable    CKD (chronic kidney disease) stage 3, GFR 30-59 ml/min [N18.30]  Yes    Ascending aortic aneurysm [I71.21]  Yes    Paroxysmal atrial fibrillation [I48.0]  Yes    Alzheimer's  dementia [G30.9, F02.80]  Yes    Gastroesophageal reflux disease [K21.9]  Yes    Essential hypertension [I10]  Yes      Resolved Hospital Problems   No resolved problems to display.         1.  Mechanical fall leading to right hip and right lower extremity contusion/difficulty ambulation.  CT scan of the brain without acute abnormality CT scan of the C-spine with degenerative disc disease but no fracture.  CT scan of the pelvis revealed no fracture but distended urinary bladder.  X-ray of the right hip/right femur/right knee was negative.  N  Appears comfortable.  Continue Lidoderm patch and Tylenol.  PT and OT evaluated the patient and recommended skilled facility.  No nonsteroidal anti-inflammatory medication secondary to renal failure will ask CCP to see.    2.  Alzheimer's dementia/history of CVA.  CNS examination without focal deficits.  Mentation seems to be at baseline.  Normal TSH/folate/RPR.  B12 is low  (look below).  CT scan of the brain with atrophy and small vessel disease.  Will continue Aricept and Eliquis.  3.  Vitamin B-12 deficiency.  Normal hemoglobin.  Awaiting intrinsic factor.  On vitamin B-12 IM injections.    4.  Acute urinary retention/UTI.  Urinary retention has resolved.  On 3 days of Rocephin.  4.  Stage IIIa chronic renal failure.  Baseline creatinine between 1.05 and 1.14.  Patient appears to be clinically euvolemic.  Renal function about the baseline.  5.  History of hypertension/atrial fibrillation/ascending thoracic aneurysm/AR/MR.  Last echo on 4/2023 revealing a 5.3 cm dilatation of the ascending aorta, normal ejection fraction, left ventricular hypertrophy, right atrial enlargement, pulmonary hypertension.  There is no evidence of angina or congestive heart failure.  Positive asymptomatic bradycardia.  Good blood pressure control.  Continue Eliquis/Tenormin (decrease dose)/Norvasc/Lasix.repeat 2D echo shows normal ejection fraction and no change in the ascending aorta dilatation  with moderate AR and mild MR...  6.  Hyperbilirubinemia.  Old/stable.  Normal bilirubin today.  Mostly Gilbert's disease   6.  DVT prophylaxis.  Patient anticoagulated.  7.  DNR status.  Patient with previous wishes of a DO NOT RESUSCITATE.     Discussed my findings and plan of treatment with the patient/nurse.  Disposition.  To SNF once bed available.        Светлана Casas MD  [facility]  03/03/24  17:06 EST

## 2024-03-04 LAB
ALBUMIN SERPL-MCNC: 3.4 G/DL (ref 3.5–5.2)
ALBUMIN/GLOB SERPL: 0.9 G/DL
ALP SERPL-CCNC: 68 U/L (ref 39–117)
ALT SERPL W P-5'-P-CCNC: 16 U/L (ref 1–33)
ANION GAP SERPL CALCULATED.3IONS-SCNC: 9.9 MMOL/L (ref 5–15)
AST SERPL-CCNC: 25 U/L (ref 1–32)
BASOPHILS # BLD AUTO: 0.02 10*3/MM3 (ref 0–0.2)
BASOPHILS NFR BLD AUTO: 0.4 % (ref 0–1.5)
BILIRUB SERPL-MCNC: 1.1 MG/DL (ref 0–1.2)
BUN SERPL-MCNC: 20 MG/DL (ref 8–23)
BUN/CREAT SERPL: 17.5 (ref 7–25)
CALCIUM SPEC-SCNC: 9.4 MG/DL (ref 8.2–9.6)
CHLORIDE SERPL-SCNC: 109 MMOL/L (ref 98–107)
CO2 SERPL-SCNC: 24.1 MMOL/L (ref 22–29)
CREAT SERPL-MCNC: 1.14 MG/DL (ref 0.57–1)
DEPRECATED RDW RBC AUTO: 47 FL (ref 37–54)
EGFRCR SERPLBLD CKD-EPI 2021: 45.3 ML/MIN/1.73
EOSINOPHIL # BLD AUTO: 0.35 10*3/MM3 (ref 0–0.4)
EOSINOPHIL NFR BLD AUTO: 6.6 % (ref 0.3–6.2)
ERYTHROCYTE [DISTWIDTH] IN BLOOD BY AUTOMATED COUNT: 13.1 % (ref 12.3–15.4)
GLOBULIN UR ELPH-MCNC: 3.6 GM/DL
GLUCOSE SERPL-MCNC: 115 MG/DL (ref 65–99)
HCT VFR BLD AUTO: 39.3 % (ref 34–46.6)
HGB BLD-MCNC: 13 G/DL (ref 12–15.9)
IMM GRANULOCYTES # BLD AUTO: 0.04 10*3/MM3 (ref 0–0.05)
IMM GRANULOCYTES NFR BLD AUTO: 0.8 % (ref 0–0.5)
LYMPHOCYTES # BLD AUTO: 1.65 10*3/MM3 (ref 0.7–3.1)
LYMPHOCYTES NFR BLD AUTO: 31.3 % (ref 19.6–45.3)
MCH RBC QN AUTO: 32.1 PG (ref 26.6–33)
MCHC RBC AUTO-ENTMCNC: 33.1 G/DL (ref 31.5–35.7)
MCV RBC AUTO: 97 FL (ref 79–97)
MONOCYTES # BLD AUTO: 0.65 10*3/MM3 (ref 0.1–0.9)
MONOCYTES NFR BLD AUTO: 12.3 % (ref 5–12)
NEUTROPHILS NFR BLD AUTO: 2.57 10*3/MM3 (ref 1.7–7)
NEUTROPHILS NFR BLD AUTO: 48.6 % (ref 42.7–76)
NRBC BLD AUTO-RTO: 0 /100 WBC (ref 0–0.2)
PLATELET # BLD AUTO: 200 10*3/MM3 (ref 140–450)
PMV BLD AUTO: 10.6 FL (ref 6–12)
POTASSIUM SERPL-SCNC: 3.9 MMOL/L (ref 3.5–5.2)
PROT SERPL-MCNC: 7 G/DL (ref 6–8.5)
RBC # BLD AUTO: 4.05 10*6/MM3 (ref 3.77–5.28)
SODIUM SERPL-SCNC: 143 MMOL/L (ref 136–145)
WBC NRBC COR # BLD AUTO: 5.28 10*3/MM3 (ref 3.4–10.8)

## 2024-03-04 PROCEDURE — 97110 THERAPEUTIC EXERCISES: CPT | Performed by: PHYSICAL THERAPIST

## 2024-03-04 PROCEDURE — 25010000002 CYANOCOBALAMIN PER 1000 MCG: Performed by: INTERNAL MEDICINE

## 2024-03-04 PROCEDURE — 97530 THERAPEUTIC ACTIVITIES: CPT | Performed by: PHYSICAL THERAPIST

## 2024-03-04 PROCEDURE — 80053 COMPREHEN METABOLIC PANEL: CPT | Performed by: INTERNAL MEDICINE

## 2024-03-04 PROCEDURE — 25010000002 CEFTRIAXONE PER 250 MG: Performed by: INTERNAL MEDICINE

## 2024-03-04 PROCEDURE — 85025 COMPLETE CBC W/AUTO DIFF WBC: CPT | Performed by: INTERNAL MEDICINE

## 2024-03-04 RX ORDER — CYANOCOBALAMIN 1000 UG/ML
2000 INJECTION, SOLUTION INTRAMUSCULAR; SUBCUTANEOUS
Status: DISCONTINUED | OUTPATIENT
Start: 2024-03-04 | End: 2024-03-05 | Stop reason: HOSPADM

## 2024-03-04 RX ADMIN — CYANOCOBALAMIN 1000 MCG: 1000 INJECTION, SOLUTION INTRAMUSCULAR; SUBCUTANEOUS at 08:03

## 2024-03-04 RX ADMIN — LIDOCAINE 1 PATCH: 4 PATCH TOPICAL at 08:02

## 2024-03-04 RX ADMIN — APIXABAN 2.5 MG: 2.5 TABLET, FILM COATED ORAL at 22:47

## 2024-03-04 RX ADMIN — APIXABAN 2.5 MG: 2.5 TABLET, FILM COATED ORAL at 08:02

## 2024-03-04 RX ADMIN — FUROSEMIDE 20 MG: 20 TABLET ORAL at 08:02

## 2024-03-04 RX ADMIN — DONEPEZIL HYDROCHLORIDE 5 MG: 5 TABLET, FILM COATED ORAL at 08:02

## 2024-03-04 RX ADMIN — AMLODIPINE BESYLATE 5 MG: 5 TABLET ORAL at 08:02

## 2024-03-04 RX ADMIN — Medication 5000 UNITS: at 08:02

## 2024-03-04 RX ADMIN — ACETAMINOPHEN 325MG 650 MG: 325 TABLET ORAL at 04:55

## 2024-03-04 RX ADMIN — CEFTRIAXONE SODIUM 1000 MG: 1 INJECTION, POWDER, FOR SOLUTION INTRAMUSCULAR; INTRAVENOUS at 17:29

## 2024-03-04 RX ADMIN — ATENOLOL 25 MG: 25 TABLET ORAL at 08:02

## 2024-03-04 NOTE — PROGRESS NOTES
Name: Ryann Hernadez ADMIT: 2024   : 1931  PCP: Ruddy Sinha MD    MRN: [id number] LOS: 3 days   AGE/SEX: 89 y.o. female  ROOM: [facility]     Subjective   Subjective   Denies any new complaint. She states her pain is controlled. No chest pain or shortness of breath. She is eager to go to rehab.     Objective   Objective   Vital Signs  Temp:  [97.3 °F (36.3 °C)-97.9 °F (36.6 °C)] 97.9 °F (36.6 °C)  Heart Rate:  [49-90] 68  Resp:  [16] 16  BP: (115-153)/(71-99) 153/99  SpO2:  [95 %-98 %] 95 %  on   ;   Device (Oxygen Therapy): room air  Body mass index is 21.51 kg/m².    Physical Exam  Constitutional:       General: She is not in acute distress.     Appearance: She is not toxic-appearing.   HENT:      Head: Normocephalic and atraumatic.   Cardiovascular:      Rate and Rhythm: Normal rate and regular rhythm.   Pulmonary:      Effort: Pulmonary effort is normal. No respiratory distress.      Breath sounds: Normal breath sounds. No wheezing or rhonchi.   Abdominal:      General: Bowel sounds are normal.      Palpations: Abdomen is soft.      Tenderness: There is no abdominal tenderness. There is no guarding or rebound.   Musculoskeletal:         General: No swelling.   Skin:     General: Skin is warm and dry.   Neurological:      General: No focal deficit present.      Mental Status: She is alert.      Comments: Oriented to self, hospital, and situation. Did not know the year.   Psychiatric:         Mood and Affect: Mood normal.         Behavior: Behavior normal.     Results Review  I reviewed the patient's new clinical results.  Results from last 7 days   Lab Units 24  0503 24  0333 24  0504 24  0613   WBC 10*3/mm3 5.28 5.26 6.90 6.71   HEMOGLOBIN g/dL 13.0 12.0 12.4 12.3   PLATELETS 10*3/mm3 200 167 170 179     Results from last 7 days   Lab Units 24  0503 24  0334 24  0504 24  0613   SODIUM mmol/L 143 142 143 144   POTASSIUM mmol/L 3.9 3.6 3.8  "3.7   CHLORIDE mmol/L 109* 109* 107 107   CO2 mmol/L 24.1 20.6* 27.0 26.0   BUN mg/dL 20 18 12 11   CREATININE mg/dL 1.14* 1.07* 1.07* 1.13*   GLUCOSE mg/dL 115* 107* 114* 109*     Lab Results   Component Value Date    ANIONGAP 9.9 03/04/2024     Estimated Creatinine Clearance: 30.2 mL/min (A) (by C-G formula based on SCr of 1.14 mg/dL (H)).   Lab Results   Component Value Date    EGFR 45.3 (L) 03/04/2024     Results from last 7 days   Lab Units 03/04/24  0503 03/03/24  0334 03/02/24  0504 02/29/24  1249   ALBUMIN g/dL 3.4* 3.2* 3.6 4.0   BILIRUBIN mg/dL 1.1 1.1 1.9* 1.9*   ALK PHOS U/L 68 66 76 85   AST (SGOT) U/L 25 19 19 23   ALT (SGPT) U/L 16 13 16 18     Results from last 7 days   Lab Units 03/04/24  0503 03/03/24  0334 03/02/24  0504 03/01/24  0613 02/29/24  1249   CALCIUM mg/dL 9.4 8.7 9.2 9.5 9.9*   ALBUMIN g/dL 3.4* 3.2* 3.6  --  4.0       No results found for: \"HGBA1C\", \"POCGLU\"    No radiology results for the last day    Scheduled Meds  amLODIPine, 5 mg, Oral, Daily  apixaban, 2.5 mg, Oral, Q12H  atenolol, 25 mg, Oral, Daily  cefTRIAXone, 1,000 mg, Intravenous, Q24H  cholecalciferol, 5,000 Units, Oral, Daily  donepezil, 5 mg, Oral, Daily  furosemide, 20 mg, Oral, Daily  Lidocaine, 1 patch, Transdermal, Q24H  Morphine, 2 mg, Intravenous, Once  ondansetron, 4 mg, Intravenous, Once    Continuous Infusions   PRN Meds    acetaminophen **OR** acetaminophen **OR** acetaminophen    senna-docusate sodium **AND** polyethylene glycol **AND** bisacodyl **AND** bisacodyl    ondansetron    [COMPLETED] Insert Peripheral IV **AND** sodium chloride     Diet  Diet: Regular/House Diet; Texture: Regular Texture (IDDSI 7); Fluid Consistency: Thin (IDDSI 0)    I have personally reviewed:  [x]  Medications  [x]  Laboratory   [x]  Microbiology   []  Radiology   []  EKG/Telemetry   []  Cardiology/Vascular   []  Pathology   []  Records      Assessment/Plan     Active Hospital Problems    Diagnosis  POA    **Right hip pain " [M25.551]  Yes    B12 deficiency [E53.8]  Yes    UTI (urinary tract infection) [N39.0]  Yes    Acute urinary retention [R33.8]  Yes    History of stroke [Z86.73]  Not Applicable    CKD (chronic kidney disease) stage 3, GFR 30-59 ml/min [N18.30]  Yes    Ascending aortic aneurysm [I71.21]  Yes    Paroxysmal atrial fibrillation [I48.0]  Yes    Alzheimer's dementia [G30.9, F02.80]  Yes    Gastroesophageal reflux disease [K21.9]  Yes    Essential hypertension [I10]  Yes      Resolved Hospital Problems   No resolved problems to display.     89 y.o. female     Mechanical fall  Right lower extremity/right hip contusion  Pain controlled  Await SNF    Dementia  History of stroke  Mental status seems to be baseline  Delirium precautions    Abnormal urinalysis/UTI  Urinary retention  Currently on ceftriaxone x 3 days (today)  No culture  D/W RN urinating fine    B12 deficiency  s/p B12 replacement IM start high dose PO daily    CKD 3   monitoring at baseline    Hypertension  Atrial fibrillation  Ascending thoracic aneurysm  Aortic and mitral regurg    DVT prophylaxis  Eliquis (home med)    Discharge  Awaiting SNF  Expected discharge date/ time has not been documented.    Discussed with patient, nursing staff, and CCP    Lance Puentes MD  [facility]  03/04/24

## 2024-03-04 NOTE — DISCHARGE PLACEMENT REQUEST
"Francisco Hernadez N (89 y.o. Female)       Date of Birth   06/01/1931    Social Security Number       Address   [address] [address]    Home Phone   [phone]    MRN   [id number]       [facility]    Marital Status                               Admission Date   2/29/24    Admission Type   Emergency    Admitting Provider   Erlinda Marcum MD    Attending Provider   Lance Puentes MD    Department, Room/Bed   [address], P780/1       Discharge Date       Discharge Disposition       Discharge Destination                                 Attending Provider: Lance Puentes MD    Allergies: Sulfa Antibiotics, Lisinopril, Milk-related Compounds, Olmesartan    Isolation: None   Infection: None   Code Status: No CPR    Ht: 168 cm (66.14\")   Wt: 60.7 kg (133 lb 13.1 oz)    Admission Cmt: None   Principal Problem: Right hip pain [M25.551]                   Active Insurance as of 2/29/2024       Primary Coverage       Payor Plan Insurance Group Employer/Plan Group    MEDICARE MEDICARE A & B        Payor Plan Address Payor Plan Phone Number Payor Plan Fax Number Effective Dates    [address] [phone]  5/1/1996 - None Entered    [facility] [address]         Subscriber Name Subscriber Birth Date Member ID       FRANCISCO HERNADEZ N 6/1/1931 [id number]               Secondary Coverage       Payor Plan Insurance Group Employer/Plan Group    [facility] SUPP [id number]       Payor Plan Address Payor Plan Phone Number Payor Plan Fax Number Effective Dates    [address]   12/1/2016 - None Entered    [facility] [id number]         Subscriber Name Subscriber Birth Date Member ID       FRANCISCO HERNADEZ N 6/1/1931 [id number]                     Emergency Contacts        (Rel.) Home Phone Work Phone Mobile Phone    Rhea Angelo (Legal Guardian) -- [phone]                "

## 2024-03-04 NOTE — THERAPY TREATMENT NOTE
Patient Name: Ryann Hernadez  : 1931    MRN: [id number]                              Today's Date: 3/4/2024       Admit Date: 2024    Visit Dx:     ICD-10-CM ICD-9-CM   1. Unwitnessed fall  R29.6 [id number]   2. History of dementia  Z86.59 V11.8   3. Deficit in activities of daily living (ADL)  Z78.9 V49.89   4. Acute right hip pain  M25.551 719.45   5. Injury of head, initial encounter  S09.90XA 959.01     Patient Active Problem List   Diagnosis    Allergic rhinitis    Gastroesophageal reflux disease    Essential hypertension    Ataxic gait    Balance disorder    Acute right otitis media    Nonintractable episodic headache    Facial swelling    Dizziness    Medicare annual wellness visit, initial    Hospital discharge follow-up    At high risk for falls    Renal insufficiency    Paroxysmal atrial fibrillation    Ureteral stone with hydronephrosis    Hydronephrosis due to obstruction of ureter    Aortic root dilatation    Alzheimer's dementia    Age-related osteoporosis without current pathological fracture    Corn of foot    BPPV (benign paroxysmal positional vertigo), unspecified laterality    TIA (transient ischemic attack)    Ascending aortic aneurysm    Acute deep vein thrombosis (DVT) of calf muscle vein of left lower extremity    Medicare annual wellness visit, subsequent    Paroxysmal atrial fibrillation    Elevated troponin    COVID-19 virus detected    Embolic stroke    Right hip pain    Acute urinary retention    History of stroke    CKD (chronic kidney disease) stage 3, GFR 30-59 ml/min    B12 deficiency    UTI (urinary tract infection)     Past Medical History:   Diagnosis Date    A-fib     Acute deep vein thrombosis (DVT) of calf muscle vein of left lower extremity 2020    Allergic rhinitis     Anemia     Aneurysm, ascending aorta 2016    5 CM    Anxiety     Ataxic gait     Atypical chest pain     BPPV (benign paroxysmal positional vertigo)     Bradycardia     Chronic cystitis      Chronic headaches     Chronic nonintractable headache 9/14/2016    Chronic paroxysmal hemicrania     Depression     Esophagitis     Gastritis     GERD (gastroesophageal reflux disease)     Hematuria     High blood pressure     History of cataract     History of irritable bowel syndrome     Hypertension     IBS (irritable bowel syndrome)     Influenza A     Kidney lesion     LEFT KIDNEY CYSTIC    Labyrinthitis 07/28/2014    OA (osteoarthritis)     Osteoporosis 9/13/2018    Other abnormal clinical finding 09/21/2012    LEFT UTEROCELE    Pain of thigh     Personal history of gastric ulcer     Renal disorder     Renal insufficiency 8/7/2017    Rhabdomyolysis 7/5/2022    TIA (transient ischemic attack) 2/5/2020    UTI (urinary tract infection) 3/2/2024     Past Surgical History:   Procedure Laterality Date    BREAST LUMPECTOMY      BREAST SURGERY      CATARACT EXTRACTION      COLONOSCOPY N/A     DR. MARGARITA MENDOZA    ELBOW ARTHROTOMY  07/01/1999    DR. RYAN RITCHIE    EYE SURGERY      FRACTURE SURGERY      HAND, BROKEN FINGERS  DR. MIKE CHRISTIANSON    KNEE SURGERY  08/31/1999    DR. THADDEUS MENDES    OOPHORECTOMY      TUBAL ABDOMINAL LIGATION Bilateral 1962    DR. EDDA MANNING      General Information       Row Name 03/04/24 1043          Physical Therapy Time and Intention    Document Type therapy note (daily note)  -     Mode of Treatment individual therapy;physical therapy  -               User Key  (r) = Recorded By, (t) = Taken By, (c) = Cosigned By      Initials Name Provider Type    Julissa Rodrigues, PT Physical Therapist                   Mobility       Row Name 03/04/24 1043          Bed Mobility    Bed Mobility supine-sit  -     Supine-Sit [place] (Bed Mobility) verbal cues;minimum assist (75% patient effort)  -ELY     Assistive Device (Bed Mobility) head of bed elevated;bed rails  -       Row Name 03/04/24 1043          Sit-Stand Transfer    Sit-Stand [place] (Transfers) minimum assist  (75% patient effort)  -     Assistive Device (Sit-Stand Transfers) walker, front-wheeled  -KH       Row Name 03/04/24 1043          Gait/Stairs (Locomotion)    [place] Level (Gait) verbal cues;minimum assist (75% patient effort);1 person assist  -     Assistive Device (Gait) walker, front-wheeled  -KH     Distance in Feet (Gait) 12  -KH     Deviations/Abnormal Patterns (Gait) ena decreased;gait speed decreased;antalgic  -     Bilateral Gait Deviations forward flexed posture  -KH     Right Sided Gait Deviations weight shift ability decreased  -KH     Comment, (Gait/Stairs) verbal cues for sequencing  -       Row Name 03/04/24 1043          Mobility    Extremity Weight-bearing Status right lower extremity  -     Right Lower Extremity (Weight-bearing Status) weight-bearing as tolerated (WBAT)  -               User Key  (r) = Recorded By, (t) = Taken By, (c) = Cosigned By      Initials Name Provider Type    Julissa Rodrigues PT Physical Therapist                   Obj/Interventions       Row Name 03/04/24 1044          Motor Skills    Therapeutic Exercise --  RLE AP, HS, hip abd x 10 reps  with assist  -               User Key  (r) = Recorded By, (t) = Taken By, (c) = Cosigned By      Initials Name Provider Type    Julissa Rodrigues, JAMILA Physical Therapist                   Goals/Plan    No documentation.                  Clinical Impression       [facility] Name 03/04/24 1045          Pain    Pain Location - Side/Orientation Right  -     Pain Location lower  -     Pain Location - extremity  -     Additional Documentation Pain Scale: FACES Pre/Post-Treatment (Group)  -[facility] Name 03/04/24 1045          Pain Scale: FACES Pre/Post-Treatment    Pain: FACES Scale, Pretreatment 2-->hurts little bit  -KH     Posttreatment Pain Rating 6-->hurts even more  -     Pain Location - Side/Orientation Right  -     Pain Location lower  -     Pain Location - extremity  -[facility]  Name 03/04/24 1045          Plan of Care Review    Plan of Care Reviewed With patient  -     Outcome Evaluation Pt was in bed and agreeable to therapy. She continues to require assistance to move RLE and to push up from sidelying. Pt stood with min A and was able to walk 12 ft with min A and Rwx. Frequent verbal cues for sequencing and step length. Pt tolerated RLE exercies with assist.  -       Row Name 03/04/24 1045          Positioning and Restraints    Pre-Treatment Position in bed  -     Post Treatment Position chair  -     In Chair reclined;call light within reach;encouraged to call for assist;exit alarm on  -               User Key  (r) = Recorded By, (t) = Taken By, (c) = Cosigned By      Initials Name Provider Type    Julissa Rodrigues, JAMILA Physical Therapist                   Outcome Measures       Row Name 03/04/24 1049 03/04/24 0802       How much help from another person do you currently need...    Turning from your back to your side while in flat bed without using bedrails? 3  -KH 3  -MN    Moving from lying on back to sitting on the side of a flat bed without bedrails? 3  -KH 3  -MN    Moving to and from a bed to a chair (including a wheelchair)? 3  -KH 3  -MN    Standing up from a chair using your arms (e.g., wheelchair, bedside chair)? 3  -KH 3  -MN    Climbing 3-5 steps with a railing? 2  -KH 3  -MN    To walk in hospital room? 3  -KH 3  -MN    AM-PAC 6 Clicks Score (PT) 17  -KH 18  -MN    Highest Level of Mobility Goal 5 --> Static standing  - 6 --> Walk 10 steps or more  -MN      Row Name 03/04/24 1049          Functional Assessment    Outcome Measure Options AM-PAC 6 Clicks Basic Mobility (PT)  -               User Key  (r) = Recorded By, (t) = Taken By, (c) = Cosigned By      Initials Name Provider Type    Julissa Rodrigues, PT Physical Therapist    Sendy Kulkarni RN Registered Nurse                                 Physical Therapy Education       Title:  PT OT SLP Therapies (Done)       Topic: Physical Therapy (Done)       Point: Mobility training (Done)       Learning Progress Summary             Patient Acceptance, E, VU by  at 3/4/2024 1049    Acceptance, E, VU,NR by  at 3/1/2024 1351                         Point: Home exercise program (Done)       Learning Progress Summary             Patient Acceptance, E, VU by  at 3/4/2024 1049                         Point: Body mechanics (Done)       Learning Progress Summary             Patient Acceptance, E, VU by  at 3/4/2024 1049    Acceptance, E, VU,NR by CW at 3/1/2024 1351                         Point: Precautions (Done)       Learning Progress Summary             Patient Acceptance, E, VU by  at 3/4/2024 1049    Acceptance, E, VU,NR by  at 3/1/2024 1351                                         User Key       Initials Effective Dates Name Provider Type Discipline     07/11/23 -  Julissa Duff, PT Physical Therapist PT     12/13/22 -  Fabiola Awad, PT Physical Therapist PT                  PT Recommendation and Plan     Plan of Care Reviewed With: patient  Outcome Evaluation: Pt was in bed and agreeable to therapy. She continues to require assistance to move RLE and to push up from sidelying. Pt stood with min A and was able to walk 12 ft with min A and Rwx. Frequent verbal cues for sequencing and step length. Pt tolerated RLE exercies with assist.     Time Calculation:         PT Charges       Row Name 03/04/24 1049             Time Calculation    Start Time 0920  -      Stop Time 0943  -      Time Calculation (min) 23 min  -      PT Received On 03/04/24  -      PT - Next Appointment 03/05/24  -         Time Calculation- PT    Total Timed Code Minutes- PT 23 minute(s)  -         Timed Charges    61598 - PT Therapeutic Exercise Minutes 11  -KH      01057 - PT Therapeutic Activity Minutes 12  -         Total Minutes    Timed Charges Total Minutes 23  -KH       Total Minutes  23  -KH                User Key  (r) = Recorded By, (t) = Taken By, (c) = Cosigned By      Initials Name Provider Type    Julissa Rodrigues, PT Physical Therapist                  Therapy Charges for Today       Code Description Service Date Service Provider Modifiers Qty    [id number]  PT THER PROC EA 15 MIN 3/4/2024 Julissa Duff, PT GP 1    [id number]  PT THERAPEUTIC ACT EA 15 MIN 3/4/2024 Julissa Duff, PT GP 1            PT G-Codes  Outcome Measure Options: AM-PAC 6 Clicks Basic Mobility (PT)  AM-PAC 6 Clicks Score (PT): 17  AM-PAC 6 Clicks Score (OT): 15  Modified Tory Scale: 4 - Moderately severe disability.  Unable to walk without assistance, and unable to attend to own bodily needs without assistance.       Julissa Duff, PT  3/4/2024

## 2024-03-04 NOTE — PLAN OF CARE
Goal Outcome Evaluation:  Plan of Care Reviewed With: patient        Progress: improving  Outcome Evaluation: VSS, pain controlled by prn meds. purewick in place. pt forgetful. pt rested well during shift. labs in am. awaiting for placement.

## 2024-03-04 NOTE — CASE MANAGEMENT/SOCIAL WORK
Continued Stay Note  [facility]     Patient Name: Ryann Hernadez  MRN: [id number]  Today's Date: 3/4/2024    Admit Date: 2/29/2024    Plan: SNF-referral pending to [facility]   Discharge Plan       Row Name 03/04/24 0817       Plan    Plan Comments Per MD, patient likely ready for dc. Spoke with Lynne/[facility]. She will review the case and follow up with CCP later this AM.                   Discharge Codes    No documentation.                       Agueda Lozoya RN

## 2024-03-04 NOTE — CASE MANAGEMENT/SOCIAL WORK
Continued Stay Note  [facility]     Patient Name: Ryann Hernadez  MRN: [id number]  Today's Date: 3/4/2024    Admit Date: 2/29/2024    Plan: [facility]- [facility] available- [facility] arranged for 3/5 @ 1400   Discharge Plan       Row Name 03/04/24 1514       Plan    Plan [facility]- bed available- [facility] arranged for 3/5 @ 1400    Patient/Family in Agreement with Plan yes      Row Name 03/04/24 1510       Plan    Plan Comments Spoke with daughter, Rhea, and she would prefer [facility]. Spoke with [facility] and they can accept tomorrow. Transfer packet in cubbie and pharmacy updated. [facility] arranged for 3/5 ( Tuesday) @ 1400. Daughter updated and agreeable.                   Discharge Codes    No documentation.                 Expected Discharge Date and Time       Expected Discharge Date Expected Discharge Time    Mar 5, 2024               Agueda Lozoya RN

## 2024-03-04 NOTE — DISCHARGE PLACEMENT REQUEST
"Francisco Hernadez N (89 y.o. Female)       Date of Birth   06/01/1931    Social Security Number       Address   [address] [address]    Home Phone   [phone]    MRN   [id number]       [facility]    Marital Status                               Admission Date   2/29/24    Admission Type   Emergency    Admitting Provider   Erlinda Marcum MD    Attending Provider   Lance Puentes MD    Department, Room/Bed   [address], P780/1       Discharge Date       Discharge Disposition       Discharge Destination                                 Attending Provider: Lance Puentes MD    Allergies: Sulfa Antibiotics, Lisinopril, Milk-related Compounds, Olmesartan    Isolation: None   Infection: None   Code Status: No CPR    Ht: 168 cm (66.14\")   Wt: 60.7 kg (133 lb 13.1 oz)    Admission Cmt: None   Principal Problem: Right hip pain [M25.551]                   Active Insurance as of 2/29/2024       Primary Coverage       Payor Plan Insurance Group Employer/Plan Group    MEDICARE MEDICARE A & B        Payor Plan Address Payor Plan Phone Number Payor Plan Fax Number Effective Dates    [address] [phone]  5/1/1996 - None Entered    [facility] [address]         Subscriber Name Subscriber Birth Date Member ID       FRANCISCO HERNADEZ N 6/1/1931 [id number]               Secondary Coverage       Payor Plan Insurance Group Employer/Plan Group    [facility] SUPP [id number]       Payor Plan Address Payor Plan Phone Number Payor Plan Fax Number Effective Dates    [address]   12/1/2016 - None Entered    [facility] [address]         Subscriber Name Subscriber Birth Date Member ID       FRANCISCO HERNADEZ N 6/1/1931 [id number]                     Emergency Contacts        (Rel.) Home Phone Work Phone Mobile Phone    Rhea Angelo (Legal Guardian) -- [phone]                "

## 2024-03-04 NOTE — PLAN OF CARE
Goal Outcome Evaluation:  Plan of Care Reviewed With: patient           Outcome Evaluation: Pt was in bed and agreeable to therapy. She continues to require assistance to move RLE and to push up from sidelying. Pt stood with min A and was able to walk 12 ft with min A and Rwx. Frequent verbal cues for sequencing and step length. Pt tolerated RLE exercies with assist.

## 2024-03-05 VITALS
RESPIRATION RATE: 16 BRPM | OXYGEN SATURATION: 96 % | TEMPERATURE: 97.4 F | HEART RATE: 51 BPM | WEIGHT: 133.82 LBS | HEIGHT: 66 IN | BODY MASS INDEX: 21.51 KG/M2 | SYSTOLIC BLOOD PRESSURE: 102 MMHG | DIASTOLIC BLOOD PRESSURE: 64 MMHG

## 2024-03-05 LAB
ALBUMIN SERPL-MCNC: 3.2 G/DL (ref 3.5–5.2)
ALBUMIN/GLOB SERPL: 1 G/DL
ALP SERPL-CCNC: 66 U/L (ref 39–117)
ALT SERPL W P-5'-P-CCNC: 14 U/L (ref 1–33)
ANION GAP SERPL CALCULATED.3IONS-SCNC: 12.7 MMOL/L (ref 5–15)
AST SERPL-CCNC: 25 U/L (ref 1–32)
BASOPHILS # BLD AUTO: 0.03 10*3/MM3 (ref 0–0.2)
BASOPHILS NFR BLD AUTO: 0.6 % (ref 0–1.5)
BILIRUB SERPL-MCNC: 0.8 MG/DL (ref 0–1.2)
BUN SERPL-MCNC: 18 MG/DL (ref 8–23)
BUN/CREAT SERPL: 16.8 (ref 7–25)
CALCIUM SPEC-SCNC: 9 MG/DL (ref 8.2–9.6)
CHLORIDE SERPL-SCNC: 107 MMOL/L (ref 98–107)
CO2 SERPL-SCNC: 23.3 MMOL/L (ref 22–29)
CREAT SERPL-MCNC: 1.07 MG/DL (ref 0.57–1)
DEPRECATED RDW RBC AUTO: 46.3 FL (ref 37–54)
EGFRCR SERPLBLD CKD-EPI 2021: 48.8 ML/MIN/1.73
EOSINOPHIL # BLD AUTO: 0.23 10*3/MM3 (ref 0–0.4)
EOSINOPHIL NFR BLD AUTO: 4.4 % (ref 0.3–6.2)
ERYTHROCYTE [DISTWIDTH] IN BLOOD BY AUTOMATED COUNT: 13.1 % (ref 12.3–15.4)
GLOBULIN UR ELPH-MCNC: 3.2 GM/DL
GLUCOSE SERPL-MCNC: 110 MG/DL (ref 65–99)
HCT VFR BLD AUTO: 36.2 % (ref 34–46.6)
HGB BLD-MCNC: 12 G/DL (ref 12–15.9)
IMM GRANULOCYTES # BLD AUTO: 0.02 10*3/MM3 (ref 0–0.05)
IMM GRANULOCYTES NFR BLD AUTO: 0.4 % (ref 0–0.5)
LYMPHOCYTES # BLD AUTO: 1.52 10*3/MM3 (ref 0.7–3.1)
LYMPHOCYTES NFR BLD AUTO: 29.3 % (ref 19.6–45.3)
MCH RBC QN AUTO: 32 PG (ref 26.6–33)
MCHC RBC AUTO-ENTMCNC: 33.1 G/DL (ref 31.5–35.7)
MCV RBC AUTO: 96.5 FL (ref 79–97)
MONOCYTES # BLD AUTO: 0.7 10*3/MM3 (ref 0.1–0.9)
MONOCYTES NFR BLD AUTO: 13.5 % (ref 5–12)
NEUTROPHILS NFR BLD AUTO: 2.69 10*3/MM3 (ref 1.7–7)
NEUTROPHILS NFR BLD AUTO: 51.8 % (ref 42.7–76)
NRBC BLD AUTO-RTO: 0 /100 WBC (ref 0–0.2)
PLATELET # BLD AUTO: 197 10*3/MM3 (ref 140–450)
PMV BLD AUTO: 10.1 FL (ref 6–12)
POTASSIUM SERPL-SCNC: 4 MMOL/L (ref 3.5–5.2)
PROT SERPL-MCNC: 6.4 G/DL (ref 6–8.5)
RBC # BLD AUTO: 3.75 10*6/MM3 (ref 3.77–5.28)
SODIUM SERPL-SCNC: 143 MMOL/L (ref 136–145)
WBC NRBC COR # BLD AUTO: 5.19 10*3/MM3 (ref 3.4–10.8)

## 2024-03-05 PROCEDURE — 80053 COMPREHEN METABOLIC PANEL: CPT | Performed by: INTERNAL MEDICINE

## 2024-03-05 PROCEDURE — 85025 COMPLETE CBC W/AUTO DIFF WBC: CPT | Performed by: INTERNAL MEDICINE

## 2024-03-05 RX ORDER — LIDOCAINE 4 G/G
1 PATCH TOPICAL
Start: 2024-03-05

## 2024-03-05 RX ORDER — POLYETHYLENE GLYCOL 3350 17 G/17G
17 POWDER, FOR SOLUTION ORAL DAILY PRN
Start: 2024-03-05

## 2024-03-05 RX ORDER — LANOLIN ALCOHOL/MO/W.PET/CERES
2000 CREAM (GRAM) TOPICAL DAILY
Start: 2024-03-05

## 2024-03-05 RX ORDER — AMOXICILLIN 250 MG
2 CAPSULE ORAL 2 TIMES DAILY PRN
Start: 2024-03-05

## 2024-03-05 RX ORDER — ATENOLOL 25 MG/1
25 TABLET ORAL DAILY
Start: 2024-03-05

## 2024-03-05 RX ORDER — BISACODYL 5 MG/1
5 TABLET, DELAYED RELEASE ORAL DAILY PRN
Start: 2024-03-05

## 2024-03-05 RX ORDER — BISACODYL 10 MG
10 SUPPOSITORY, RECTAL RECTAL DAILY PRN
Start: 2024-03-05

## 2024-03-05 RX ADMIN — AMLODIPINE BESYLATE 5 MG: 5 TABLET ORAL at 08:32

## 2024-03-05 RX ADMIN — DONEPEZIL HYDROCHLORIDE 5 MG: 5 TABLET, FILM COATED ORAL at 08:33

## 2024-03-05 RX ADMIN — FUROSEMIDE 20 MG: 20 TABLET ORAL at 08:33

## 2024-03-05 RX ADMIN — Medication 5000 UNITS: at 08:32

## 2024-03-05 RX ADMIN — LIDOCAINE 1 PATCH: 4 PATCH TOPICAL at 08:32

## 2024-03-05 RX ADMIN — APIXABAN 2.5 MG: 2.5 TABLET, FILM COATED ORAL at 08:32

## 2024-03-05 RX ADMIN — ATENOLOL 25 MG: 25 TABLET ORAL at 08:33

## 2024-03-05 NOTE — PLAN OF CARE
Goal Outcome Evaluation:  Plan of Care Reviewed With: patient            Pt admitted on 2/29 with rt hip pain. No fractures noted. Pt is alert but can be forgetful at times. Pt continues with the Pure Wick, voiding function intact. Minimal amounts of pain noted. Pt educated on blood pressure monitoring. Pt resting at this time, will continue to monitor

## 2024-03-05 NOTE — CASE MANAGEMENT/SOCIAL WORK
Continued Stay Note  [facility]     Patient Name: Ryann Hernadez  MRN: [id number]  Today's Date: 3/5/2024    Admit Date: 2/29/2024    Plan: [facility]- bed available- [facility] arranged for 3/5 @ 1400   Discharge Plan       Row Name 03/05/24 0836       Plan    Plan Comments DC orders in EPIC. Transfer packet updated and dc summary faxed. [facility] arranged for 1400. Daughter, Rhea, updated on discharge by telephone and she is agreeable. [facility] updated. Secure chat sent to RN. Patient will dc to skilled bed at [facility] via [facility] at 1400.                   Discharge Codes    No documentation.                 Expected Discharge Date and Time       Expected Discharge Date Expected Discharge Time    Mar 5, 2024               Agueda Lozoya RN

## 2024-03-05 NOTE — PLAN OF CARE
Goal Outcome Evaluation:           Progress: improving  Outcome Evaluation: vss, nvi, disoriented to time, slid out of chair today trying to get up to make her bed-no injuries, voiding per PW or BRP, plan to DC to [facility] tomorrow, EMS at 1400

## 2024-03-05 NOTE — DISCHARGE SUMMARY
[facility]    Date of Discharge:  3/5/2024    PCP: Ruddy Sinha MD    Discharge Diagnosis:   Active Hospital Problems    Diagnosis  POA    **Right hip pain [M25.551]  Yes    B12 deficiency [E53.8]  Yes    UTI (urinary tract infection) [N39.0]  Yes    Acute urinary retention [R33.8]  Yes    History of stroke [Z86.73]  Not Applicable    CKD (chronic kidney disease) stage 3, GFR 30-59 ml/min [N18.30]  Yes    Ascending aortic aneurysm [I71.21]  Yes    Paroxysmal atrial fibrillation [I48.0]  Yes    Alzheimer's dementia [G30.9, F02.80]  Yes    Gastroesophageal reflux disease [K21.9]  Yes    Essential hypertension [I10]  Yes      Resolved Hospital Problems   No resolved problems to display.          Consults       Date and Time Order Name Status Description    2/29/2024  3:30 PM [facility] (on-call MD unless specified) Details            Hospital Course  89 y.o. female had a mechanical fall and right leg/hip contusion. She had difficulty ambulating and was admitted for pain control. Physical therapy worked with her and yesterday she was able to walk 12 feet with a walker. She is only been taking Tylenol for pain. SNF was recommended and she is being transferred today. She has a history of stroke and dementia however mental status has been stable only disoriented to year. She had an abnormal urinalysis (bacteriuria, pyuria) and urinary retention and was treated for urinary tract infection with 3 days of ceftriaxone. Urinary retention has resolved. B12 is low and she was given IM injections while here and will be discharged on high-dose PO. She is stable to transfer to SNF today.    I discussed the patient's findings and my recommendations with patient and nursing staff.    Temp:  [97.4 °F (36.3 °C)-98.1 °F (36.7 °C)] 97.7 °F (36.5 °C)  Heart Rate:  [] 59  Resp:  [16-18] 18  BP: (115-147)/(72-78) 115/72  Body mass index is 21.51 kg/m².    Physical  Exam  Constitutional:       General: She is not in acute distress.     Appearance: She is not toxic-appearing.   HENT:      Head: Normocephalic and atraumatic.   Cardiovascular:      Rate and Rhythm: Normal rate and regular rhythm.   Pulmonary:      Effort: Pulmonary effort is normal. No respiratory distress.      Breath sounds: Normal breath sounds. No wheezing or rhonchi.   Abdominal:      General: Bowel sounds are normal.      Palpations: Abdomen is soft.      Tenderness: There is no abdominal tenderness. There is no guarding or rebound.   Musculoskeletal:         General: No swelling.   Skin:     General: Skin is warm and dry.   Neurological:      General: No focal deficit present.      Mental Status: She is alert.      Comments: oriented to self, hospital, and situation, but not year   Psychiatric:         Mood and Affect: Mood normal.         Behavior: Behavior normal.       Disposition: Skilled Nursing Facility (DC - External)       Discharge Medications        New Medications        Instructions Start Date   bisacodyl 5 MG EC tablet  Commonly known as: DULCOLAX   5 mg, Oral, Daily PRN      bisacodyl 10 MG suppository  Commonly known as: DULCOLAX   10 mg, Rectal, Daily PRN      Lidocaine 4 %   1 patch, Transdermal, Every 24 Hours Scheduled, Remove & Discard patch within 12 hours or as directed by MD      polyethylene glycol 17 g packet  Commonly known as: MIRALAX   17 g, Oral, Daily PRN      sennosides-docusate 8.6-50 MG per tablet  Commonly known as: PERICOLACE   2 tablets, Oral, 2 Times Daily PRN      vitamin B-12 1000 MCG tablet  Commonly known as: CYANOCOBALAMIN   2,000 mcg, Oral, Daily             Changes to Medications        Instructions Start Date   atenolol 25 MG tablet  Commonly known as: TENORMIN  What changed:   medication strength  how much to take   25 mg, Oral, Daily             Continue These Medications        Instructions Start Date   acetaminophen 500 MG tablet  Commonly known as: TYLENOL    500 mg, Oral, Every 6 Hours PRN      amLODIPine 5 MG tablet  Commonly known as: NORVASC   5 mg, Oral, Daily      apixaban 2.5 MG tablet tablet  Commonly known as: ELIQUIS   2.5 mg, Oral, Every 12 Hours Scheduled      donepezil 5 MG tablet  Commonly known as: ARICEPT   1 tablet, Oral, Daily      fish oil 1000 MG capsule capsule   Oral, Daily With Breakfast      furosemide 20 MG tablet  Commonly known as: LASIX   20 mg, Oral, Daily      mupirocin 2 % ointment  Commonly known as: BACTROBAN   as needed PRN.      One-A-Day Womens 50 Plus tablet tablet  Generic drug: multivitamin with minerals   Oral      triamcinolone 0.1 % ointment  Commonly known as: KENALOG   APPLY TO THE AFFECTED AREA DAILY      vitamin D3 125 MCG (5000 UT) capsule capsule   5,000 Units, Oral, Daily             Stop These Medications      atorvastatin 40 MG tablet  Commonly known as: LIPITOR             Diet Instructions       Diet: Regular/House Diet      Discharge Diet: Regular/House Diet    Texture: Regular Texture (IDDSI 7)    Fluid Consistency: Thin (IDDSI 0)           Activity Instructions       Activity as Tolerated             Additional Instructions for the Follow-ups that You Need to Schedule       Call MD for problems / concerns.   As directed             Contact information for follow-up providers       Ruddy Sinha MD .    Specialty: Family Medicine  Contact information:  [address] [address] [phone]                       Contact information for after-discharge care       Destination       [facility] .    Service: Skilled Nursing  Contact information:  [address] [facility] [address] [phone]                                No future appointments.  Pending Labs       Order Current Status    Intrinsic Factor Ab In process           Lance Puentes MD  [facility]  03/05/24    Discharge time spent greater than 30 minutes.

## 2024-03-05 NOTE — PLAN OF CARE
Goal Outcome Evaluation:      Patient is alert with confusion. Disoriented to time.  Assist x1 with ambulation.  Bradycardia noted.  Tylenol ordered for pain management.  Patient to discharge to [facility] today.  EMS is scheduled for pickup at 2:00 pm.

## 2024-03-05 NOTE — CASE MANAGEMENT/SOCIAL WORK
Case Management Discharge Note      Final Note: dc to skilled bed at [facility] via [facility]         Selected Continued Care - Discharged on 3/5/2024 Admission date: 2/29/2024 - Discharge disposition: Skilled Nursing Facility (DC - External)      Destination Coordination complete.      Service Provider Selected Services Address Phone Fax Patient Preferred    [facility] [address] [id number] [phone] --              Durable Medical Equipment    No services have been selected for the patient.                Dialysis/Infusion    No services have been selected for the patient.                Home Medical Care    No services have been selected for the patient.                Therapy    No services have been selected for the patient.                Community Resources    No services have been selected for the patient.                Community & DME    No services have been selected for the patient.                    Transportation Services  Ambulance: [facility]    Final Discharge Disposition Code: 03 - skilled nursing facility (SNF)

## 2024-03-07 LAB — IF BLOCK AB SER-ACNC: 45.2 AU/ML (ref 0–1.1)

## 2024-05-17 ENCOUNTER — TELEPHONE (OUTPATIENT)
Dept: CARDIOLOGY | Facility: CLINIC | Age: 89
End: 2024-05-17

## 2024-05-17 NOTE — TELEPHONE ENCOUNTER
Caller: JABIER    Relationship:    Callback number: [phone]   Is it ok to leave a message: [] Yes [] No    Requested medication for samples: ELIQUIS    How much medication does the patient currently have left: NONE    Who will be picking up the samples: JABIER    Do you need information about patient financial assistance for this medication: [] Yes [x] No

## 2024-05-31 ENCOUNTER — OFFICE VISIT (OUTPATIENT)
Dept: FAMILY MEDICINE CLINIC | Facility: CLINIC | Age: 89
End: 2024-05-31
Payer: MEDICARE

## 2024-05-31 VITALS
HEART RATE: 56 BPM | BODY MASS INDEX: 21.53 KG/M2 | TEMPERATURE: 98.3 F | HEIGHT: 66 IN | WEIGHT: 134 LBS | DIASTOLIC BLOOD PRESSURE: 54 MMHG | SYSTOLIC BLOOD PRESSURE: 100 MMHG | OXYGEN SATURATION: 98 %

## 2024-05-31 DIAGNOSIS — L03.115 BILATERAL CELLULITIS OF LOWER LEG: ICD-10-CM

## 2024-05-31 DIAGNOSIS — M81.0 AGE-RELATED OSTEOPOROSIS WITHOUT CURRENT PATHOLOGICAL FRACTURE: ICD-10-CM

## 2024-05-31 DIAGNOSIS — L03.116 BILATERAL CELLULITIS OF LOWER LEG: ICD-10-CM

## 2024-05-31 DIAGNOSIS — I48.0 PAROXYSMAL ATRIAL FIBRILLATION: ICD-10-CM

## 2024-05-31 DIAGNOSIS — Z91.81 AT HIGH RISK FOR FALLS: ICD-10-CM

## 2024-05-31 DIAGNOSIS — F02.80 ALZHEIMER'S DEMENTIA, UNSPECIFIED DEMENTIA SEVERITY, UNSPECIFIED TIMING OF DEMENTIA ONSET, UNSPECIFIED WHETHER BEHAVIORAL, PSYCHOTIC, OR MOOD DISTURBANCE OR ANXIETY: ICD-10-CM

## 2024-05-31 DIAGNOSIS — I10 ESSENTIAL HYPERTENSION: Primary | ICD-10-CM

## 2024-05-31 DIAGNOSIS — G30.9 ALZHEIMER'S DEMENTIA, UNSPECIFIED DEMENTIA SEVERITY, UNSPECIFIED TIMING OF DEMENTIA ONSET, UNSPECIFIED WHETHER BEHAVIORAL, PSYCHOTIC, OR MOOD DISTURBANCE OR ANXIETY: ICD-10-CM

## 2024-05-31 DIAGNOSIS — I71.21 ANEURYSM OF ASCENDING AORTA WITHOUT RUPTURE: ICD-10-CM

## 2024-05-31 DIAGNOSIS — N18.31 STAGE 3A CHRONIC KIDNEY DISEASE: ICD-10-CM

## 2024-05-31 PROBLEM — M25.551 RIGHT HIP PAIN: Status: RESOLVED | Noted: 2024-02-29 | Resolved: 2024-05-31

## 2024-05-31 PROBLEM — N13.2 URETERAL STONE WITH HYDRONEPHROSIS: Status: RESOLVED | Noted: 2019-09-22 | Resolved: 2024-05-31

## 2024-05-31 PROBLEM — R42 DIZZINESS: Status: RESOLVED | Noted: 2017-02-23 | Resolved: 2024-05-31

## 2024-05-31 PROBLEM — H81.10 BPPV (BENIGN PAROXYSMAL POSITIONAL VERTIGO), UNSPECIFIED LATERALITY: Status: RESOLVED | Noted: 2020-01-08 | Resolved: 2024-05-31

## 2024-05-31 PROBLEM — N28.9 RENAL INSUFFICIENCY: Status: RESOLVED | Noted: 2017-08-07 | Resolved: 2024-05-31

## 2024-05-31 PROBLEM — G45.9 TIA (TRANSIENT ISCHEMIC ATTACK): Status: RESOLVED | Noted: 2020-02-05 | Resolved: 2024-05-31

## 2024-05-31 PROBLEM — Z00.00 MEDICARE ANNUAL WELLNESS VISIT, INITIAL: Status: RESOLVED | Noted: 2017-03-06 | Resolved: 2024-05-31

## 2024-05-31 PROBLEM — R33.8 ACUTE URINARY RETENTION: Status: RESOLVED | Noted: 2024-03-01 | Resolved: 2024-05-31

## 2024-05-31 PROBLEM — N13.1 HYDRONEPHROSIS DUE TO OBSTRUCTION OF URETER: Status: RESOLVED | Noted: 2019-09-24 | Resolved: 2024-05-31

## 2024-05-31 PROBLEM — Z09 HOSPITAL DISCHARGE FOLLOW-UP: Status: RESOLVED | Noted: 2017-03-06 | Resolved: 2024-05-31

## 2024-05-31 PROBLEM — R22.0 FACIAL SWELLING: Status: RESOLVED | Noted: 2017-02-23 | Resolved: 2024-05-31

## 2024-05-31 PROBLEM — Z00.00 MEDICARE ANNUAL WELLNESS VISIT, SUBSEQUENT: Status: RESOLVED | Noted: 2021-01-20 | Resolved: 2024-05-31

## 2024-05-31 PROBLEM — U07.1 COVID-19 VIRUS DETECTED: Status: RESOLVED | Noted: 2022-07-05 | Resolved: 2024-05-31

## 2024-05-31 PROBLEM — N39.0 UTI (URINARY TRACT INFECTION): Status: RESOLVED | Noted: 2024-03-02 | Resolved: 2024-05-31

## 2024-05-31 PROCEDURE — 99204 OFFICE O/P NEW MOD 45 MIN: CPT | Performed by: NURSE PRACTITIONER

## 2024-05-31 PROCEDURE — 1126F AMNT PAIN NOTED NONE PRSNT: CPT | Performed by: NURSE PRACTITIONER

## 2024-05-31 RX ORDER — AMOXICILLIN AND CLAVULANATE POTASSIUM 875; 125 MG/1; MG/1
1 TABLET, FILM COATED ORAL 2 TIMES DAILY
Qty: 20 TABLET | Refills: 0 | Status: SHIPPED | OUTPATIENT
Start: 2024-05-31 | End: 2024-06-10

## 2024-05-31 RX ORDER — FUROSEMIDE 20 MG/1
20 TABLET ORAL 2 TIMES DAILY
COMMUNITY
End: 2024-05-31 | Stop reason: SDUPTHER

## 2024-05-31 RX ORDER — AMOXICILLIN AND CLAVULANATE POTASSIUM 875; 125 MG/1; MG/1
1 TABLET, FILM COATED ORAL 2 TIMES DAILY
Qty: 20 TABLET | Refills: 0 | Status: SHIPPED | OUTPATIENT
Start: 2024-05-31 | End: 2024-05-31

## 2024-05-31 RX ORDER — MUPIROCIN CALCIUM 20 MG/G
1 CREAM TOPICAL 3 TIMES DAILY
COMMUNITY
Start: 2024-02-01

## 2024-05-31 NOTE — ASSESSMENT & PLAN NOTE
Renal condition is stable.  Continue current treatment regimen.  Sodium restriction  Renal condition will be reassessed in 6 months.

## 2024-05-31 NOTE — PROGRESS NOTES
Chief Complaint  Establish Care, Leg Swelling, and Nasal Congestion    Subjective        HPI   Ryann presents to [facility] as a 89-year-old female to establish care, to discuss swelling in her legs bilaterally, sinus pain and pressure/nasal congestion over the past week.        This is my first time seeing this patient.  Note from last office visit from her previous PCP on 4/22/2024    HPI she was hospitalized February 29 - March 5, 2024 after sustaining a mechanical fall and injuring her right hip. Was then transferred to subacute rehab facility between March 5 and April 9, 2024 with good progress after receiving physical therapy. She continues to have home health services since: 9, physical therapy and skilled nursing twice a week.   Essential hypertension. Atenolol dosage was decreased from 50 mg daily to 25 mg daily in March, she does not complain of headache or dizziness, appetite is pretty good, her dementia is stable     Patient Active Problem List   Diagnosis    Allergic rhinitis    Gastroesophageal reflux disease    Essential hypertension    Ataxic gait    Balance disorder    At high risk for falls    Paroxysmal atrial fibrillation    Aortic root dilatation    Alzheimer's dementia    Age-related osteoporosis without current pathological fracture    Corn of foot    Ascending aortic aneurysm    Acute deep vein thrombosis (DVT) of calf muscle vein of left lower extremity    Paroxysmal atrial fibrillation    Elevated troponin    Embolic stroke    History of stroke    CKD (chronic kidney disease) stage 3, GFR 30-59 ml/min    B12 deficiency           She is taking her Lasix.  Her family states that she is supposed to be taking it twice a day but only taking it once a day at this time.  She does sit in her chair most of the day.  She has had a seen wound care in the past  She has had some increased redness around her ankles.  Has previously been to wound care.  Her family is with  "her in the office today.  States that they did have to do some wrapping previously.  They also gave her some antibiotics to take previously that did help with her redness and swelling.    She does see cardiology and does have follow-up appointment scheduled with that specialist Dr. Pastor    Sinus pain and pressure congestion over the last week.  No coughing shortness of breath or wheezing.  She has had some yellow nasal discharge.  No fever, no abdominal pain, no N/V/D/C.    She has no other acute complaints in office today    Last labs reviewed 3/2024      The following portions of the patient's history were reviewed and updated as appropriate: allergies, current medications, past family history, past medical history, past social history, past surgical history, and problem list          Review of Systems   Constitutional:  Negative for chills, fatigue and fever.   Eyes:  Negative for visual disturbance.   Respiratory:  Negative for cough, shortness of breath, wheezing and stridor.    Cardiovascular:  Positive for leg swelling. Negative for chest pain and palpitations.   Gastrointestinal:  Negative for abdominal pain, diarrhea, nausea and vomiting.   Skin:  Negative for rash.   Neurological:  Negative for dizziness.   Psychiatric/Behavioral:  Negative for self-injury, sleep disturbance and suicidal ideas. The patient is not nervous/anxious.         Objective   Vital Signs:   Vitals:    05/31/24 1310   BP: 100/54   Pulse: 56   Temp: 98.3 °F (36.8 °C)   SpO2: 98%   Weight: 60.8 kg (134 lb)   Height: 168 cm (66.14\")   PainSc: 0-No pain  Comment: just discomfort            5/31/2024     1:20 PM   PHQ-2/PHQ-9 Depression Screening   Little Interest or Pleasure in Doing Things 0-->not at all   Feeling Down, Depressed or Hopeless 0-->not at all   PHQ-9: Brief Depression Severity Measure Score 0       BMI is within normal parameters. No other follow-up for BMI required.        Physical Exam  Vitals reviewed. "   Constitutional:       General: She is not in acute distress.  Eyes:      Conjunctiva/sclera: Conjunctivae normal.   Neck:      Thyroid: No thyromegaly.      Vascular: No carotid bruit.   Cardiovascular:      Rate and Rhythm: Normal rate and regular rhythm.      Heart sounds: Normal heart sounds.   Pulmonary:      Effort: Pulmonary effort is normal. No respiratory distress.      Breath sounds: Normal breath sounds. No stridor. No wheezing, rhonchi or rales.   Chest:      Chest wall: No tenderness.   Musculoskeletal:      Right lower le+ Pitting Edema present.      Left lower le+ Pitting Edema present.   Skin:     Comments: Mild erythema to bilateral ankles.  No drainage, fever or warmth   Neurological:      Mental Status: She is alert and oriented to person, place, and time.   Psychiatric:         Attention and Perception: Attention normal.         Mood and Affect: Mood normal.          Result Review :     The following data was reviewed by: JENNA Cortes on 2024:      Comprehensive Metabolic Panel (2024 06:11) glucose 110 GFR 48.8  CBC & Differential (2024 06:11) white blood cell count normal H&H 12.0 36.2   LIPID PANEL (2024 13:39) total 176 LDL 83 HDL 67 triglycerides 85  Assessment and Plan    Assessment & Plan  Essential hypertension  Hypertension is stable and controlled  Continue current treatment regimen.  Blood pressure will be reassessed in 6 months.  Paroxysmal atrial fibrillation  Take medication as directed follow-up with cardiology  Continue Eliquis  Aneurysm of ascending aorta without rupture  Keep follow-up with cardiology as directed  At high risk for falls  Uses cane  No falls since last hospitalization 3/2024  Stage 3a chronic kidney disease  Renal condition is stable.  Continue current treatment regimen.  Sodium restriction  Renal condition will be reassessed in 6 months.  Age-related osteoporosis without current pathological fracture  Continue current  management  Alzheimer's dementia, unspecified dementia severity, unspecified timing of dementia onset, unspecified whether behavioral, psychotic, or mood disturbance or anxiety  Continue current management no changes  Bilateral cellulitis of lower leg  Take Augmentin as directed  Referral to wound care if no improvements  Recommend elevating extremities  Report any increased fever redness swelling drainage immediately-no current open ulcerations    Orders Placed This Encounter   Procedures    Ambulatory Referral to Wound Clinic     New Medications Ordered This Visit   Medications    amoxicillin-clavulanate (AUGMENTIN) 875-125 MG per tablet     Sig: Take 1 tablet by mouth 2 (Two) Times a Day for 10 days.     Dispense:  20 tablet     Refill:  0          Follow Up   Return if symptoms worsen or fail to improve.  Patient was given instructions and counseling regarding her condition or for health maintenance advice. Please see specific information pulled into the AVS if appropriate.

## 2024-06-12 ENCOUNTER — OFFICE VISIT (OUTPATIENT)
Dept: WOUND CARE | Facility: HOSPITAL | Age: 89
End: 2024-06-12
Payer: MEDICARE

## 2024-10-04 ENCOUNTER — APPOINTMENT (OUTPATIENT)
Dept: CT IMAGING | Facility: HOSPITAL | Age: 89
End: 2024-10-04
Payer: MEDICARE

## 2024-10-04 ENCOUNTER — HOSPITAL ENCOUNTER (EMERGENCY)
Facility: HOSPITAL | Age: 89
Discharge: HOME | End: 2024-10-04
Attending: EMERGENCY MEDICINE
Payer: MEDICARE

## 2024-10-04 VITALS
OXYGEN SATURATION: 94 % | HEART RATE: 65 BPM | RESPIRATION RATE: 18 BRPM | HEIGHT: 66 IN | WEIGHT: 134.04 LBS | DIASTOLIC BLOOD PRESSURE: 94 MMHG | TEMPERATURE: 98 F | BODY MASS INDEX: 21.54 KG/M2 | SYSTOLIC BLOOD PRESSURE: 127 MMHG

## 2024-10-04 DIAGNOSIS — G45.9 TIA (TRANSIENT ISCHEMIC ATTACK): Primary | ICD-10-CM

## 2024-10-04 LAB
ALBUMIN SERPL-MCNC: 3.9 G/DL (ref 3.5–5.2)
ALBUMIN/GLOB SERPL: 1.3 G/DL
ALP SERPL-CCNC: 113 U/L (ref 39–117)
ALT SERPL W P-5'-P-CCNC: 30 U/L (ref 1–33)
ANION GAP SERPL CALCULATED.3IONS-SCNC: 9 MMOL/L (ref 5–15)
AST SERPL-CCNC: 38 U/L (ref 1–32)
BACTERIA UR QL AUTO: ABNORMAL /HPF
BASOPHILS # BLD AUTO: 0.03 10*3/MM3 (ref 0–0.2)
BASOPHILS NFR BLD AUTO: 0.5 % (ref 0–1.5)
BILIRUB SERPL-MCNC: 0.8 MG/DL (ref 0–1.2)
BILIRUB UR QL STRIP: NEGATIVE
BUN SERPL-MCNC: 12 MG/DL (ref 8–23)
BUN/CREAT SERPL: 12 (ref 7–25)
CALCIUM SPEC-SCNC: 9.7 MG/DL (ref 8.2–9.6)
CHLORIDE SERPL-SCNC: 109 MMOL/L (ref 98–107)
CLARITY UR: CLEAR
CO2 SERPL-SCNC: 26 MMOL/L (ref 22–29)
COLOR UR: YELLOW
CREAT SERPL-MCNC: 1 MG/DL (ref 0.57–1)
DEPRECATED RDW RBC AUTO: 43.4 FL (ref 37–54)
EGFRCR SERPLBLD CKD-EPI 2021: 52.6 ML/MIN/1.73
EOSINOPHIL # BLD AUTO: 0.1 10*3/MM3 (ref 0–0.4)
EOSINOPHIL NFR BLD AUTO: 1.5 % (ref 0.3–6.2)
ERYTHROCYTE [DISTWIDTH] IN BLOOD BY AUTOMATED COUNT: 13.3 % (ref 12.3–15.4)
GLOBULIN UR ELPH-MCNC: 3.1 GM/DL
GLUCOSE SERPL-MCNC: 90 MG/DL (ref 65–99)
GLUCOSE UR STRIP-MCNC: NEGATIVE MG/DL
HCT VFR BLD AUTO: 32.8 % (ref 34–46.6)
HGB BLD-MCNC: 10.5 G/DL (ref 12–15.9)
HGB UR QL STRIP.AUTO: NEGATIVE
HYALINE CASTS UR QL AUTO: ABNORMAL /LPF
IMM GRANULOCYTES # BLD AUTO: 0.03 10*3/MM3 (ref 0–0.05)
IMM GRANULOCYTES NFR BLD AUTO: 0.5 % (ref 0–0.5)
INR PPP: 1.21 (ref 0.9–1.1)
KETONES UR QL STRIP: NEGATIVE
LEUKOCYTE ESTERASE UR QL STRIP.AUTO: ABNORMAL
LYMPHOCYTES # BLD AUTO: 1.78 10*3/MM3 (ref 0.7–3.1)
LYMPHOCYTES NFR BLD AUTO: 27.1 % (ref 19.6–45.3)
MCH RBC QN AUTO: 28.8 PG (ref 26.6–33)
MCHC RBC AUTO-ENTMCNC: 32 G/DL (ref 31.5–35.7)
MCV RBC AUTO: 89.9 FL (ref 79–97)
MONOCYTES # BLD AUTO: 0.62 10*3/MM3 (ref 0.1–0.9)
MONOCYTES NFR BLD AUTO: 9.4 % (ref 5–12)
NEUTROPHILS NFR BLD AUTO: 4.01 10*3/MM3 (ref 1.7–7)
NEUTROPHILS NFR BLD AUTO: 61 % (ref 42.7–76)
NITRITE UR QL STRIP: NEGATIVE
NRBC BLD AUTO-RTO: 0 /100 WBC (ref 0–0.2)
PH UR STRIP.AUTO: 7.5 [PH] (ref 5–8)
PLATELET # BLD AUTO: 246 10*3/MM3 (ref 140–450)
PMV BLD AUTO: 9.9 FL (ref 6–12)
POTASSIUM SERPL-SCNC: 3.9 MMOL/L (ref 3.5–5.2)
PROT SERPL-MCNC: 7 G/DL (ref 6–8.5)
PROT UR QL STRIP: ABNORMAL
PROTHROMBIN TIME: 15.5 SECONDS (ref 11.7–14.2)
QT INTERVAL: 463 MS
QTC INTERVAL: 447 MS
RBC # BLD AUTO: 3.65 10*6/MM3 (ref 3.77–5.28)
RBC # UR STRIP: ABNORMAL /HPF
REF LAB TEST METHOD: ABNORMAL
SODIUM SERPL-SCNC: 144 MMOL/L (ref 136–145)
SP GR UR STRIP: 1.01 (ref 1–1.03)
SQUAMOUS #/AREA URNS HPF: ABNORMAL /HPF
UROBILINOGEN UR QL STRIP: ABNORMAL
WBC # UR STRIP: ABNORMAL /HPF
WBC NRBC COR # BLD AUTO: 6.57 10*3/MM3 (ref 3.4–10.8)

## 2024-10-04 PROCEDURE — 93005 ELECTROCARDIOGRAM TRACING: CPT | Performed by: EMERGENCY MEDICINE

## 2024-10-04 PROCEDURE — 99284 EMERGENCY DEPT VISIT MOD MDM: CPT

## 2024-10-04 PROCEDURE — 81001 URINALYSIS AUTO W/SCOPE: CPT | Performed by: STUDENT IN AN ORGANIZED HEALTH CARE EDUCATION/TRAINING PROGRAM

## 2024-10-04 PROCEDURE — 80053 COMPREHEN METABOLIC PANEL: CPT | Performed by: EMERGENCY MEDICINE

## 2024-10-04 PROCEDURE — 85610 PROTHROMBIN TIME: CPT | Performed by: EMERGENCY MEDICINE

## 2024-10-04 PROCEDURE — 70450 CT HEAD/BRAIN W/O DYE: CPT

## 2024-10-04 PROCEDURE — 93010 ELECTROCARDIOGRAM REPORT: CPT | Performed by: INTERNAL MEDICINE

## 2024-10-04 PROCEDURE — 85025 COMPLETE CBC W/AUTO DIFF WBC: CPT | Performed by: EMERGENCY MEDICINE

## 2024-10-04 RX ORDER — SODIUM CHLORIDE 0.9 % (FLUSH) 0.9 %
10 SYRINGE (ML) INJECTION AS NEEDED
Status: DISCONTINUED | OUTPATIENT
Start: 2024-10-04 | End: 2024-10-04 | Stop reason: HOSPADM

## 2024-10-04 NOTE — ED NOTES
Patient arrives via [facility] from home for complaints of stroke-like symptoms. Patient had slurred speech and facial droop prior to EMS arrival. Upon arrival EMS states symptoms had resolved. Episode lasted 15 minutes. Patient has a history of TIA's. Patient has a history of dementia. Alert and oriented x2, baseline.

## 2024-10-04 NOTE — CASE MANAGEMENT/SOCIAL WORK
Discharge Planning Assessment  [facility]     Patient Name: Ryann Hernadez  MRN: [id number]  Today's Date: 10/4/2024    Admit Date: 10/4/2024        Discharge Needs Assessment    No documentation.                  Discharge Plan       Row Name 10/04/24 1528       Plan    Plan Comments Patient with legal gaurdian on file. Guardian at bedside provides registration with general consent to treat. Bolivar present upon chart review. Guardianship paperwork scanned into Epic. Disposition from ER pending. Jory RICHARDS RN CCP manager updated, per policy. RAULITO Ortiz RN                  Continued Care and Services - Admitted Since 10/4/2024    No active coordination exists for this encounter.          Demographic Summary    No documentation.                  Functional Status    No documentation.                  Psychosocial    No documentation.                  Abuse/Neglect    No documentation.                  Legal    No documentation.                  Substance Abuse    No documentation.                  Patient Forms    No documentation.                     Thang Ortiz, RN

## 2024-10-04 NOTE — ED NOTES
Ultrasound Inserted IV     Site: L Upper Arm     Catheter Length (in): 1 20G     Diameter(cm): 0.65    Depth(cm): 0.3      Vascular Access Score=  4) Vein is poorly palpable and/or poorly visible.      Angiocath visualized and advanced in the venous lumen. Flush visualized superiorly to insertion site in venous lumen. Blood return noted and collected during insertion. No signs of phlebitis noted. Standard IV dressing placed and secured.
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
## 2024-11-18 ENCOUNTER — TELEPHONE (OUTPATIENT)
Dept: CARDIOLOGY | Facility: CLINIC | Age: 89
End: 2024-11-18
Payer: MEDICARE

## 2024-11-18 NOTE — TELEPHONE ENCOUNTER
Caller: JABIER BLUNT    Relationship:DAUGHTER LEGAL GUARDIAN     Callback number: [phone]   Is it ok to leave a message: [x] Yes [] No    Requested medication for samples: ELIQUIS    How much medication does the patient currently have left: 1 WEEK    Who will be picking up the samples: DAUGHTER    Do you need information about patient financial assistance for this medication: [] Yes [x] No    Additional details provided:  AT [facility].

## 2025-02-27 ENCOUNTER — TELEPHONE (OUTPATIENT)
Dept: FAMILY MEDICINE CLINIC | Facility: CLINIC | Age: OVER 89
End: 2025-02-27
Payer: MEDICARE

## 2025-02-27 NOTE — TELEPHONE ENCOUNTER
Caller: ROJELIO    Relationship: Other    Best call back number: [phone]    What form or medical record are you requesting: UPDATED MEDICATION LIST    Who is requesting this form or medical record from you: ROJELIO    How would you like to receive the form or medical records (pick-up, mail, fax): FAX  If fax, what is the fax number: [phone]    Timeframe paperwork needed: ASAP    Additional notes: PATIENT RECENTLY MOVED INTO ASSISTED LIVING AND WOULD LIKE TO MAKE SURE THE HAVE THE MOST CURRENT LIST OF MEDICATIONS.

## 2025-02-27 NOTE — TELEPHONE ENCOUNTER
HUB TO RELAY  Left Jeane a voicemail to inform her that per patient chart she hasn't not been seen in our office since 5/31/2024. I can provide her with patient last office office with medication list. Instructed to contact our office for further questions or concerns

## 2025-03-21 ENCOUNTER — OFFICE VISIT (OUTPATIENT)
Dept: FAMILY MEDICINE CLINIC | Facility: CLINIC | Age: OVER 89
End: 2025-03-21
Payer: MEDICARE

## 2025-03-21 ENCOUNTER — APPOINTMENT (OUTPATIENT)
Dept: GENERAL RADIOLOGY | Facility: HOSPITAL | Age: OVER 89
DRG: 291 | End: 2025-03-21
Payer: MEDICARE

## 2025-03-21 ENCOUNTER — HOSPITAL ENCOUNTER (INPATIENT)
Facility: HOSPITAL | Age: OVER 89
LOS: 6 days | Discharge: NURSING, REHAB OR LONG-TERM CARE | DRG: 291 | End: 2025-03-27
Attending: EMERGENCY MEDICINE | Admitting: INTERNAL MEDICINE
Payer: MEDICARE

## 2025-03-21 VITALS
WEIGHT: 166.8 LBS | HEIGHT: 66 IN | SYSTOLIC BLOOD PRESSURE: 90 MMHG | TEMPERATURE: 98.7 F | OXYGEN SATURATION: 98 % | HEART RATE: 75 BPM | BODY MASS INDEX: 26.81 KG/M2 | DIASTOLIC BLOOD PRESSURE: 66 MMHG

## 2025-03-21 DIAGNOSIS — R60.1 GENERALIZED EDEMA: Primary | ICD-10-CM

## 2025-03-21 DIAGNOSIS — R63.5 ABNORMAL WEIGHT GAIN: ICD-10-CM

## 2025-03-21 DIAGNOSIS — D64.9 ACUTE ANEMIA: ICD-10-CM

## 2025-03-21 DIAGNOSIS — I50.9 ACUTE CONGESTIVE HEART FAILURE, UNSPECIFIED HEART FAILURE TYPE: Primary | ICD-10-CM

## 2025-03-21 DIAGNOSIS — R06.02 SHORTNESS OF BREATH: ICD-10-CM

## 2025-03-21 DIAGNOSIS — R60.0 PEDAL EDEMA: ICD-10-CM

## 2025-03-21 DIAGNOSIS — I49.9 IRREGULAR HEARTBEAT: ICD-10-CM

## 2025-03-21 LAB
ALBUMIN SERPL-MCNC: 3.2 G/DL (ref 3.5–5.2)
ALBUMIN/GLOB SERPL: 0.9 G/DL
ALP SERPL-CCNC: 119 U/L (ref 39–117)
ALT SERPL W P-5'-P-CCNC: 28 U/L (ref 1–33)
ANION GAP SERPL CALCULATED.3IONS-SCNC: 11.6 MMOL/L (ref 5–15)
APTT PPP: 34.8 SECONDS (ref 22.7–35.4)
AST SERPL-CCNC: 55 U/L (ref 1–32)
BASOPHILS # BLD AUTO: 0.03 10*3/MM3 (ref 0–0.2)
BASOPHILS NFR BLD AUTO: 0.5 % (ref 0–1.5)
BILIRUB SERPL-MCNC: 0.7 MG/DL (ref 0–1.2)
BUN SERPL-MCNC: 22 MG/DL (ref 8–23)
BUN/CREAT SERPL: 16.4 (ref 7–25)
CALCIUM SPEC-SCNC: 9 MG/DL (ref 8.2–9.6)
CHLORIDE SERPL-SCNC: 108 MMOL/L (ref 98–107)
CO2 SERPL-SCNC: 25.4 MMOL/L (ref 22–29)
CREAT SERPL-MCNC: 1.34 MG/DL (ref 0.57–1)
DEPRECATED RDW RBC AUTO: 44 FL (ref 37–54)
EGFRCR SERPLBLD CKD-EPI 2021: 37 ML/MIN/1.73
EOSINOPHIL # BLD AUTO: 0.13 10*3/MM3 (ref 0–0.4)
EOSINOPHIL NFR BLD AUTO: 2 % (ref 0.3–6.2)
ERYTHROCYTE [DISTWIDTH] IN BLOOD BY AUTOMATED COUNT: 15.5 % (ref 12.3–15.4)
FERRITIN SERPL-MCNC: 12.3 NG/ML (ref 13–150)
GEN 5 1HR TROPONIN T REFLEX: 28 NG/L
GLOBULIN UR ELPH-MCNC: 3.4 GM/DL
GLUCOSE SERPL-MCNC: 109 MG/DL (ref 65–99)
HCT VFR BLD AUTO: 26.2 % (ref 34–46.6)
HGB BLD-MCNC: 7.8 G/DL (ref 12–15.9)
IMM GRANULOCYTES # BLD AUTO: 0.02 10*3/MM3 (ref 0–0.05)
IMM GRANULOCYTES NFR BLD AUTO: 0.3 % (ref 0–0.5)
INR PPP: 1.59 (ref 0.9–1.1)
IRON 24H UR-MRATE: 209 MCG/DL (ref 37–145)
IRON SATN MFR SERPL: 54 % (ref 20–50)
LYMPHOCYTES # BLD AUTO: 1.14 10*3/MM3 (ref 0.7–3.1)
LYMPHOCYTES NFR BLD AUTO: 17.4 % (ref 19.6–45.3)
MCH RBC QN AUTO: 23.8 PG (ref 26.6–33)
MCHC RBC AUTO-ENTMCNC: 29.8 G/DL (ref 31.5–35.7)
MCV RBC AUTO: 79.9 FL (ref 79–97)
MONOCYTES # BLD AUTO: 0.78 10*3/MM3 (ref 0.1–0.9)
MONOCYTES NFR BLD AUTO: 11.9 % (ref 5–12)
NEUTROPHILS NFR BLD AUTO: 4.47 10*3/MM3 (ref 1.7–7)
NEUTROPHILS NFR BLD AUTO: 67.9 % (ref 42.7–76)
NRBC BLD AUTO-RTO: 0.5 /100 WBC (ref 0–0.2)
NT-PROBNP SERPL-MCNC: 5462 PG/ML (ref 0–1800)
PLATELET # BLD AUTO: 348 10*3/MM3 (ref 140–450)
PMV BLD AUTO: 9.4 FL (ref 6–12)
POTASSIUM SERPL-SCNC: 4.2 MMOL/L (ref 3.5–5.2)
PROT SERPL-MCNC: 6.6 G/DL (ref 6–8.5)
PROTHROMBIN TIME: 19 SECONDS (ref 11.7–14.2)
RBC # BLD AUTO: 3.28 10*6/MM3 (ref 3.77–5.28)
SODIUM SERPL-SCNC: 145 MMOL/L (ref 136–145)
TIBC SERPL-MCNC: 386 MCG/DL (ref 298–536)
TRANSFERRIN SERPL-MCNC: 259 MG/DL (ref 200–360)
TROPONIN T % DELTA: -20
TROPONIN T NUMERIC DELTA: -7 NG/L
TROPONIN T SERPL HS-MCNC: 35 NG/L
WBC NRBC COR # BLD AUTO: 6.57 10*3/MM3 (ref 3.4–10.8)

## 2025-03-21 PROCEDURE — 36415 COLL VENOUS BLD VENIPUNCTURE: CPT

## 2025-03-21 PROCEDURE — 36415 COLL VENOUS BLD VENIPUNCTURE: CPT | Performed by: NURSE PRACTITIONER

## 2025-03-21 PROCEDURE — 25010000002 FUROSEMIDE PER 20 MG: Performed by: EMERGENCY MEDICINE

## 2025-03-21 PROCEDURE — 85025 COMPLETE CBC W/AUTO DIFF WBC: CPT | Performed by: EMERGENCY MEDICINE

## 2025-03-21 PROCEDURE — 83880 ASSAY OF NATRIURETIC PEPTIDE: CPT | Performed by: EMERGENCY MEDICINE

## 2025-03-21 PROCEDURE — 85610 PROTHROMBIN TIME: CPT | Performed by: EMERGENCY MEDICINE

## 2025-03-21 PROCEDURE — 93005 ELECTROCARDIOGRAM TRACING: CPT | Performed by: EMERGENCY MEDICINE

## 2025-03-21 PROCEDURE — 82728 ASSAY OF FERRITIN: CPT | Performed by: NURSE PRACTITIONER

## 2025-03-21 PROCEDURE — 99285 EMERGENCY DEPT VISIT HI MDM: CPT

## 2025-03-21 PROCEDURE — 85730 THROMBOPLASTIN TIME PARTIAL: CPT | Performed by: EMERGENCY MEDICINE

## 2025-03-21 PROCEDURE — 71045 X-RAY EXAM CHEST 1 VIEW: CPT

## 2025-03-21 PROCEDURE — 84466 ASSAY OF TRANSFERRIN: CPT | Performed by: NURSE PRACTITIONER

## 2025-03-21 PROCEDURE — 83540 ASSAY OF IRON: CPT | Performed by: NURSE PRACTITIONER

## 2025-03-21 PROCEDURE — 84484 ASSAY OF TROPONIN QUANT: CPT | Performed by: EMERGENCY MEDICINE

## 2025-03-21 PROCEDURE — 80053 COMPREHEN METABOLIC PANEL: CPT | Performed by: EMERGENCY MEDICINE

## 2025-03-21 RX ORDER — ATORVASTATIN CALCIUM 80 MG/1
80 TABLET, FILM COATED ORAL NIGHTLY
Status: DISCONTINUED | OUTPATIENT
Start: 2025-03-22 | End: 2025-03-27 | Stop reason: HOSPADM

## 2025-03-21 RX ORDER — FUROSEMIDE 10 MG/ML
80 INJECTION INTRAMUSCULAR; INTRAVENOUS ONCE
Status: COMPLETED | OUTPATIENT
Start: 2025-03-21 | End: 2025-03-21

## 2025-03-21 RX ORDER — DONEPEZIL HYDROCHLORIDE 10 MG/1
5 TABLET, FILM COATED ORAL DAILY
Status: DISCONTINUED | OUTPATIENT
Start: 2025-03-22 | End: 2025-03-27 | Stop reason: HOSPADM

## 2025-03-21 RX ORDER — NITROGLYCERIN 0.4 MG/1
0.4 TABLET SUBLINGUAL
Status: DISCONTINUED | OUTPATIENT
Start: 2025-03-21 | End: 2025-03-27 | Stop reason: HOSPADM

## 2025-03-21 RX ORDER — BISACODYL 10 MG
10 SUPPOSITORY, RECTAL RECTAL DAILY PRN
Status: DISCONTINUED | OUTPATIENT
Start: 2025-03-21 | End: 2025-03-27 | Stop reason: HOSPADM

## 2025-03-21 RX ORDER — ACETAMINOPHEN 650 MG/1
650 SUPPOSITORY RECTAL EVERY 4 HOURS PRN
Status: DISCONTINUED | OUTPATIENT
Start: 2025-03-21 | End: 2025-03-22

## 2025-03-21 RX ORDER — ACETAMINOPHEN 160 MG/5ML
650 SOLUTION ORAL EVERY 4 HOURS PRN
Status: DISCONTINUED | OUTPATIENT
Start: 2025-03-21 | End: 2025-03-22

## 2025-03-21 RX ORDER — POLYETHYLENE GLYCOL 3350 17 G/17G
17 POWDER, FOR SOLUTION ORAL DAILY PRN
Status: DISCONTINUED | OUTPATIENT
Start: 2025-03-21 | End: 2025-03-27 | Stop reason: HOSPADM

## 2025-03-21 RX ORDER — ATENOLOL 50 MG/1
50 TABLET ORAL
Status: DISCONTINUED | OUTPATIENT
Start: 2025-03-22 | End: 2025-03-27 | Stop reason: HOSPADM

## 2025-03-21 RX ORDER — ONDANSETRON 2 MG/ML
4 INJECTION INTRAMUSCULAR; INTRAVENOUS EVERY 6 HOURS PRN
Status: DISCONTINUED | OUTPATIENT
Start: 2025-03-21 | End: 2025-03-27 | Stop reason: HOSPADM

## 2025-03-21 RX ORDER — ALUMINA, MAGNESIA, AND SIMETHICONE 2400; 2400; 240 MG/30ML; MG/30ML; MG/30ML
15 SUSPENSION ORAL EVERY 6 HOURS PRN
Status: DISCONTINUED | OUTPATIENT
Start: 2025-03-21 | End: 2025-03-27 | Stop reason: HOSPADM

## 2025-03-21 RX ORDER — ATORVASTATIN CALCIUM 80 MG/1
80 TABLET, FILM COATED ORAL NIGHTLY
Status: ON HOLD | COMMUNITY
Start: 2025-03-07

## 2025-03-21 RX ORDER — FUROSEMIDE 10 MG/ML
20 INJECTION INTRAMUSCULAR; INTRAVENOUS
Status: DISCONTINUED | OUTPATIENT
Start: 2025-03-22 | End: 2025-03-22

## 2025-03-21 RX ORDER — BISACODYL 5 MG/1
5 TABLET, DELAYED RELEASE ORAL DAILY PRN
Status: DISCONTINUED | OUTPATIENT
Start: 2025-03-21 | End: 2025-03-27 | Stop reason: HOSPADM

## 2025-03-21 RX ORDER — SODIUM CHLORIDE 0.9 % (FLUSH) 0.9 %
10 SYRINGE (ML) INJECTION AS NEEDED
Status: DISCONTINUED | OUTPATIENT
Start: 2025-03-21 | End: 2025-03-22

## 2025-03-21 RX ORDER — AMOXICILLIN 250 MG
2 CAPSULE ORAL 2 TIMES DAILY PRN
Status: DISCONTINUED | OUTPATIENT
Start: 2025-03-21 | End: 2025-03-27 | Stop reason: HOSPADM

## 2025-03-21 RX ORDER — ACETAMINOPHEN 325 MG/1
650 TABLET ORAL EVERY 4 HOURS PRN
Status: DISCONTINUED | OUTPATIENT
Start: 2025-03-21 | End: 2025-03-27 | Stop reason: HOSPADM

## 2025-03-21 RX ORDER — TRIAMCINOLONE ACETONIDE 1 MG/G
OINTMENT TOPICAL DAILY
Status: DISCONTINUED | OUTPATIENT
Start: 2025-03-22 | End: 2025-03-27 | Stop reason: HOSPADM

## 2025-03-21 RX ORDER — AMLODIPINE BESYLATE 5 MG/1
5 TABLET ORAL DAILY
Status: DISCONTINUED | OUTPATIENT
Start: 2025-03-22 | End: 2025-03-22

## 2025-03-21 RX ORDER — SODIUM CHLORIDE 0.9 % (FLUSH) 0.9 %
10 SYRINGE (ML) INJECTION AS NEEDED
Status: DISCONTINUED | OUTPATIENT
Start: 2025-03-21 | End: 2025-03-27 | Stop reason: HOSPADM

## 2025-03-21 RX ORDER — ONDANSETRON 4 MG/1
4 TABLET, ORALLY DISINTEGRATING ORAL EVERY 6 HOURS PRN
Status: DISCONTINUED | OUTPATIENT
Start: 2025-03-21 | End: 2025-03-27 | Stop reason: HOSPADM

## 2025-03-21 RX ADMIN — FUROSEMIDE 80 MG: 10 INJECTION, SOLUTION INTRAMUSCULAR; INTRAVENOUS at 18:07

## 2025-03-21 NOTE — PROGRESS NOTES
Chief Complaint  Leg Swelling (Retaining water ) and Shortness of Breath    Subjective          Leg Swelling  Pertinent negative symptoms include no abdominal pain, no chest pain, no fatigue, no nausea and no vomiting.        Ryann Hernadez 89 y.o. female presents for evaluation of leg and generalized swelling.    Ms. Hernadez has been experiencing bilateral leg edema for the past week, which has progressively worsened. Initially, the swelling was localized to her legs, but it has since spread to her knees, upper body, hands, and face. She resides at [facility], a [facility] living facility, where she was previously treated for an oozing wound on her leg. Despite an increase in her Lasix dosage last Friday, as recommended by the nurse, there has been no improvement in her condition. The wound doctor had previously advised against prolonged use of Lasix due to her history of thromboembolism. She reports shortness of breath, but does not experience any chest pain or wheezing. She admits to not elevating her legs as frequently as recommended and consuming salty foods. She is currently on a regimen of Furosemide 20 mg once daily and Eliquis. The patient has Alzheimer's. The patient's daughter is in attendance to verify the patient's symptoms and medical history.    She also reports experiencing suicidal ideation, attributing it to the stress of not living in her own home. However, she does not endorse any plans to harm herself or others.    MEDICATIONS  Current: Furosemide, Eliquis    PHQ-9 score: 20      Review of Systems   Constitutional:  Negative for fatigue.   Respiratory:  Positive for shortness of breath. Negative for chest tightness and wheezing.    Cardiovascular:  Positive for leg swelling. Negative for chest pain and palpitations.   Gastrointestinal:  Negative for abdominal pain, nausea and vomiting.   Genitourinary:  Negative for decreased urine volume and difficulty urinating.   Neurological:   "Negative for dizziness, syncope and light-headedness.   Psychiatric/Behavioral:  Positive for dysphoric mood and suicidal ideas. Negative for behavioral problems and self-injury. The patient is nervous/anxious.         Objective   Vital Signs:   BP 90/66 (BP Location: Left arm, Patient Position: Sitting, Cuff Size: Adult)   Pulse 75   Temp 98.7 °F (37.1 °C) (Oral)   Ht 168 cm (66.14\")   Wt 75.7 kg (166 lb 12.8 oz)   SpO2 98%   BMI 26.81 kg/m²      BMI is >= 25 and <30. (Overweight) The following options were offered after discussion;: exercise counseling/recommendations and nutrition counseling/recommendations       Physical Exam  Vitals and nursing note reviewed.   Constitutional:       General: She is in acute distress.      Appearance: She is overweight.   HENT:      Head: Normocephalic.   Eyes:      Conjunctiva/sclera:      Right eye: Right conjunctiva is not injected. No exudate.     Left eye: Left conjunctiva is not injected. No exudate.     Pupils: Pupils are equal, round, and reactive to light.      Comments: Bilateral periorbital edema.   Neck:      Vascular: No carotid bruit.   Cardiovascular:      Rate and Rhythm: Normal rate. Rhythm irregular.      Comments: Difficult to elicit lower extremity pulses due to increased bilateral pitting edema. Generalized edema noted.  Pulmonary:      Effort: No respiratory distress.      Breath sounds: No stridor. Examination of the right-lower field reveals rhonchi. Examination of the left-lower field reveals rhonchi. Rhonchi present. No wheezing or rales.   Musculoskeletal:      Right forearm: Swelling present.      Left forearm: Swelling present.      Right hand: Swelling present.      Left hand: Swelling present.      Right upper leg: Swelling present.      Left upper leg: Swelling present.      Right knee: Swelling present.      Left knee: Swelling present.      Right lower leg: 3+ Pitting Edema present.      Left lower leg: 3+ Pitting Edema present.   Skin:    "  General: Skin is warm.      Findings: No erythema.   Neurological:      Mental Status: She is alert. Mental status is at baseline.   Psychiatric:         Mood and Affect: Mood is depressed. Mood is not anxious. Affect is flat.         Thought Content: Thought content includes suicidal ideation. Thought content does not include homicidal ideation. Thought content does not include homicidal or suicidal plan.           Lungs were auscultated.  Heart exhibits an irregular rhythm.  There is significant swelling in the upper and lower legs and hands. Periorbital edema is noted. Pulses are decreased.    Vital Signs  Weight is 166. Oxygen saturation is at 98 percent.       The following data was reviewed by: JENNA Jarquin on 03/21/2025:  Comprehensive Metabolic Panel (10/04/2024 16:22)     Results                 Assessment and Plan       1. Generalized Edema.  She presents with significant generalized edema extending beyond her lower extremities to her upper body and face, raising concerns about potential fluid accumulation in her lungs, which could be contributing to her dyspnea. Her oxygen saturation is currently at 98 percent, but this could rapidly decline if pulmonary edema develops. Additionally, she has experienced weight gain since October 2024, increasing from 134 to 166 pounds. The combination of edema, dyspnea, and weight gain suggests congestive heart failure. Her cardiac rhythm is irregular, indicating that the excess fluid is placing undue stress on her heart and lungs, which could lead to respiratory distress. Given these findings, it is not advisable for her to return to her facility at this time. Immediate hospitalization is recommended for further evaluation and management. An EKG will be performed prior to her transfer to the hospital via ambulance. She is in agreement to EMS transfer to the hospital.     2. Suicidal ideation.  She reports experiencing suicidal thoughts, which she attributes to  the stress of not living in her own home. She does not endorse any plans to harm herself or others. It is recommended that she receive psychiatric evaluation and support to address these thoughts immediately at the hospital. She is in agreement.        Assessment & Plan  Generalized edema  EMS was immediately called to transfer the patient to the hospital for further evaluation and treatment.    Our staff was unable to complete the EKG prior to EMS arrival. EKG was started by our staff. EMS staff advised us to discontinue the EKG, and that they would obtain their own EKG.       Shortness of breath         Irregular heartbeat         Abnormal weight gain                  Follow Up     Return With PCP following hospital discharge..    Patient or patient representative verbalized consent for the use of Ambient Listening during the visit with  JENNA Jarquin for chart documentation. 3/21/2025  14:29 EDT    Patient was given instructions and counseling regarding her condition or for health maintenance advice. Please see specific information pulled into the AVS if appropriate.

## 2025-03-21 NOTE — ED NOTES
.Nursing report ED to floor  Ryann Hernadez  89 y.o.  female    HPI :  HPI  Stated Reason for Visit: weight gain and leg swelling    Chief Complaint  Chief Complaint   Patient presents with    Leg Swelling    Weight Gain       Admitting doctor:   Johnie Darby MD    Admitting diagnosis:   The primary encounter diagnosis was Acute congestive heart failure, unspecified heart failure type. Diagnoses of Pedal edema and Acute anemia were also pertinent to this visit.    Code status:   Current Code Status       Date Active Code Status Order ID Comments User Context       Prior            Allergies:   Sulfa antibiotics, Lisinopril, Milk-related compounds, and Olmesartan    Isolation:   No active isolations    Intake and Output  No intake or output data in the 24 hours ending 03/21/25 1850    Weight:   There were no vitals filed for this visit.    Most recent vitals:   Vitals:    03/21/25 1602 03/21/25 1625 03/21/25 1710 03/21/25 1807   BP: 129/75 116/78 105/84 112/78   BP Location: Left arm      Pulse: 67  67 70   Resp: 17      Temp: 99 °F (37.2 °C)      TempSrc: Oral      SpO2: 100%  100%        Active LDAs/IV Access:   Lines, Drains & Airways       Active LDAs       Name Placement date Placement time Site Days    Peripheral IV 03/21/25 1647 Anterior;Right Forearm 03/21/25  1647  Forearm  less than 1                    Labs (abnormal labs have a star):   Labs Reviewed   COMPREHENSIVE METABOLIC PANEL - Abnormal; Notable for the following components:       Result Value    Glucose 109 (*)     Creatinine 1.34 (*)     Chloride 108 (*)     Albumin 3.2 (*)     AST (SGOT) 55 (*)     Alkaline Phosphatase 119 (*)     eGFR 37.0 (*)     All other components within normal limits    Narrative:     GFR Categories in Chronic Kidney Disease (CKD)      GFR Category          GFR (mL/min/1.73)    Interpretation  G1                     90 or greater         Normal or high (1)  G2                      60-89                Mild  decrease (1)  G3a                   45-59                Mild to moderate decrease  G3b                   30-44                Moderate to severe decrease  G4                    15-29                Severe decrease  G5                    14 or less           Kidney failure          (1)In the absence of evidence of kidney disease, neither GFR category G1 or G2 fulfill the criteria for CKD.    eGFR calculation 2021 CKD-EPI creatinine equation, which does not include race as a factor   PROTIME-INR - Abnormal; Notable for the following components:    Protime 19.0 (*)     INR 1.59 (*)     All other components within normal limits   BNP (IN-HOUSE) - Abnormal; Notable for the following components:    proBNP 5,462.0 (*)     All other components within normal limits    Narrative:     This assay is used as an aid in the diagnosis of individuals suspected of having heart failure. It can be used as an aid in the diagnosis of acute decompensated heart failure (ADHF) in patients presenting with signs and symptoms of ADHF to the emergency department (ED). In addition, NT-proBNP of <300 pg/mL indicates ADHF is not likely.    Age Range Result Interpretation  NT-proBNP Concentration (pg/mL:      <50             Positive            >450                   Gray                 300-450                    Negative             <300    50-75           Positive            >900                  Gray                300-900                  Negative            <300      >75             Positive            >1800                  Gray                300-1800                  Negative            <300   TROPONIN - Abnormal; Notable for the following components:    HS Troponin T 35 (*)     All other components within normal limits    Narrative:     High Sensitive Troponin T Reference Range:  <14.0 ng/L- Negative Female for AMI  <22.0 ng/L- Negative Male for AMI  >=14 - Abnormal Female indicating possible myocardial injury.  >=22 - Abnormal Male  indicating possible myocardial injury.   Clinicians would have to utilize clinical acumen, EKG, Troponin, and serial changes to determine if it is an Acute Myocardial Infarction or myocardial injury due to an underlying chronic condition.        CBC WITH AUTO DIFFERENTIAL - Abnormal; Notable for the following components:    RBC 3.28 (*)     Hemoglobin 7.8 (*)     Hematocrit 26.2 (*)     MCH 23.8 (*)     MCHC 29.8 (*)     RDW 15.5 (*)     Lymphocyte % 17.4 (*)     nRBC 0.5 (*)     All other components within normal limits   APTT - Normal   HIGH SENSITIVITIY TROPONIN T 1HR   CBC AND DIFFERENTIAL    Narrative:     The following orders were created for panel order CBC & Differential.  Procedure                               Abnormality         Status                     ---------                               -----------         ------                     CBC Auto Differential[[id number]]        Abnormal            Final result                 Please view results for these tests on the individual orders.       EKG:   ECG 12 Lead Other; leg swelling   Preliminary Result   HEART RATE=66  bpm   RR Tlkzshxp=481  ms   OH Interval=  ms   P Horizontal Axis=  deg   P Front Axis=  deg   QRSD Taotjzsw=187  ms   QT Bgigvjgm=278  ms   TGzR=875  ms   QRS Axis=-26  deg   T Wave Axis=86  deg   - ABNORMAL ECG -   Atrial fibrillation   Borderline left axis deviation   Low voltage, extremity and precordial leads   Abnormal R-wave progression, early transition   Prolonged QT interval   Date and Time of Study:2025-03-21 17:03:07          Meds given in ED:   Medications   sodium chloride 0.9 % flush 10 mL (has no administration in time range)   furosemide (LASIX) injection 80 mg (80 mg Intravenous Given 3/21/25 1807)       Imaging results:  No radiology results for the last day    Ambulatory status:   - up with assistance    Social issues:   Social History     Socioeconomic History    Marital status:    Tobacco Use    Smoking status:  Never     Passive exposure: Never    Smokeless tobacco: Never   Vaping Use    Vaping status: Never Used   Substance and Sexual Activity    Alcohol use: No    Drug use: No    Sexual activity: Defer       Peripheral Neurovascular  Peripheral Neurovascular (Adult)  Peripheral Neurovascular WDL: .WDL except  Additional Documentation: Edema (Group)  Edema  Edema: foot, left, foot, right, ankle, right, ankle, left, leg, right, leg, left  Leg, Left Edema: 3+ (Moderate)  Leg, Right Edema: 3+ (Moderate)  Ankle, Left Edema: 3+ (Moderate)  Ankle, Right Edema: 3+ (Moderate)  Foot, Left Edema: 3+ (Moderate)  Foot, Right Edema: 3+ (Moderate)    Neuro Cognitive  Neuro Cognitive (Adult)  Cognitive/Neuro/Behavioral WDL: .WDL except, orientation  Orientation: disoriented to, time, situation    Learning  Learning Assessment  Learning Readiness and Ability: cognitive limitation noted    Respiratory  Respiratory  Airway WDL: WDL  Respiratory WDL  Respiratory WDL: WDL    Abdominal Pain       Pain Assessments  Pain (Adult)  (0-10) Pain Rating: Rest: 0  (0-10) Pain Rating: Activity: 0    NIH Stroke Scale       Vonda Ornelas RN  03/21/25 18:50 EDT

## 2025-03-21 NOTE — CASE MANAGEMENT/SOCIAL WORK
Discharge Planning Assessment  [facility]     Patient Name: Ryann Hernadez  MRN: [id number]  Today's Date: 3/21/2025    Admit Date: 3/21/2025        Discharge Needs Assessment    No documentation.                  Discharge Plan       Row Name 03/21/25 1822       Plan    Plan Comments Patient with legal guardian on file. Legal guardian at bedside gives registration consent. Haskell is present upon chart review. Guardianship paperwork has been scanned into Epic. Mei GAMEZ RN CCP manager updated. Disposition from ER pending. RAULITO Ortiz RN                       Demographic Summary    No documentation.                  Functional Status    No documentation.                  Psychosocial    No documentation.                  Abuse/Neglect    No documentation.                  Legal    No documentation.                  Substance Abuse    No documentation.                  Patient Forms    No documentation.                     Thang Ortiz, RN

## 2025-03-21 NOTE — ED PROVIDER NOTES
EMERGENCY DEPARTMENT ENCOUNTER    Room Number:  38/38  PCP: Andie Song APRN  Historian: Patient      HPI:  Chief Complaint: Leg swelling  A complete HPI/ROS/PMH/PSH/SH/FH are unobtainable due to: None  Context: Ryann Hernadez is a 89 y.o. female who presents to the ED c/o sudden onset for leg swelling.  She does have a history of peripheral edema but no known history of congestive heart failure.  She does report some shortness of breath with exertion along with the significant worsening bilateral lower extremity edema.  Her primary doctor reports that she has gained a fairly significant amount of weight as well since her last visit.  She currently denies chest pain, extremity pain, back pain, nausea/vomiting, or fever/chills.            PAST MEDICAL HISTORY  Active Ambulatory Problems     Diagnosis Date Noted    Allergic rhinitis 01/15/2014    Gastroesophageal reflux disease 01/15/2014    Essential hypertension 01/09/2013    Ataxic gait 08/12/2016    Balance disorder 08/12/2016    At high risk for falls 08/07/2017    Paroxysmal atrial fibrillation 09/22/2019    Aortic root dilatation 10/10/2019    Alzheimer's dementia 09/26/2018    Age-related osteoporosis without current pathological fracture 09/13/2018    Stokes of foot 11/05/2019    Ascending aortic aneurysm 02/06/2020    Acute deep vein thrombosis (DVT) of calf muscle vein of left lower extremity 02/06/2020    Paroxysmal atrial fibrillation 02/03/2021    Elevated troponin 07/05/2022    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    History of stroke 03/01/2024    CKD (chronic kidney disease) stage 3, GFR 30-59 ml/min 03/01/2024    B12 deficiency 03/02/2024     Resolved Ambulatory Problems     Diagnosis Date Noted    Acute right otitis media 08/12/2016    Nonintractable episodic headache 09/14/2016    Acute recurrent maxillary sinusitis 02/23/2017    Facial swelling 02/23/2017    Dizziness 02/23/2017    Medicare annual wellness visit, initial 03/06/2017    Hospital  discharge follow-up 03/06/2017    Renal insufficiency 08/07/2017    Ureteral stone with hydronephrosis 09/22/2019    Hydronephrosis due to obstruction of ureter 09/24/2019    BPPV (benign paroxysmal positional vertigo), unspecified laterality 01/08/2020    TIA (transient ischemic attack) 02/05/2020    Medicare annual wellness visit, subsequent 01/20/2021    Fall at home 07/04/2022    RUBEN (acute kidney injury) 07/05/2022    Rhabdomyolysis 07/05/2022    COVID-19 virus detected 07/05/2022    Right hip pain 02/29/2024    Acute urinary retention 03/01/2024    UTI (urinary tract infection) 03/02/2024     Past Medical History:   Diagnosis Date    A-fib     Anemia     Aneurysm, ascending aorta 03/22/2016    Anxiety     Atypical chest pain     BPPV (benign paroxysmal positional vertigo)     Bradycardia     Chronic cystitis     Chronic headaches     Chronic nonintractable headache 9/14/2016    Chronic paroxysmal hemicrania     Depression     Esophagitis     Gastritis     GERD (gastroesophageal reflux disease)     Hematuria     High blood pressure     History of cataract     History of irritable bowel syndrome     Hypertension     IBS (irritable bowel syndrome)     Influenza A     Kidney lesion     Labyrinthitis 07/28/2014    OA (osteoarthritis)     Osteoporosis 9/13/2018    Other abnormal clinical finding 09/21/2012    Pain of thigh     Personal history of gastric ulcer     Renal disorder          PAST SURGICAL HISTORY  Past Surgical History:   Procedure Laterality Date    BREAST LUMPECTOMY      BREAST SURGERY      CATARACT EXTRACTION      COLONOSCOPY N/A     DR. MARGARITA MENDOZA    ELBOW ARTHROTOMY  07/01/1999    DR. RYAN RITCHIE    EYE SURGERY      FRACTURE SURGERY      HAND, BROKEN FINGERS  DR. MIKE CHRISTIANSON    KNEE SURGERY  08/31/1999    DR. THADDEUS MENDES    OOPHORECTOMY      TUBAL ABDOMINAL LIGATION Bilateral 1962    DR. EDDA MANNING         FAMILY HISTORY  Family History   Problem Relation Age of Onset    Heart attack Mother      Arthritis Mother     Arthritis Father     Arthritis Sister     Arthritis Other     Kidney disease Other     Thyroid disease Other     Diabetes Other     Hypertension Other     Heart disease Other     Arthritis Maternal Grandmother          SOCIAL HISTORY  Social History     Socioeconomic History    Marital status:    Tobacco Use    Smoking status: Never     Passive exposure: Never    Smokeless tobacco: Never   Vaping Use    Vaping status: Never Used   Substance and Sexual Activity    Alcohol use: No    Drug use: No    Sexual activity: Defer         ALLERGIES  Sulfa antibiotics, Lisinopril, Milk-related compounds, and Olmesartan        REVIEW OF SYSTEMS  Review of Systems   Constitutional:  Negative for fever.   HENT:  Negative for sore throat.    Eyes: Negative.    Respiratory:  Negative for cough and shortness of breath.    Cardiovascular:  Positive for leg swelling. Negative for chest pain.   Gastrointestinal:  Negative for abdominal pain, diarrhea and vomiting.   Genitourinary:  Negative for dysuria.   Musculoskeletal:  Negative for neck pain.   Skin:  Negative for rash.   Allergic/Immunologic: Negative.    Neurological:  Negative for weakness, numbness and headaches.   Hematological: Negative.    Psychiatric/Behavioral: Negative.     All other systems reviewed and are negative.         PHYSICAL EXAM  ED Triage Vitals [03/21/25 1602]   Temp Heart Rate Resp BP SpO2   99 °F (37.2 °C) 67 17 129/75 100 %      Temp src Heart Rate Source Patient Position BP Location FiO2 (%)   Oral Monitor -- Left arm --       Physical Exam  Constitutional:       General: She is not in acute distress.     Appearance: Normal appearance. She is not ill-appearing or toxic-appearing.   HENT:      Head: Normocephalic and atraumatic.   Eyes:      Extraocular Movements: Extraocular movements intact.      Pupils: Pupils are equal, round, and reactive to light.   Cardiovascular:      Rate and Rhythm: Normal rate and regular rhythm.       Heart sounds: No murmur heard.     No friction rub. No gallop.   Pulmonary:      Effort: Pulmonary effort is normal.      Breath sounds: Normal breath sounds.   Abdominal:      General: Abdomen is flat. There is no distension.      Palpations: Abdomen is soft.      Tenderness: There is no abdominal tenderness.   Musculoskeletal:         General: Swelling present. No tenderness. Normal range of motion.      Cervical back: Normal range of motion and neck supple.      Right lower leg: Edema present.      Left lower leg: Edema present.   Skin:     General: Skin is warm and dry.   Neurological:      General: No focal deficit present.      Mental Status: She is alert and oriented to person, place, and time.      Sensory: No sensory deficit.      Motor: No weakness.   Psychiatric:         Mood and Affect: Mood normal.         Behavior: Behavior normal.           Vital signs and nursing notes reviewed.          LAB RESULTS  Recent Results (from the past 24 hours)   Comprehensive Metabolic Panel    Collection Time: 03/21/25  4:45 PM    Specimen: Blood   Result Value Ref Range    Glucose 109 (H) 65 - 99 mg/dL    BUN 22 8 - 23 mg/dL    Creatinine 1.34 (H) 0.57 - 1.00 mg/dL    Sodium 145 136 - 145 mmol/L    Potassium 4.2 3.5 - 5.2 mmol/L    Chloride 108 (H) 98 - 107 mmol/L    CO2 25.4 22.0 - 29.0 mmol/L    Calcium 9.0 8.2 - 9.6 mg/dL    Total Protein 6.6 6.0 - 8.5 g/dL    Albumin 3.2 (L) 3.5 - 5.2 g/dL    ALT (SGPT) 28 1 - 33 U/L    AST (SGOT) 55 (H) 1 - 32 U/L    Alkaline Phosphatase 119 (H) 39 - 117 U/L    Total Bilirubin 0.7 0.0 - 1.2 mg/dL    Globulin 3.4 gm/dL    A/G Ratio 0.9 g/dL    BUN/Creatinine Ratio 16.4 7.0 - 25.0    Anion Gap 11.6 5.0 - 15.0 mmol/L    eGFR 37.0 (L) >60.0 mL/min/1.73   Protime-INR    Collection Time: 03/21/25  4:45 PM    Specimen: Blood   Result Value Ref Range    Protime 19.0 (H) 11.7 - 14.2 Seconds    INR 1.59 (H) 0.90 - 1.10   aPTT    Collection Time: 03/21/25  4:45 PM    Specimen: Blood    Result Value Ref Range    PTT 34.8 22.7 - 35.4 seconds   BNP    Collection Time: 03/21/25  4:45 PM    Specimen: Blood   Result Value Ref Range    proBNP 5,462.0 (H) 0.0 - 1,800.0 pg/mL   High Sensitivity Troponin T    Collection Time: 03/21/25  4:45 PM    Specimen: Blood   Result Value Ref Range    HS Troponin T 35 (H) <14 ng/L   CBC Auto Differential    Collection Time: 03/21/25  4:45 PM    Specimen: Blood   Result Value Ref Range    WBC 6.57 3.40 - 10.80 10*3/mm3    RBC 3.28 (L) 3.77 - 5.28 10*6/mm3    Hemoglobin 7.8 (L) 12.0 - 15.9 g/dL    Hematocrit 26.2 (L) 34.0 - 46.6 %    MCV 79.9 79.0 - 97.0 fL    MCH 23.8 (L) 26.6 - 33.0 pg    MCHC 29.8 (L) 31.5 - 35.7 g/dL    RDW 15.5 (H) 12.3 - 15.4 %    RDW-SD 44.0 37.0 - 54.0 fl    MPV 9.4 6.0 - 12.0 fL    Platelets 348 140 - 450 10*3/mm3    Neutrophil % 67.9 42.7 - 76.0 %    Lymphocyte % 17.4 (L) 19.6 - 45.3 %    Monocyte % 11.9 5.0 - 12.0 %    Eosinophil % 2.0 0.3 - 6.2 %    Basophil % 0.5 0.0 - 1.5 %    Immature Grans % 0.3 0.0 - 0.5 %    Neutrophils, Absolute 4.47 1.70 - 7.00 10*3/mm3    Lymphocytes, Absolute 1.14 0.70 - 3.10 10*3/mm3    Monocytes, Absolute 0.78 0.10 - 0.90 10*3/mm3    Eosinophils, Absolute 0.13 0.00 - 0.40 10*3/mm3    Basophils, Absolute 0.03 0.00 - 0.20 10*3/mm3    Immature Grans, Absolute 0.02 0.00 - 0.05 10*3/mm3    nRBC 0.5 (H) 0.0 - 0.2 /100 WBC   ECG 12 Lead Other; leg swelling    Collection Time: 03/21/25  5:03 PM   Result Value Ref Range    QT Interval 587 ms    QTC Interval 616 ms       Ordered the above labs and reviewed the results.        RADIOLOGY  XR Chest 1 View  Result Date: 3/21/2025  XR CHEST 1 VW-  CLINICAL HISTORY:  swelling  COMPARISON: 7/4/2022  FINDINGS: A single portable view of the chest demonstrates moderate cardiomegaly. Bibasilar atelectasis/infiltrate is appreciated. There is increased density present at the lung bases which may represent layering pleural fluid collections. The pulmonary interstitium is mildly  prominent parahilar regions bilaterally, more prominent as compared to 7/4/2022, accentuated by inspiratory effort. Mild congestive changes cannot be excluded.    This report was finalized on 3/21/2025 5:32 PM by Dr. Ruddy Delatorre M.D on Workstation: [id number]        Ordered the above noted radiological studies. Reviewed by me in PACS.            PROCEDURES  Procedures    EKG independently interpreted by myself as follows:    EKG          EKG time: 1703  Rhythm/Rate: atrial fibrillation, 66  P waves and IA: absent  QRS, axis: nml, nml   ST and T waves: Normal ST segments, nonspecific T wave flattening    Interpreted Contemporaneously by me, independently viewed  unchanged compared to prior 10/4/24            MEDICATIONS GIVEN IN ER  Medications   sodium chloride 0.9 % flush 10 mL (has no administration in time range)   furosemide (LASIX) injection 80 mg (80 mg Intravenous Given 3/21/25 1807)                   MEDICAL DECISION MAKING, PROGRESS, and CONSULTS    All labs have been independently reviewed by me.  All radiology studies have been reviewed by me and I have also reviewed the radiology report.   EKG's independently viewed and interpreted by me.  Discussion below represents my analysis of pertinent findings related to patient's condition, differential diagnosis, treatment plan and final disposition.      Additional sources:  - Discussed/ obtained information from independent historians: History obtained from the patient at bedside.    - External (non-ED) record review: Upon medical records review, the patient was last seen and evaluated in the outpatient office of her family medicine provider today, 3/21/2025, for evaluation of lower extremity edema.  She was sent to the ED today immediately via ambulance from her primary care office for ED assessment.    - Chronic or social conditions impacting care: Chronic Alzheimer's dementia    - Shared decision making: Admission decision based on shared conversations  have between myself, the patient and family at bedside, as well as Dr. Darby with A.      Orders placed during this visit:  Orders Placed This Encounter   Procedures    XR Chest 1 View    Comprehensive Metabolic Panel    Protime-INR    aPTT    BNP    High Sensitivity Troponin T    CBC Auto Differential    High Sensitivity Troponin T 1Hr    LHA (on-call MD unless specified) Details    ECG 12 Lead Other; leg swelling    Insert Peripheral IV    CBC & Differential               Differential diagnosis includes but is not limited to:    Pedal edema, CHF with acute exacerbation, acute coronary syndrome, cellulitis, volume overload, acute renal failure, or electrolyte disturbance      Independent interpretation of labs, radiology studies, and discussions with consultants:    Chest x-ray independently interpreted by myself with my interpretation showing mild cardiomegaly with increased interstitial edema.  No pneumothorax.      ED Course as of 03/21/25 1831   Fri Mar 21, 2025   1813 On reevaluation, the patient is resting comfortably but is requiring 2 L of oxygen at this point.  Her oxygen saturations are currently 100% on room air.  I informed the patient and her family that she does probably have signs of congestive heart failure.  We will treat with IV diuretics and admit her to the hospital today for further management and treatment.  They agree with that plan and all questions answered. [BM]   1830 The patient's presentation, workup, as well as diagnosis and treatment plan was discussed at length with FAUSTO Phelan.  He agrees to admit the patient to the hospital today for further management and treatment. [BM]      ED Course User Index  [BM] Moses Wood MD           DIAGNOSIS  Final diagnoses:   Acute congestive heart failure, unspecified heart failure type   Pedal edema   Acute anemia         DISPOSITION  ADMISSION    Discussed treatment plan and reason for admission with pt/family and admitting  physician.  Pt/family voiced understanding of the plan for admission for further testing/treatment as needed.               Latest Documented Vital Signs:  As of 18:31 EDT  BP- 112/78 HR- 70 Temp- 99 °F (37.2 °C) (Oral) O2 sat- 100%              --    Please note that portions of this were completed with a voice recognition program.       Note Disclaimer: At [facility], we believe that sharing information builds trust and better relationships. You are receiving this note because you are receiving care at [facility] or recently visited. It is possible you will see health information before a provider has talked with you about it. This kind of information can be easy to misunderstand. To help you fully understand what it means for your health, we urge you to discuss this note with your provider.             Moses Wood MD  03/21/25 0000

## 2025-03-21 NOTE — H&P
Patient Name:  Ryann Hernadez  YOB: 1931  MRN:  [id number]  Admit Date:  3/21/2025  Patient Care Team:  Andie Song APRN as PCP - General (Nurse Practitioner)      Subjective   History Present Illness     Chief Complaint   Patient presents with    Leg Swelling    Weight Gain     History of Present Illness  Ms. Hernadez is a 89 y.o. non-smoker with a history of Alzheimer's dementia, CKD stage IIIa, HTN, GERD, previous CVA, paroxysmal atrial fibrillation on Eliquis that presents to [facility] secondary to bilateral leg swelling and weight gain.  HPI has been obtained from ER documentation due to patient's mentation.  Patient was originally evaluated by her PCP today for chief complaint of progressively worsening bilateral leg swelling in addition to weight gain over the past week.  She currently resides at a senior living facility.  Her Lasix dose was increased last Friday but her symptoms continued.  It was noted that she has gained a fairly significant amount of weight since her last PCP visit so she was sent to the ED for further evaluation.  Workup in the emergency department reveals a proBNP of 5462.  Her hemoglobin was also noted to be 7.8.  Chest x-ray obtained was suspicious for pleural fluid collections.  Mild congestive changes cannot be excluded.  She was given IV Lasix in the ED and we were asked admit for further treatment.    Review of Systems   Unable to perform ROS: Dementia        Personal History     Past Medical History:   Diagnosis Date    A-fib     Acute deep vein thrombosis (DVT) of calf muscle vein of left lower extremity 2/6/2020    Allergic rhinitis     Anemia     Aneurysm, ascending aorta 03/22/2016    5 CM    Anxiety     Ataxic gait     Atypical chest pain     BPPV (benign paroxysmal positional vertigo)     Bradycardia     CHF (congestive heart failure) 3/21/2025    Chronic cystitis     Chronic headaches     Chronic nonintractable headache  9/14/2016    Chronic paroxysmal hemicrania     Depression     Esophagitis     Gastritis     GERD (gastroesophageal reflux disease)     Hematuria     High blood pressure     History of cataract     History of irritable bowel syndrome     Hypertension     IBS (irritable bowel syndrome)     Influenza A     Kidney lesion     LEFT KIDNEY CYSTIC    Labyrinthitis 07/28/2014    OA (osteoarthritis)     Osteoporosis 9/13/2018    Other abnormal clinical finding 09/21/2012    LEFT UTEROCELE    Pain of thigh     Personal history of gastric ulcer     Renal disorder     Renal insufficiency 8/7/2017    Rhabdomyolysis 7/5/2022    TIA (transient ischemic attack) 2/5/2020    UTI (urinary tract infection) 3/2/2024     Past Surgical History:   Procedure Laterality Date    BREAST LUMPECTOMY      BREAST SURGERY      CATARACT EXTRACTION      COLONOSCOPY N/A     DR. MARGARITA MENDOZA    ELBOW ARTHROTOMY  07/01/1999    DR. RYAN RITCHIE    EYE SURGERY      FRACTURE SURGERY      HAND, BROKEN FINGERS  DR. MIKE CHRISTIANSON    KNEE SURGERY  08/31/1999    DR. THADDEUS MENDES    OOPHORECTOMY      TUBAL ABDOMINAL LIGATION Bilateral 1962    DR. EDDA MANNING     Family History   Problem Relation Age of Onset    Heart attack Mother     Arthritis Mother     Arthritis Father     Arthritis Sister     Arthritis Other     Kidney disease Other     Thyroid disease Other     Diabetes Other     Hypertension Other     Heart disease Other     Arthritis Maternal Grandmother      Social History     Tobacco Use    Smoking status: Never     Passive exposure: Never    Smokeless tobacco: Never   Vaping Use    Vaping status: Never Used   Substance Use Topics    Alcohol use: No    Drug use: No     No current facility-administered medications on file prior to encounter.     Current Outpatient Medications on File Prior to Encounter   Medication Sig Dispense Refill    acetaminophen (TYLENOL) 500 MG tablet Take 1 tablet by mouth Every 6 (Six) Hours As Needed for Mild Pain.       amLODIPine (NORVASC) 5 MG tablet TAKE 1 TABLET BY MOUTH DAILY 90 tablet 3    atenolol (TENORMIN) 25 MG tablet Take 1 tablet by mouth Daily.      atorvastatin (LIPITOR) 80 MG tablet Take 1 tablet by mouth Every Night.      Cholecalciferol (VITAMIN D3) 5000 units capsule capsule Take 1 capsule by mouth Daily.      donepezil (ARICEPT) 5 MG tablet Take 1 tablet by mouth Daily.      furosemide (LASIX) 20 MG tablet Take 1 tablet by mouth Daily.      metroNIDAZOLE (METROCREAM) 0.75 % cream APPLY TOPICALLY TO FACE TWICE DAILY      apixaban (ELIQUIS) 2.5 MG tablet tablet Take 1 tablet by mouth Every 12 (Twelve) Hours. Indications: Atrial Fibrillation 28 tablet 0    bisacodyl (DULCOLAX) 10 MG suppository Insert 1 suppository into the rectum Daily As Needed for Constipation (Use if bisacodyl oral is ineffective). (Patient not taking: Reported on 3/21/2025)      bisacodyl (DULCOLAX) 5 MG EC tablet Take 1 tablet by mouth Daily As Needed for Constipation (Use if polyethylene glycol is ineffective). (Patient not taking: Reported on 3/21/2025)      Lidocaine 4 % Place 1 patch on the skin as directed by provider Daily. Remove & Discard patch within 12 hours or as directed by MD      Multiple Vitamins-Minerals (ONE-A-DAY WOMENS 50 PLUS) tablet Take  by mouth.      mupirocin (BACTROBAN) 2 % cream Apply 1 Application topically to the appropriate area as directed 3 (Three) Times a Day. (Patient not taking: Reported on 3/21/2025)      mupirocin (BACTROBAN) 2 % ointment as needed PRN. (Patient not taking: Reported on 3/21/2025)      Omega-3 Fatty Acids (fish oil) 1000 MG capsule capsule Take  by mouth Daily With Breakfast. (Patient not taking: Reported on 3/21/2025)      polyethylene glycol (MIRALAX) 17 g packet Take 17 g by mouth Daily As Needed (Use if senna-docusate is ineffective). (Patient not taking: Reported on 3/21/2025)      sennosides-docusate (PERICOLACE) 8.6-50 MG per tablet Take 2 tablets by mouth 2 (Two) Times a Day As  Needed for Constipation. (Patient not taking: Reported on 3/21/2025)      triamcinolone (KENALOG) 0.1 % ointment APPLY TO THE AFFECTED AREA DAILY      vitamin B-12 (CYANOCOBALAMIN) 1000 MCG tablet Take 2 tablets by mouth Daily. (Patient not taking: Reported on 3/21/2025)       Allergies   Allergen Reactions    Sulfa Antibiotics Anaphylaxis    Lisinopril     Milk-Related Compounds Diarrhea    Olmesartan        Objective    Objective     Vital Signs  Temp:  [97.7 °F (36.5 °C)-99 °F (37.2 °C)] 97.7 °F (36.5 °C)  Heart Rate:  [62-75] 67  Resp:  [17-18] 18  BP: ()/(66-90) 118/76  SpO2:  [95 %-100 %] 95 %  on  Flow (L/min) (Oxygen Therapy):  [2] 2;   Device (Oxygen Therapy): nasal cannula  There is no height or weight on file to calculate BMI.    Physical Exam  Vitals and nursing note reviewed.   Constitutional:       General: She is not in acute distress.     Appearance: She is well-developed. She is not toxic-appearing.   HENT:      Head: Normocephalic and atraumatic.   Eyes:      General: No scleral icterus.        Right eye: No discharge.         Left eye: No discharge.      Conjunctiva/sclera: Conjunctivae normal.   Neck:      Vascular: No JVD.   Cardiovascular:      Rate and Rhythm: Normal rate and regular rhythm.      Heart sounds: Normal heart sounds. No murmur heard.     No friction rub. No gallop.   Pulmonary:      Effort: Pulmonary effort is normal. No respiratory distress.      Breath sounds: Normal breath sounds. Decreased breath sounds present. No wheezing or rales.   Abdominal:      General: Bowel sounds are normal. There is no distension.      Palpations: Abdomen is soft.      Tenderness: There is no abdominal tenderness. There is no guarding.   Musculoskeletal:         General: No tenderness or deformity. Normal range of motion.      Cervical back: Normal range of motion and neck supple.      Right lower leg: 3+ Edema present.      Left lower leg: 3+ Edema present.   Skin:     General: Skin is warm  and dry.      Capillary Refill: Capillary refill takes less than 2 seconds.   Neurological:      Mental Status: She is alert. She is disoriented.   Psychiatric:         Behavior: Behavior normal.     Results Review:  I reviewed the patient's new clinical results.  I reviewed the patient's new imaging results and agree with the interpretation.  I reviewed the patient's other test results and agree with the interpretation  I personally viewed and interpreted the patient's EKG/Telemetry data    Lab Results (last 24 hours)       Procedure Component Value Units Date/Time    CBC & Differential [[id number]]  (Abnormal) Collected: 03/21/25 1645    Specimen: Blood Updated: 03/21/25 1656    Narrative:      The following orders were created for panel order CBC & Differential.  Procedure                               Abnormality         Status                     ---------                               -----------         ------                     CBC Auto Differential[[id number]]        Abnormal            Final result                 Please view results for these tests on the individual orders.    Comprehensive Metabolic Panel [[id number]]  (Abnormal) Collected: 03/21/25 1645    Specimen: Blood Updated: 03/21/25 1717     Glucose 109 mg/dL      BUN 22 mg/dL      Creatinine 1.34 mg/dL      Sodium 145 mmol/L      Potassium 4.2 mmol/L      Chloride 108 mmol/L      CO2 25.4 mmol/L      Calcium 9.0 mg/dL      Total Protein 6.6 g/dL      Albumin 3.2 g/dL      ALT (SGPT) 28 U/L      AST (SGOT) 55 U/L      Alkaline Phosphatase 119 U/L      Total Bilirubin 0.7 mg/dL      Globulin 3.4 gm/dL      A/G Ratio 0.9 g/dL      BUN/Creatinine Ratio 16.4     Anion Gap 11.6 mmol/L      eGFR 37.0 mL/min/1.73     Narrative:      GFR Categories in Chronic Kidney Disease (CKD)      GFR Category          GFR (mL/min/1.73)    Interpretation  G1                     90 or greater         Normal or high (1)  G2                      60-89                Mild  decrease (1)  G3a                   45-59                Mild to moderate decrease  G3b                   30-44                Moderate to severe decrease  G4                    15-29                Severe decrease  G5                    14 or less           Kidney failure          (1)In the absence of evidence of kidney disease, neither GFR category G1 or G2 fulfill the criteria for CKD.    eGFR calculation 2021 CKD-EPI creatinine equation, which does not include race as a factor    Protime-INR [[id number]]  (Abnormal) Collected: 03/21/25 1645    Specimen: Blood Updated: 03/21/25 1706     Protime 19.0 Seconds      INR 1.59    aPTT [[id number]]  (Normal) Collected: 03/21/25 1645    Specimen: Blood Updated: 03/21/25 1706     PTT 34.8 seconds     BNP [[id number]]  (Abnormal) Collected: 03/21/25 1645    Specimen: Blood Updated: 03/21/25 1717     proBNP 5,462.0 pg/mL     Narrative:      This assay is used as an aid in the diagnosis of individuals suspected of having heart failure. It can be used as an aid in the diagnosis of acute decompensated heart failure (ADHF) in patients presenting with signs and symptoms of ADHF to the emergency department (ED). In addition, NT-proBNP of <300 pg/mL indicates ADHF is not likely.    Age Range Result Interpretation  NT-proBNP Concentration (pg/mL:      <50             Positive            >450                   Gray                 300-450                    Negative             <300    50-75           Positive            >900                  Gray                300-900                  Negative            <300      >75             Positive            >1800                  Gray                300-1800                  Negative            <300    High Sensitivity Troponin T [[id number]]  (Abnormal) Collected: 03/21/25 1645    Specimen: Blood Updated: 03/21/25 1717     HS Troponin T 35 ng/L     Narrative:      High Sensitive Troponin T Reference Range:  <14.0 ng/L- Negative Female for  AMI  <22.0 ng/L- Negative Male for AMI  >=14 - Abnormal Female indicating possible myocardial injury.  >=22 - Abnormal Male indicating possible myocardial injury.   Clinicians would have to utilize clinical acumen, EKG, Troponin, and serial changes to determine if it is an Acute Myocardial Infarction or myocardial injury due to an underlying chronic condition.         CBC Auto Differential [[id number]]  (Abnormal) Collected: 03/21/25 1645    Specimen: Blood Updated: 03/21/25 1656     WBC 6.57 10*3/mm3      RBC 3.28 10*6/mm3      Hemoglobin 7.8 g/dL      Hematocrit 26.2 %      MCV 79.9 fL      MCH 23.8 pg      MCHC 29.8 g/dL      RDW 15.5 %      RDW-SD 44.0 fl      MPV 9.4 fL      Platelets 348 10*3/mm3      Neutrophil % 67.9 %      Lymphocyte % 17.4 %      Monocyte % 11.9 %      Eosinophil % 2.0 %      Basophil % 0.5 %      Immature Grans % 0.3 %      Neutrophils, Absolute 4.47 10*3/mm3      Lymphocytes, Absolute 1.14 10*3/mm3      Monocytes, Absolute 0.78 10*3/mm3      Eosinophils, Absolute 0.13 10*3/mm3      Basophils, Absolute 0.03 10*3/mm3      Immature Grans, Absolute 0.02 10*3/mm3      nRBC 0.5 /100 WBC     High Sensitivity Troponin T 1Hr [[id number]]  (Abnormal) Collected: 03/21/25 1824    Specimen: Blood Updated: 03/21/25 1906     HS Troponin T 28 ng/L      Troponin T Numeric Delta -7 ng/L      Troponin T % Delta -20    Narrative:      High Sensitive Troponin T Reference Range:  <14.0 ng/L- Negative Female for AMI  <22.0 ng/L- Negative Male for AMI  >=14 - Abnormal Female indicating possible myocardial injury.  >=22 - Abnormal Male indicating possible myocardial injury.   Clinicians would have to utilize clinical acumen, EKG, Troponin, and serial changes to determine if it is an Acute Myocardial Infarction or myocardial injury due to an underlying chronic condition.         Iron Profile [[id number]]  (Abnormal) Collected: 03/21/25 1824    Specimen: Blood Updated: 03/21/25 2035     Iron 209 mcg/dL      Iron  Saturation (TSAT) 54 %      Transferrin 259 mg/dL      TIBC 386 mcg/dL     Ferritin [[id number]]  (Abnormal) Collected: 03/21/25 2140    Specimen: Blood Updated: 03/21/25 2223     Ferritin 12.30 ng/mL     Narrative:      Results may be falsely decreased if patient taking Biotin.              Imaging Results (Last 24 Hours)       Procedure Component Value Units Date/Time    XR Chest 1 View [[id number]] Collected: 03/21/25 1730     Updated: 03/21/25 1735    Narrative:      XR CHEST 1 VW-     CLINICAL HISTORY:  swelling     COMPARISON: 7/4/2022     FINDINGS: A single portable view of the chest demonstrates moderate  cardiomegaly. Bibasilar atelectasis/infiltrate is appreciated. There is  increased density present at the lung bases which may represent layering  pleural fluid collections. The pulmonary interstitium is mildly  prominent parahilar regions bilaterally, more prominent as compared to  7/4/2022, accentuated by inspiratory effort. Mild congestive changes  cannot be excluded.           This report was finalized on 3/21/2025 5:32 PM by Dr. Ruddy Delatorre M.D  on Workstation: [id number]               Results for orders placed during the hospital encounter of 02/29/24    Adult Transthoracic Echo Complete W/ Cont if Necessary Per Protocol    Interpretation Summary    Left ventricular systolic function is normal. Calculated left ventricular EF = 59.1%    Left ventricular wall thickness is consistent with hypertrophy. Sigmoid-shaped ventricular septum is present.    Left ventricular diastolic function was indeterminate.    Moderate aortic valve regurgitation is present.    Mild mitral valve regurgitation is present with a centrally-directed jet noted.    Moderate tricuspid valve regurgitation is present.    Estimated right ventricular systolic pressure from tricuspid regurgitation is mildly elevated (35-45 mmHg). Calculated right ventricular systolic pressure from tricuspid regurgitation is 41 mmHg.    Severe dilation  of the ascending aorta is present.  5.2 cm      ECG 12 Lead Other; leg swelling   Preliminary Result   HEART RATE=66  bpm   RR Gzcacara=720  ms   WI Interval=  ms   P Horizontal Axis=  deg   P Front Axis=  deg   QRSD Vrxnpwpx=572  ms   QT Zofmhqci=496  ms   EScT=358  ms   QRS Axis=-26  deg   T Wave Axis=86  deg   - ABNORMAL ECG -   Atrial fibrillation   Borderline left axis deviation   Low voltage, extremity and precordial leads   Abnormal R-wave progression, early transition   Prolonged QT interval   Date and Time of Study:2025-03-21 17:03:07      Telemetry Scan   Final Result           Assessment/Plan     Active Hospital Problems    Diagnosis  POA    **CHF (congestive heart failure) [I50.9]  Yes    Anemia [D64.9]  Yes    CKD (chronic kidney disease) stage 3, GFR 30-59 ml/min [N18.30]  Yes    History of stroke [Z86.73]  Not Applicable    Paroxysmal atrial fibrillation [I48.0]  Yes    Alzheimer's dementia [G30.9, F02.80]  Yes    Gastroesophageal reflux disease [K21.9]  Yes    Essential hypertension [I10]  Yes      Resolved Hospital Problems   No resolved problems to display.       Ms. Hernadez is a 89 y.o. non-smoker with a history of Alzheimer's dementia, CKD stage IIIa, HTN, GERD, previous CVA, paroxysmal atrial fibrillation who presents with bilateral lower extremity swelling and weight gain.    Acute CHF exacerbation  History of AR/MR  -Patient's been on a dose of IV diuretics in ED, will continue  -Cardiology consultation in a.m.  -Last 2D on 03/2024 showed LVEF 59%. Will repeat in a.m.  -Daily weights  -Strict I's and O    Acute anemia  -Check fecal occult stool  -Trend H&H and transfuse if hemoglobin drops below 7  -Hold Eliquis  -Check iron studies, ferritin, folate, B12 in a.m.    CKD stage IIIa  -Creat today 1.34. SCr baseline 1-1.07  -Avoid nephrotoxins  -Repeat CMP in a.m.    Atrial fibrillation  Ascending thoracic aneurysm  -Rate controlled, continue antiarrhythmics  -Hold OAC for now 2/2 to anemia      Essential hypertension  -Stable, resume home regimen      Alzheimer's dementia  Hx of CVA  -Without behavioral disturbances  -Resume     GERD  -Continue PPI      I discussed the patient's findings and my recommendations with patient.    VTE Prophylaxis - Eliquis (home med).  Code Status - Full code.       JENNA Quiñonez  [facility]  03/21/25  22:47 EDT

## 2025-03-22 PROBLEM — D50.9 IRON DEFICIENCY ANEMIA: Status: ACTIVE | Noted: 2025-03-21

## 2025-03-22 PROBLEM — I50.31 ACUTE DIASTOLIC CONGESTIVE HEART FAILURE: Status: ACTIVE | Noted: 2025-03-21

## 2025-03-22 PROBLEM — N18.31 STAGE 3A CHRONIC KIDNEY DISEASE: Status: ACTIVE | Noted: 2024-03-01

## 2025-03-22 LAB
ALBUMIN SERPL-MCNC: 3.2 G/DL (ref 3.5–5.2)
ALBUMIN/GLOB SERPL: 1 G/DL
ALP SERPL-CCNC: 106 U/L (ref 39–117)
ALT SERPL W P-5'-P-CCNC: 25 U/L (ref 1–33)
ANION GAP SERPL CALCULATED.3IONS-SCNC: 12.1 MMOL/L (ref 5–15)
AST SERPL-CCNC: 52 U/L (ref 1–32)
BILIRUB SERPL-MCNC: 0.6 MG/DL (ref 0–1.2)
BUN SERPL-MCNC: 21 MG/DL (ref 8–23)
BUN/CREAT SERPL: 15.8 (ref 7–25)
CALCIUM SPEC-SCNC: 8.9 MG/DL (ref 8.2–9.6)
CHLORIDE SERPL-SCNC: 110 MMOL/L (ref 98–107)
CO2 SERPL-SCNC: 25.9 MMOL/L (ref 22–29)
CREAT SERPL-MCNC: 1.33 MG/DL (ref 0.57–1)
DEPRECATED RDW RBC AUTO: 46.3 FL (ref 37–54)
EGFRCR SERPLBLD CKD-EPI 2021: 37.4 ML/MIN/1.73
ERYTHROCYTE [DISTWIDTH] IN BLOOD BY AUTOMATED COUNT: 15.4 % (ref 12.3–15.4)
FOLATE SERPL-MCNC: 13.8 NG/ML (ref 4.78–24.2)
GLOBULIN UR ELPH-MCNC: 3.1 GM/DL
GLUCOSE SERPL-MCNC: 104 MG/DL (ref 65–99)
HCT VFR BLD AUTO: 26.1 % (ref 34–46.6)
HCT VFR BLD AUTO: 26.1 % (ref 34–46.6)
HGB BLD-MCNC: 7.3 G/DL (ref 12–15.9)
HGB BLD-MCNC: 7.7 G/DL (ref 12–15.9)
INR PPP: 1.43 (ref 0.9–1.1)
MAGNESIUM SERPL-MCNC: 2.1 MG/DL (ref 1.7–2.3)
MCH RBC QN AUTO: 23.3 PG (ref 26.6–33)
MCHC RBC AUTO-ENTMCNC: 28 G/DL (ref 31.5–35.7)
MCV RBC AUTO: 83.4 FL (ref 79–97)
PHOSPHATE SERPL-MCNC: 3.1 MG/DL (ref 2.5–4.5)
PLATELET # BLD AUTO: 312 10*3/MM3 (ref 140–450)
PMV BLD AUTO: 9.5 FL (ref 6–12)
POTASSIUM SERPL-SCNC: 3.8 MMOL/L (ref 3.5–5.2)
PROT SERPL-MCNC: 6.3 G/DL (ref 6–8.5)
PROTHROMBIN TIME: 17.4 SECONDS (ref 11.7–14.2)
QT INTERVAL: 587 MS
QTC INTERVAL: 616 MS
RBC # BLD AUTO: 3.13 10*6/MM3 (ref 3.77–5.28)
SODIUM SERPL-SCNC: 148 MMOL/L (ref 136–145)
TSH SERPL DL<=0.05 MIU/L-ACNC: 1.18 UIU/ML (ref 0.27–4.2)
URATE SERPL-MCNC: 7.9 MG/DL (ref 2.4–5.7)
VIT B12 BLD-MCNC: 432 PG/ML (ref 211–946)
WBC NRBC COR # BLD AUTO: 6.41 10*3/MM3 (ref 3.4–10.8)

## 2025-03-22 PROCEDURE — 82746 ASSAY OF FOLIC ACID SERUM: CPT | Performed by: NURSE PRACTITIONER

## 2025-03-22 PROCEDURE — 83735 ASSAY OF MAGNESIUM: CPT | Performed by: INTERNAL MEDICINE

## 2025-03-22 PROCEDURE — 85610 PROTHROMBIN TIME: CPT | Performed by: INTERNAL MEDICINE

## 2025-03-22 PROCEDURE — 80053 COMPREHEN METABOLIC PANEL: CPT | Performed by: NURSE PRACTITIONER

## 2025-03-22 PROCEDURE — 82607 VITAMIN B-12: CPT | Performed by: NURSE PRACTITIONER

## 2025-03-22 PROCEDURE — 25010000002 FUROSEMIDE PER 20 MG: Performed by: HOSPITALIST

## 2025-03-22 PROCEDURE — 84100 ASSAY OF PHOSPHORUS: CPT | Performed by: INTERNAL MEDICINE

## 2025-03-22 PROCEDURE — 99222 1ST HOSP IP/OBS MODERATE 55: CPT | Performed by: NURSE PRACTITIONER

## 2025-03-22 PROCEDURE — 85027 COMPLETE CBC AUTOMATED: CPT | Performed by: NURSE PRACTITIONER

## 2025-03-22 PROCEDURE — 84550 ASSAY OF BLOOD/URIC ACID: CPT | Performed by: INTERNAL MEDICINE

## 2025-03-22 PROCEDURE — 84443 ASSAY THYROID STIM HORMONE: CPT | Performed by: INTERNAL MEDICINE

## 2025-03-22 PROCEDURE — 25010000002 FUROSEMIDE PER 20 MG: Performed by: INTERNAL MEDICINE

## 2025-03-22 PROCEDURE — 85018 HEMOGLOBIN: CPT | Performed by: HOSPITALIST

## 2025-03-22 PROCEDURE — 85014 HEMATOCRIT: CPT | Performed by: HOSPITALIST

## 2025-03-22 RX ORDER — FUROSEMIDE 10 MG/ML
20 INJECTION INTRAMUSCULAR; INTRAVENOUS
Status: DISCONTINUED | OUTPATIENT
Start: 2025-03-22 | End: 2025-03-23

## 2025-03-22 RX ADMIN — FUROSEMIDE 20 MG: 10 INJECTION, SOLUTION INTRAMUSCULAR; INTRAVENOUS at 17:37

## 2025-03-22 RX ADMIN — DONEPEZIL HYDROCHLORIDE 5 MG: 10 TABLET, FILM COATED ORAL at 09:00

## 2025-03-22 RX ADMIN — ATENOLOL 50 MG: 50 TABLET ORAL at 09:00

## 2025-03-22 RX ADMIN — ATORVASTATIN CALCIUM 80 MG: 80 TABLET, FILM COATED ORAL at 21:04

## 2025-03-22 RX ADMIN — Medication 10 ML: at 17:37

## 2025-03-22 RX ADMIN — Medication 10 ML: at 08:59

## 2025-03-22 RX ADMIN — FUROSEMIDE 20 MG: 10 INJECTION, SOLUTION INTRAMUSCULAR; INTRAVENOUS at 08:59

## 2025-03-22 RX ADMIN — ATORVASTATIN CALCIUM 80 MG: 80 TABLET, FILM COATED ORAL at 00:36

## 2025-03-22 NOTE — CONSULTS
Patient Name: Ryann Hernadez  :1931  89 y.o.    Date of Admission: 3/21/2025  Date of Consultation:  25  Encounter Provider: JENNA Lua  Place of Service: [facility] CARDIOLOGY  Referring Provider: No ref. provider found  Patient Care Team:  Andie Song APRN as PCP - General (Nurse Practitioner)      Chief complaint: swelling     History of Present Illness: Ms. Hernadez is a 89 year old woman followed by Dr. Pastor for dilated ascending aortic 5.2 cm by transthoracic echo 3/2/2024. She has PAF, history of DVT, hypertension and chronic kidney disease. She is anticoagulated with apixaban.     She last saw Dr. Pastor in . She was doing ok. She was in SR. It was felt she was not a candidate for surgical intervention of her ascending aortic aneurysm.     She move into assisted living about a month ago. Two weeks ago daughter noticed her chronic lower extremity edema was moving up her leg to her thighs. She noted labored breathing yesterday.     She saw her PCP on 3/21 for generalized edema and shortness of breath.  Weight is up 30 lbs from 2024.    She was sent to the emergency room. She is on IV lasix.     HS trop 35 then 28. proBNP 5462. Creatinine 1.34. albumin 3.2. alkphos 119 AST 55. Na 145 now 148.  She is anemic 7.3 (previous 10-12 baseline)       Echo 3/2/2024       Left ventricular systolic function is normal. Calculated left ventricular EF = 59.1%    Left ventricular wall thickness is consistent with hypertrophy. Sigmoid-shaped ventricular septum is present.    Left ventricular diastolic function was indeterminate.    Moderate aortic valve regurgitation is present.    Mild mitral valve regurgitation is present with a centrally-directed jet noted.    Moderate tricuspid valve regurgitation is present.    Estimated right ventricular systolic pressure from tricuspid regurgitation is mildly elevated (35-45 mmHg). Calculated  right ventricular systolic pressure from tricuspid regurgitation is 41 mmHg.    Severe dilation of the ascending aorta is present.  5.2 cm       Past Medical History:   Diagnosis Date    A-fib     Acute deep vein thrombosis (DVT) of calf muscle vein of left lower extremity 2/6/2020    Allergic rhinitis     Anemia     Aneurysm, ascending aorta 03/22/2016    5 CM    Anxiety     Ataxic gait     Atypical chest pain     BPPV (benign paroxysmal positional vertigo)     Bradycardia     CHF (congestive heart failure) 3/21/2025    Chronic cystitis     Chronic headaches     Chronic nonintractable headache 9/14/2016    Chronic paroxysmal hemicrania     Depression     Esophagitis     Gastritis     GERD (gastroesophageal reflux disease)     Hematuria     High blood pressure     History of cataract     History of irritable bowel syndrome     Hypertension     IBS (irritable bowel syndrome)     Influenza A     Kidney lesion     LEFT KIDNEY CYSTIC    Labyrinthitis 07/28/2014    OA (osteoarthritis)     Osteoporosis 9/13/2018    Other abnormal clinical finding 09/21/2012    LEFT UTEROCELE    Pain of thigh     Personal history of gastric ulcer     Renal disorder     Renal insufficiency 8/7/2017    Rhabdomyolysis 7/5/2022    TIA (transient ischemic attack) 2/5/2020    UTI (urinary tract infection) 3/2/2024       Past Surgical History:   Procedure Laterality Date    BREAST LUMPECTOMY      BREAST SURGERY      CATARACT EXTRACTION      COLONOSCOPY N/A     DR. MARGARITA MENDOZA    ELBOW ARTHROTOMY  07/01/1999    DR. RYAN RITCHIE    EYE SURGERY      FRACTURE SURGERY      HAND, BROKEN FINGERS  DR. MIKE CHRISTIANSON    KNEE SURGERY  08/31/1999    DR. THADDEUS MENDES    OOPHORECTOMY      TUBAL ABDOMINAL LIGATION Bilateral 1962    DR. EDDA MANNING         Prior to Admission medications    Medication Sig Start Date End Date Taking? Authorizing Provider   acetaminophen (TYLENOL) 500 MG tablet Take 1 tablet by mouth Every 6 (Six) Hours As Needed for Mild Pain.    Yes Christopher Gill MD   amLODIPine (NORVASC) 5 MG tablet TAKE 1 TABLET BY MOUTH DAILY 8/4/23  Yes Yossi Pastor MD   atenolol (TENORMIN) 25 MG tablet Take 1 tablet by mouth Daily. 3/5/24  Yes Lance Puentes MD   atorvastatin (LIPITOR) 80 MG tablet Take 1 tablet by mouth Every Night. 3/7/25  Yes Christopher Gill MD   Cholecalciferol (VITAMIN D3) 5000 units capsule capsule Take 1 capsule by mouth Daily.   Yes Christopher Gill MD   donepezil (ARICEPT) 5 MG tablet Take 1 tablet by mouth Daily. 1/21/23  Yes Christopher Gill MD   furosemide (LASIX) 20 MG tablet Take 1 tablet by mouth Daily. 11/17/23 3/21/25 Yes Christopher Gill MD   metroNIDAZOLE (METROCREAM) 0.75 % cream APPLY TOPICALLY TO FACE TWICE DAILY 12/27/24  Yes Christopher Gill MD   apixaban (ELIQUIS) 2.5 MG tablet tablet Take 1 tablet by mouth Every 12 (Twelve) Hours. Indications: Atrial Fibrillation 2/25/25   Yossi Pastor MD   bisacodyl (DULCOLAX) 10 MG suppository Insert 1 suppository into the rectum Daily As Needed for Constipation (Use if bisacodyl oral is ineffective).  Patient not taking: Reported on 3/21/2025 3/5/24   Lance Puentes MD   bisacodyl (DULCOLAX) 5 MG EC tablet Take 1 tablet by mouth Daily As Needed for Constipation (Use if polyethylene glycol is ineffective).  Patient not taking: Reported on 3/21/2025 3/5/24   Lance Puentes MD   Lidocaine 4 % Place 1 patch on the skin as directed by provider Daily. Remove & Discard patch within 12 hours or as directed by MD 3/5/24   Lance Puentes MD   Multiple Vitamins-Minerals (ONE-A-DAY WOMENS 50 PLUS) tablet Take  by mouth.    Christopher Gill MD   mupirocin (BACTROBAN) 2 % cream Apply 1 Application topically to the appropriate area as directed 3 (Three) Times a Day.  Patient not taking: Reported on 3/21/2025 2/1/24   Christopher Gill MD   mupirocin (BACTROBAN) 2 % ointment as needed PRN.  Patient not taking: Reported on 3/21/2025  2/6/24   Christopher Gill MD   Omega-3 Fatty Acids (fish oil) 1000 MG capsule capsule Take  by mouth Daily With Breakfast.  Patient not taking: Reported on 3/21/2025    Christopher Gill MD   polyethylene glycol (MIRALAX) 17 g packet Take 17 g by mouth Daily As Needed (Use if senna-docusate is ineffective).  Patient not taking: Reported on 3/21/2025 3/5/24   Lance Puentes MD   sennosides-docusate (PERICOLACE) 8.6-50 MG per tablet Take 2 tablets by mouth 2 (Two) Times a Day As Needed for Constipation.  Patient not taking: Reported on 3/21/2025 3/5/24   Lance Puentes MD   triamcinolone (KENALOG) 0.1 % ointment APPLY TO THE AFFECTED AREA DAILY 2/6/24   Christopher Gill MD   vitamin B-12 (CYANOCOBALAMIN) 1000 MCG tablet Take 2 tablets by mouth Daily.  Patient not taking: Reported on 3/21/2025 3/5/24   Lance Puentes MD       Allergies   Allergen Reactions    Sulfa Antibiotics Anaphylaxis    Lisinopril     Milk-Related Compounds Diarrhea    Olmesartan        Social History     Socioeconomic History    Marital status:    Tobacco Use    Smoking status: Never     Passive exposure: Never    Smokeless tobacco: Never   Vaping Use    Vaping status: Never Used   Substance and Sexual Activity    Alcohol use: No    Drug use: No    Sexual activity: Defer       Family History   Problem Relation Age of Onset    Heart attack Mother     Arthritis Mother     Arthritis Father     Arthritis Sister     Arthritis Other     Kidney disease Other     Thyroid disease Other     Diabetes Other     Hypertension Other     Heart disease Other     Arthritis Maternal Grandmother        REVIEW OF SYSTEMS:   All systems reviewed.  Pertinent positives identified in HPI.  All other systems are negative.      Objective:     Vitals:    03/22/25 0737 03/22/25 0754 03/22/25 0906 03/22/25 1000   BP: 101/74      BP Location: Left arm      Patient Position: Sitting      Pulse: 76      Resp: 18      Temp: 97.9 °F (36.6 °C)       TempSrc: Oral      SpO2:  96% 95% 99%   Weight:         Body mass index is 26.97 kg/m².    Physical Exam:  Constitutional: She is oriented to person, place, and time. She appears well-developed. She does not appear ill.   HENT:   Head: Normocephalic and atraumatic. Head is without contusion.   Right Ear: Hearing normal. No drainage.   Left Ear: Hearing normal. No drainage.   Nose: No nasal deformity. No epistaxis.   Eyes: Lids are normal. Right eye exhibits no exudate. Left eye exhibits no exudate.  Neck: No JVD present. Carotid bruit is not present. No tracheal deviation present. No thyroid mass and no thyromegaly present.   Cardiovascular: Normal rate, regular rhythm and normal heart sounds.    Pulses:       Posterior tibial pulses are 2+ on the right side, and 2+ on the left side.   Pulmonary/Chest: Effort normal and breath sounds normal.   Abdominal: Soft. Normal appearance and bowel sounds are normal. There is no tenderness.   Musculoskeletal: Normal range of motion.        Right shoulder: She exhibits no deformity.        Left shoulder: She exhibits no deformity.   Neurological: She is alert and oriented to person, place, and time. She has normal strength.   Skin: Skin is warm, dry and intact. No rash noted. 3+ edema up to thighs.  Psychiatric: She has a normal mood and affect. Her behavior is normal. Thought content normal.   Vitals reviewed      Lab Review:     Results from last 7 days   Lab Units 03/22/25  0242   SODIUM mmol/L 148*   POTASSIUM mmol/L 3.8   CHLORIDE mmol/L 110*   CO2 mmol/L 25.9   BUN mg/dL 21   CREATININE mg/dL 1.33*   CALCIUM mg/dL 8.9   BILIRUBIN mg/dL 0.6   ALK PHOS U/L 106   ALT (SGPT) U/L 25   AST (SGOT) U/L 52*   GLUCOSE mg/dL 104*     Results from last 7 days   Lab Units 03/21/25  1824 03/21/25  1645   HSTROP T ng/L 28* 35*     Results from last 7 days   Lab Units 03/22/25  0242   WBC 10*3/mm3 6.41   HEMOGLOBIN g/dL 7.3*   HEMATOCRIT % 26.1*   PLATELETS 10*3/mm3 312     Results  from last 7 days   Lab Units 03/22/25  0242 03/21/25  1645   INR  1.43* 1.59*   APTT seconds  --  34.8     Results from last 7 days   Lab Units 03/22/25  0242   MAGNESIUM mg/dL 2.1               Current Facility-Administered Medications:     acetaminophen (TYLENOL) tablet 650 mg, 650 mg, Oral, Q4H PRN **OR** [DISCONTINUED] acetaminophen (TYLENOL) 160 MG/5ML oral solution 650 mg, 650 mg, Oral, Q4H PRN **OR** [DISCONTINUED] acetaminophen (TYLENOL) suppository 650 mg, 650 mg, Rectal, Q4H PRN, Gloria Augustine APRN    aluminum-magnesium hydroxide-simethicone (MAALOX MAX) 400-400-40 MG/5ML suspension 15 mL, 15 mL, Oral, Q6H PRN, Gloria Augustine APRN    atenolol (TENORMIN) tablet 50 mg, 50 mg, Oral, Q24H, Johnie Darby MD, 50 mg at 03/22/25 0900    atorvastatin (LIPITOR) tablet 80 mg, 80 mg, Oral, Nightly, Johnie Darby MD, 80 mg at 03/22/25 0036    sennosides-docusate (PERICOLACE) 8.6-50 MG per tablet 2 tablet, 2 tablet, Oral, BID PRN **AND** polyethylene glycol (MIRALAX) packet 17 g, 17 g, Oral, Daily PRN **AND** bisacodyl (DULCOLAX) EC tablet 5 mg, 5 mg, Oral, Daily PRN **AND** bisacodyl (DULCOLAX) suppository 10 mg, 10 mg, Rectal, Daily PRN, Gloria Augustine APRN    donepezil (ARICEPT) tablet 5 mg, 5 mg, Oral, Daily, Johnie Darby MD, 5 mg at 03/22/25 0900    furosemide (LASIX) injection 20 mg, 20 mg, Intravenous, BID Diuretics, Johnie Darby MD, 20 mg at 03/22/25 0859    melatonin tablet 5 mg, 5 mg, Oral, Nightly PRN, Davide, Jacquetta N, APRN    metroNIDAZOLE (METROCREAM) 0.75 % cream, , Topical, Q12H, Johnie Darby MD    nitroglycerin (NITROSTAT) SL tablet 0.4 mg, 0.4 mg, Sublingual, Q5 Min PRN, Gloria Augustine APRN    ondansetron ODT (ZOFRAN-ODT) disintegrating tablet 4 mg, 4 mg, Oral, Q6H PRN **OR** ondansetron (ZOFRAN) injection 4 mg, 4 mg, Intravenous, Q6H PRN, Gloria Augustine APRN    sodium chloride 0.9 % flush 10 mL, 10 mL, Intravenous, PRN, Davide,  Gloria GASTON, APRN, 10 mL at 03/22/25 0859    triamcinolone (KENALOG) 0.1 % ointment, , Topical, Daily, Johnie Darby MD    Assessment and Plan:       Active Hospital Problems    Diagnosis  POA    **Acute diastolic congestive heart failure [I50.31]  Yes    Iron deficiency anemia [D50.9]  Yes    Stage 3a chronic kidney disease [N18.31]  Yes    History of stroke [Z86.73]  Not Applicable    Paroxysmal atrial fibrillation [I48.0]  Yes    Alzheimer's dementia [G30.9, F02.80]  Yes    Essential hypertension [I10]  Yes      Resolved Hospital Problems   No resolved problems to display.     Acute heart failure, volume overload. Symptoms seem more right sided. Agree with stopping amlodipine. Continue IV diuretics today. Her sodium is up but I think she has a ways to go. If Na stable tomorrow, maybe we can advance diuretics. Check echo. May have component of high output heart failure from anemia.   Persistent atrial fibrillation, anticoagulated with apixaban. No obvious bleeding but holding apixaban due to significant anemia.   Anemia , fecal occult pending.   Hypertension , on the low side. Follow.   Ascending aortic aneurysm . 5.2 cm on echo last year. Not a surgical candidate.   Dementia   Chronic kidney disease , near baseline.   History of DVT  Hypernatremia , repeat in AM.   Elevated LFTs     Continue IV lasix. Monitor electrolytes and renal function.   Further recommendations pending echocardiogram.     Kandice Palafox, JENNA  03/22/25  11:34 EDT

## 2025-03-22 NOTE — PLAN OF CARE
Goal Outcome Evaluation:  Plan of Care Reviewed With: patient        Progress: no change  Outcome Evaluation: Alert, pleasantly confused. Up to chair with assist of 2 (weak).  Daughter visited.  External catheter on - large amount urine output.  Encouraged to drink water per MD order.  Stool for lab needed.  Legs very edematous.  Q 8 hr H&H cancelled.  Hgb up to 7.7 this afternoon.  Weaned off oxygen.  Denies shortness of air.  Calls to [facility] to get med list faxed to clarify where ordered creams go - [employer] did not have a body area listed.  The fax was incomplete each time they tried to fax it.

## 2025-03-22 NOTE — PLAN OF CARE
Goal Outcome Evaluation:  Plan of Care Reviewed With: patient        Progress: no change     Patient alert and oriented to place and self, on 2l of oxygen. Safety ensured, she was carried along with her plan of care and reoriented as needed. Daughter who left for home was also carried along with plan of care. No bowel output, or bleeding observed all through the shift. No complain made this moment. I will continue to monitor and render care as needed during the shift.

## 2025-03-22 NOTE — PROGRESS NOTES
Name: Ryann Hernadez ADMIT: 3/21/2025   : 1931  PCP: Andie Song APRN    MRN: [id number] LOS: 1 days   AGE/SEX: 89 y.o. female  ROOM: [facility]     Subjective   Subjective   Sleeping in bedside chair but awakens rather easily and can answer my questions.  Has dementia so not real up to speed as to what is going on.  She denies shortness of breath but does report swelling in her lower extremities that is better today.       Objective   Objective   Vital Signs  Temp:  [97.5 °F (36.4 °C)-99 °F (37.2 °C)] 98.4 °F (36.9 °C)  Heart Rate:  [62-76] 67  Resp:  [17-18] 18  BP: (101-129)/(61-90) 101/70  SpO2:  [94 %-100 %] 94 %  on  Flow (L/min) (Oxygen Therapy):  [2] 2;   Device (Oxygen Therapy): room air  Body mass index is 26.97 kg/m².    Intake/Output Summary (Last 24 hours) at 3/22/2025 1508  Last data filed at 3/22/2025 1324  Gross per 24 hour   Intake 960 ml   Output 900 ml   Net 60 ml   Net IO Since Admission: 60 mL [25 1508]   Wt Readings from Last 1 Encounters:   25 0610 76.1 kg (167 lb 12.8 oz)     Wt Readings from Last 3 Encounters:   25 76.1 kg (167 lb 12.8 oz)   25 75.7 kg (166 lb 12.8 oz)   10/04/24 60.8 kg (134 lb 0.6 oz)       Physical Exam  Vitals and nursing note reviewed.   Constitutional:       General: She is not in acute distress.  Cardiovascular:      Rate and Rhythm: Normal rate. Rhythm irregularly irregular.   Pulmonary:      Effort: Pulmonary effort is normal.      Breath sounds: Normal breath sounds.   Abdominal:      General: Bowel sounds are normal.      Palpations: Abdomen is soft.      Tenderness: There is no abdominal tenderness.   Musculoskeletal:         General: No swelling.      Right lower leg: Edema present.      Left lower leg: Edema present.   Skin:     General: Skin is warm and dry.   Neurological:      Mental Status: She is alert. Mental status is at baseline.       Results Review     I reviewed the patient's new clinical results.  Results  "from last 7 days   Lab Units 03/22/25  1233 03/22/25  0242 03/21/25  1645   WBC 10*3/mm3  --  6.41 6.57   HEMOGLOBIN g/dL 7.7* 7.3* 7.8*   PLATELETS 10*3/mm3  --  312 348     Results from last 7 days   Lab Units 03/22/25  0242 03/21/25  1645   SODIUM mmol/L 148* 145   POTASSIUM mmol/L 3.8 4.2   CHLORIDE mmol/L 110* 108*   CO2 mmol/L 25.9 25.4   BUN mg/dL 21 22   CREATININE mg/dL 1.33* 1.34*   GLUCOSE mg/dL 104* 109*   EGFR mL/min/1.73 37.4* 37.0*     Results from last 7 days   Lab Units 03/22/25  0242 03/21/25  1645   CALCIUM mg/dL 8.9 9.0   ALBUMIN g/dL 3.2* 3.2*   MAGNESIUM mg/dL 2.1  --    PHOSPHORUS mg/dL 3.1  --      Results from last 7 days   Lab Units 03/22/25  0242 03/21/25  1824 03/21/25  1645   INR  1.43*  --  1.59*   HSTROP T ng/L  --  28* 35*   PROBNP pg/mL  --   --  5,462.0*   URIC ACID mg/dL 7.9*  --   --          Invalid input(s): \"LDLCALC\"  No results found for: \"HGBA1C\", \"POCGLU\"  XR Chest 1 View  XR CHEST 1 VW-     CLINICAL HISTORY:  swelling     COMPARISON: 7/4/2022     FINDINGS: A single portable view of the chest demonstrates moderate  cardiomegaly. Bibasilar atelectasis/infiltrate is appreciated. There is  increased density present at the lung bases which may represent layering  pleural fluid collections. The pulmonary interstitium is mildly  prominent parahilar regions bilaterally, more prominent as compared to  7/4/2022, accentuated by inspiratory effort. Mild congestive changes  cannot be excluded.           This report was finalized on 3/21/2025 5:32 PM by Dr. Ruddy Delatorre M.D  on Workstation: [id number]       Results for orders placed during the hospital encounter of 02/29/24    Adult Transthoracic Echo Complete W/ Cont if Necessary Per Protocol    Interpretation Summary    Left ventricular systolic function is normal. Calculated left ventricular EF = 59.1%    Left ventricular wall thickness is consistent with hypertrophy. Sigmoid-shaped ventricular septum is present.    Left " ventricular diastolic function was indeterminate.    Moderate aortic valve regurgitation is present.    Mild mitral valve regurgitation is present with a centrally-directed jet noted.    Moderate tricuspid valve regurgitation is present.    Estimated right ventricular systolic pressure from tricuspid regurgitation is mildly elevated (35-45 mmHg). Calculated right ventricular systolic pressure from tricuspid regurgitation is 41 mmHg.    Severe dilation of the ascending aorta is present.  5.2 cm    I have personally reviewed all medications:  Scheduled Medications  atenolol, 50 mg, Oral, Q24H  atorvastatin, 80 mg, Oral, Nightly  donepezil, 5 mg, Oral, Daily  furosemide, 20 mg, Intravenous, BID Diuretics  metroNIDAZOLE, , Topical, Q12H  triamcinolone, , Topical, Daily    Infusions   Diet  Diet: Cardiac; Healthy Heart (2-3 Na+); Fluid Consistency: Thin (IDDSI 0)    I have personally reviewed:  [x]  Laboratory   [x]  Microbiology   [x]  Radiology   [x]  EKG/Telemetry  [x]  Cardiology/Vascular   []  Pathology    []  Records       Assessment/Plan     Active Hospital Problems    Diagnosis  POA    **Acute diastolic congestive heart failure [I50.31]  Yes    Iron deficiency anemia [D50.9]  Yes    Stage 3a chronic kidney disease [N18.31]  Yes    History of stroke [Z86.73]  Not Applicable    Paroxysmal atrial fibrillation [I48.0]  Yes    Alzheimer's dementia [G30.9, F02.80]  Yes    Essential hypertension [I10]  Yes      Resolved Hospital Problems   No resolved problems to display.       Ms. Hernadez is a 89 y.o. female with history of valvular heart disease, dementia, CKD 3a, HTN, prior CVA and paroxysmal A-fib (on Eliquis) who presented with bilateral lower extremity swelling and weight gain felt secondary to new onset CHF.     AFVSS  99% on room air  Hemoglobin 7.3 down from 7.8  Sodium 148 up from 145  Creatinine stable 1.33  Uric acid 7.9       Admitted for what seems like new onset CHF.  She had pulmonary edema on chest  x-ray and lower extremity edema.  BNP elevation difficult to interpret in setting of valvular heart disease and atrial fibrillation.  She is on low-dose Lasix and I do not hear any rales on exam.  She has compression stockings on her lower extremities but still seems to have significant lower extremity edema.  Discussed with cardiology who states she has about 30 pound weight gain over the last several months.  Will leave her on IV Lasix but need to monitor sodium.  Will have nursing try to give her some water to drink.    Elevated uric acid on admit is indicative of increased risk.  Subsequent elevations indicate patient intravascularly drier.  Will monitor this daily.    DC amlodipine as could be contributing to lower extremity edema.    Discussed with daughter who confirms patient is DNR.  Will update CODE STATUS.  She is interested in getting her heart failure treated and hopefully improvement in her edema but at this time would not be interested in any invasive procedures like endoscopy.  Favor holding Eliquis for now and likely even at discharge.    Ferritin 13 c/w iron deficiency anemia      SCDs for DVT prophylaxis.  Eliquis on hold.  Full code.  Discussed with patient, family, nursing staff, and consulting provider.  Anticipate discharge to SNU facility next week.  Expected Discharge Date: 3/25/2025; Expected Discharge Time:       Scooby Garcia MD  [facility]  03/22/25  15:08 EDT

## 2025-03-23 ENCOUNTER — APPOINTMENT (OUTPATIENT)
Dept: CARDIOLOGY | Facility: HOSPITAL | Age: OVER 89
DRG: 291 | End: 2025-03-23
Payer: MEDICARE

## 2025-03-23 LAB
ABO GROUP BLD: NORMAL
ANION GAP SERPL CALCULATED.3IONS-SCNC: 8 MMOL/L (ref 5–15)
AORTIC DIMENSIONLESS INDEX: 0.63 (DI)
AV MEAN PRESS GRAD SYS DOP V1V2: 5 MMHG
AV VMAX SYS DOP: 151 CM/SEC
BH CV ECHO MEAS - AI P1/2T: 878.4 MSEC
BH CV ECHO MEAS - AO MAX PG: 9.1 MMHG
BH CV ECHO MEAS - AO ROOT DIAM: 3 CM
BH CV ECHO MEAS - AO V2 VTI: 31.2 CM
BH CV ECHO MEAS - AVA(I,D): 1.85 CM2
BH CV ECHO MEAS - EDV(CUBED): 59.3 ML
BH CV ECHO MEAS - EDV(MOD-SP2): 69 ML
BH CV ECHO MEAS - EDV(MOD-SP4): 74 ML
BH CV ECHO MEAS - EF(MOD-SP2): 59.4 %
BH CV ECHO MEAS - EF(MOD-SP4): 56.8 %
BH CV ECHO MEAS - ESV(CUBED): 14.2 ML
BH CV ECHO MEAS - ESV(MOD-SP2): 28 ML
BH CV ECHO MEAS - ESV(MOD-SP4): 32 ML
BH CV ECHO MEAS - FS: 38 %
BH CV ECHO MEAS - IVS/LVPW: 1.09 CM
BH CV ECHO MEAS - IVSD: 1.2 CM
BH CV ECHO MEAS - LAT PEAK E' VEL: 15.9 CM/SEC
BH CV ECHO MEAS - LV DIASTOLIC VOL/BSA (35-75): 40.7 CM2
BH CV ECHO MEAS - LV MASS(C)D: 149.5 GRAMS
BH CV ECHO MEAS - LV MAX PG: 5.9 MMHG
BH CV ECHO MEAS - LV MEAN PG: 3 MMHG
BH CV ECHO MEAS - LV SYSTOLIC VOL/BSA (12-30): 17.6 CM2
BH CV ECHO MEAS - LV V1 MAX: 121 CM/SEC
BH CV ECHO MEAS - LV V1 VTI: 19.6 CM
BH CV ECHO MEAS - LVIDD: 3.9 CM
BH CV ECHO MEAS - LVIDS: 2.42 CM
BH CV ECHO MEAS - LVOT AREA: 2.9 CM2
BH CV ECHO MEAS - LVOT DIAM: 1.94 CM
BH CV ECHO MEAS - LVPWD: 1.1 CM
BH CV ECHO MEAS - MED PEAK E' VEL: 6.4 CM/SEC
BH CV ECHO MEAS - MR MAX PG: 63.2 MMHG
BH CV ECHO MEAS - MR MAX VEL: 397.3 CM/SEC
BH CV ECHO MEAS - MV DEC SLOPE: 602.3 CM/SEC2
BH CV ECHO MEAS - MV DEC TIME: 0.19 SEC
BH CV ECHO MEAS - MV E MAX VEL: 91.7 CM/SEC
BH CV ECHO MEAS - MV MAX PG: 4.9 MMHG
BH CV ECHO MEAS - MV MEAN PG: 1.84 MMHG
BH CV ECHO MEAS - MV P1/2T: 54.9 MSEC
BH CV ECHO MEAS - MV V2 VTI: 21.6 CM
BH CV ECHO MEAS - MVA(P1/2T): 4 CM2
BH CV ECHO MEAS - MVA(VTI): 2.7 CM2
BH CV ECHO MEAS - PA ACC TIME: 0.09 SEC
BH CV ECHO MEAS - PA V2 MAX: 141.2 CM/SEC
BH CV ECHO MEAS - PI END-D VEL: 114 CM/SEC
BH CV ECHO MEAS - PULM A REVS DUR: 0.06 SEC
BH CV ECHO MEAS - PULM A REVS VEL: 21.8 CM/SEC
BH CV ECHO MEAS - PULM DIAS VEL: 45.1 CM/SEC
BH CV ECHO MEAS - PULM S/D: 1.1
BH CV ECHO MEAS - PULM SYS VEL: 49.5 CM/SEC
BH CV ECHO MEAS - RAP SYSTOLE: 15 MMHG
BH CV ECHO MEAS - RV MAX PG: 1.24 MMHG
BH CV ECHO MEAS - RV V1 MAX: 55.7 CM/SEC
BH CV ECHO MEAS - RV V1 VTI: 12.2 CM
BH CV ECHO MEAS - RVSP: 46.9 MMHG
BH CV ECHO MEAS - SV(LVOT): 57.8 ML
BH CV ECHO MEAS - SV(MOD-SP2): 41 ML
BH CV ECHO MEAS - SV(MOD-SP4): 42 ML
BH CV ECHO MEAS - SVI(LVOT): 31.7 ML/M2
BH CV ECHO MEAS - SVI(MOD-SP2): 22.5 ML/M2
BH CV ECHO MEAS - SVI(MOD-SP4): 23.1 ML/M2
BH CV ECHO MEAS - TR MAX PG: 31.9 MMHG
BH CV ECHO MEAS - TR MAX VEL: 282.3 CM/SEC
BH CV ECHO MEASUREMENTS AVERAGE E/E' RATIO: 8.22
BH CV XLRA - RV BASE: 4.3 CM
BH CV XLRA - RV LENGTH: 6.6 CM
BH CV XLRA - RV MID: 3.6 CM
BH CV XLRA - TDI S': 11.9 CM/SEC
BLD GP AB SCN SERPL QL: NEGATIVE
BUN SERPL-MCNC: 21 MG/DL (ref 8–23)
BUN/CREAT SERPL: 16.3 (ref 7–25)
CALCIUM SPEC-SCNC: 9 MG/DL (ref 8.2–9.6)
CHLORIDE SERPL-SCNC: 108 MMOL/L (ref 98–107)
CO2 SERPL-SCNC: 27 MMOL/L (ref 22–29)
CREAT SERPL-MCNC: 1.29 MG/DL (ref 0.57–1)
DEPRECATED RDW RBC AUTO: 46.5 FL (ref 37–54)
EGFRCR SERPLBLD CKD-EPI 2021: 38.8 ML/MIN/1.73
ERYTHROCYTE [DISTWIDTH] IN BLOOD BY AUTOMATED COUNT: 15.5 % (ref 12.3–15.4)
GLUCOSE SERPL-MCNC: 106 MG/DL (ref 65–99)
HCT VFR BLD AUTO: 24.3 % (ref 34–46.6)
HCT VFR BLD AUTO: 25.6 % (ref 34–46.6)
HCT VFR BLD AUTO: 25.6 % (ref 34–46.6)
HEMOCCULT STL QL: POSITIVE
HGB BLD-MCNC: 7.1 G/DL (ref 12–15.9)
HGB BLD-MCNC: 7.3 G/DL (ref 12–15.9)
HGB BLD-MCNC: 7.3 G/DL (ref 12–15.9)
LEFT ATRIUM VOLUME INDEX: 31.1 ML/M2
LV EF BIPLANE MOD: 58.4 %
MCH RBC QN AUTO: 23.7 PG (ref 26.6–33)
MCHC RBC AUTO-ENTMCNC: 28.5 G/DL (ref 31.5–35.7)
MCV RBC AUTO: 83.1 FL (ref 79–97)
PLATELET # BLD AUTO: 330 10*3/MM3 (ref 140–450)
PMV BLD AUTO: 9.7 FL (ref 6–12)
POTASSIUM SERPL-SCNC: 3.9 MMOL/L (ref 3.5–5.2)
POTASSIUM SERPL-SCNC: 4.1 MMOL/L (ref 3.5–5.2)
RBC # BLD AUTO: 3.08 10*6/MM3 (ref 3.77–5.28)
RH BLD: POSITIVE
SODIUM SERPL-SCNC: 143 MMOL/L (ref 136–145)
T&S EXPIRATION DATE: NORMAL
URATE SERPL-MCNC: 7.6 MG/DL (ref 2.4–5.7)
WBC NRBC COR # BLD AUTO: 5.63 10*3/MM3 (ref 3.4–10.8)

## 2025-03-23 PROCEDURE — 85027 COMPLETE CBC AUTOMATED: CPT | Performed by: HOSPITALIST

## 2025-03-23 PROCEDURE — 25010000002 NA FERRIC GLUC CPLX PER 12.5 MG: Performed by: INTERNAL MEDICINE

## 2025-03-23 PROCEDURE — 86850 RBC ANTIBODY SCREEN: CPT | Performed by: INTERNAL MEDICINE

## 2025-03-23 PROCEDURE — P9016 RBC LEUKOCYTES REDUCED: HCPCS

## 2025-03-23 PROCEDURE — 36430 TRANSFUSION BLD/BLD COMPNT: CPT

## 2025-03-23 PROCEDURE — 84132 ASSAY OF SERUM POTASSIUM: CPT | Performed by: INTERNAL MEDICINE

## 2025-03-23 PROCEDURE — 85014 HEMATOCRIT: CPT | Performed by: NURSE PRACTITIONER

## 2025-03-23 PROCEDURE — 93306 TTE W/DOPPLER COMPLETE: CPT | Performed by: INTERNAL MEDICINE

## 2025-03-23 PROCEDURE — 25010000002 FUROSEMIDE PER 20 MG: Performed by: HOSPITALIST

## 2025-03-23 PROCEDURE — 82272 OCCULT BLD FECES 1-3 TESTS: CPT | Performed by: NURSE PRACTITIONER

## 2025-03-23 PROCEDURE — 86900 BLOOD TYPING SEROLOGIC ABO: CPT | Performed by: INTERNAL MEDICINE

## 2025-03-23 PROCEDURE — 99232 SBSQ HOSP IP/OBS MODERATE 35: CPT | Performed by: INTERNAL MEDICINE

## 2025-03-23 PROCEDURE — 85018 HEMOGLOBIN: CPT | Performed by: NURSE PRACTITIONER

## 2025-03-23 PROCEDURE — 93306 TTE W/DOPPLER COMPLETE: CPT

## 2025-03-23 PROCEDURE — 80048 BASIC METABOLIC PNL TOTAL CA: CPT | Performed by: HOSPITALIST

## 2025-03-23 PROCEDURE — 25010000002 FUROSEMIDE PER 20 MG: Performed by: INTERNAL MEDICINE

## 2025-03-23 PROCEDURE — 84550 ASSAY OF BLOOD/URIC ACID: CPT | Performed by: HOSPITALIST

## 2025-03-23 PROCEDURE — 86901 BLOOD TYPING SEROLOGIC RH(D): CPT | Performed by: INTERNAL MEDICINE

## 2025-03-23 PROCEDURE — 86923 COMPATIBILITY TEST ELECTRIC: CPT

## 2025-03-23 PROCEDURE — 86900 BLOOD TYPING SEROLOGIC ABO: CPT

## 2025-03-23 RX ORDER — FUROSEMIDE 10 MG/ML
40 INJECTION INTRAMUSCULAR; INTRAVENOUS EVERY 12 HOURS
Status: COMPLETED | OUTPATIENT
Start: 2025-03-23 | End: 2025-03-25

## 2025-03-23 RX ORDER — CETIRIZINE HYDROCHLORIDE 10 MG/1
5 TABLET ORAL ONCE
Status: COMPLETED | OUTPATIENT
Start: 2025-03-23 | End: 2025-03-23

## 2025-03-23 RX ORDER — ACETAMINOPHEN 325 MG/1
650 TABLET ORAL ONCE
Status: COMPLETED | OUTPATIENT
Start: 2025-03-23 | End: 2025-03-23

## 2025-03-23 RX ORDER — POTASSIUM CHLORIDE 750 MG/1
10 TABLET, FILM COATED, EXTENDED RELEASE ORAL ONCE
Status: COMPLETED | OUTPATIENT
Start: 2025-03-23 | End: 2025-03-23

## 2025-03-23 RX ORDER — PANTOPRAZOLE SODIUM 40 MG/10ML
40 INJECTION, POWDER, LYOPHILIZED, FOR SOLUTION INTRAVENOUS EVERY 12 HOURS SCHEDULED
Status: DISCONTINUED | OUTPATIENT
Start: 2025-03-23 | End: 2025-03-27 | Stop reason: HOSPADM

## 2025-03-23 RX ADMIN — ATENOLOL 50 MG: 50 TABLET ORAL at 09:17

## 2025-03-23 RX ADMIN — FUROSEMIDE 40 MG: 10 INJECTION, SOLUTION INTRAMUSCULAR; INTRAVENOUS at 20:25

## 2025-03-23 RX ADMIN — FUROSEMIDE 20 MG: 10 INJECTION, SOLUTION INTRAMUSCULAR; INTRAVENOUS at 09:17

## 2025-03-23 RX ADMIN — ATORVASTATIN CALCIUM 80 MG: 80 TABLET, FILM COATED ORAL at 20:25

## 2025-03-23 RX ADMIN — PANTOPRAZOLE SODIUM 40 MG: 40 INJECTION, POWDER, LYOPHILIZED, FOR SOLUTION INTRAVENOUS at 06:48

## 2025-03-23 RX ADMIN — POTASSIUM CHLORIDE 10 MEQ: 750 TABLET, EXTENDED RELEASE ORAL at 10:17

## 2025-03-23 RX ADMIN — PANTOPRAZOLE SODIUM 40 MG: 40 INJECTION, POWDER, LYOPHILIZED, FOR SOLUTION INTRAVENOUS at 20:25

## 2025-03-23 RX ADMIN — Medication 10 ML: at 09:19

## 2025-03-23 RX ADMIN — Medication 10 ML: at 15:02

## 2025-03-23 RX ADMIN — ACETAMINOPHEN 650 MG: 325 TABLET, FILM COATED ORAL at 15:03

## 2025-03-23 RX ADMIN — DONEPEZIL HYDROCHLORIDE 5 MG: 10 TABLET, FILM COATED ORAL at 09:17

## 2025-03-23 RX ADMIN — CETIRIZINE HYDROCHLORIDE 5 MG: 10 TABLET, FILM COATED ORAL at 15:03

## 2025-03-23 RX ADMIN — SODIUM CHLORIDE 125 MG: 9 INJECTION, SOLUTION INTRAVENOUS at 15:37

## 2025-03-23 NOTE — PLAN OF CARE
Goal Outcome Evaluation:  Plan of Care Reviewed With: patient        Progress: no change  Outcome Evaluation: Alert, confused.  Family visited.  Up to chair and BSC with assist.  Received iron transfusion today.  Potassium replaced.  Water encouraged (per order).  Legs remain swollen.  Sleeps a lot.  Is [place], and tends to get irritable often which seems to be related to hearing deficit and confusion and not understanding.   To have blood transfusion 1 unit tonight for Hgb 7.1.

## 2025-03-23 NOTE — PROGRESS NOTES
Name: Ryann Hernadez ADMIT: 3/21/2025   : 1931  PCP: Andie Song APRN    MRN: [id number] LOS: 2 days   AGE/SEX: 89 y.o. female  ROOM: [facility]   Subjective   Chief Complaint   Patient presents with    Leg Swelling    Weight Gain     Dyspnea fair  On RA  Still with swelling  Had echo  Has been on diuretics    ROS  No f/c  No n/v  No cp/palp  No soa/cough    Objective   Vital Signs  Temp:  [98.1 °F (36.7 °C)-99 °F (37.2 °C)] 98.1 °F (36.7 °C)  Heart Rate:  [65-67] 65  Resp:  [18] 18  BP: ()/(68-90) 126/90  SpO2:  [92 %-100 %] 93 %  on   ;   Device (Oxygen Therapy): room air  Body mass index is 25.82 kg/m².    Physical Exam  Constitutional:       General: She is not in acute distress.  HENT:      Head: Normocephalic and atraumatic.   Eyes:      General: No scleral icterus.  Cardiovascular:      Rate and Rhythm: Regular rhythm.      Heart sounds: Normal heart sounds.   Pulmonary:      Effort: Pulmonary effort is normal. No respiratory distress (decreased bs at bases).   Abdominal:      General: There is no distension.      Palpations: Abdomen is soft.   Musculoskeletal:      Cervical back: Neck supple.      Right lower leg: Edema present.      Left lower leg: Edema present.   Neurological:      Mental Status: She is alert.     Some poor memory    Results Review:       I reviewed the patient's new clinical results.  Results from last 7 days   Lab Units 25  0845 25  1233 25  0242 25  1645   WBC 10*3/mm3 5.63  --  6.41 6.57   HEMOGLOBIN g/dL 7.3*  7.3* 7.7* 7.3* 7.8*   PLATELETS 10*3/mm3 330  --  312 348     Results from last 7 days   Lab Units 25  0845 25  0242 25  1645   SODIUM mmol/L 143 148* 145   POTASSIUM mmol/L 3.9 3.8 4.2   CHLORIDE mmol/L 108* 110* 108*   CO2 mmol/L 27.0 25.9 25.4   BUN mg/dL 21 21 22   CREATININE mg/dL 1.29* 1.33* 1.34*   GLUCOSE mg/dL 106* 104* 109*   Estimated Creatinine Clearance: 27.8 mL/min (A) (by C-G formula based on SCr  "of 1.29 mg/dL (H)).  Results from last 7 days   Lab Units 03/22/25  0242 03/21/25  1645   ALBUMIN g/dL 3.2* 3.2*   BILIRUBIN mg/dL 0.6 0.7   ALK PHOS U/L 106 119*   AST (SGOT) U/L 52* 55*   ALT (SGPT) U/L 25 28     Results from last 7 days   Lab Units 03/23/25  0845 03/22/25  0242 03/21/25  1645   CALCIUM mg/dL 9.0 8.9 9.0   ALBUMIN g/dL  --  3.2* 3.2*   MAGNESIUM mg/dL  --  2.1  --    PHOSPHORUS mg/dL  --  3.1  --          Coag   Results from last 7 days   Lab Units 03/22/25  0242 03/21/25  1645   INR  1.43* 1.59*   APTT seconds  --  34.8     HbA1C   Lab Results   Component Value Date    HGBA1C 6.20 (H) 07/05/2022    HGBA1C 5.80 (H) 02/06/2020     Infection     Radiology(recent) No radiology results for the last day  HS Troponin T   Date Value Ref Range Status   03/21/2025 28 (H) <14 ng/L Final   03/21/2025 35 (H) <14 ng/L Final     No components found for: \"TSH;2\"    atenolol, 50 mg, Oral, Q24H  atorvastatin, 80 mg, Oral, Nightly  donepezil, 5 mg, Oral, Daily  furosemide, 20 mg, Intravenous, BID Diuretics  metroNIDAZOLE, , Topical, Q12H  pantoprazole, 40 mg, Intravenous, Q12H  triamcinolone, , Topical, Daily       Diet: Cardiac; Healthy Heart (2-3 Na+); Fluid Consistency: Thin (IDDSI 0)      Assessment & Plan      Active Hospital Problems    Diagnosis  POA    **Acute diastolic congestive heart failure [I50.31]  Yes    Iron deficiency anemia [D50.9]  Yes    Stage 3a chronic kidney disease [N18.31]  Yes    History of stroke [Z86.73]  Not Applicable    Paroxysmal atrial fibrillation [I48.0]  Yes    Alzheimer's dementia [G30.9, F02.80]  Yes    Essential hypertension [I10]  Yes      Resolved Hospital Problems   No resolved problems to display.       Ms. Hernadez is a 89 y.o. female with history of valvular heart disease, dementia, CKD 3a, HTN, prior CVA and paroxysmal A-fib (on Eliquis) who presented with bilateral lower extremity swelling and weight gain felt secondary to new onset CHF.       Admitted for what seems " like new onset CHF.  She had pulmonary edema on chest x-ray and lower extremity edema.   She is on IV Lasix and Cardiology is following. ECHO is pending        DCd amlodipine as could be contributing to lower extremity edema.     Dr. Garcia discussed with daughter who confirms patient is DNR.  She is interested in getting her heart failure treated and hopefully improvement in her edema but at this time would not be interested in any invasive procedures like endoscopy.  Favor holding Eliquis for now and likely even at discharge.    Ferritin 13 c/w iron deficiency anemia. Will give IV iron and monitor.         SCDs for DVT prophylaxis.  Eliquis on hold.  Discussed with patient  Anticipate discharge to SNU facility next week.        Jose Bautista MD  [facility]  03/23/25  11:59 EDT

## 2025-03-23 NOTE — PLAN OF CARE
Goal Outcome Evaluation:  Plan of Care Reviewed With: patient        Progress: no change     Patient oriented to self only, on room air, safety ensured, she was carried along with her plan of care. Jory Zepeda was carried along with patient lab result. No complain from patient this moment. VSS, I will continue to monitor and render care as needed during the shift.

## 2025-03-23 NOTE — PROGRESS NOTES
"[facility]    Patient Name: Ryann Hernadez  :1931  89 y.o.  LOS: 2  Encounter Provider: Preet Amaya Jr, MD      Patient Care Team:  Andie Song APRN as PCP - General (Nurse Practitioner)    Chief Complaint: Follow-up acute right-sided heart failure, pulmonary hypertension, severe tricuspid regurgitation    Interval History: Echo with mildly dilated right ventricle mostly in the basal segments which is causing functional TR that is severe.  3 g drop in hemoglobin with apixaban held.  Patient is not tachycardic or hypoxic and pulmonary embolism is of lower likelihood.       Objective   Vital Signs  Temp:  [98.1 °F (36.7 °C)-99 °F (37.2 °C)] 98.1 °F (36.7 °C)  Heart Rate:  [65-66] 65  Resp:  [18] 18  BP: ()/(68-90) 126/90    Intake/Output Summary (Last 24 hours) at 3/23/2025 1335  Last data filed at 3/23/2025 1239  Gross per 24 hour   Intake 1500 ml   Output 900 ml   Net 600 ml     Flowsheet Rows      Flowsheet Row First Filed Value   Admission Height 167.6 cm (66\") Documented at 2025 0800   Admission Weight 76.1 kg (167 lb 12.8 oz) Documented at 2025 0610              Vitals reviewed.   Constitutional:       Appearance: Not in distress. Frail. Chronically ill-appearing.   Neck:      Vascular: No JVR. JVD normal.   Pulmonary:      Effort: Pulmonary effort is normal.      Breath sounds: No wheezing. No rhonchi. No rales.   Chest:      Chest wall: Not tender to palpatation.   Cardiovascular:      PMI at left midclavicular line. Normal rate. Irregularly irregular rhythm. Normal S1. Normal S2.       Murmurs: There is a systolic murmur.      No gallop.  No click. No rub.   Pulses:     Intact distal pulses.   Edema:     Thigh: bilateral 2+ edema of the thigh.     Pretibial: bilateral 2+ edema of the pretibial area.     Ankle: bilateral 2+ edema of the ankle.     Feet: bilateral 2+ edema of the feet.  Abdominal:      General: Bowel sounds are normal.      " "Palpations: Abdomen is soft.      Tenderness: There is no abdominal tenderness.   Musculoskeletal: Normal range of motion.         General: No tenderness. Skin:     General: Skin is warm and dry.   Neurological:      General: No focal deficit present.      Mental Status: Alert and oriented to person, place and time.           Pertinent Test Results:  Results from last 7 days   Lab Units 03/23/25  0845 03/22/25  0242 03/21/25  1645   SODIUM mmol/L 143 148* 145   POTASSIUM mmol/L 3.9 3.8 4.2   CHLORIDE mmol/L 108* 110* 108*   CO2 mmol/L 27.0 25.9 25.4   BUN mg/dL 21 21 22   CREATININE mg/dL 1.29* 1.33* 1.34*   GLUCOSE mg/dL 106* 104* 109*   CALCIUM mg/dL 9.0 8.9 9.0   AST (SGOT) U/L  --  52* 55*   ALT (SGPT) U/L  --  25 28     Results from last 7 days   Lab Units 03/21/25  1824 03/21/25  1645   HSTROP T ng/L 28* 35*     Results from last 7 days   Lab Units 03/23/25  0845 03/22/25  1233 03/22/25  0242 03/21/25  1645   WBC 10*3/mm3 5.63  --  6.41 6.57   HEMOGLOBIN g/dL 7.3*  7.3* 7.7* 7.3* 7.8*   HEMATOCRIT % 25.6*  25.6* 26.1* 26.1* 26.2*   PLATELETS 10*3/mm3 330  --  312 348     Results from last 7 days   Lab Units 03/22/25  0242 03/21/25  1645   INR  1.43* 1.59*   APTT seconds  --  34.8     Results from last 7 days   Lab Units 03/22/25  0242   MAGNESIUM mg/dL 2.1           Invalid input(s): \"LDLCALC\"  Results from last 7 days   Lab Units 03/21/25  1645   PROBNP pg/mL 5,462.0*     Results from last 7 days   Lab Units 03/22/25  0242   TSH uIU/mL 1.180           Medication Review:   acetaminophen, 650 mg, Oral, Once  atenolol, 50 mg, Oral, Q24H  atorvastatin, 80 mg, Oral, Nightly  cetirizine, 5 mg, Oral, Once  donepezil, 5 mg, Oral, Daily  ferric gluconate, 125 mg, Intravenous, Once  furosemide, 20 mg, Intravenous, BID Diuretics  metroNIDAZOLE, , Topical, Q12H  pantoprazole, 40 mg, Intravenous, Q12H  triamcinolone, , Topical, Daily              Assessment & Plan     Active Hospital Problems    Diagnosis  POA    " **Acute diastolic congestive heart failure [I50.31]  Yes    Iron deficiency anemia [D50.9]  Yes    Stage 3a chronic kidney disease [N18.31]  Yes    History of stroke [Z86.73]  Not Applicable    Paroxysmal atrial fibrillation [I48.0]  Yes    Alzheimer's dementia [G30.9, F02.80]  Yes    Essential hypertension [I10]  Yes      Resolved Hospital Problems   No resolved problems to display.        Acute right-sided heart failure -chronicity is uncertain.  Patient has known moderate pulmonary hypertension.  The basal segments of the right ventricle are now dilated and this is resulted in severe TR.  Normal right ventricular function was noted.  She has been off of Eliquis because the hemoglobin dropped to 7 but only for a day.  The patient is neither tachycardic nor hypoxic so I have low suspicion for pulmonary embolism.  We will hold off on any imaging for now and go up on IV diuretics.  I do hear a more prominent MR murmur than is represented on the echocardiogram.  It is possible the severity is underestimated and could be driving high right-sided pressures.  Acute on chronic anemia -3 g drop in hemoglobin with apixaban held.  No overt evidence for acute bleeding.  She has a history of DVT and currently has atrial fibrillation.  Would favor her going back on anticoagulation when deemed appropriate.  Persistent atrial fibrillation -holding anticoagulation as noted above.  Rate is currently controlled on atenolol.  Thoracic aortic aneurysm -5.1 cm on echo with 5.2 cm the year before.  She is not a surgical candidate.  CKD  Dementia  History of DVT           Preet Amaya Jr, MD  [facility]   [facility]  [address] [place] [address] [address] [place] [address]  Office: [phone]    03/23/25  13:35 EDT

## 2025-03-24 LAB
ANION GAP SERPL CALCULATED.3IONS-SCNC: 10.2 MMOL/L (ref 5–15)
BH BB BLOOD EXPIRATION DATE: NORMAL
BH BB BLOOD TYPE BARCODE: 7300
BH BB DISPENSE STATUS: NORMAL
BH BB PRODUCT CODE: NORMAL
BH BB UNIT NUMBER: NORMAL
BUN SERPL-MCNC: 23 MG/DL (ref 8–23)
BUN/CREAT SERPL: 14.8 (ref 7–25)
CALCIUM SPEC-SCNC: 9 MG/DL (ref 8.2–9.6)
CHLORIDE SERPL-SCNC: 109 MMOL/L (ref 98–107)
CO2 SERPL-SCNC: 25.8 MMOL/L (ref 22–29)
CREAT SERPL-MCNC: 1.55 MG/DL (ref 0.57–1)
CROSSMATCH INTERPRETATION: NORMAL
EGFRCR SERPLBLD CKD-EPI 2021: 31.1 ML/MIN/1.73
GLUCOSE SERPL-MCNC: 105 MG/DL (ref 65–99)
HCT VFR BLD AUTO: 29 % (ref 34–46.6)
HCT VFR BLD AUTO: 29.6 % (ref 34–46.6)
HGB BLD-MCNC: 8.8 G/DL (ref 12–15.9)
HGB BLD-MCNC: 9 G/DL (ref 12–15.9)
POTASSIUM SERPL-SCNC: 4 MMOL/L (ref 3.5–5.2)
SODIUM SERPL-SCNC: 145 MMOL/L (ref 136–145)
UNIT  ABO: NORMAL
UNIT  RH: NORMAL
URATE SERPL-MCNC: 8.2 MG/DL (ref 2.4–5.7)

## 2025-03-24 PROCEDURE — 25010000002 NA FERRIC GLUC CPLX PER 12.5 MG: Performed by: INTERNAL MEDICINE

## 2025-03-24 PROCEDURE — 25010000002 ENOXAPARIN PER 10 MG: Performed by: INTERNAL MEDICINE

## 2025-03-24 PROCEDURE — 84550 ASSAY OF BLOOD/URIC ACID: CPT | Performed by: HOSPITALIST

## 2025-03-24 PROCEDURE — 85014 HEMATOCRIT: CPT | Performed by: NURSE PRACTITIONER

## 2025-03-24 PROCEDURE — 25010000002 FUROSEMIDE PER 20 MG: Performed by: INTERNAL MEDICINE

## 2025-03-24 PROCEDURE — 99232 SBSQ HOSP IP/OBS MODERATE 35: CPT | Performed by: STUDENT IN AN ORGANIZED HEALTH CARE EDUCATION/TRAINING PROGRAM

## 2025-03-24 PROCEDURE — 80048 BASIC METABOLIC PNL TOTAL CA: CPT | Performed by: HOSPITALIST

## 2025-03-24 PROCEDURE — 85018 HEMOGLOBIN: CPT | Performed by: NURSE PRACTITIONER

## 2025-03-24 RX ORDER — ENOXAPARIN SODIUM 100 MG/ML
30 INJECTION SUBCUTANEOUS EVERY 24 HOURS
Status: DISCONTINUED | OUTPATIENT
Start: 2025-03-24 | End: 2025-03-27 | Stop reason: HOSPADM

## 2025-03-24 RX ORDER — CETIRIZINE HYDROCHLORIDE 10 MG/1
5 TABLET ORAL ONCE
Status: COMPLETED | OUTPATIENT
Start: 2025-03-24 | End: 2025-03-24

## 2025-03-24 RX ORDER — ACETAMINOPHEN 325 MG/1
650 TABLET ORAL ONCE
Status: COMPLETED | OUTPATIENT
Start: 2025-03-24 | End: 2025-03-24

## 2025-03-24 RX ADMIN — PANTOPRAZOLE SODIUM 40 MG: 40 INJECTION, POWDER, LYOPHILIZED, FOR SOLUTION INTRAVENOUS at 22:19

## 2025-03-24 RX ADMIN — ATORVASTATIN CALCIUM 80 MG: 80 TABLET, FILM COATED ORAL at 22:19

## 2025-03-24 RX ADMIN — METRONIDAZOLE: 7.5 CREAM TOPICAL at 22:19

## 2025-03-24 RX ADMIN — FUROSEMIDE 40 MG: 10 INJECTION, SOLUTION INTRAMUSCULAR; INTRAVENOUS at 22:19

## 2025-03-24 RX ADMIN — ACETAMINOPHEN 650 MG: 325 TABLET, FILM COATED ORAL at 12:23

## 2025-03-24 RX ADMIN — CETIRIZINE HYDROCHLORIDE 5 MG: 10 TABLET ORAL at 12:23

## 2025-03-24 RX ADMIN — SODIUM CHLORIDE 125 MG: 9 INJECTION, SOLUTION INTRAVENOUS at 12:22

## 2025-03-24 RX ADMIN — ATENOLOL 50 MG: 50 TABLET ORAL at 09:43

## 2025-03-24 RX ADMIN — ENOXAPARIN SODIUM 30 MG: 100 INJECTION SUBCUTANEOUS at 13:14

## 2025-03-24 RX ADMIN — PANTOPRAZOLE SODIUM 40 MG: 40 INJECTION, POWDER, LYOPHILIZED, FOR SOLUTION INTRAVENOUS at 09:43

## 2025-03-24 RX ADMIN — FUROSEMIDE 40 MG: 10 INJECTION, SOLUTION INTRAMUSCULAR; INTRAVENOUS at 09:42

## 2025-03-24 RX ADMIN — DONEPEZIL HYDROCHLORIDE 5 MG: 10 TABLET, FILM COATED ORAL at 09:43

## 2025-03-24 NOTE — PROGRESS NOTES
"[facility]    Patient Name: Ryann Hernadez  :1931  89 y.o.  LOS: 3  Encounter Provider: John Thompson MD      Patient Care Team:  Andie Song APRN as PCP - General (Nurse Practitioner)    Chief Complaint: Follow-up acute right-sided heart failure, pulmonary hypertension, severe tricuspid regurgitation    Interval History: NAEON. On RA this am. Breathing better than admission.        Objective   Vital Signs  Temp:  [97.7 °F (36.5 °C)-98.8 °F (37.1 °C)] 97.9 °F (36.6 °C)  Heart Rate:  [59-74] 74  Resp:  [16-18] 16  BP: (101-123)/(76-82) 117/82    Intake/Output Summary (Last 24 hours) at 3/24/2025 1147  Last data filed at 3/24/2025 0718  Gross per 24 hour   Intake 1275 ml   Output --   Net 1275 ml     Flowsheet Rows      Flowsheet Row First Filed Value   Admission Height 167.6 cm (66\") Documented at 2025 0800   Admission Weight 76.1 kg (167 lb 12.8 oz) Documented at 2025 0610              Vitals reviewed.   Constitutional:       Appearance: Not in distress. Frail. Chronically ill-appearing.   Neck:      Vascular: No JVR. JVD normal.   Pulmonary:      Effort: Pulmonary effort is normal.      Breath sounds: No wheezing. No rhonchi. No rales.   Chest:      Chest wall: Not tender to palpatation.   Cardiovascular:      PMI at left midclavicular line. Normal rate. Irregularly irregular rhythm. Normal S1. Normal S2.       Murmurs: There is a systolic murmur.      No gallop.  No click. No rub.   Pulses:     Intact distal pulses.   Edema:     Feet: bilateral trace edema of the feet.  Abdominal:      General: Bowel sounds are normal.      Palpations: Abdomen is soft.      Tenderness: There is no abdominal tenderness.   Musculoskeletal: Normal range of motion.         General: No tenderness. Skin:     General: Skin is warm and dry.   Neurological:      General: No focal deficit present.      Mental Status: Alert and oriented to person, place and time.           Pertinent " "Test Results:  Results from last 7 days   Lab Units 03/24/25  0422 03/23/25  1636 03/23/25  0845 03/22/25  0242 03/21/25  1645   SODIUM mmol/L 145  --  143 148* 145   POTASSIUM mmol/L 4.0 4.1 3.9 3.8 4.2   CHLORIDE mmol/L 109*  --  108* 110* 108*   CO2 mmol/L 25.8  --  27.0 25.9 25.4   BUN mg/dL 23  --  21 21 22   CREATININE mg/dL 1.55*  --  1.29* 1.33* 1.34*   GLUCOSE mg/dL 105*  --  106* 104* 109*   CALCIUM mg/dL 9.0  --  9.0 8.9 9.0   AST (SGOT) U/L  --   --   --  52* 55*   ALT (SGPT) U/L  --   --   --  25 28     Results from last 7 days   Lab Units 03/21/25  1824 03/21/25  1645   HSTROP T ng/L 28* 35*     Results from last 7 days   Lab Units 03/24/25  0755 03/24/25  0422 03/23/25  1636 03/23/25  0845 03/22/25  1233 03/22/25  0242 03/21/25  1645   WBC 10*3/mm3  --   --   --  5.63  --  6.41 6.57   HEMOGLOBIN g/dL 9.0* 8.8* 7.1* 7.3*  7.3* 7.7* 7.3* 7.8*   HEMATOCRIT % 29.6* 29.0* 24.3* 25.6*  25.6* 26.1* 26.1* 26.2*   PLATELETS 10*3/mm3  --   --   --  330  --  312 348     Results from last 7 days   Lab Units 03/22/25  0242 03/21/25  1645   INR  1.43* 1.59*   APTT seconds  --  34.8     Results from last 7 days   Lab Units 03/22/25  0242   MAGNESIUM mg/dL 2.1           Invalid input(s): \"LDLCALC\"  Results from last 7 days   Lab Units 03/21/25  1645   PROBNP pg/mL 5,462.0*     Results from last 7 days   Lab Units 03/22/25  0242   TSH uIU/mL 1.180           Medication Review:   acetaminophen, 650 mg, Oral, Once  atenolol, 50 mg, Oral, Q24H  atorvastatin, 80 mg, Oral, Nightly  cetirizine, 5 mg, Oral, Once  donepezil, 5 mg, Oral, Daily  ferric gluconate, 125 mg, Intravenous, Once  furosemide, 40 mg, Intravenous, Q12H  metroNIDAZOLE, , Topical, Q12H  pantoprazole, 40 mg, Intravenous, Q12H  triamcinolone, , Topical, Daily              Assessment & Plan     Active Hospital Problems    Diagnosis  POA    **Acute diastolic congestive heart failure [I50.31]  Yes    Iron deficiency anemia [D50.9]  Yes    Stage 3a chronic " kidney disease [N18.31]  Yes    History of stroke [Z86.73]  Not Applicable    Paroxysmal atrial fibrillation [I48.0]  Yes    Alzheimer's dementia [G30.9, F02.80]  Yes    Essential hypertension [I10]  Yes      Resolved Hospital Problems   No resolved problems to display.        Acute right-sided heart failure -chronicity is uncertain.  Patient has known moderate pulmonary hypertension.  Cardiogram with severe TR and basal right ventricular dilation.  She was on Eliquis is not been tachycardic or hypoxic, suggesting against pulmonary embolism.  She has diuresed well.   Acute on chronic anemia -3 g drop in hemoglobin with apixaban held.  No overt evidence for acute bleeding.  She has a history of DVT and currently has atrial fibrillation.  Given age and ongoing anemia requiring transfusion I agree with holding AC  Persistent atrial fibrillation -holding anticoagulation as noted above.  Rate is currently controlled on atenolol.  Thoracic aortic aneurysm -5.1 cm on echo with 5.2 cm the year before.  She is not a surgical candidate.  CKD  Dementia  History of DVT      Transitioned to IV lasix yesterday. She still has some edema but improved from prior documentation. Cr bumped a little today. I will continue BID IV lasix, follow response and plan for switch to oral diuretic regimen possibly in am.          John Thompson MD  [facility]   [facility]  [address] [place] [address] - [address]  [place] [address]  Office: [phone]    03/24/25  11:47 EDT

## 2025-03-24 NOTE — PLAN OF CARE
Goal Outcome Evaluation:  Plan of Care Reviewed With: patient        Progress: improving        Patient alert and oriented to self only, on room air. VSS, HGB post blood transfusion improved. Safety ensured. No new complain made this moment. I will continue to monitor and render care as needed during the shift.

## 2025-03-24 NOTE — PROGRESS NOTES
"Nutrition Services    Patient Name:  Ryann Hernadez  YOB: 1931  MRN: [id number]  Admit Date:  3/21/2025Assessment Date:  03/24/25    NUTRITION SCREENING      Reason for Encounter MST score 2+   Diagnosis/Problem Acute diastolic CHF   PMH Dementia, valvular heart disease, CKD, HTN, afib   PO Diet Diet: Cardiac; Healthy Heart (2-3 Na+); Fluid Consistency: Thin (IDDSI 0)   Supplements n/a   PO Intake % 50-75%       Medications MAR reviewed by RD   Labs  Listed below, reviewed   Physical Findings Alert, disoriented, 1-4+ edema lower extremity    GI Function BM 3/23, formed    Skin Status R lower leg wound        Height  Weight  BMI  Weight Trend     Height: 167.6 cm (66\")  Weight: 69.1 kg (152 lb 5.4 oz) (03/24/25 0500)  Body mass index is 24.59 kg/m².  Gain, fluid related, on lasix        Nutrition Problem (PES) Inadequate oral intake related to acute CHF  as evidence by PO <75%.       Intervention/Plan Boost BID   Encourage oral intakes >75%    RD to follow up per protocol.     Results from last 7 days   Lab Units 03/24/25  0422 03/23/25  1636 03/23/25  0845 03/22/25  0242 03/21/25  1645   SODIUM mmol/L 145  --  143 148* 145   POTASSIUM mmol/L 4.0 4.1 3.9 3.8 4.2   CHLORIDE mmol/L 109*  --  108* 110* 108*   CO2 mmol/L 25.8  --  27.0 25.9 25.4   BUN mg/dL 23  --  21 21 22   CREATININE mg/dL 1.55*  --  1.29* 1.33* 1.34*   CALCIUM mg/dL 9.0  --  9.0 8.9 9.0   BILIRUBIN mg/dL  --   --   --  0.6 0.7   ALK PHOS U/L  --   --   --  106 119*   ALT (SGPT) U/L  --   --   --  25 28   AST (SGOT) U/L  --   --   --  52* 55*   GLUCOSE mg/dL 105*  --  106* 104* 109*     Results from last 7 days   Lab Units 03/24/25  0755 03/22/25  1233 03/22/25  0242   MAGNESIUM mg/dL  --   --  2.1   PHOSPHORUS mg/dL  --   --  3.1   HEMOGLOBIN g/dL 9.0*   < > 7.3*   HEMATOCRIT % 29.6*   < > 26.1*    < > = values in this interval not displayed.     Lab Results   Component Value Date    HGBA1C 6.20 (H) 07/05/2022 "         Electronically signed by:  Janet Weaver RD  03/24/25 11:07 EDT

## 2025-03-24 NOTE — DISCHARGE PLACEMENT REQUEST
"Francisco Hernadez N (89 y.o. Female)       Date of Birth   06/01/1931    Social Security Number       Address   [address] [place] [address]    Home Phone   [phone]    MRN   [id number]       [facility]    Marital Status                               Admission Date   3/21/2025    Admission Type   Emergency    Admitting Provider   Johnie Darby MD    Attending Provider   Jose Bautista MD    Department, Room/Bed   [address], E[address]/1       Discharge Date       Discharge Disposition       Discharge Destination                                 Attending Provider: Jose Bautista MD    Allergies: Sulfa Antibiotics, Lisinopril, Milk-related Compounds, Olmesartan    Isolation: None   Infection: None   Code Status: No CPR    Ht: 167.6 cm (66\")   Wt: 69.1 kg (152 lb 5.4 oz)    Admission Cmt: None   Principal Problem: Acute diastolic congestive heart failure [I50.31]                   Active Insurance as of 3/21/2025       Primary Coverage       Payor Plan Insurance Group Employer/Plan Group    MEDICARE MEDICARE A & B        Payor Plan Address Payor Plan Phone Number Payor Plan Fax Number Effective Dates    [address] [phone]  5/1/1996 - None Entered    [facility] [id number]         Subscriber Name Subscriber Birth Date Member ID       FRANCISCO HERNADEZ N 6/1/1931 [id number]               Secondary Coverage       Payor Plan Insurance Group Employer/Plan Group    [facility] SUPP [id number]       Payor Plan Address Payor Plan Phone Number Payor Plan Fax Number Effective Dates    [address]   12/1/2016 - None Entered    [facility] [address]         Subscriber Name Subscriber Birth Date Member ID       FRANCISCO HERNADEZ N 6/1/1931 [id number]                     Emergency Contacts        (Rel.) Home Phone Work Phone Mobile Phone    Rhea Angelo (Legal Guardian) -- [phone]      "

## 2025-03-24 NOTE — PROGRESS NOTES
Name: Ryann Hernadez ADMIT: 3/21/2025   : 1931  PCP: Andie Song APRN    MRN: [id number] LOS: 3 days   AGE/SEX: 89 y.o. female  ROOM: [facility]   Subjective   Chief Complaint   Patient presents with    Leg Swelling    Weight Gain     Dyspnea fair  On RA  Still with swelling  Had echo  Has been on diuretics  S/p blood transfusion yesterday  No complaints per the patient today    ROS  No f/c  No n/v  No cp/palp  No soa/cough    Objective   Vital Signs  Temp:  [97.7 °F (36.5 °C)-98.8 °F (37.1 °C)] 97.9 °F (36.6 °C)  Heart Rate:  [59-74] 74  Resp:  [16-18] 16  BP: (101-123)/(76-82) 117/82  SpO2:  [90 %-100 %] 100 %  on   ;   Device (Oxygen Therapy): room air  Body mass index is 24.59 kg/m².    Physical Exam  Constitutional:       General: She is not in acute distress.  HENT:      Head: Normocephalic and atraumatic.   Eyes:      General: No scleral icterus.  Cardiovascular:      Rate and Rhythm: Normal rate. Rhythm irregular.      Heart sounds: Murmur heard.   Pulmonary:      Effort: Pulmonary effort is normal. No respiratory distress (decreased bs at bases).   Abdominal:      General: There is no distension.      Palpations: Abdomen is soft.   Musculoskeletal:      Cervical back: Neck supple.      Right lower leg: Edema present.      Left lower leg: Edema present.   Neurological:      Mental Status: She is alert.     Some poor memory    Results Review:       I reviewed the patient's new clinical results.  Results from last 7 days   Lab Units 25  0755 25  0422 25  1636 25  0845 25  1233 25  0242 25  1645   WBC 10*3/mm3  --   --   --  5.63  --  6.41 6.57   HEMOGLOBIN g/dL 9.0* 8.8* 7.1* 7.3*  7.3*   < > 7.3* 7.8*   PLATELETS 10*3/mm3  --   --   --  330  --  312 348    < > = values in this interval not displayed.     Results from last 7 days   Lab Units 25  0422 25  1636 25  0845 25  0242 25  1645   SODIUM mmol/L 145  --  143 148*  "145   POTASSIUM mmol/L 4.0 4.1 3.9 3.8 4.2   CHLORIDE mmol/L 109*  --  108* 110* 108*   CO2 mmol/L 25.8  --  27.0 25.9 25.4   BUN mg/dL 23  --  21 21 22   CREATININE mg/dL 1.55*  --  1.29* 1.33* 1.34*   GLUCOSE mg/dL 105*  --  106* 104* 109*   Estimated Creatinine Clearance: 24.7 mL/min (A) (by C-G formula based on SCr of 1.55 mg/dL (H)).  Results from last 7 days   Lab Units 03/22/25  0242 03/21/25  1645   ALBUMIN g/dL 3.2* 3.2*   BILIRUBIN mg/dL 0.6 0.7   ALK PHOS U/L 106 119*   AST (SGOT) U/L 52* 55*   ALT (SGPT) U/L 25 28     Results from last 7 days   Lab Units 03/24/25  0422 03/23/25  0845 03/22/25  0242 03/21/25  1645   CALCIUM mg/dL 9.0 9.0 8.9 9.0   ALBUMIN g/dL  --   --  3.2* 3.2*   MAGNESIUM mg/dL  --   --  2.1  --    PHOSPHORUS mg/dL  --   --  3.1  --          Coag   Results from last 7 days   Lab Units 03/22/25  0242 03/21/25  1645   INR  1.43* 1.59*   APTT seconds  --  34.8     HbA1C   Lab Results   Component Value Date    HGBA1C 6.20 (H) 07/05/2022    HGBA1C 5.80 (H) 02/06/2020     Infection     Radiology(recent) No radiology results for the last day  HS Troponin T   Date Value Ref Range Status   03/21/2025 28 (H) <14 ng/L Final   03/21/2025 35 (H) <14 ng/L Final     No components found for: \"TSH;2\"    atenolol, 50 mg, Oral, Q24H  atorvastatin, 80 mg, Oral, Nightly  donepezil, 5 mg, Oral, Daily  furosemide, 40 mg, Intravenous, Q12H  metroNIDAZOLE, , Topical, Q12H  pantoprazole, 40 mg, Intravenous, Q12H  triamcinolone, , Topical, Daily       Diet: Cardiac; Healthy Heart (2-3 Na+); Fluid Consistency: Thin (IDDSI 0)      Assessment & Plan      Active Hospital Problems    Diagnosis  POA    **Acute diastolic congestive heart failure [I50.31]  Yes    Iron deficiency anemia [D50.9]  Yes    Stage 3a chronic kidney disease [N18.31]  Yes    History of stroke [Z86.73]  Not Applicable    Paroxysmal atrial fibrillation [I48.0]  Yes    Alzheimer's dementia [G30.9, F02.80]  Yes    Essential hypertension [I10]  Yes "      Resolved Hospital Problems   No resolved problems to display.       Ms. Hernadez is a 89 y.o. female with history of valvular heart disease, dementia, CKD 3a, HTN, prior CVA and paroxysmal A-fib (on Eliquis) who presented with bilateral lower extremity swelling and weight gain felt secondary to new onset CHF.       Admitted for what seems like new onset CHF.  She had pulmonary edema on chest x-ray and lower extremity edema.   She is on IV Lasix and Cardiology is following. ECHO complete. Cr slight increase today.         DCd amlodipine as could be contributing to lower extremity edema.     Dr. Garcia discussed with daughter who confirms patient is DNR.  She is interested in getting her heart failure treated and hopefully improvement in her edema but at this time would not be interested in any invasive procedures like endoscopy.      Ferritin 13 c/w iron deficiency anemia. S/p blood transfusion. Will give another dose of IV iron and monitor. Hgb improved. Will see what Cardiology thinks of eliquis today, yesterday they discussed maybe getting back on for Afib and history of DVT.         SCDs for DVT prophylaxis.  Eliquis on hold.  Discussed with patient and RN  Anticipate discharge to SNU facility in the coming days.         Jose Bautista MD  [facility]  03/24/25  11:59 EDT

## 2025-03-24 NOTE — NURSING NOTE
03/24/25 0926   Wound Right lower leg   No placement date or time found.   Present on Original Admission: Yes  Side: Right  Orientation: lower  Location: leg   Dressing Appearance dry;intact   Base pink;scab   Periwound intact   Edges irregular   Wound Length (cm) 0.5 cm   Wound Width (cm) 0.5 cm   Wound Surface Area (cm^2) 0.2 cm^2   Drainage Amount none     WOCN: Patient with small scab right shin. She wears compression hose. Maybe due to pulling up hose. She is currently on pro plus bed. Skin prevention protocols placed in epic. May place dry gauze over scab

## 2025-03-24 NOTE — PROGRESS NOTES
Discharge Planning Assessment  [facility]     Patient Name: Ryann Hernadez  MRN: [id number]  Today's Date: 3/24/2025    Admit Date: 3/21/2025    Plan: Reteun back to [facility] pending PT eval   Discharge Needs Assessment       Row Name 03/24/25 1414       Living Environment    People in Home other (see comments)  [facility]    Potentially Unsafe Housing Conditions none    Primary Care Provided by self;other (see comments)  [facility]    Provides Primary Care For no one    Family Caregiver if Needed other (see comments)  [facility]    Quality of Family Relationships supportive;involved;helpful    Able to Return to Prior Arrangements yes       Resource/Environmental Concerns    Resource/Environmental Concerns none       Transition Planning    Patient/Family Anticipates Transition to other (see comments)  [facility]       Discharge Needs Assessment    Equipment Currently Used at Home walker, rolling                   Discharge Plan       Row Name 03/24/25 1416       Plan    Plan Reteun back to [facility] pending PT eval    Plan Comments Spoke with pt's listed legal guardian /daughter Rhea Angelo who did confirm pt is from AL at [facility].  Per Ms. Angelo she is hoping for pt to return back to [facility] at [place].  Did inform Ms Angelo pt does have a PT eval pending which will assist in the level of care assessment pt will need at [place].   Spoke to Tamia [phone] with [facility] who did state that pt is able to ambulate short distances with a rolling walker and has been needing increased assistance with her ADL's.  Per [facility] pt has been able to stand up from a seated positon by her self.  Tamia did state that staff does assist pt in getting to the dining jensen since it is some distance from pt's room.   CCP will follow up after PT eval to confirm pt can return back to [facility].   Pt was getting ready to start PT at  [facility] by [facility] on site PT group [facility].                  Continued Care and Services - Admitted Since 3/21/2025       Destination       Service Provider Request Status Services Address Phone Fax Patient Preferred    [facility]ending - Request Sent -- [address] , [facility] [id number] [phone] --                  Expected Discharge Date and Time       Expected Discharge Date Expected Discharge Time    Mar 25, 2025            Demographic Summary       Row Name 03/24/25 1413       General Information    Admission Type inpatient    Arrived From home    Referral Source admission list    Reason for Consult discharge planning    Preferred Language English                   Functional Status       Row Name 03/24/25 1413       Functional Status    Usual Activity Tolerance moderate    Current Activity Tolerance fair       Functional Status, IADL    Medications completely dependent    Meal Preparation completely dependent    Housekeeping completely dependent    Laundry completely dependent    Shopping completely dependent                   Psychosocial    No documentation.                  Abuse/Neglect    No documentation.                  Legal    No documentation.                  Substance Abuse    No documentation.                  Patient Forms    No documentation.                     Brisa Bowers MSW

## 2025-03-25 ENCOUNTER — APPOINTMENT (OUTPATIENT)
Dept: GENERAL RADIOLOGY | Facility: HOSPITAL | Age: OVER 89
DRG: 291 | End: 2025-03-25
Payer: MEDICARE

## 2025-03-25 LAB
ANION GAP SERPL CALCULATED.3IONS-SCNC: 11 MMOL/L (ref 5–15)
BUN SERPL-MCNC: 21 MG/DL (ref 8–23)
BUN/CREAT SERPL: 15 (ref 7–25)
CALCIUM SPEC-SCNC: 9.1 MG/DL (ref 8.2–9.6)
CHLORIDE SERPL-SCNC: 108 MMOL/L (ref 98–107)
CO2 SERPL-SCNC: 25 MMOL/L (ref 22–29)
CREAT SERPL-MCNC: 1.4 MG/DL (ref 0.57–1)
DEPRECATED RDW RBC AUTO: 47.8 FL (ref 37–54)
EGFRCR SERPLBLD CKD-EPI 2021: 35.2 ML/MIN/1.73
ERYTHROCYTE [DISTWIDTH] IN BLOOD BY AUTOMATED COUNT: 16.5 % (ref 12.3–15.4)
GLUCOSE SERPL-MCNC: 94 MG/DL (ref 65–99)
HCT VFR BLD AUTO: 30.3 % (ref 34–46.6)
HGB BLD-MCNC: 9.3 G/DL (ref 12–15.9)
MCH RBC QN AUTO: 25.1 PG (ref 26.6–33)
MCHC RBC AUTO-ENTMCNC: 30.7 G/DL (ref 31.5–35.7)
MCV RBC AUTO: 81.7 FL (ref 79–97)
PLATELET # BLD AUTO: 262 10*3/MM3 (ref 140–450)
PMV BLD AUTO: 9.4 FL (ref 6–12)
POTASSIUM SERPL-SCNC: 3.8 MMOL/L (ref 3.5–5.2)
POTASSIUM SERPL-SCNC: 4 MMOL/L (ref 3.5–5.2)
RBC # BLD AUTO: 3.71 10*6/MM3 (ref 3.77–5.28)
SODIUM SERPL-SCNC: 144 MMOL/L (ref 136–145)
URATE SERPL-MCNC: 8.9 MG/DL (ref 2.4–5.7)
WBC NRBC COR # BLD AUTO: 6.57 10*3/MM3 (ref 3.4–10.8)

## 2025-03-25 PROCEDURE — 80048 BASIC METABOLIC PNL TOTAL CA: CPT | Performed by: HOSPITALIST

## 2025-03-25 PROCEDURE — 25010000002 FUROSEMIDE PER 20 MG: Performed by: INTERNAL MEDICINE

## 2025-03-25 PROCEDURE — 84132 ASSAY OF SERUM POTASSIUM: CPT | Performed by: INTERNAL MEDICINE

## 2025-03-25 PROCEDURE — 25010000002 ENOXAPARIN PER 10 MG: Performed by: INTERNAL MEDICINE

## 2025-03-25 PROCEDURE — 71046 X-RAY EXAM CHEST 2 VIEWS: CPT

## 2025-03-25 PROCEDURE — 85027 COMPLETE CBC AUTOMATED: CPT | Performed by: INTERNAL MEDICINE

## 2025-03-25 PROCEDURE — 84550 ASSAY OF BLOOD/URIC ACID: CPT | Performed by: HOSPITALIST

## 2025-03-25 PROCEDURE — 97162 PT EVAL MOD COMPLEX 30 MIN: CPT

## 2025-03-25 PROCEDURE — 97530 THERAPEUTIC ACTIVITIES: CPT

## 2025-03-25 PROCEDURE — 99232 SBSQ HOSP IP/OBS MODERATE 35: CPT | Performed by: NURSE PRACTITIONER

## 2025-03-25 PROCEDURE — 25010000002 FUROSEMIDE PER 20 MG: Performed by: NURSE PRACTITIONER

## 2025-03-25 RX ORDER — TORSEMIDE 20 MG/1
20 TABLET ORAL DAILY
Status: DISCONTINUED | OUTPATIENT
Start: 2025-03-26 | End: 2025-03-27 | Stop reason: HOSPADM

## 2025-03-25 RX ORDER — POTASSIUM CHLORIDE 750 MG/1
10 TABLET, FILM COATED, EXTENDED RELEASE ORAL ONCE
Status: COMPLETED | OUTPATIENT
Start: 2025-03-25 | End: 2025-03-25

## 2025-03-25 RX ADMIN — PANTOPRAZOLE SODIUM 40 MG: 40 INJECTION, POWDER, LYOPHILIZED, FOR SOLUTION INTRAVENOUS at 20:28

## 2025-03-25 RX ADMIN — TRIAMCINOLONE ACETONIDE: 1 OINTMENT TOPICAL at 08:40

## 2025-03-25 RX ADMIN — DONEPEZIL HYDROCHLORIDE 5 MG: 10 TABLET, FILM COATED ORAL at 08:41

## 2025-03-25 RX ADMIN — ATENOLOL 50 MG: 50 TABLET ORAL at 08:41

## 2025-03-25 RX ADMIN — POTASSIUM CHLORIDE 10 MEQ: 750 TABLET, EXTENDED RELEASE ORAL at 10:24

## 2025-03-25 RX ADMIN — FUROSEMIDE 40 MG: 10 INJECTION, SOLUTION INTRAMUSCULAR; INTRAVENOUS at 20:28

## 2025-03-25 RX ADMIN — ATORVASTATIN CALCIUM 80 MG: 80 TABLET, FILM COATED ORAL at 20:28

## 2025-03-25 RX ADMIN — FUROSEMIDE 40 MG: 10 INJECTION, SOLUTION INTRAMUSCULAR; INTRAVENOUS at 08:41

## 2025-03-25 RX ADMIN — ENOXAPARIN SODIUM 30 MG: 100 INJECTION SUBCUTANEOUS at 12:35

## 2025-03-25 RX ADMIN — METRONIDAZOLE: 7.5 CREAM TOPICAL at 08:40

## 2025-03-25 RX ADMIN — METRONIDAZOLE: 7.5 CREAM TOPICAL at 20:28

## 2025-03-25 RX ADMIN — PANTOPRAZOLE SODIUM 40 MG: 40 INJECTION, POWDER, LYOPHILIZED, FOR SOLUTION INTRAVENOUS at 08:41

## 2025-03-25 NOTE — PLAN OF CARE
Goal Outcome Evaluation:  Plan of Care Reviewed With: patient           Outcome Evaluation: Ryann Hernadez is a 89 year old female seen for physical therapy evaluation after being admitted from [facility] for heart failure. She notes that she is having no pain, denies falls, and notes that she ambulates with a rollator. She notes that she recieves assist with bathing and  meals. Today, she performs bed mobility with standby A. She performs STS with CGA using RW, and transfers to bedside chair, ambulating 5 ft with RW and CGA. She declines any further mobility due to being cold. Tried to encourage bedside chair therex, and pt. declines because she is too cold.  Provided pt. with multiple blankets and she is left with all needs met and within reach. PT recommending SNF at d/c.    Anticipated Discharge Disposition (PT): skilled nursing facility

## 2025-03-25 NOTE — PROGRESS NOTES
"    Patient Name: Ryann Hernadez  :1931  89 y.o.      Patient Care Team:  Andie Song APRN as PCP - General (Nurse Practitioner)    Chief Complaint: follow up CHF     Interval History: weight is down by bed scale. 1300 UOP . Creatinine a little better today. She thinks her legs and breathing are a little better.        Objective   Vital Signs  Temp:  [98.2 °F (36.8 °C)-98.9 °F (37.2 °C)] 98.4 °F (36.9 °C)  Heart Rate:  [61-78] 78  Resp:  [18-20] 20  BP: (117-122)/(74-92) 121/76    Intake/Output Summary (Last 24 hours) at 3/25/2025 0959  Last data filed at 3/25/2025 0400  Gross per 24 hour   Intake 200 ml   Output 1300 ml   Net -1100 ml     Flowsheet Rows      Flowsheet Row First Filed Value   Admission Height 167.6 cm (66\") Documented at 2025 0800   Admission Weight 76.1 kg (167 lb 12.8 oz) Documented at 2025 0610            Physical Exam:   General Appearance:    Alert, cooperative, in no acute distress   Lungs:     Clear to auscultation.  Normal respiratory effort and rate.      Heart:    irregular rhythm and normal rate, normal S1 and S2, +murmurs,  no gallops or rubs.     Chest Wall:    No abnormalities observed   Abdomen:     Soft, nontender, positive bowel sounds.     Extremities:   no cyanosis, clubbing   No marked joint deformities.  Adequate musculoskeletal strength.  1+ edema     Results Review:    Results from last 7 days   Lab Units 25  0548   SODIUM mmol/L 144   POTASSIUM mmol/L 3.8   CHLORIDE mmol/L 108*   CO2 mmol/L 25.0   BUN mg/dL 21   CREATININE mg/dL 1.40*   GLUCOSE mg/dL 94   CALCIUM mg/dL 9.1     Results from last 7 days   Lab Units 25  1824 25  1645   HSTROP T ng/L 28* 35*     Results from last 7 days   Lab Units 25  0548   WBC 10*3/mm3 6.57   HEMOGLOBIN g/dL 9.3*   HEMATOCRIT % 30.3*   PLATELETS 10*3/mm3 262     Results from last 7 days   Lab Units 25  0242 25  1645   INR  1.43* 1.59*   APTT seconds  --  34.8     Results from " last 7 days   Lab Units 03/22/25  0242   MAGNESIUM mg/dL 2.1                   Medication Review:   atenolol, 50 mg, Oral, Q24H  atorvastatin, 80 mg, Oral, Nightly  donepezil, 5 mg, Oral, Daily  enoxaparin sodium, 30 mg, Subcutaneous, Q24H  furosemide, 40 mg, Intravenous, Q12H  metroNIDAZOLE, , Topical, Q12H  pantoprazole, 40 mg, Intravenous, Q12H  potassium chloride ER, 10 mEq, Oral, Once  triamcinolone, , Topical, Daily              Assessment & Plan      Acute right sided heart failure, volume overload. Agree with stopping amlodipine.  Persistent atrial fibrillation, anticoagulated with apixaban prior to admission.  No obvious bleeding but holding apixaban due to significant anemia. CHADs2 Vasc 7  Anemia , fecal occult positive. Transfused 3/23.  Hypertension , on the low side. Follow.   Ascending aortic aneurysm . 5.1 cm on current echo. Not a surgical candidate.   Dementia   Chronic kidney disease , near baseline. Bumped slightly with diuresis.   History of DVT , calf  in 2020.   Hypernatremia , improved.   Elevated LFTs   History of stroke , multiple vascular territories by MRI 2022.      LE edema has improved.   Waste products relatively stable.   Transition to oral diuretics in AM.     Hgb stable. Required transfusion this admission. Declined scopes. Continue to hold apixaban. She does have a high CHADs2 Vasc. May need to consider re challenging AC in the future.     JENNA Lua  [facility]  03/25/25  09:59 EDT

## 2025-03-25 NOTE — PLAN OF CARE
Goal Outcome Evaluation:           Progress: improving  Outcome Evaluation: VSS. No c/o pain. Pt is intermittently confused at baseline. Pt c/o SOA-MD says likely due to CHF and will repeat CXR. Pt O2 sat remains above 90% on room air. Pt is 1 x assist to the chair or bsc. Pt able to feed herself. Possible discharge tomorrow.

## 2025-03-25 NOTE — PROGRESS NOTES
Name: Ryann Hernadez ADMIT: 3/21/2025   : 1931  PCP: Andie Song APRN    MRN: [id number] LOS: 4 days   AGE/SEX: 89 y.o. female  ROOM: [facility]   Subjective   Chief Complaint   Patient presents with    Leg Swelling    Weight Gain     Dyspnea better  On RA  Swelling is better  Has been on diuretics IV  S/p blood transfusion 2 days ago  No bleeding      ROS  No f/c  No n/v  No cp/palp  No soa/cough    Objective   Vital Signs  Temp:  [98.2 °F (36.8 °C)-98.9 °F (37.2 °C)] 98.4 °F (36.9 °C)  Heart Rate:  [61-78] 78  Resp:  [18-20] 20  BP: (117-122)/(74-92) 121/76  SpO2:  [87 %] 87 %  on   ;   Device (Oxygen Therapy): room air  Body mass index is 24.73 kg/m².    Physical Exam  Constitutional:       General: She is not in acute distress.  HENT:      Head: Normocephalic and atraumatic.   Eyes:      General: No scleral icterus.  Cardiovascular:      Rate and Rhythm: Normal rate. Rhythm irregular.      Heart sounds: Murmur heard.   Pulmonary:      Effort: Pulmonary effort is normal. No respiratory distress (decreased bs at bases).   Abdominal:      General: There is no distension.      Palpations: Abdomen is soft.   Musculoskeletal:      Cervical back: Neck supple.      Right lower leg: Edema present.      Left lower leg: Edema present.   Neurological:      Mental Status: She is alert.     Some poor memory    Results Review:       I reviewed the patient's new clinical results.  Results from last 7 days   Lab Units 25  0548 25  0755 25  0422 25  1636 25  0845 25  1233 25  0242 25  1645   WBC 10*3/mm3 6.57  --   --   --  5.63  --  6.41 6.57   HEMOGLOBIN g/dL 9.3* 9.0* 8.8* 7.1* 7.3*  7.3*   < > 7.3* 7.8*   PLATELETS 10*3/mm3 262  --   --   --  330  --  312 348    < > = values in this interval not displayed.     Results from last 7 days   Lab Units 25  0548 25  0422 25  1636 25  0845 25  0242   SODIUM mmol/L 144 145  --  143 148*  "  POTASSIUM mmol/L 3.8 4.0 4.1 3.9 3.8   CHLORIDE mmol/L 108* 109*  --  108* 110*   CO2 mmol/L 25.0 25.8  --  27.0 25.9   BUN mg/dL 21 23  --  21 21   CREATININE mg/dL 1.40* 1.55*  --  1.29* 1.33*   GLUCOSE mg/dL 94 105*  --  106* 104*   Estimated Creatinine Clearance: 27.5 mL/min (A) (by C-G formula based on SCr of 1.4 mg/dL (H)).  Results from last 7 days   Lab Units 03/22/25  0242 03/21/25  1645   ALBUMIN g/dL 3.2* 3.2*   BILIRUBIN mg/dL 0.6 0.7   ALK PHOS U/L 106 119*   AST (SGOT) U/L 52* 55*   ALT (SGPT) U/L 25 28     Results from last 7 days   Lab Units 03/25/25  0548 03/24/25  0422 03/23/25  0845 03/22/25  0242 03/21/25  1645   CALCIUM mg/dL 9.1 9.0 9.0 8.9 9.0   ALBUMIN g/dL  --   --   --  3.2* 3.2*   MAGNESIUM mg/dL  --   --   --  2.1  --    PHOSPHORUS mg/dL  --   --   --  3.1  --          Coag   Results from last 7 days   Lab Units 03/22/25  0242 03/21/25  1645   INR  1.43* 1.59*   APTT seconds  --  34.8     HbA1C   Lab Results   Component Value Date    HGBA1C 6.20 (H) 07/05/2022    HGBA1C 5.80 (H) 02/06/2020     Infection     Radiology(recent) No radiology results for the last day  No results found for: \"TROPONINT\", \"TROPONINI\", \"BNP\"    No components found for: \"TSH;2\"    atenolol, 50 mg, Oral, Q24H  atorvastatin, 80 mg, Oral, Nightly  donepezil, 5 mg, Oral, Daily  enoxaparin sodium, 30 mg, Subcutaneous, Q24H  furosemide, 40 mg, Intravenous, Q12H  metroNIDAZOLE, , Topical, Q12H  pantoprazole, 40 mg, Intravenous, Q12H  [START ON 3/26/2025] torsemide, 20 mg, Oral, Daily  triamcinolone, , Topical, Daily       Diet: Cardiac; Healthy Heart (2-3 Na+); Fluid Consistency: Thin (IDDSI 0)      Assessment & Plan      Active Hospital Problems    Diagnosis  POA    **Acute diastolic congestive heart failure [I50.31]  Yes    Iron deficiency anemia [D50.9]  Yes    Stage 3a chronic kidney disease [N18.31]  Yes    History of stroke [Z86.73]  Not Applicable    Paroxysmal atrial fibrillation [I48.0]  Yes    Alzheimer's " dementia [G30.9, F02.80]  Yes    Essential hypertension [I10]  Yes      Resolved Hospital Problems   No resolved problems to display.       Ms. Hernadez is a 89 y.o. female with history of valvular heart disease, dementia, CKD 3a, HTN, prior CVA and paroxysmal A-fib (on Eliquis) who presented with bilateral lower extremity swelling and weight gain felt secondary to new onset CHF.       She had pulmonary edema on chest x-ray and lower extremity edema.   She is on IV Lasix and Cardiology is following. ECHO complete. Likely po diuretics tomorrow per Cardiology.        DCd amlodipine as could be contributing to lower extremity edema.     Dr. Garcia discussed with daughter who confirms patient is DNR.  She is interested in getting her heart failure treated and hopefully improvement in her edema but at this time would not be interested in any invasive procedures like endoscopy.      Ferritin 13 c/w iron deficiency anemia. S/p blood transfusion. S/p IV iron. Hgb improved. Will see what Cardiology thinks of eliquis at time of discharge.         SCDs for DVT prophylaxis.  Eliquis on hold.  Discussed with patient and RN  Anticipate discharge to SNU facility in the coming days. 3/26 or 3/27        Jose Bautista MD  [facility]  03/25/25  11:59 EDT

## 2025-03-25 NOTE — PLAN OF CARE
Problem: Adult Inpatient Plan of Care  Goal: Plan of Care Review  Outcome: Progressing  Goal: Patient-Specific Goal (Individualized)  Outcome: Progressing  Goal: Absence of Hospital-Acquired Illness or Injury  Outcome: Progressing  Intervention: Identify and Manage Fall Risk  Recent Flowsheet Documentation  Taken 3/25/2025 0200 by Agueda Peters RN  Safety Promotion/Fall Prevention:   assistive device/personal items within reach   clutter free environment maintained   activity supervised   safety round/check completed   room organization consistent  Taken 3/25/2025 0000 by Agueda Peters RN  Safety Promotion/Fall Prevention:   assistive device/personal items within reach   clutter free environment maintained   activity supervised   safety round/check completed   room organization consistent  Taken 3/24/2025 2200 by Agueda Peters RN  Safety Promotion/Fall Prevention:   assistive device/personal items within reach   clutter free environment maintained   activity supervised   safety round/check completed   room organization consistent  Taken 3/24/2025 2000 by Agueda Peters RN  Safety Promotion/Fall Prevention:   assistive device/personal items within reach   clutter free environment maintained   activity supervised   safety round/check completed   room organization consistent  Goal: Optimal Comfort and Wellbeing  Outcome: Progressing  Goal: Readiness for Transition of Care  Outcome: Progressing     Problem: Fall Injury Risk  Goal: Absence of Fall and Fall-Related Injury  Outcome: Progressing  Intervention: Promote Injury-Free Environment  Recent Flowsheet Documentation  Taken 3/25/2025 0200 by Agueda Peters RN  Safety Promotion/Fall Prevention:   assistive device/personal items within reach   clutter free environment maintained   activity supervised   safety round/check completed   room organization consistent  Taken 3/25/2025 0000 by Agueda Peters RN  Safety Promotion/Fall Prevention:   assistive  device/personal items within reach   clutter free environment maintained   activity supervised   safety round/check completed   room organization consistent  Taken 3/24/2025 2200 by Agueda Peters RN  Safety Promotion/Fall Prevention:   assistive device/personal items within reach   clutter free environment maintained   activity supervised   safety round/check completed   room organization consistent  Taken 3/24/2025 2000 by Agueda Peters RN  Safety Promotion/Fall Prevention:   assistive device/personal items within reach   clutter free environment maintained   activity supervised   safety round/check completed   room organization consistent     Problem: Skin Injury Risk Increased  Goal: Skin Health and Integrity  Outcome: Progressing  Intervention: Optimize Skin Protection  Recent Flowsheet Documentation  Taken 3/25/2025 0200 by Agueda Peters RN  Activity Management: activity minimized  Taken 3/25/2025 0000 by Agueda Peters RN  Activity Management: activity minimized  Taken 3/24/2025 2200 by Agueda Peters RN  Activity Management: activity minimized  Taken 3/24/2025 2000 by Agueda Peters RN  Activity Management: activity minimized     Problem: Comorbidity Management  Goal: Blood Pressure in Desired Range  Outcome: Progressing     Problem: Heart Failure  Goal: Optimal Coping  Outcome: Progressing  Goal: Optimal Cardiac Output and Blood Flow  Outcome: Progressing  Goal: Stable Heart Rate and Rhythm  Outcome: Progressing  Goal: Fluid and Electrolyte Balance  Outcome: Progressing  Goal: Optimal Functional Ability  Outcome: Progressing  Intervention: Optimize Functional Ability  Recent Flowsheet Documentation  Taken 3/25/2025 0200 by Agueda Peters RN  Activity Management: activity minimized  Taken 3/25/2025 0000 by Agueda Peters RN  Activity Management: activity minimized  Taken 3/24/2025 2200 by Agueda Peters RN  Activity Management: activity minimized  Taken 3/24/2025 2000 by Agueda Peters  RN  Activity Management: activity minimized  Goal: Improved Oral Intake  Outcome: Progressing  Goal: Effective Oxygenation and Ventilation  Outcome: Progressing  Intervention: Promote Airway Secretion Clearance  Recent Flowsheet Documentation  Taken 3/25/2025 0200 by Agueda Peters RN  Activity Management: activity minimized  Taken 3/25/2025 0000 by Agueda Peters RN  Activity Management: activity minimized  Taken 3/24/2025 2200 by Agueda Peters, RN  Activity Management: activity minimized  Taken 3/24/2025 2000 by Agueda Peters, RN  Activity Management: activity minimized  Goal: Effective Breathing Pattern During Sleep  Outcome: Progressing   Goal Outcome Evaluation:

## 2025-03-25 NOTE — THERAPY EVALUATION
Patient Name: Ryann Hernadez  : 1931    MRN: [id number]                              Today's Date: 3/25/2025       Admit Date: 3/21/2025    Visit Dx:     ICD-10-CM ICD-9-CM   1. Acute congestive heart failure, unspecified heart failure type  I50.9 428.0   2. Pedal edema  R60.0 782.3   3. Acute anemia  D64.9 285.9     Patient Active Problem List   Diagnosis    Allergic rhinitis    Gastroesophageal reflux disease    Essential hypertension    Ataxic gait    Balance disorder    At high risk for falls    Paroxysmal atrial fibrillation    Aortic root dilatation    Alzheimer's dementia    Age-related osteoporosis without current pathological fracture    Corn of foot    Ascending aortic aneurysm    Acute deep vein thrombosis (DVT) of calf muscle vein of left lower extremity    Paroxysmal atrial fibrillation    Elevated troponin    Embolic stroke    History of stroke    Stage 3a chronic kidney disease    B12 deficiency    Acute diastolic congestive heart failure    Iron deficiency anemia     Past Medical History:   Diagnosis Date    A-fib     Acute deep vein thrombosis (DVT) of calf muscle vein of left lower extremity 2020    Allergic rhinitis     Anemia     Aneurysm, ascending aorta 2016    5 CM    Anxiety     Ataxic gait     Atypical chest pain     BPPV (benign paroxysmal positional vertigo)     Bradycardia     CHF (congestive heart failure) 2025    Chronic cystitis     Chronic headaches     Chronic nonintractable headache 2016    Chronic paroxysmal hemicrania     Depression     Esophagitis     Gastritis     GERD (gastroesophageal reflux disease)     Hearing decreased     Hematuria     High blood pressure     History of cataract     History of irritable bowel syndrome     Hypertension     IBS (irritable bowel syndrome)     Influenza A     Kidney lesion     LEFT KIDNEY CYSTIC    Labyrinthitis 2014    OA (osteoarthritis)     Osteoporosis 2018    Other abnormal clinical finding  09/21/2012    LEFT UTEROCELE    Pain of thigh     Personal history of gastric ulcer     Renal disorder     Renal insufficiency 08/07/2017    Rhabdomyolysis 07/05/2022    TIA (transient ischemic attack) 02/05/2020    UTI (urinary tract infection) 03/02/2024     Past Surgical History:   Procedure Laterality Date    BREAST LUMPECTOMY      BREAST SURGERY      CATARACT EXTRACTION      COLONOSCOPY N/A     DR. MARGARITA MENDOZA    ELBOW ARTHROTOMY  07/01/1999    DR. RYAN RITCHIE    EYE SURGERY      FRACTURE SURGERY      HAND, BROKEN FINGERS  DR. MIKE CHRISTIANSON    KNEE SURGERY  08/31/1999    DR. THADDEUS MENDES    OOPHORECTOMY      TUBAL ABDOMINAL LIGATION Bilateral 1962    DR. EDDA MANNING      General Information       Row Name 03/25/25 1332          Physical Therapy Time and Intention    Document Type evaluation  -ER     Mode of Treatment individual therapy;physical therapy  -ER       Row Name 03/25/25 1332          General Information    Patient Profile Reviewed yes  -ER     Prior Level of Function --  From [facility], reports she has help for bathing and meals  -ER     Existing Precautions/Restrictions no known precautions/restrictions  -ER     Barriers to Rehab medically complex;previous functional deficit;cognitive status  -ER       Row Name 03/25/25 1332          Living Environment    Current Living Arrangements assisted living facility  -ER     People in Home facility resident  -ER       Row Name 03/25/25 1332          Home Main Entrance    Number of Stairs, Main Entrance none  -ER       Row Name 03/25/25 1332          Stairs Within Home, Primary    Number of Stairs, Within Home, Primary none  -ER       Row Name 03/25/25 1332          Cognition    Orientation Status (Cognition) oriented to;person;place;situation  unable to state year  -ER       Row Name 03/25/25 1332          Safety Issues/Impairments Affecting Functional Mobility    Impairments Affecting Function (Mobility) balance;endurance/activity tolerance  -ER                User Key  (r) = Recorded By, (t) = Taken By, (c) = Cosigned By      Initials Name Provider Type    ER Lelia Jessica, JAMILA Physical Therapist                   Mobility       Row Name 03/25/25 1334          Bed Mobility    Bed Mobility bed mobility (all) activities  -ER     All Activities, [place] (Bed Mobility) standby assist  -ER       Row Name 03/25/25 1334          Bed-Chair Transfer    Bed-Chair [place] (Transfers) contact guard  -ER     Assistive Device (Bed-Chair Transfers) walker, front-wheeled  -ER       Row Name 03/25/25 1334          Sit-Stand Transfer    Sit-Stand [place] (Transfers) contact guard  -ER     Assistive Device (Sit-Stand Transfers) walker, front-wheeled  -ER       Row Name 03/25/25 1334          Gait/Stairs (Locomotion)    [place] Level (Gait) contact guard  -ER     Assistive Device (Gait) walker, front-wheeled  -ER     Patient was able to Ambulate yes  -ER     Distance in Feet (Gait) 5  -ER     Deviations/Abnormal Patterns (Gait) bilateral deviations;gait speed decreased  -ER     Bilateral Gait Deviations forward flexed posture  -ER               User Key  (r) = Recorded By, (t) = Taken By, (c) = Cosigned By      Initials Name Provider Type    ER Lelia Jessica, PT Physical Therapist                   Obj/Interventions       Row Name 03/25/25 1335          Range of Motion Comprehensive    General Range of Motion no range of motion deficits identified  -ER       Row Name 03/25/25 1335          Strength Comprehensive (MMT)    Comment, General Manual Muscle Testing (MMT) Assessment BLE ~4-/5  -ER       Row Name 03/25/25 1332          Balance    Balance Assessment sitting static balance;sitting dynamic balance;standing static balance;standing dynamic balance  -ER     Static Sitting Balance supervision  -ER     Dynamic Sitting Balance supervision  -ER     Position, Sitting Balance sitting edge of bed;unsupported  -ER     Static Standing Balance contact guard  -ER     Dynamic Standing  Balance contact guard  -ER     Position/Device Used, Standing Balance supported;walker, front-wheeled  -ER     Balance Interventions sitting;standing;sit to stand;supported;static;dynamic  -ER       Row Name 03/25/25 3290          Sensory Assessment (Somatosensory)    Sensory Assessment (Somatosensory) sensation intact  -ER               User Key  (r) = Recorded By, (t) = Taken By, (c) = Cosigned By      Initials Name Provider Type    ER Lelia Jessica, PT Physical Therapist                   Goals/Plan       Row Name 03/25/25 3727          Bed Mobility Goal 1 (PT)    Activity/Assistive Device (Bed Mobility Goal 1, PT) bed mobility activities, all  -ER     [place] Level/Cues Needed (Bed Mobility Goal 1, PT) modified independence  -ER     Time Frame (Bed Mobility Goal 1, PT) 2 weeks  -ER       Row Name 03/25/25 1342          Transfer Goal 1 (PT)    Activity/Assistive Device (Transfer Goal 1, PT) transfers, all  -ER     [place] Level/Cues Needed (Transfer Goal 1, PT) supervision required  -ER     Time Frame (Transfer Goal 1, PT) 2 weeks  -ER       Row Name 03/25/25 6639          Gait Training Goal 1 (PT)    Activity/Assistive Device (Gait Training Goal 1, PT) gait (walking locomotion)  -ER     [place] Level (Gait Training Goal 1, PT) supervision required  -ER     Distance (Gait Training Goal 1, PT) 5  -ER     Time Frame (Gait Training Goal 1, PT) 2 weeks  -ER       Row Name 03/25/25 1342          Therapy Assessment/Plan (PT)    Planned Therapy Interventions (PT) balance training;bed mobility training;gait training;home exercise program;patient/family education;stretching;strengthening;stair training;neuromuscular re-education;ROM (range of motion);wheelchair management/propulsion training;postural re-education;transfer training  -ER               User Key  (r) = Recorded By, (t) = Taken By, (c) = Cosigned By      Initials Name Provider Type    ER Lelia Jessica, PT Physical Therapist                    Clinical Impression       Row Name 03/25/25 1336          Pain    Pretreatment Pain Rating 0/10 - no pain  -ER     Posttreatment Pain Rating 0/10 - no pain  -ER       Row Name 03/25/25 1336          Plan of Care Review    Plan of Care Reviewed With patient  -ER     Outcome Evaluation Ryann Hernadez is a 89 year old female seen for physical therapy evaluation after being admitted from [facility] for heart failure. She notes that she is having no pain, denies falls, and notes that she ambulates with a rollator. She notes that she recieves assist with bathing and  meals. Today, she performs bed mobility with standby A. She performs STS with CGA using RW, and transfers to bedside chair, ambulating 5 ft with RW and CGA. She declines any further mobility due to being cold. Tried to encourage bedside chair therex, and pt. declines because she is too cold.  Provided pt. with multiple blankets and she is left with all needs met and within reach. PT recommending SNF at d/c.  -ER       Row Name 03/25/25 1336          Therapy Assessment/Plan (PT)    Criteria for Skilled Interventions Met (PT) yes  -ER     Therapy Frequency (PT) 5 times/wk  -ER       Row Name 03/25/25 1336          Positioning and Restraints    Pre-Treatment Position in bed  -ER     Post Treatment Position chair  -ER     In Chair notified nsg;call light within reach;encouraged to call for assist;exit alarm on  -ER               User Key  (r) = Recorded By, (t) = Taken By, (c) = Cosigned By      Initials Name Provider Type    ER Lelia Jessica, PT Physical Therapist                   Outcome Measures       Row Name 03/25/25 1344 03/25/25 0841       How much help from another person do you currently need...    Turning from your back to your side while in flat bed without using bedrails? 4  -ER 4  -AL    Moving from lying on back to sitting on the side of a flat bed without bedrails? 4  -ER 4  -AL    Moving to and from a bed to a chair (including a wheelchair)? 3  -ER 3   -AL    Standing up from a chair using your arms (e.g., wheelchair, bedside chair)? 3  -ER 3  -AL    Climbing 3-5 steps with a railing? 2  -ER 2  -AL    To walk in hospital room? 3  -ER 2  -AL    AM-PAC 6 Clicks Score (PT) 19  -ER 18  -AL              User Key  (r) = Recorded By, (t) = Taken By, (c) = Cosigned By      Initials Name Provider Type    AL Brown Cobos RN Registered Nurse    ER Lelia Jessica, PT Physical Therapist                                 Physical Therapy Education       Title: PT OT SLP Therapies (In Progress)       Topic: Physical Therapy (In Progress)       Point: Mobility training (In Progress)       Learning Progress Summary            Patient Acceptance, E, NR by ER at 3/25/2025 1344                      Point: Home exercise program (In Progress)       Learning Progress Summary            Patient Acceptance, E, NR by ER at 3/25/2025 1344                      Point: Body mechanics (In Progress)       Learning Progress Summary            Patient Acceptance, E, NR by ER at 3/25/2025 1344                      Point: Precautions (In Progress)       Learning Progress Summary            Patient Acceptance, E, NR by ER at 3/25/2025 1344                                      User Key       Initials Effective Dates Name Provider Type Discipline    ER 10/15/23 -  Lelia Jessica, PT Physical Therapist PT                  PT Recommendation and Plan  Planned Therapy Interventions (PT): balance training, bed mobility training, gait training, home exercise program, patient/family education, stretching, strengthening, stair training, neuromuscular re-education, ROM (range of motion), wheelchair management/propulsion training, postural re-education, transfer training  Outcome Evaluation: Ryann Hernadez is a 89 year old female seen for physical therapy evaluation after being admitted from [facility] for heart failure. She notes that she is having no pain, denies falls, and notes that she ambulates with a rollator. She  notes that she recieves assist with bathing and  meals. Today, she performs bed mobility with standby A. She performs STS with CGA using RW, and transfers to bedside chair, ambulating 5 ft with RW and CGA. She declines any further mobility due to being cold. Tried to encourage bedside chair therex, and pt. declines because she is too cold.  Provided pt. with multiple blankets and she is left with all needs met and within reach. PT recommending SNF at d/c.     Time Calculation:         PT Charges       Row Name 03/25/25 1345             Time Calculation    Start Time 0953  -ER      Stop Time 1008  -ER      Time Calculation (min) 15 min  -ER      PT Received On 03/25/25  -ER      PT - Next Appointment 03/26/25  -ER      PT Goal Re-Cert Due Date 04/08/25  -ER         Time Calculation- PT    Total Timed Code Minutes- PT 12 minute(s)  -ER         Timed Charges    67970 - PT Therapeutic Activity Minutes 12  -ER         Total Minutes    Timed Charges Total Minutes 12  -ER       Total Minutes 12  -ER                User Key  (r) = Recorded By, (t) = Taken By, (c) = Cosigned By      Initials Name Provider Type    ER Lelia Jessica, PT Physical Therapist                  Therapy Charges for Today       Code Description Service Date Service Provider Modifiers Qty    00240222000 HC PT THERAPEUTIC ACT EA 15 MIN 3/25/2025 Lelia Jessica, PT GP 1    25588820566 HC PT EVAL MOD COMPLEXITY 3 3/25/2025 Lelia Jessica, PT GP 1            PT G-Codes  AM-PAC 6 Clicks Score (PT): 19  PT Discharge Summary  Anticipated Discharge Disposition (PT): skilled nursing facility    Lelia Jessica PT  3/25/2025

## 2025-03-26 LAB
DEPRECATED RDW RBC AUTO: 51.3 FL (ref 37–54)
ERYTHROCYTE [DISTWIDTH] IN BLOOD BY AUTOMATED COUNT: 16.8 % (ref 12.3–15.4)
HCT VFR BLD AUTO: 31.2 % (ref 34–46.6)
HGB BLD-MCNC: 9.1 G/DL (ref 12–15.9)
MCH RBC QN AUTO: 25 PG (ref 26.6–33)
MCHC RBC AUTO-ENTMCNC: 29.2 G/DL (ref 31.5–35.7)
MCV RBC AUTO: 85.7 FL (ref 79–97)
PLATELET # BLD AUTO: 266 10*3/MM3 (ref 140–450)
PMV BLD AUTO: 9.9 FL (ref 6–12)
RBC # BLD AUTO: 3.64 10*6/MM3 (ref 3.77–5.28)
URATE SERPL-MCNC: 9.5 MG/DL (ref 2.4–5.7)
WBC NRBC COR # BLD AUTO: 6.28 10*3/MM3 (ref 3.4–10.8)

## 2025-03-26 PROCEDURE — 84550 ASSAY OF BLOOD/URIC ACID: CPT | Performed by: HOSPITALIST

## 2025-03-26 PROCEDURE — 25010000002 ENOXAPARIN PER 10 MG: Performed by: INTERNAL MEDICINE

## 2025-03-26 PROCEDURE — 99232 SBSQ HOSP IP/OBS MODERATE 35: CPT | Performed by: NURSE PRACTITIONER

## 2025-03-26 PROCEDURE — 85027 COMPLETE CBC AUTOMATED: CPT | Performed by: INTERNAL MEDICINE

## 2025-03-26 RX ADMIN — TRIAMCINOLONE ACETONIDE: 1 OINTMENT TOPICAL at 12:10

## 2025-03-26 RX ADMIN — SENNOSIDES AND DOCUSATE SODIUM 2 TABLET: 50; 8.6 TABLET ORAL at 12:09

## 2025-03-26 RX ADMIN — TORSEMIDE 20 MG: 20 TABLET ORAL at 09:57

## 2025-03-26 RX ADMIN — DONEPEZIL HYDROCHLORIDE 5 MG: 10 TABLET, FILM COATED ORAL at 09:56

## 2025-03-26 RX ADMIN — ATENOLOL 50 MG: 50 TABLET ORAL at 09:57

## 2025-03-26 RX ADMIN — METRONIDAZOLE: 7.5 CREAM TOPICAL at 20:10

## 2025-03-26 RX ADMIN — PANTOPRAZOLE SODIUM 40 MG: 40 INJECTION, POWDER, LYOPHILIZED, FOR SOLUTION INTRAVENOUS at 20:10

## 2025-03-26 RX ADMIN — PANTOPRAZOLE SODIUM 40 MG: 40 INJECTION, POWDER, LYOPHILIZED, FOR SOLUTION INTRAVENOUS at 09:56

## 2025-03-26 RX ADMIN — ENOXAPARIN SODIUM 30 MG: 100 INJECTION SUBCUTANEOUS at 14:12

## 2025-03-26 RX ADMIN — Medication 10 ML: at 09:57

## 2025-03-26 RX ADMIN — ATORVASTATIN CALCIUM 80 MG: 80 TABLET, FILM COATED ORAL at 20:10

## 2025-03-26 NOTE — PLAN OF CARE
Goal Outcome Evaluation:   No significant events overnight. Patient rested majority of the night. IV Diuresis. Hgb remains stable. Possible dc back to facility today.

## 2025-03-26 NOTE — PROGRESS NOTES
"    Patient Name: Ryann Hernadez  :1931  89 y.o.      Patient Care Team:  Andie Song APRN as PCP - General (Nurse Practitioner)    Chief Complaint: follow up CHF     Interval History: she is resting in bed. Feeling well. Started oral torsemide this morning with good UOP so far. No real complaints. Wants to go home.     Objective   Vital Signs  Temp:  [98.2 °F (36.8 °C)-98.8 °F (37.1 °C)] 98.2 °F (36.8 °C)  Heart Rate:  [54-66] 66  Resp:  [18] 18  BP: (101-117)/(66-81) 103/75    Intake/Output Summary (Last 24 hours) at 3/26/2025 1440  Last data filed at 3/26/2025 1333  Gross per 24 hour   Intake 640 ml   Output 2250 ml   Net -1610 ml     Flowsheet Rows      Flowsheet Row First Filed Value   Admission Height 167.6 cm (66\") Documented at 2025 0800   Admission Weight 76.1 kg (167 lb 12.8 oz) Documented at 2025 0610            Physical Exam:   General Appearance:    Alert, cooperative, in no acute distress   Lungs:     Clear to auscultation.  Normal respiratory effort and rate.      Heart:    irregular rhythm and normal rate, normal S1 and S2, +murmurs,  no gallops or rubs.     Chest Wall:    No abnormalities observed   Abdomen:     Soft, nontender, positive bowel sounds.     Extremities:   no cyanosis, clubbing   No marked joint deformities.  Adequate musculoskeletal strength.  1+ edema     Results Review:    Results from last 7 days   Lab Units 25  1532 25  0548   SODIUM mmol/L  --  144   POTASSIUM mmol/L 4.0 3.8   CHLORIDE mmol/L  --  108*   CO2 mmol/L  --  25.0   BUN mg/dL  --  21   CREATININE mg/dL  --  1.40*   GLUCOSE mg/dL  --  94   CALCIUM mg/dL  --  9.1     Results from last 7 days   Lab Units 25  1824 25  1645   HSTROP T ng/L 28* 35*     Results from last 7 days   Lab Units 25  0624   WBC 10*3/mm3 6.28   HEMOGLOBIN g/dL 9.1*   HEMATOCRIT % 31.2*   PLATELETS 10*3/mm3 266     Results from last 7 days   Lab Units 25  0242 25  1645   INR  " 1.43* 1.59*   APTT seconds  --  34.8     Results from last 7 days   Lab Units 03/22/25  0242   MAGNESIUM mg/dL 2.1                   Medication Review:   atenolol, 50 mg, Oral, Q24H  atorvastatin, 80 mg, Oral, Nightly  donepezil, 5 mg, Oral, Daily  enoxaparin sodium, 30 mg, Subcutaneous, Q24H  metroNIDAZOLE, , Topical, Q12H  pantoprazole, 40 mg, Intravenous, Q12H  torsemide, 20 mg, Oral, Daily  triamcinolone, , Topical, Daily              Assessment & Plan      Acute right sided heart failure, volume overload. Agree with stopping amlodipine.  Persistent atrial fibrillation, anticoagulated with apixaban prior to admission.  No obvious bleeding but holding apixaban due to significant anemia. CHADs2 Vasc 7  Anemia , fecal occult positive. Transfused 3/23.  Hypertension , on the low side. Follow.   Ascending aortic aneurysm . 5.1 cm on current echo. Not a surgical candidate.   Dementia   Chronic kidney disease , near baseline. Bumped slightly with diuresis.   History of DVT , calf  in 2020.   Hypernatremia , improved.   Elevated LFTs   History of stroke , multiple vascular territories by MRI 2022.      LE edema has improved.   Waste products relatively stable.   Now on oral torsemide. Continue this at VT.     Hgb stable. Required transfusion this admission. Stool positive for blood. She declined scopes. Continue to hold apixaban at VT. She does have a high CHADs2 Vasc. May need to consider re challenging AC in the future.     No objection to discharge tomorrow. Her daughter was in the room and we discussed the risk of bleeding/anemia on apixaban and the risk of stroke without it. We are holding it for now but can reconsider as an outpatient. Her risk of bleeding is high and her risk of stroke is high.     Nothing further to add. Will see as needed.     JENNA Lua  [facility]  03/26/25  14:40 EDT

## 2025-03-26 NOTE — PROGRESS NOTES
[facility]    [phone]   LOS: 5 days     Name: Ryann Hernadez  Age/Sex: 89 y.o. female  :  1931        PCP: Andie Song APRN  Chief Complaint   Patient presents with    Leg Swelling    Weight Gain      Subjective   She rested well overnight.  Remains short of breath with exertion.  Denies other new issues or complaints right now.  Poor appetite this morning.  General: No Fever or Chills, Cardiac: No Chest Pain or Palpitations, Resp: No Cough or SOA, GI: No Nausea, Vomiting, or Diarrhea, and Other: No bleeding    atenolol, 50 mg, Oral, Q24H  atorvastatin, 80 mg, Oral, Nightly  donepezil, 5 mg, Oral, Daily  enoxaparin sodium, 30 mg, Subcutaneous, Q24H  metroNIDAZOLE, , Topical, Q12H  pantoprazole, 40 mg, Intravenous, Q12H  torsemide, 20 mg, Oral, Daily  triamcinolone, , Topical, Daily           Objective   Vital Signs  Temp:  [98.2 °F (36.8 °C)-98.8 °F (37.1 °C)] 98.2 °F (36.8 °C)  Heart Rate:  [54-66] 66  Resp:  [18] 18  BP: (101-117)/(66-81) 103/75  Body mass index is 24.68 kg/m².    Intake/Output Summary (Last 24 hours) at 3/26/2025 1627  Last data filed at 3/26/2025 1547  Gross per 24 hour   Intake 640 ml   Output 2850 ml   Net -2210 ml       Physical Exam  Vitals reviewed.   Constitutional:       General: She is not in acute distress.     Appearance: She is ill-appearing.   Cardiovascular:      Rate and Rhythm: Normal rate and regular rhythm.   Pulmonary:      Effort: No respiratory distress.      Breath sounds: Normal breath sounds.   Abdominal:      General: Bowel sounds are normal. There is no distension.      Palpations: Abdomen is soft.   Neurological:      Mental Status: She is alert and oriented to person, place, and time. Mental status is at baseline.           Results Review:       I reviewed the patient's new clinical results.  Results from last 7 days   Lab Units 25  0624 25  0548 25  0755 25  0422 25  1636 25  0845  03/22/25  1233 03/22/25  0242 03/21/25  1645   WBC 10*3/mm3 6.28 6.57  --   --   --  5.63  --  6.41 6.57   HEMOGLOBIN g/dL 9.1* 9.3* 9.0* 8.8* 7.1* 7.3*  7.3* 7.7* 7.3* 7.8*   PLATELETS 10*3/mm3 266 262  --   --   --  330  --  312 348     Results from last 7 days   Lab Units 03/25/25  1532 03/25/25  0548 03/24/25  0422 03/23/25  1636 03/23/25  0845 03/22/25  0242 03/21/25  1645   SODIUM mmol/L  --  144 145  --  143 148* 145   POTASSIUM mmol/L 4.0 3.8 4.0 4.1 3.9 3.8 4.2   CHLORIDE mmol/L  --  108* 109*  --  108* 110* 108*   CO2 mmol/L  --  25.0 25.8  --  27.0 25.9 25.4   BUN mg/dL  --  21 23  --  21 21 22   CREATININE mg/dL  --  1.40* 1.55*  --  1.29* 1.33* 1.34*   CALCIUM mg/dL  --  9.1 9.0  --  9.0 8.9 9.0   MAGNESIUM mg/dL  --   --   --   --   --  2.1  --    PHOSPHORUS mg/dL  --   --   --   --   --  3.1  --    Estimated Creatinine Clearance: 27.5 mL/min (A) (by C-G formula based on SCr of 1.4 mg/dL (H)).      Assessment & Plan   Active Hospital Problems    Diagnosis  POA    **Acute diastolic congestive heart failure [I50.31]  Yes    Iron deficiency anemia [D50.9]  Yes    Stage 3a chronic kidney disease [N18.31]  Yes    History of stroke [Z86.73]  Not Applicable    Paroxysmal atrial fibrillation [I48.0]  Yes    Alzheimer's dementia [G30.9, F02.80]  Yes    Essential hypertension [I10]  Yes      Resolved Hospital Problems   No resolved problems to display.       PLAN  Ms. Hernadez is a 89 y.o. female with history of valvular heart disease, dementia, CKD 3a, HTN, prior CVA and paroxysmal A-fib (on Eliquis) who presented with bilateral lower extremity swelling and weight gain felt secondary to new onset CHF.   -Transition to oral torsemide today if renal function and electrolytes stable and maintains a negative fluid balance plan discharge home tomorrow    Disposition  Expected Discharge Date: 3/27/2025; Expected Discharge Time:        Ruddy Manley MD  [facility]  03/26/25  16:27  EDT

## 2025-03-26 NOTE — PLAN OF CARE
Goal Outcome Evaluation:  Plan of Care Reviewed With: patient        Progress: no change  Outcome Evaluation: Alert, confused, sometimes irritable but this seems related to decreased hearing and confusion.   Daughter visited.  Up to BSC with assist.  Legs less edematous than when she was admitted.  Probable d/c tomorrow to SNF. Good urine output.  BM today.

## 2025-03-26 NOTE — PLAN OF CARE
Problem: Adult Inpatient Plan of Care  Goal: Plan of Care Review  Outcome: Progressing  Goal: Patient-Specific Goal (Individualized)  Outcome: Progressing  Goal: Absence of Hospital-Acquired Illness or Injury  Outcome: Progressing  Intervention: Identify and Manage Fall Risk  Recent Flowsheet Documentation  Taken 3/26/2025 0000 by Laina Mauricio RN  Safety Promotion/Fall Prevention: safety round/check completed  Taken 3/25/2025 2200 by Laina Mauricio RN  Safety Promotion/Fall Prevention: safety round/check completed  Taken 3/25/2025 2000 by Laina Mauricio RN  Safety Promotion/Fall Prevention: activity supervised  Intervention: Prevent Skin Injury  Recent Flowsheet Documentation  Taken 3/26/2025 0000 by Laina Mauricio RN  Body Position:   position changed independently   right  Goal: Optimal Comfort and Wellbeing  Outcome: Progressing  Goal: Readiness for Transition of Care  Outcome: Progressing     Problem: Fall Injury Risk  Goal: Absence of Fall and Fall-Related Injury  Outcome: Progressing  Intervention: Promote Injury-Free Environment  Recent Flowsheet Documentation  Taken 3/26/2025 0000 by Laina Mauricio RN  Safety Promotion/Fall Prevention: safety round/check completed  Taken 3/25/2025 2200 by Laina Mauricio RN  Safety Promotion/Fall Prevention: safety round/check completed  Taken 3/25/2025 2000 by Laina Mauricio RN  Safety Promotion/Fall Prevention: activity supervised     Problem: Skin Injury Risk Increased  Goal: Skin Health and Integrity  Outcome: Progressing  Intervention: Optimize Skin Protection  Recent Flowsheet Documentation  Taken 3/25/2025 2000 by Laina Mauricio RN  Activity Management: activity encouraged  Head of Bed (HOB) Positioning: HOB at 30-45 degrees     Problem: Comorbidity Management  Goal: Blood Pressure in Desired Range  Outcome: Progressing     Problem: Heart Failure  Goal: Optimal Coping  Outcome: Progressing  Goal: Optimal Cardiac Output and Blood Flow  Outcome: Progressing  Goal:  Stable Heart Rate and Rhythm  Outcome: Progressing  Goal: Fluid and Electrolyte Balance  Outcome: Progressing  Goal: Optimal Functional Ability  Outcome: Progressing  Intervention: Optimize Functional Ability  Recent Flowsheet Documentation  Taken 3/25/2025 2000 by Laina Mauricio RN  Activity Management: activity encouraged  Goal: Improved Oral Intake  Outcome: Progressing  Goal: Effective Oxygenation and Ventilation  Outcome: Progressing  Intervention: Promote Airway Secretion Clearance  Recent Flowsheet Documentation  Taken 3/25/2025 2000 by Laina Mauricio RN  Activity Management: activity encouraged  Cough And Deep Breathing: done with encouragement  Intervention: Optimize Oxygenation and Ventilation  Recent Flowsheet Documentation  Taken 3/25/2025 2000 by Laina Mauricio RN  Head of Bed (HOB) Positioning: HOB at 30-45 degrees  Goal: Effective Breathing Pattern During Sleep  Outcome: Progressing   Goal Outcome Evaluation:

## 2025-03-27 VITALS
TEMPERATURE: 98.2 F | BODY MASS INDEX: 24.09 KG/M2 | WEIGHT: 149.91 LBS | HEIGHT: 66 IN | RESPIRATION RATE: 18 BRPM | DIASTOLIC BLOOD PRESSURE: 93 MMHG | SYSTOLIC BLOOD PRESSURE: 119 MMHG | OXYGEN SATURATION: 95 % | HEART RATE: 65 BPM

## 2025-03-27 LAB
ANION GAP SERPL CALCULATED.3IONS-SCNC: 11.1 MMOL/L (ref 5–15)
BUN SERPL-MCNC: 16 MG/DL (ref 8–23)
BUN/CREAT SERPL: 11.3 (ref 7–25)
CALCIUM SPEC-SCNC: 8.8 MG/DL (ref 8.2–9.6)
CHLORIDE SERPL-SCNC: 107 MMOL/L (ref 98–107)
CO2 SERPL-SCNC: 27.9 MMOL/L (ref 22–29)
CREAT SERPL-MCNC: 1.42 MG/DL (ref 0.57–1)
DEPRECATED RDW RBC AUTO: 49.9 FL (ref 37–54)
EGFRCR SERPLBLD CKD-EPI 2021: 34.6 ML/MIN/1.73
ERYTHROCYTE [DISTWIDTH] IN BLOOD BY AUTOMATED COUNT: 17.3 % (ref 12.3–15.4)
GLUCOSE SERPL-MCNC: 108 MG/DL (ref 65–99)
HCT VFR BLD AUTO: 29.1 % (ref 34–46.6)
HGB BLD-MCNC: 9 G/DL (ref 12–15.9)
MCH RBC QN AUTO: 25.9 PG (ref 26.6–33)
MCHC RBC AUTO-ENTMCNC: 30.9 G/DL (ref 31.5–35.7)
MCV RBC AUTO: 83.6 FL (ref 79–97)
PLATELET # BLD AUTO: 235 10*3/MM3 (ref 140–450)
PMV BLD AUTO: 9.8 FL (ref 6–12)
POTASSIUM SERPL-SCNC: 3.4 MMOL/L (ref 3.5–5.2)
RBC # BLD AUTO: 3.48 10*6/MM3 (ref 3.77–5.28)
SODIUM SERPL-SCNC: 146 MMOL/L (ref 136–145)
URATE SERPL-MCNC: 9.2 MG/DL (ref 2.4–5.7)
WBC NRBC COR # BLD AUTO: 6.05 10*3/MM3 (ref 3.4–10.8)

## 2025-03-27 PROCEDURE — 85027 COMPLETE CBC AUTOMATED: CPT | Performed by: INTERNAL MEDICINE

## 2025-03-27 PROCEDURE — 80048 BASIC METABOLIC PNL TOTAL CA: CPT | Performed by: HOSPITALIST

## 2025-03-27 PROCEDURE — 84550 ASSAY OF BLOOD/URIC ACID: CPT | Performed by: HOSPITALIST

## 2025-03-27 RX ORDER — TORSEMIDE 20 MG/1
20 TABLET ORAL DAILY
Start: 2025-03-28

## 2025-03-27 RX ORDER — ATENOLOL 50 MG/1
50 TABLET ORAL
Qty: 30 TABLET | Refills: 0 | Status: SHIPPED | OUTPATIENT
Start: 2025-03-28

## 2025-03-27 RX ORDER — POTASSIUM CHLORIDE 1500 MG/1
20 TABLET, EXTENDED RELEASE ORAL ONCE
Status: COMPLETED | OUTPATIENT
Start: 2025-03-27 | End: 2025-03-27

## 2025-03-27 RX ADMIN — DONEPEZIL HYDROCHLORIDE 5 MG: 10 TABLET, FILM COATED ORAL at 08:02

## 2025-03-27 RX ADMIN — METRONIDAZOLE: 7.5 CREAM TOPICAL at 08:03

## 2025-03-27 RX ADMIN — PANTOPRAZOLE SODIUM 40 MG: 40 INJECTION, POWDER, LYOPHILIZED, FOR SOLUTION INTRAVENOUS at 08:02

## 2025-03-27 RX ADMIN — TORSEMIDE 20 MG: 20 TABLET ORAL at 08:02

## 2025-03-27 RX ADMIN — TRIAMCINOLONE ACETONIDE: 1 OINTMENT TOPICAL at 08:03

## 2025-03-27 RX ADMIN — ATENOLOL 50 MG: 50 TABLET ORAL at 08:02

## 2025-03-27 RX ADMIN — POTASSIUM CHLORIDE 20 MEQ: 1500 TABLET, EXTENDED RELEASE ORAL at 08:02

## 2025-03-27 NOTE — DISCHARGE SUMMARY
Patient Name: Ryann Hernadez  : 1931  MRN: [id number]    Date of Admission: 3/21/2025  Date of Discharge:  3/27/2025  Primary Care Physician: Andie Song APRN      Chief Complaint:   Leg Swelling and Weight Gain      Discharge Diagnoses     Active Hospital Problems    Diagnosis  POA    **Acute diastolic congestive heart failure [I50.31]  Yes    Iron deficiency anemia [D50.9]  Yes    Stage 3a chronic kidney disease [N18.31]  Yes    History of stroke [Z86.73]  Not Applicable    Paroxysmal atrial fibrillation [I48.0]  Yes    Alzheimer's dementia [G30.9, F02.80]  Yes    Essential hypertension [I10]  Yes      Resolved Hospital Problems   No resolved problems to display.        Hospital Course     Ms. Hernadez is a 89 y.o. female with a history of hypertension, chronic kidney disease, iron deficiency anemia, paroxysmal A-fib hypertension and dementia who presented to [facility] initially complaining of leg swelling and weight gain.  Please see the admitting history and physical for further details.  She was found to have new onset heart failure with valvular heart disease and atrial fibrillation and was admitted to the hospital for further evaluation and treatment.  She has had a 30 pound weight gain in the last few months and had pretty significant lower extremity edema on presentation.  She was started on diuretics and improved fairly quickly over the course the hospitalization.  She was transition to oral torsemide and is maintaining a negative fluid balance and weight is decreasing.  She was anemic with iron deficiency and received IV iron and packed red blood cells.  Hemoglobin remained stable and family deferred endoscopic evaluation at this time.  Anticoagulation has been held due to the potential for bleeding and likely her risk of bleeding is prohibitive at this point without endoscopic evaluation.  Ultimately I think given her dementia and her significant medical frailty  significant conversation should be had in the outpatient setting regarding her goals of care.  I think it would be pretty appropriate to begin palliative discussions in order to prevent further harm with aggressive medical interventions.  The patient is a DNR during this hospitalization and ongoing discussion should be had as such with advance care planning in the outpatient setting.    Day of Discharge     Subjective:  Doing okay very hard of hearing denies new issues or complaints    Physical Exam:  Temp:  [98.2 °F (36.8 °C)-98.6 °F (37 °C)] 98.2 °F (36.8 °C)  Heart Rate:  [63-67] 65  Resp:  [18] 18  BP: (103-119)/(75-93) 119/93  Body mass index is 24.2 kg/m².  Physical Exam  Vitals and nursing note reviewed.   Constitutional:       General: She is not in acute distress.     Appearance: She is ill-appearing.   Cardiovascular:      Rate and Rhythm: Normal rate.   Pulmonary:      Effort: Pulmonary effort is normal. No respiratory distress.   Neurological:      Mental Status: She is alert. Mental status is at baseline. She is disoriented.         Consultants     Consult Orders (all) (From admission, onward)       Start     Ordered    03/23/25 0434  Inpatient Gastroenterology Consult  Once        Specialty:  Gastroenterology  Provider:  Tena Oliveira MD    03/23/25 0434    03/22/25 0702  Inpatient Cardiology Consult  IN AM        Specialty:  Cardiology  Provider:  Yossi Pastor MD    03/21/25 1945 03/21/25 1814  [facility] (on-call MD unless specified) Details  Once,   Status:  Canceled        Specialty:  Hospitalist  Provider:  (Not yet assigned)    03/21/25 1813                  Procedures     * Surgery not found *    Imaging Results (All)       Procedure Component Value Units Date/Time    XR Chest PA & Lateral [[id number]] Collected: 03/25/25 1731     Updated: 03/25/25 1737    Narrative:      XR CHEST PA AND LATERAL-        INDICATION: Dyspnea, congestive heart failure     COMPARISON: Chest radiograph March  21, 2025     TECHNIQUE: 2 view chest     FINDINGS:      Vascular congestion. Ill-defined basilar lung opacities. Blunting  costophrenic angles. Stable mediastinum. Enlarged cardiac silhouette.  Enlarged aortic knob measuring 4.7 cm.       Impression:         1. Stable chest.  2. Cardiomegaly with vascular congestion.  3. Ill-defined basilar opacities, possible atelectasis.  4. Small pleural effusions.  5. Stable aneurysmal aortic knob     This report was finalized on 3/25/2025 5:34 PM by Dr. Thang Ramos M.D on Workstation: [id number]       XR Chest 1 View [[id number]] Collected: 03/21/25 1730     Updated: 03/21/25 1735    Narrative:      XR CHEST 1 VW-     CLINICAL HISTORY:  swelling     COMPARISON: 7/4/2022     FINDINGS: A single portable view of the chest demonstrates moderate  cardiomegaly. Bibasilar atelectasis/infiltrate is appreciated. There is  increased density present at the lung bases which may represent layering  pleural fluid collections. The pulmonary interstitium is mildly  prominent parahilar regions bilaterally, more prominent as compared to  7/4/2022, accentuated by inspiratory effort. Mild congestive changes  cannot be excluded.           This report was finalized on 3/21/2025 5:32 PM by Dr. Ruddy Delatorre M.D  on Workstation: [id number]             Results for orders placed during the hospital encounter of 02/05/20    Duplex Venous Lower Extremity - Bilateral CAR    Interpretation Summary  · Acute left lower extremity deep vein thrombosis noted in the gastrocnemius/soleal.  · All other veins appeared normal bilaterally.    Results for orders placed during the hospital encounter of 03/21/25    Adult Transthoracic Echo Complete W/ Cont if Necessary Per Protocol    Interpretation Summary    Left ventricular systolic function is normal. Left ventricular ejection fraction appears to be 56 - 60%.    Left ventricular wall thickness is consistent with mild concentric hypertrophy.    The right  ventricular cavity is mildly dilated.    The right atrial cavity is severely  dilated.    Severe tricuspid valve regurgitation is present.    Estimated right ventricular systolic pressure from tricuspid regurgitation is moderately elevated (45-55 mmHg).    Moderate pulmonic valve regurgitation is present.    Moderate dilation of the ascending aorta is present 5.1 cm.    Pertinent Labs     Results from last 7 days   Lab Units 03/27/25  0601 03/26/25  0624 03/25/25  0548 03/24/25  0755 03/23/25  1636 03/23/25  0845   WBC 10*3/mm3 6.05 6.28 6.57  --   --  5.63   HEMOGLOBIN g/dL 9.0* 9.1* 9.3* 9.0*   < > 7.3*  7.3*   PLATELETS 10*3/mm3 235 266 262  --   --  330    < > = values in this interval not displayed.     Results from last 7 days   Lab Units 03/27/25  0601 03/25/25  1532 03/25/25  0548 03/24/25  0422 03/23/25  1636 03/23/25  0845   SODIUM mmol/L 146*  --  144 145  --  143   POTASSIUM mmol/L 3.4* 4.0 3.8 4.0   < > 3.9   CHLORIDE mmol/L 107  --  108* 109*  --  108*   CO2 mmol/L 27.9  --  25.0 25.8  --  27.0   BUN mg/dL 16  --  21 23  --  21   CREATININE mg/dL 1.42*  --  1.40* 1.55*  --  1.29*   GLUCOSE mg/dL 108*  --  94 105*  --  106*   EGFR mL/min/1.73 34.6*  --  35.2* 31.1*  --  38.8*    < > = values in this interval not displayed.     Results from last 7 days   Lab Units 03/22/25  0242 03/21/25  1645   ALBUMIN g/dL 3.2* 3.2*   BILIRUBIN mg/dL 0.6 0.7   ALK PHOS U/L 106 119*   AST (SGOT) U/L 52* 55*   ALT (SGPT) U/L 25 28     Results from last 7 days   Lab Units 03/27/25  0601 03/25/25  0548 03/24/25  0422 03/23/25  0845 03/22/25  0242 03/21/25  1645   CALCIUM mg/dL 8.8 9.1 9.0 9.0 8.9 9.0   ALBUMIN g/dL  --   --   --   --  3.2* 3.2*   MAGNESIUM mg/dL  --   --   --   --  2.1  --    PHOSPHORUS mg/dL  --   --   --   --  3.1  --        Results from last 7 days   Lab Units 03/21/25  1824 03/21/25  1645   HSTROP T ng/L 28* 35*   PROBNP pg/mL  --  5,462.0*     Results from last 7 days   Lab Units 03/27/25  0601  "  URIC ACID mg/dL 9.2*         Invalid input(s): \"LDLCALC\"          Test Results Pending at Discharge     Pending Results       Procedure [Order ID] Specimen - Date/Time    Potassium [[id number]]     Specimen: Blood               Discharge Details        Discharge Medications        New Medications        Instructions Start Date   torsemide 20 MG tablet  Commonly known as: DEMADEX   20 mg, Oral, Daily   Start Date: March 28, 2025            Changes to Medications        Instructions Start Date   atenolol 50 MG tablet  Commonly known as: TENORMIN  What changed:   medication strength  how much to take  when to take this   50 mg, Oral, Every 24 Hours Scheduled   Start Date: March 28, 2025            Continue These Medications        Instructions Start Date   acetaminophen 500 MG tablet  Commonly known as: TYLENOL   500 mg, Every 6 Hours PRN      atorvastatin 80 MG tablet  Commonly known as: LIPITOR   80 mg, Nightly      donepezil 5 MG tablet  Commonly known as: ARICEPT   1 tablet, Daily      Lidocaine 4 %   1 patch, Transdermal, Every 24 Hours Scheduled, Remove & Discard patch within 12 hours or as directed by MD      metroNIDAZOLE 0.75 % cream  Commonly known as: METROCREAM   APPLY TOPICALLY TO FACE TWICE DAILY      One-A-Day Womens 50 Plus tablet tablet  Generic drug: multivitamin with minerals   Take  by mouth.      triamcinolone 0.1 % ointment  Commonly known as: KENALOG   1 Application Daily. Both legs      vitamin D3 125 MCG (5000 UT) capsule capsule   5,000 Units, Daily             Stop These Medications      amLODIPine 5 MG tablet  Commonly known as: NORVASC     apixaban 2.5 MG tablet tablet  Commonly known as: ELIQUIS     bisacodyl 10 MG suppository  Commonly known as: DULCOLAX     bisacodyl 5 MG EC tablet  Commonly known as: DULCOLAX     fish oil 1000 MG capsule capsule     furosemide 20 MG tablet  Commonly known as: LASIX     mupirocin 2 % cream  Commonly known as: BACTROBAN     mupirocin 2 % " ointment  Commonly known as: BACTROBAN     polyethylene glycol 17 g packet  Commonly known as: MIRALAX     sennosides-docusate 8.6-50 MG per tablet  Commonly known as: PERICOLACE     vitamin B-12 1000 MCG tablet  Commonly known as: CYANOCOBALAMIN              Allergies   Allergen Reactions    Sulfa Antibiotics Anaphylaxis    Lisinopril     Milk-Related Compounds Diarrhea    Olmesartan        Discharge Disposition:  Skilled Nursing Facility (DC - External)      Discharge Diet:  Diet Order   Procedures    Diet: Cardiac; Healthy Heart (2-3 Na+); Fluid Consistency: Thin (IDDSI 0)       Discharge Activity:   Activity Instructions       Activity as Tolerated              CODE STATUS:    Code Status and Medical Interventions: No CPR (Do Not Attempt to Resuscitate); Limited Support; No intubation (DNI)   Ordered at: 03/22/25 5875     Code Status (Patient has no pulse and is not breathing):    No CPR (Do Not Attempt to Resuscitate)     Medical Interventions (Patient has pulse or is breathing):    Limited Support     Medical Intervention Limits:    No intubation (DNI)     Level Of Support Discussed With:    Health Care Surrogate       No future appointments.  Additional Instructions for the Follow-ups that You Need to Schedule       Discharge Follow-up with PCP   As directed       Currently Documented PCP:    Andie Song APRN    PCP Phone Number:    [phone]     Follow Up Details: 1-2 weeks        Discharge Follow-up with Specialty: Cardiology; 1 Week   As directed      Specialty: Cardiology   Follow Up: 1 Week               Follow-up Information       Andie Song APRN .    Specialties: Nurse Practitioner, Family Medicine  Why: 1-2 weeks  Contact information:  [address] [address]  [phone]                             Additional Instructions for the Follow-ups that You Need to Schedule       Discharge Follow-up with PCP   As directed       Currently Documented PCP:     Andie Song APRN    PCP Phone Number:    [phone]     Follow Up Details: 1-2 weeks        Discharge Follow-up with Specialty: Cardiology; 1 Week   As directed      Specialty: Cardiology   Follow Up: 1 Week            Time Spent on Discharge:  Greater than 30 minutes      Ruddy Manley MD  [facility]  03/27/25  08:46 EDT

## 2025-03-27 NOTE — PLAN OF CARE
Goal Outcome Evaluation:   Alert and confused at baseline. Had another bowel movement this am. No significant events noted overnight. Possible discharge back to facility ([facility]) today.

## 2025-03-27 NOTE — DISCHARGE PLACEMENT REQUEST
"Francisco Hernadez N (89 y.o. Female)       Date of Birth   06/01/1931    Social Security Number       Address   [address] [place], [place] [address]    Home Phone   [phone]    MRN   [id number]       [facility]    Marital Status                               Admission Date   3/21/2025    Admission Type   Emergency    Admitting Provider   Johnie Darby MD    Attending Provider   Ruddy Manley MD    Department, Room/Bed   [address], E467/1       Discharge Date       Discharge Disposition   Skilled Nursing Facility (DC - External)    Discharge Destination                                 Attending Provider: Ruddy Manley MD    Allergies: Sulfa Antibiotics, Lisinopril, Milk-related Compounds, Olmesartan    Isolation: None   Infection: None   Code Status: No CPR    Ht: 167.6 cm (66\")   Wt: 68 kg (149 lb 14.6 oz)    Admission Cmt: None   Principal Problem: Acute diastolic congestive heart failure [I50.31]                   Active Insurance as of 3/21/2025       Primary Coverage       Payor Plan Insurance Group Employer/Plan Group    MEDICARE MEDICARE A & B        Payor Plan Address Payor Plan Phone Number Payor Plan Fax Number Effective Dates    [address] [phone]  5/1/1996 - None Entered    [facility] [id number]         Subscriber Name Subscriber Birth Date Member ID       FRANCISCO HERNADEZ N 6/1/1931 [id number]               Secondary Coverage       Payor Plan Insurance Group Employer/Plan Group    [facility] SUPP [id number]       Payor Plan Address Payor Plan Phone Number Payor Plan Fax Number Effective Dates    [address]   12/1/2016 - None Entered    [facility] [id number]         Subscriber Name Subscriber Birth Date Member ID       FRANCISCO HERNADEZ N 6/1/1931 [id number]                     Emergency Contacts        (Rel.) Home Phone Work Phone Mobile Phone    Rhea Angelo (Legal " Monserrat) -- [phone]                   Discharge Summary        Ruddy Manley MD at 25 0846              Patient Name: Ryann Hernadez  : 1931  MRN: [id number]    Date of Admission: 3/21/2025  Date of Discharge:  3/27/2025  Primary Care Physician: Andie Song APRN      Chief Complaint:   Leg Swelling and Weight Gain      Discharge Diagnoses     Active Hospital Problems    Diagnosis  POA    **Acute diastolic congestive heart failure [I50.31]  Yes    Iron deficiency anemia [D50.9]  Yes    Stage 3a chronic kidney disease [N18.31]  Yes    History of stroke [Z86.73]  Not Applicable    Paroxysmal atrial fibrillation [I48.0]  Yes    Alzheimer's dementia [G30.9, F02.80]  Yes    Essential hypertension [I10]  Yes      Resolved Hospital Problems   No resolved problems to display.        Hospital Course     Ms. Hernadez is a 89 y.o. female with a history of hypertension, chronic kidney disease, iron deficiency anemia, paroxysmal A-fib hypertension and dementia who presented to [facility] initially complaining of leg swelling and weight gain.  Please see the admitting history and physical for further details.  She was found to have new onset heart failure with valvular heart disease and atrial fibrillation and was admitted to the hospital for further evaluation and treatment.  She has had a 30 pound weight gain in the last few months and had pretty significant lower extremity edema on presentation.  She was started on diuretics and improved fairly quickly over the course the hospitalization.  She was transition to oral torsemide and is maintaining a negative fluid balance and weight is decreasing.  She was anemic with iron deficiency and received IV iron and packed red blood cells.  Hemoglobin remained stable and family deferred endoscopic evaluation at this time.  Anticoagulation has been held due to the potential for bleeding and likely her risk of bleeding is  prohibitive at this point without endoscopic evaluation.  Ultimately I think given her dementia and her significant medical frailty significant conversation should be had in the outpatient setting regarding her goals of care.  I think it would be pretty appropriate to begin palliative discussions in order to prevent further harm with aggressive medical interventions.  The patient is a DNR during this hospitalization and ongoing discussion should be had as such with advance care planning in the outpatient setting.    Day of Discharge     Subjective:  Doing okay very hard of hearing denies new issues or complaints    Physical Exam:  Temp:  [98.2 °F (36.8 °C)-98.6 °F (37 °C)] 98.2 °F (36.8 °C)  Heart Rate:  [63-67] 65  Resp:  [18] 18  BP: (103-119)/(75-93) 119/93  Body mass index is 24.2 kg/m².  Physical Exam  Vitals and nursing note reviewed.   Constitutional:       General: She is not in acute distress.     Appearance: She is ill-appearing.   Cardiovascular:      Rate and Rhythm: Normal rate.   Pulmonary:      Effort: Pulmonary effort is normal. No respiratory distress.   Neurological:      Mental Status: She is alert. Mental status is at baseline. She is disoriented.         Consultants     Consult Orders (all) (From admission, onward)       Start     Ordered    03/23/25 0434  Inpatient Gastroenterology Consult  Once        Specialty:  Gastroenterology  Provider:  Tena Oliveira MD    03/23/25 0434    03/22/25 0702  Inpatient Cardiology Consult  IN AM        Specialty:  Cardiology  Provider:  Yossi Pastor MD    03/21/25 1945    03/21/25 1814  [facility] (on-call MD unless specified) Details  Once,   Status:  Canceled        Specialty:  Hospitalist  Provider:  (Not yet assigned)    03/21/25 1813                  Procedures     * Surgery not found *    Imaging Results (All)       Procedure Component Value Units Date/Time    XR Chest PA & Lateral [[id number]] Collected: 03/25/25 1731     Updated: 03/25/25 1737     Narrative:      XR CHEST PA AND LATERAL-        INDICATION: Dyspnea, congestive heart failure     COMPARISON: Chest radiograph March 21, 2025     TECHNIQUE: 2 view chest     FINDINGS:      Vascular congestion. Ill-defined basilar lung opacities. Blunting  costophrenic angles. Stable mediastinum. Enlarged cardiac silhouette.  Enlarged aortic knob measuring 4.7 cm.       Impression:         1. Stable chest.  2. Cardiomegaly with vascular congestion.  3. Ill-defined basilar opacities, possible atelectasis.  4. Small pleural effusions.  5. Stable aneurysmal aortic knob     This report was finalized on 3/25/2025 5:34 PM by Dr. Thang Ramos M.D on Workstation: [id number]       XR Chest 1 View [[id number]] Collected: 03/21/25 1730     Updated: 03/21/25 1735    Narrative:      XR CHEST 1 VW-     CLINICAL HISTORY:  swelling     COMPARISON: 7/4/2022     FINDINGS: A single portable view of the chest demonstrates moderate  cardiomegaly. Bibasilar atelectasis/infiltrate is appreciated. There is  increased density present at the lung bases which may represent layering  pleural fluid collections. The pulmonary interstitium is mildly  prominent parahilar regions bilaterally, more prominent as compared to  7/4/2022, accentuated by inspiratory effort. Mild congestive changes  cannot be excluded.           This report was finalized on 3/21/2025 5:32 PM by Dr. Ruddy Delatorre M.D  on Workstation: [id number]             Results for orders placed during the hospital encounter of 02/05/20    Duplex Venous Lower Extremity - Bilateral CAR    Interpretation Summary  · Acute left lower extremity deep vein thrombosis noted in the gastrocnemius/soleal.  · All other veins appeared normal bilaterally.    Results for orders placed during the hospital encounter of 03/21/25    Adult Transthoracic Echo Complete W/ Cont if Necessary Per Protocol    Interpretation Summary    Left ventricular systolic function is normal. Left ventricular ejection  fraction appears to be 56 - 60%.    Left ventricular wall thickness is consistent with mild concentric hypertrophy.    The right ventricular cavity is mildly dilated.    The right atrial cavity is severely  dilated.    Severe tricuspid valve regurgitation is present.    Estimated right ventricular systolic pressure from tricuspid regurgitation is moderately elevated (45-55 mmHg).    Moderate pulmonic valve regurgitation is present.    Moderate dilation of the ascending aorta is present 5.1 cm.    Pertinent Labs     Results from last 7 days   Lab Units 03/27/25  0601 03/26/25  0624 03/25/25  0548 03/24/25  0755 03/23/25  1636 03/23/25  0845   WBC 10*3/mm3 6.05 6.28 6.57  --   --  5.63   HEMOGLOBIN g/dL 9.0* 9.1* 9.3* 9.0*   < > 7.3*  7.3*   PLATELETS 10*3/mm3 235 266 262  --   --  330    < > = values in this interval not displayed.     Results from last 7 days   Lab Units 03/27/25  0601 03/25/25  1532 03/25/25  0548 03/24/25  0422 03/23/25  1636 03/23/25  0845   SODIUM mmol/L 146*  --  144 145  --  143   POTASSIUM mmol/L 3.4* 4.0 3.8 4.0   < > 3.9   CHLORIDE mmol/L 107  --  108* 109*  --  108*   CO2 mmol/L 27.9  --  25.0 25.8  --  27.0   BUN mg/dL 16  --  21 23  --  21   CREATININE mg/dL 1.42*  --  1.40* 1.55*  --  1.29*   GLUCOSE mg/dL 108*  --  94 105*  --  106*   EGFR mL/min/1.73 34.6*  --  35.2* 31.1*  --  38.8*    < > = values in this interval not displayed.     Results from last 7 days   Lab Units 03/22/25  0242 03/21/25  1645   ALBUMIN g/dL 3.2* 3.2*   BILIRUBIN mg/dL 0.6 0.7   ALK PHOS U/L 106 119*   AST (SGOT) U/L 52* 55*   ALT (SGPT) U/L 25 28     Results from last 7 days   Lab Units 03/27/25  0601 03/25/25  0548 03/24/25  0422 03/23/25  0845 03/22/25  0242 03/21/25  1645   CALCIUM mg/dL 8.8 9.1 9.0 9.0 8.9 9.0   ALBUMIN g/dL  --   --   --   --  3.2* 3.2*   MAGNESIUM mg/dL  --   --   --   --  2.1  --    PHOSPHORUS mg/dL  --   --   --   --  3.1  --        Results from last 7 days   Lab Units 03/21/25  0958  "03/21/25  1645   HSTROP T ng/L 28* 35*   PROBNP pg/mL  --  5,462.0*     Results from last 7 days   Lab Units 03/27/25  0601   URIC ACID mg/dL 9.2*         Invalid input(s): \"LDLCALC\"          Test Results Pending at Discharge     Pending Results       Procedure [Order ID] Specimen - Date/Time    Potassium [[id number]]     Specimen: Blood               Discharge Details        Discharge Medications        New Medications        Instructions Start Date   torsemide 20 MG tablet  Commonly known as: DEMADEX   20 mg, Oral, Daily   Start Date: March 28, 2025            Continue These Medications        Instructions Start Date   acetaminophen 500 MG tablet  Commonly known as: TYLENOL   500 mg, Every 6 Hours PRN      atenolol 25 MG tablet  Commonly known as: TENORMIN   25 mg, Oral, Daily      atorvastatin 80 MG tablet  Commonly known as: LIPITOR   80 mg, Nightly      donepezil 5 MG tablet  Commonly known as: ARICEPT   1 tablet, Daily      Lidocaine 4 %   1 patch, Transdermal, Every 24 Hours Scheduled, Remove & Discard patch within 12 hours or as directed by MD      metroNIDAZOLE 0.75 % cream  Commonly known as: METROCREAM   APPLY TOPICALLY TO FACE TWICE DAILY      One-A-Day Womens 50 Plus tablet tablet  Generic drug: multivitamin with minerals   Take  by mouth.      triamcinolone 0.1 % ointment  Commonly known as: KENALOG   1 Application Daily. Both legs      vitamin D3 125 MCG (5000 UT) capsule capsule   5,000 Units, Daily             Stop These Medications      amLODIPine 5 MG tablet  Commonly known as: NORVASC     apixaban 2.5 MG tablet tablet  Commonly known as: ELIQUIS     bisacodyl 10 MG suppository  Commonly known as: DULCOLAX     bisacodyl 5 MG EC tablet  Commonly known as: DULCOLAX     fish oil 1000 MG capsule capsule     furosemide 20 MG tablet  Commonly known as: LASIX     mupirocin 2 % cream  Commonly known as: BACTROBAN     mupirocin 2 % ointment  Commonly known as: BACTROBAN     polyethylene glycol 17 g " packet  Commonly known as: MIRALAX     sennosides-docusate 8.6-50 MG per tablet  Commonly known as: PERICOLACE     vitamin B-12 1000 MCG tablet  Commonly known as: CYANOCOBALAMIN              Allergies   Allergen Reactions    Sulfa Antibiotics Anaphylaxis    Lisinopril     Milk-Related Compounds Diarrhea    Olmesartan        Discharge Disposition:  Skilled Nursing Facility (DC - External)      Discharge Diet:  Diet Order   Procedures    Diet: Cardiac; Healthy Heart (2-3 Na+); Fluid Consistency: Thin (IDDSI 0)       Discharge Activity:   Activity Instructions       Activity as Tolerated              CODE STATUS:    Code Status and Medical Interventions: No CPR (Do Not Attempt to Resuscitate); Limited Support; No intubation (DNI)   Ordered at: 03/22/25 1508     Code Status (Patient has no pulse and is not breathing):    No CPR (Do Not Attempt to Resuscitate)     Medical Interventions (Patient has pulse or is breathing):    Limited Support     Medical Intervention Limits:    No intubation (DNI)     Level Of Support Discussed With:    Health Care Surrogate       No future appointments.  Additional Instructions for the Follow-ups that You Need to Schedule       Discharge Follow-up with PCP   As directed       Currently Documented PCP:    Andie Song APRN    PCP Phone Number:    [phone]     Follow Up Details: 1-2 weeks        Discharge Follow-up with Specialty: Cardiology; 1 Week   As directed      Specialty: Cardiology   Follow Up: 1 Week               Follow-up Information       Andie Song APRN .    Specialties: Nurse Practitioner, Family Medicine  Why: 1-2 weeks  Contact information:  [address] [id number] [phone]                             Additional Instructions for the Follow-ups that You Need to Schedule       Discharge Follow-up with PCP   As directed       Currently Documented PCP:    Andie Song APRN    PCP Phone Number:    [phone]     Follow  Up Details: 1-2 weeks        Discharge Follow-up with Specialty: Cardiology; 1 Week   As directed      Specialty: Cardiology   Follow Up: 1 Week            Time Spent on Discharge:  Greater than 30 minutes      Ruddy Bernard MD  [facility]  03/27/25  08:46 EDT    Electronically signed by Ruddy Bernard MD at 03/27/25 0848       Discharge Order (From admission, onward)       Start     Ordered    03/27/25 0843  Discharge patient  Once        Expected Discharge Date: 03/27/25   Discharge Disposition: Skilled Nursing Facility (DC - External)   Physician of Record for Attribution - Please select from Treatment Team: RUDDY BERNARD [[id number]]   Review needed by CMO to determine Physician of Record: No      Question Answer Comment   Physician of Record for Attribution - Please select from Treatment Team RUDDY BERNARD    Review needed by CMO to determine Physician of Record No        03/27/25 0845                   2022 22:41

## 2025-03-27 NOTE — PLAN OF CARE
Problem: Adult Inpatient Plan of Care  Goal: Plan of Care Review  Outcome: Progressing  Goal: Patient-Specific Goal (Individualized)  Outcome: Progressing  Goal: Absence of Hospital-Acquired Illness or Injury  Outcome: Progressing  Intervention: Identify and Manage Fall Risk  Recent Flowsheet Documentation  Taken 3/27/2025 0000 by Laina Mauricio RN  Safety Promotion/Fall Prevention: safety round/check completed  Taken 3/26/2025 2200 by Laina Mauricio RN  Safety Promotion/Fall Prevention: safety round/check completed  Taken 3/26/2025 2028 by Laina Mauricio RN  Safety Promotion/Fall Prevention:   activity supervised   assistive device/personal items within [facility]   fall prevention program maintained  Intervention: Prevent Skin Injury  Recent Flowsheet Documentation  Taken 3/27/2025 0000 by Laina Mauricio RN  Body Position:   position changed independently   right  Taken 3/26/2025 2028 by Laina Mauricio RN  Body Position: supine  Goal: Optimal Comfort and Wellbeing  Outcome: Progressing  Goal: Readiness for Transition of Care  Outcome: Progressing     Problem: Fall Injury Risk  Goal: Absence of Fall and Fall-Related Injury  Outcome: Progressing  Intervention: Promote Injury-Free Environment  Recent Flowsheet Documentation  Taken 3/27/2025 0000 by Laina Mauricio RN  Safety Promotion/Fall Prevention: safety round/check completed  Taken 3/26/2025 2200 by Laina Mauricio RN  Safety Promotion/Fall Prevention: safety round/check completed  Taken 3/26/2025 2028 by Laina Mauricio RN  Safety Promotion/Fall Prevention:   activity supervised   assistive device/personal items within [facility]   fall prevention program maintained     Problem: Skin Injury Risk Increased  Goal: Skin Health and Integrity  Outcome: Progressing  Intervention: Optimize Skin Protection  Recent Flowsheet Documentation  Taken 3/26/2025 2028 by Laina Mauricio RN  Activity Management: back to bed     Problem: Comorbidity Management  Goal: Blood Pressure in  Desired Range  Outcome: Progressing     Problem: Heart Failure  Goal: Optimal Coping  Outcome: Progressing  Goal: Optimal Cardiac Output and Blood Flow  Outcome: Progressing  Goal: Stable Heart Rate and Rhythm  Outcome: Progressing  Goal: Fluid and Electrolyte Balance  Outcome: Progressing  Goal: Optimal Functional Ability  Outcome: Progressing  Intervention: Optimize Functional Ability  Recent Flowsheet Documentation  Taken 3/26/2025 2028 by Laina Mauricio RN  Activity Management: back to bed  Goal: Improved Oral Intake  Outcome: Progressing  Goal: Effective Oxygenation and Ventilation  Outcome: Progressing  Intervention: Promote Airway Secretion Clearance  Recent Flowsheet Documentation  Taken 3/26/2025 2028 by Laina Mauricio RN  Activity Management: back to bed  Cough And Deep Breathing: refused  Goal: Effective Breathing Pattern During Sleep  Outcome: Progressing   Goal Outcome Evaluation:

## 2025-03-27 NOTE — NURSING NOTE
AVS and DC summary printed and sent to Yusef Flores along with physical copy with pt daughter. IV removed per order. Educated on discharge instructions. This RN called report and spoke with MELL Gamino who is accepting the pt with all questions answered at this time. Pt is stable at time of discharge.

## 2025-03-28 NOTE — CASE MANAGEMENT/SOCIAL WORK
Case Management Discharge Note      Final Note: Returned to [facility]. Daughter transported. Called and left [facility] stating pt was is returning. Daughter transported at D/C         Selected Continued Care - Discharged on 3/27/2025 Admission date: 3/21/2025 - Discharge disposition: Skilled Nursing Facility (DC - External)      Destination Coordination complete.      Service Provider Services Address Phone Fax Patient Preferred    [facility] [address] [facility] [address] [phone] --              Durable Medical Equipment    No services have been selected for the patient.                Dialysis/Infusion    No services have been selected for the patient.                Home Medical Care    No services have been selected for the patient.                Therapy    No services have been selected for the patient.                Community Resources    No services have been selected for the patient.                Community & DME    No services have been selected for the patient.                    Transportation Services  Private: Car    Final Discharge Disposition Code: 01 - home or self-care

## 2025-03-31 ENCOUNTER — TELEPHONE (OUTPATIENT)
Dept: CARDIOLOGY | Age: OVER 89
End: 2025-03-31
Payer: MEDICARE

## 2025-03-31 NOTE — TELEPHONE ENCOUNTER
Pt's family member, Rhea (okay per ROBERTO) called.  Pt was discharged from the hospital recently and went to [facility] assisted living.  Facility contacted Rhea that they had a question about one of pt's medications.    I called facility and spoke with Bernie who transferred me to [facility].  We reviewed medications.  They had it listed that pt was on atenolol 25 mg QD although the order is 50 mg QD.  We reviewed this and I'm going to fax this order now to them at [phone].  I encouraged for them to reach out with any further questions regarding medications.    Shabana NGUYEN RN  Triage [facility]  03/31/25 11:27 EDT

## 2025-04-21 NOTE — PROGRESS NOTES
Date of Office Visit: 2025  Encounter Provider: JENNA Glasgow  Place of Service: [facility] CARDIOLOGY  Patient Name: Ryann Hernadez  :1931  Primary Cardiologist: Dr. Yossi Pastor    Chief Complaint   Patient presents with    HFpEF    Atrial Fibrillation    Hospital Follow Up Visit   :     HPI: Ryann Hernadez is a 89 y.o. female who presents today for hospital follow-up visit.  She is a new patient to me and I reviewed her medical records.    She has known hypertension, chronic kidney disease, iron deficiency anemia, hearing loss, and dementia.  We follow her for permanent atrial fibrillation and ascending aorta dilation.    In 2025, she presented to the [facility] ED with 30 pound weight gain and lower extremity edema.  She was treated with IV diuretics and her lower extremity edema improved.  She had iron deficiency anemia and was treated with IV iron and packed red blood cells.  Hemoglobin was stable.  Apixaban was held due to the potential for bleeding and she declined proceeding with endoscopy evaluation.  Echocardiogram showed LVEF 56-60%, mild LVH, RV mildly dilated, borderline low right ventricular systolic function, right atrium severely dilated, aortic valve sclerosis, mild aortic regurgitation, mild mitral regurgitation, severe tricuspid regurgitation, moderate pulmonary hypertension, moderate pulmonic regurgitation, and ascending aorta dilated at 5.1 cm.  She was discharged on torsemide.    She presents today with her daughter, Bessie accompanying her.  They have not been performing daily weights at the nursing facility.  She is experiencing worsening lower extremity edema.  She reports shortness of breath and occasional orthopnea.  She denies chest pain, palpitations, dizziness, PND, or bleeding.    Her daughter explains that she has been court ordered to live in assisted living because of the dementia.  The patient repeatedly says that  she would do better if she was living at her home.      Past Medical History:   Diagnosis Date    A-fib     Acute deep vein thrombosis (DVT) of calf muscle vein of left lower extremity 02/06/2020    Allergic rhinitis     Anemia     Aneurysm, ascending aorta 03/22/2016    5 CM    Anxiety     Ataxic gait     Atypical chest pain     BPPV (benign paroxysmal positional vertigo)     Bradycardia     CHF (congestive heart failure) 03/21/2025    Chronic cystitis     Chronic headaches     Chronic nonintractable headache 09/14/2016    Chronic paroxysmal hemicrania     Depression     Esophagitis     Gastritis     GERD (gastroesophageal reflux disease)     Hearing decreased     Hematuria     High blood pressure     History of cataract     History of irritable bowel syndrome     Hypertension     IBS (irritable bowel syndrome)     Influenza A     Kidney lesion     LEFT KIDNEY CYSTIC    Labyrinthitis 07/28/2014    OA (osteoarthritis)     Osteoporosis 09/13/2018    Other abnormal clinical finding 09/21/2012    LEFT UTEROCELE    Pain of thigh     Personal history of gastric ulcer     Renal disorder     Renal insufficiency 08/07/2017    Rhabdomyolysis 07/05/2022    TIA (transient ischemic attack) 02/05/2020    UTI (urinary tract infection) 03/02/2024       Past Surgical History:   Procedure Laterality Date    BREAST LUMPECTOMY      BREAST SURGERY      CATARACT EXTRACTION      COLONOSCOPY N/A     DR. MARGARITA MENDOZA    ELBOW ARTHROTOMY  07/01/1999    DR. RYAN RITCHIE    EYE SURGERY      FRACTURE SURGERY      HAND, BROKEN FINGERS  DR. MIKE CHRISTIANSON    KNEE SURGERY  08/31/1999    DR. THADDEUS MENDES    OOPHORECTOMY      TUBAL ABDOMINAL LIGATION Bilateral 1962    DR. EDDA MANNING       Social History     Socioeconomic History    Marital status:    Tobacco Use    Smoking status: Never     Passive exposure: Never    Smokeless tobacco: Never   Vaping Use    Vaping status: Never Used   Substance and Sexual Activity    Alcohol use: No    Drug  use: No    Sexual activity: Defer       Family History   Problem Relation Age of Onset    Heart attack Mother     Arthritis Mother     Arthritis Father     Arthritis Sister     Arthritis Other     Kidney disease Other     Thyroid disease Other     Diabetes Other     Hypertension Other     Heart disease Other     Arthritis Maternal Grandmother        The following portion of the patient's history were reviewed and updated as appropriate: past medical history, past surgical history, past social history, past family history, allergies, current medications, and problem list.    Review of Systems   Constitutional: Negative.   Cardiovascular:  Positive for dyspnea on exertion, leg swelling and orthopnea.   Respiratory:  Positive for shortness of breath.    Neurological: Negative.    Psychiatric/Behavioral:  Positive for memory loss.        Allergies   Allergen Reactions    Sulfa Antibiotics Anaphylaxis    Lisinopril     Milk-Related Compounds Diarrhea    Olmesartan          Current Outpatient Medications:     acetaminophen (TYLENOL) 500 MG tablet, Take 1 tablet by mouth Every 6 (Six) Hours As Needed for Mild Pain., Disp: , Rfl:     atenolol (TENORMIN) 50 MG tablet, Take 1 tablet by mouth Daily., Disp: 30 tablet, Rfl: 0    atorvastatin (LIPITOR) 80 MG tablet, Take 1 tablet by mouth Every Night., Disp: , Rfl:     Cholecalciferol (VITAMIN D3) 5000 units capsule capsule, Take 1 capsule by mouth Daily., Disp: , Rfl:     donepezil (ARICEPT) 5 MG tablet, Take 1 tablet by mouth Daily., Disp: , Rfl:     Lidocaine 4 %, Place 1 patch on the skin as directed by provider Daily. Remove & Discard patch within 12 hours or as directed by MD, Disp: , Rfl:     metroNIDAZOLE (METROCREAM) 0.75 % cream, APPLY TOPICALLY TO FACE TWICE DAILY, Disp: , Rfl:     Multiple Vitamins-Minerals (ONE-A-DAY WOMENS 50 PLUS) tablet, Take  by mouth., Disp: , Rfl:     torsemide (DEMADEX) 20 MG tablet, Take 1 tablet by mouth Daily., Disp: , Rfl:      "triamcinolone (KENALOG) 0.1 % ointment, 1 Application Daily. Both legs, Disp: , Rfl:     Current Facility-Administered Medications:     bumetanide (BUMEX) injection 2 mg, 2 mg, Intravenous, Once, Linda Farris APRN         Objective:     Vitals:    04/22/25 1150   BP: 122/80   BP Location: Left arm   Patient Position: Sitting   Cuff Size: Adult   Pulse: 60   Weight: 63.4 kg (139 lb 12.8 oz)   Height: 167.6 cm (65.98\")     Body mass index is 22.58 kg/m².    PHYSICAL EXAM:    Vitals Reviewed.   General Appearance: No acute distress, well developed and well nourished.  Looks younger than stated age.  Eyes: Periorbital edema.  HENT: No hearing loss noted.    Respiratory: No signs of respiratory distress. Respiration rhythm and depth normal.  Clear to auscultation.   Cardiovascular:  Jugular Venous Pressure: Normal  Heart Rate and Rhythm: Irregular, irregular.  Heart Sounds: Normal S1 and S2. No S3 or S4 noted.  Murmurs: No murmurs noted. No rubs, thrills, or gallops.   Lower Extremities: Bilateral lower extremity edema noted.  Musculoskeletal: Normal movement of extremities.  Skin: General appearance normal.    Psychiatric: Patient alert and oriented to person, place, and time. Speech and behavior appropriate. Normal mood and affect.       ECG 12 Lead    Date/Time: 4/22/2025 11:57 AM  Performed by: Linda Farris APRN    Authorized by: Linda Farris APRN  Comparison: compared with previous ECG from 3/21/2025  Similar to previous ECG  Rhythm: atrial fibrillation  Rate: normal  BPM: 61  Conduction: conduction normal  QRS axis: normal  Other findings: non-specific ST-T wave changes    Clinical impression: abnormal EKG            Assessment:       Diagnosis Plan   1. Acute diastolic congestive heart failure  CBC & Differential    Basic Metabolic Panel    bumetanide (BUMEX) injection 2 mg      2. Shortness of breath  CBC & Differential    Basic Metabolic Panel    bumetanide (BUMEX) injection 2 mg      3. Lower " extremity edema  CBC & Differential    Basic Metabolic Panel    bumetanide (BUMEX) injection 2 mg      4. Valvular heart disease        5. Pulmonary hypertension        6. Permanent atrial fibrillation        7. Stage 3a chronic kidney disease        8. Aneurysm of ascending aorta without rupture        9. Essential hypertension               Plan:       1-3.  Acute diastolic heart failure.  Preserved LVEF per echocardiogram.  Symptoms include dyspnea and lower extremity edema.  Her legs are becoming more edematous.  She has not been performing daily weights at home and I have ordered this.  She will receive bumetanide 2 mg IV push in our [facility] and blood work to be obtained.  Increase torsemide to 40 mg daily.  Repeat BMP in 1 week.    4/5.  Valvular heart disease and pulmonary hypertension.  Contributing to heart failure.    6.  Permanent atrial fibrillation.  Asymptomatic and currently rate controlled on atenolol.  [id number] score of 7.  She was previously on apixaban, and this was discontinued due to anemia during her hospitalization.  Patient declined endoscopy evaluation in the hospital.  Will check a CBC today.  I will defer to Dr. Pastor if he wants to restart the apixaban.    7.  Ascending aorta dilation.  Measured 5.1 cm on echocardiogram.  She is not felt to be a surgical candidate.    8.  Hypertension: Blood pressure normal today.    9.  Stage III chronic kidney disease.  Recheck BMP today.    10/11.  Dementia and hearing loss.    12.  Follow-up with Dr. Pastor in 1 week.    As always, it has been a pleasure to participate in your patient's care. Thank you.         Sincerely,         JENNA Ojeda  [facility] Cardiology      Dictated utilizing Dragon Dictation  I spent 34 minutes reviewing her medical records/testing/previous office notes/labs, face-to-face interaction with patient, physical examination, formulating the plan of care, and discussion of plan of care with  patient.

## 2025-04-22 ENCOUNTER — HOSPITAL ENCOUNTER (OUTPATIENT)
Dept: CARDIOLOGY | Facility: HOSPITAL | Age: OVER 89
Discharge: HOME | End: 2025-04-22
Admitting: NURSE PRACTITIONER
Payer: MEDICARE

## 2025-04-22 ENCOUNTER — OFFICE VISIT (OUTPATIENT)
Dept: CARDIOLOGY | Age: OVER 89
End: 2025-04-22
Payer: MEDICARE

## 2025-04-22 ENCOUNTER — RESULTS FOLLOW-UP (OUTPATIENT)
Dept: CARDIOLOGY | Age: OVER 89
End: 2025-04-22

## 2025-04-22 VITALS
WEIGHT: 139.8 LBS | DIASTOLIC BLOOD PRESSURE: 80 MMHG | HEIGHT: 66 IN | SYSTOLIC BLOOD PRESSURE: 122 MMHG | HEART RATE: 60 BPM | BODY MASS INDEX: 22.47 KG/M2

## 2025-04-22 DIAGNOSIS — I71.21 ANEURYSM OF ASCENDING AORTA WITHOUT RUPTURE: ICD-10-CM

## 2025-04-22 DIAGNOSIS — I50.31 ACUTE DIASTOLIC CONGESTIVE HEART FAILURE: ICD-10-CM

## 2025-04-22 DIAGNOSIS — G30.9 ALZHEIMER'S DEMENTIA, UNSPECIFIED DEMENTIA SEVERITY, UNSPECIFIED TIMING OF DEMENTIA ONSET, UNSPECIFIED WHETHER BEHAVIORAL, PSYCHOTIC, OR MOOD DISTURBANCE OR ANXIETY: ICD-10-CM

## 2025-04-22 DIAGNOSIS — R60.0 LOWER EXTREMITY EDEMA: ICD-10-CM

## 2025-04-22 DIAGNOSIS — I38 VALVULAR HEART DISEASE: ICD-10-CM

## 2025-04-22 DIAGNOSIS — I27.20 PULMONARY HYPERTENSION: ICD-10-CM

## 2025-04-22 DIAGNOSIS — I10 ESSENTIAL HYPERTENSION: ICD-10-CM

## 2025-04-22 DIAGNOSIS — F02.80 ALZHEIMER'S DEMENTIA, UNSPECIFIED DEMENTIA SEVERITY, UNSPECIFIED TIMING OF DEMENTIA ONSET, UNSPECIFIED WHETHER BEHAVIORAL, PSYCHOTIC, OR MOOD DISTURBANCE OR ANXIETY: ICD-10-CM

## 2025-04-22 DIAGNOSIS — R06.02 SHORTNESS OF BREATH: ICD-10-CM

## 2025-04-22 DIAGNOSIS — I50.31 ACUTE DIASTOLIC CONGESTIVE HEART FAILURE: Primary | ICD-10-CM

## 2025-04-22 DIAGNOSIS — I48.21 PERMANENT ATRIAL FIBRILLATION: ICD-10-CM

## 2025-04-22 DIAGNOSIS — H91.90 HEARING LOSS, UNSPECIFIED HEARING LOSS TYPE, UNSPECIFIED LATERALITY: ICD-10-CM

## 2025-04-22 DIAGNOSIS — N18.31 STAGE 3A CHRONIC KIDNEY DISEASE: ICD-10-CM

## 2025-04-22 PROBLEM — I48.0 PAROXYSMAL ATRIAL FIBRILLATION: Status: RESOLVED | Noted: 2019-09-22 | Resolved: 2025-04-22

## 2025-04-22 PROBLEM — I48.0 PAROXYSMAL ATRIAL FIBRILLATION: Status: RESOLVED | Noted: 2021-02-03 | Resolved: 2025-04-22

## 2025-04-22 LAB
ANION GAP SERPL CALCULATED.3IONS-SCNC: 9.5 MMOL/L (ref 5–15)
BUN SERPL-MCNC: 14 MG/DL (ref 8–23)
BUN/CREAT SERPL: 9.9 (ref 7–25)
CALCIUM SPEC-SCNC: 9.7 MG/DL (ref 8.2–9.6)
CHLORIDE SERPL-SCNC: 106 MMOL/L (ref 98–107)
CO2 SERPL-SCNC: 27.5 MMOL/L (ref 22–29)
CREAT SERPL-MCNC: 1.42 MG/DL (ref 0.57–1)
EGFRCR SERPLBLD CKD-EPI 2021: 34.6 ML/MIN/1.73
GLUCOSE SERPL-MCNC: 105 MG/DL (ref 65–99)
POTASSIUM SERPL-SCNC: 4.6 MMOL/L (ref 3.5–5.2)
SODIUM SERPL-SCNC: 143 MMOL/L (ref 136–145)

## 2025-04-22 PROCEDURE — 85025 COMPLETE CBC W/AUTO DIFF WBC: CPT | Performed by: NURSE PRACTITIONER

## 2025-04-22 PROCEDURE — 99214 OFFICE O/P EST MOD 30 MIN: CPT | Performed by: NURSE PRACTITIONER

## 2025-04-22 PROCEDURE — 1159F MED LIST DOCD IN RCRD: CPT | Performed by: NURSE PRACTITIONER

## 2025-04-22 PROCEDURE — 36415 COLL VENOUS BLD VENIPUNCTURE: CPT

## 2025-04-22 PROCEDURE — 25010000002 BUMETANIDE PER 0.5 MG: Performed by: NURSE PRACTITIONER

## 2025-04-22 PROCEDURE — 1160F RVW MEDS BY RX/DR IN RCRD: CPT | Performed by: NURSE PRACTITIONER

## 2025-04-22 PROCEDURE — 85007 BL SMEAR W/DIFF WBC COUNT: CPT | Performed by: NURSE PRACTITIONER

## 2025-04-22 PROCEDURE — 96374 THER/PROPH/DIAG INJ IV PUSH: CPT

## 2025-04-22 PROCEDURE — 80048 BASIC METABOLIC PNL TOTAL CA: CPT | Performed by: NURSE PRACTITIONER

## 2025-04-22 PROCEDURE — 93000 ELECTROCARDIOGRAM COMPLETE: CPT | Performed by: NURSE PRACTITIONER

## 2025-04-22 RX ORDER — TORSEMIDE 20 MG/1
40 TABLET ORAL DAILY
Start: 2025-04-22

## 2025-04-22 RX ORDER — BUMETANIDE 0.25 MG/ML
2 INJECTION, SOLUTION INTRAMUSCULAR; INTRAVENOUS ONCE
Status: COMPLETED | OUTPATIENT
Start: 2025-04-22 | End: 2025-04-22

## 2025-04-22 RX ADMIN — BUMETANIDE 2 MG: 0.25 INJECTION INTRAMUSCULAR; INTRAVENOUS at 13:03

## 2025-04-23 LAB
BASOPHILS # BLD MANUAL: 0.1 10*3/MM3 (ref 0–0.2)
BASOPHILS NFR BLD MANUAL: 2 % (ref 0–1.5)
DEPRECATED RDW RBC AUTO: 54.7 FL (ref 37–54)
EOSINOPHIL # BLD MANUAL: 0.2 10*3/MM3 (ref 0–0.4)
EOSINOPHIL NFR BLD MANUAL: 4.1 % (ref 0.3–6.2)
ERYTHROCYTE [DISTWIDTH] IN BLOOD BY AUTOMATED COUNT: 17.5 % (ref 12.3–15.4)
HCT VFR BLD AUTO: 32 % (ref 34–46.6)
HGB BLD-MCNC: 9.6 G/DL (ref 12–15.9)
HYPOCHROMIA BLD QL: ABNORMAL
LYMPHOCYTES # BLD MANUAL: 0.96 10*3/MM3 (ref 0.7–3.1)
LYMPHOCYTES NFR BLD MANUAL: 10.2 % (ref 5–12)
MCH RBC QN AUTO: 26.2 PG (ref 26.6–33)
MCHC RBC AUTO-ENTMCNC: 30 G/DL (ref 31.5–35.7)
MCV RBC AUTO: 87.2 FL (ref 79–97)
MONOCYTES # BLD: 0.51 10*3/MM3 (ref 0.1–0.9)
NEUTROPHILS # BLD AUTO: 3.2 10*3/MM3 (ref 1.7–7)
NEUTROPHILS NFR BLD MANUAL: 64.3 % (ref 42.7–76)
NRBC BLD AUTO-RTO: 0 /100 WBC (ref 0–0.2)
OVALOCYTES BLD QL SMEAR: ABNORMAL
PLAT MORPH BLD: NORMAL
PLATELET # BLD AUTO: 215 10*3/MM3 (ref 140–450)
PMV BLD AUTO: 10.2 FL (ref 6–12)
RBC # BLD AUTO: 3.67 10*6/MM3 (ref 3.77–5.28)
VARIANT LYMPHS NFR BLD MANUAL: 19.4 % (ref 19.6–45.3)
WBC MORPH BLD: NORMAL
WBC NRBC COR # BLD AUTO: 4.97 10*3/MM3 (ref 3.4–10.8)

## 2025-04-24 ENCOUNTER — TELEPHONE (OUTPATIENT)
Dept: CARDIOLOGY | Age: OVER 89
End: 2025-04-24
Payer: MEDICARE

## 2025-04-24 NOTE — TELEPHONE ENCOUNTER
Patient gained 2 pounds.  Please call nursing facility (see below).  Blood counts still show anemia, but have improved.  BMP stable with creatinine of 1.42 which is unchanged.  Potassium normal.    I would have her take an extra dose of torsemide 20 mg today and tomorrow.  Resume back to the torsemide 40 mg daily on Saturday.  Thank you

## 2025-04-24 NOTE — TELEPHONE ENCOUNTER
Sent via fax as transmission from [facility] is still in process.    Thank you,  Esme FREEMAN RN  Triage Nurse BRENNAN  04/24/25 14:22 EDT

## 2025-04-24 NOTE — TELEPHONE ENCOUNTER
Sent encounter via Right[employer]ax.    Thank you,  Esme FREEMAN RN  Triage Nurse BRENNAN  04/24/25 13:38 EDT

## 2025-04-24 NOTE — TELEPHONE ENCOUNTER
Called [facility] ([phone]) and spoke with SONDRA Reardon.  Reviewed JENNA Farris's message and recommendations with her and Caron verbalized understanding, with repeat back of medication instructions.    Provided Caron with office phone number.    Caron requested copy of office note be faxed to [phone], which will be sent via [facility].    Thank you,  Esme FREEMAN RN  Triage Nurse BRENNAN  04/24/25 13:36 EDT

## 2025-04-25 NOTE — TELEPHONE ENCOUNTER
Fax call report shows successful transmission on 4/24/2025 at 3:59 pm.    Thank you,  Esme FREEMAN RN  Triage Nurse MARIBELG  04/25/25 09:02 EDT

## 2025-04-28 NOTE — TELEPHONE ENCOUNTER
Carmen Jeffrey ( Nurse) at [facility] called stating they increased Patient's Torsemide to 60 mg on last Wednesday and Thursday due to patient weight gain. On Friday they resumed it back to 40 mg. Patient's weight was 135 lbs yesterday and today her weight is 140.8 lbs. Yesterday patient refused all of her morning meds, so she did not get her Torsemide.  Patient did take her Torsemide 40 mg today after breakfast.         Please Advise

## 2025-04-30 ENCOUNTER — TELEPHONE (OUTPATIENT)
Dept: CARDIOLOGY | Age: OVER 89
End: 2025-04-30
Payer: MEDICARE

## 2025-04-30 NOTE — TELEPHONE ENCOUNTER
I received blood work collected 4/29/2025 from [facility], and nursing facility.    BNP was stable.  Potassium 3.7, sodium 143, carbon dioxide 31, BUN 17, and creatinine 1.2.  GFR 51.    I will forward these results to Dr. Pastor because he is seeing the patient on 5/2.

## 2025-05-01 NOTE — PROGRESS NOTES
PCP:  Andie Song APRN                                                                         ROOM:____________    : 1931  AGE: 89 y.o.    ALLERGIES:Sulfa antibiotics, Lisinopril, Milk-related compounds, and Olmesartan    LAST OV:______________________ LAST EKG:________________ LAST WEIGHT:   Wt Readings from Last 1 Encounters:   25 63.4 kg (139 lb 12.8 oz)        BP Readings from Last 3 Encounters:   25 122/80   25 119/93   25 90/66        WT: ____________  BP: __________ HR ______   02% _______      CHEST PAIN: YES OR NO                                           LIGHTHEADED: YES OR NO    SOA: YES OR NO                    FATIGUE: YES OR NO    PALPITATIONS: YES OR NO                                      EDEMA: YES OR NO    SLEEP APNEA: YES OR NO                                     CPAP     OR    BIPAP                                                  SMOKING:   Social History     Socioeconomic History    Marital status:    Tobacco Use    Smoking status: Never     Passive exposure: Never    Smokeless tobacco: Never   Vaping Use    Vaping status: Never Used   Substance and Sexual Activity    Alcohol use: No    Drug use: No    Sexual activity: Defer

## 2025-05-02 ENCOUNTER — OFFICE VISIT (OUTPATIENT)
Dept: CARDIOLOGY | Age: OVER 89
End: 2025-05-02
Payer: MEDICARE

## 2025-05-02 ENCOUNTER — HOSPITAL ENCOUNTER (OUTPATIENT)
Dept: CARDIOLOGY | Facility: HOSPITAL | Age: OVER 89
Discharge: HOME | End: 2025-05-02
Payer: MEDICARE

## 2025-05-02 VITALS
WEIGHT: 132 LBS | OXYGEN SATURATION: 92 % | BODY MASS INDEX: 21.21 KG/M2 | DIASTOLIC BLOOD PRESSURE: 66 MMHG | SYSTOLIC BLOOD PRESSURE: 98 MMHG | HEIGHT: 66 IN | HEART RATE: 55 BPM

## 2025-05-02 DIAGNOSIS — I10 ESSENTIAL HYPERTENSION: ICD-10-CM

## 2025-05-02 DIAGNOSIS — I50.32 CHRONIC HEART FAILURE WITH PRESERVED EJECTION FRACTION (HFPEF): ICD-10-CM

## 2025-05-02 DIAGNOSIS — I27.20 PULMONARY HYPERTENSION: ICD-10-CM

## 2025-05-02 DIAGNOSIS — I50.32 CHRONIC HEART FAILURE WITH PRESERVED EJECTION FRACTION (HFPEF): Primary | ICD-10-CM

## 2025-05-02 DIAGNOSIS — D50.9 IRON DEFICIENCY ANEMIA, UNSPECIFIED IRON DEFICIENCY ANEMIA TYPE: ICD-10-CM

## 2025-05-02 DIAGNOSIS — I48.21 PERMANENT ATRIAL FIBRILLATION: ICD-10-CM

## 2025-05-02 LAB
ANION GAP SERPL CALCULATED.3IONS-SCNC: 12 MMOL/L (ref 5–15)
BUN SERPL-MCNC: 14 MG/DL (ref 8–23)
BUN/CREAT SERPL: 10.7 (ref 7–25)
CALCIUM SPEC-SCNC: 9.5 MG/DL (ref 8.2–9.6)
CHLORIDE SERPL-SCNC: 103 MMOL/L (ref 98–107)
CO2 SERPL-SCNC: 27 MMOL/L (ref 22–29)
CREAT SERPL-MCNC: 1.31 MG/DL (ref 0.57–1)
EGFRCR SERPLBLD CKD-EPI 2021: 38.1 ML/MIN/1.73
FERRITIN SERPL-MCNC: 21.1 NG/ML (ref 13–150)
GLUCOSE SERPL-MCNC: 108 MG/DL (ref 65–99)
HCT VFR BLD AUTO: 33.4 % (ref 34–46.6)
HGB BLD-MCNC: 9.9 G/DL (ref 12–15.9)
IRON 24H UR-MRATE: 25 MCG/DL (ref 37–145)
IRON SATN MFR SERPL: 6 % (ref 20–50)
NT-PROBNP SERPL-MCNC: 6885 PG/ML (ref 0–1800)
POTASSIUM SERPL-SCNC: 3.4 MMOL/L (ref 3.5–5.2)
SODIUM SERPL-SCNC: 142 MMOL/L (ref 136–145)
TIBC SERPL-MCNC: 408 MCG/DL (ref 298–536)
TRANSFERRIN SERPL-MCNC: 274 MG/DL (ref 200–360)

## 2025-05-02 PROCEDURE — 36415 COLL VENOUS BLD VENIPUNCTURE: CPT

## 2025-05-02 PROCEDURE — 83880 ASSAY OF NATRIURETIC PEPTIDE: CPT | Performed by: INTERNAL MEDICINE

## 2025-05-02 PROCEDURE — 80048 BASIC METABOLIC PNL TOTAL CA: CPT | Performed by: INTERNAL MEDICINE

## 2025-05-02 PROCEDURE — 83540 ASSAY OF IRON: CPT | Performed by: INTERNAL MEDICINE

## 2025-05-02 PROCEDURE — 85018 HEMOGLOBIN: CPT | Performed by: INTERNAL MEDICINE

## 2025-05-02 PROCEDURE — 82728 ASSAY OF FERRITIN: CPT | Performed by: INTERNAL MEDICINE

## 2025-05-02 PROCEDURE — 84466 ASSAY OF TRANSFERRIN: CPT | Performed by: INTERNAL MEDICINE

## 2025-05-02 PROCEDURE — 85014 HEMATOCRIT: CPT | Performed by: INTERNAL MEDICINE

## 2025-05-02 RX ORDER — ATENOLOL 50 MG/1
25 TABLET ORAL
Qty: 30 TABLET | Refills: 0 | Status: SHIPPED | OUTPATIENT
Start: 2025-05-02

## 2025-05-02 NOTE — PROGRESS NOTES
PATIENTINFORMATION    Date of Office Visit: 2025  Encounter Provider: Yossi Pastor MD  Place of Service: [facility] CARDIOLOGY  Patient Name: Ryann Hernadez  : 1931    Subjective:     Encounter Date:2025      Patient ID: Ryann Hernadez is a 89 y.o. female.    Chief Complaint   Patient presents with    Atrial Fibrillation       HPI  Ms. Hernadez is a pleasant 89 years old lady came to cardiology clinic for follow-up visit.  She is a resident of assisted living facility and medication administered by staff.  She was admitted in March with heart failure exacerbation and came to office 2 weeks ago with worsening symptoms after which she was started on torsemide.  Improved shortness of breath and extremity swelling since last clinic visit.  She denies any significant new symptoms today.      ROS  All systems reviewed and negative except as noted in HPI.    Past Medical History:   Diagnosis Date    A-fib     Acute deep vein thrombosis (DVT) of calf muscle vein of left lower extremity 2020    Allergic rhinitis     Anemia     Aneurysm, ascending aorta 2016    5 CM    Anxiety     Ataxic gait     Atypical chest pain     BPPV (benign paroxysmal positional vertigo)     Bradycardia     CHF (congestive heart failure) 2025    Chronic cystitis     Chronic headaches     Chronic nonintractable headache 2016    Chronic paroxysmal hemicrania     Depression     Esophagitis     Gastritis     GERD (gastroesophageal reflux disease)     Hearing decreased     Hearing loss 2025    Hematuria     High blood pressure     History of cataract     History of irritable bowel syndrome     Hypertension     IBS (irritable bowel syndrome)     Influenza A     Kidney lesion     LEFT KIDNEY CYSTIC    Labyrinthitis 2014    OA (osteoarthritis)     Osteoporosis 2018    Other abnormal clinical finding 2012    LEFT UTEROCELE    Pain of thigh     Personal history  "of gastric ulcer     Renal disorder     Renal insufficiency 08/07/2017    Rhabdomyolysis 07/05/2022    TIA (transient ischemic attack) 02/05/2020    UTI (urinary tract infection) 03/02/2024       Past Surgical History:   Procedure Laterality Date    BREAST LUMPECTOMY      BREAST SURGERY      CATARACT EXTRACTION      COLONOSCOPY N/A     DR. MARGARITA MENDOZA    ELBOW ARTHROTOMY  07/01/1999    DR. RYAN RITCHIE    EYE SURGERY      FRACTURE SURGERY      HAND, BROKEN FINGERS  DR. MIKE CHRISTIANSON    KNEE SURGERY  08/31/1999    DR. THADDEUS MENDES    OOPHORECTOMY      TUBAL ABDOMINAL LIGATION Bilateral 1962    DR. EDDA MANNING       Social History     Socioeconomic History    Marital status:    Tobacco Use    Smoking status: Never     Passive exposure: Never    Smokeless tobacco: Never   Vaping Use    Vaping status: Never Used   Substance and Sexual Activity    Alcohol use: No    Drug use: No    Sexual activity: Defer       Family History   Problem Relation Age of Onset    Heart attack Mother     Arthritis Mother     Arthritis Father     Arthritis Sister     Arthritis Other     Kidney disease Other     Thyroid disease Other     Diabetes Other     Hypertension Other     Heart disease Other     Arthritis Maternal Grandmother          Procedures       Objective:     BP 98/66 (BP Location: Left arm, Patient Position: Sitting, Cuff Size: Adult)   Pulse 55   Ht 167.6 cm (65.98\")   Wt 59.9 kg (132 lb)   SpO2 92%   BMI 21.32 kg/m²  Body mass index is 21.32 kg/m².     Constitutional:       General: Not in acute distress.     Appearance: Well-developed. Not diaphoretic.   Eyes:      Pupils: Pupils are equal, round, and reactive to light.   HENT:      Head: Normocephalic and atraumatic.   Neck:      Thyroid: No thyromegaly.   Pulmonary:      Effort: Pulmonary effort is normal. No respiratory distress.      Breath sounds: Normal breath sounds. No wheezing. No rales.   Chest:      Chest wall: Not tender to palpatation. "   Cardiovascular:      Normal rate. Regular rhythm.      No gallop.    Pulses:     Intact distal pulses.   Edema:     Peripheral edema absent.   Abdominal:      General: Bowel sounds are normal. There is no distension.      Palpations: Abdomen is soft.      Tenderness: There is no guarding.   Musculoskeletal: Normal range of motion.         General: No deformity.      Cervical back: Normal range of motion and neck supple. Skin:     General: Skin is warm and dry.      Findings: No rash.   Neurological:      Mental Status: Alert and oriented to person, place, and time.      Cranial Nerves: No cranial nerve deficit.      Deep Tendon Reflexes: Reflexes are normal and symmetric.   Psychiatric:         Judgment: Judgment normal.         Review Of Data: I have reviewed pertinent recent labs, images and documents and pertinent findings included in HPI or assessment below.  Assessment and Plan     Chronic HFpEF: Admitted in March for heart failure exacerbation.  Persistent atrial fibrillation on atenolol and Eliquis.  Eliquis discontinued because of anemia during hospitalization in March 2025.  Ascending aortic and using measuring 5.1 cm-not candidate for surgery.  Severe TR with moderate pulmonary hypertension  Hypercholesterolemia  Anemia    EXTR bilateral lower extremity swelling which has also improved she is not overtly volume overloaded.  Lost more than 6 pounds since last clinic visit 2 weeks ago.  Excellent heart rate control.  BP on the lower side-decreased atenolol from 50 mg twice daily to 25 mg twice daily.  Have placed order for physical therapy at assisted living facility.  Check basic labs including iron profile  Otherwise continue current care  Fu with me in 1 month or sooner with concerns     Diagnosis and plan of care discussed with patient and verbalized understanding.            Your medication list            Accurate as of May 2, 2025  3:43 PM. If you have any questions, ask your nurse or doctor.                 CHANGE how you take these medications        Instructions Last Dose Given Next Dose Due   atenolol 50 MG tablet  Commonly known as: TENORMIN  What changed: how much to take  Changed by: Yossi Pastor      Take 0.5 tablets by mouth Daily.              CONTINUE taking these medications        Instructions Last Dose Given Next Dose Due   acetaminophen 500 MG tablet  Commonly known as: TYLENOL      Take 1 tablet by mouth Every 6 (Six) Hours As Needed for Mild Pain.       atorvastatin 80 MG tablet  Commonly known as: LIPITOR      Take 0.5 tablets by mouth Every Night.       donepezil 5 MG tablet  Commonly known as: ARICEPT      Take 1 tablet by mouth Daily.       Lidocaine 4 %      Place 1 patch on the skin as directed by provider Daily. Remove & Discard patch within 12 hours or as directed by MD       metroNIDAZOLE 0.75 % cream  Commonly known as: METROCREAM      APPLY TOPICALLY TO FACE TWICE DAILY       One-A-Day Womens 50 Plus tablet tablet  Generic drug: multivitamin with minerals      Take  by mouth.       torsemide 20 MG tablet  Commonly known as: DEMADEX      Take 2 tablets by mouth Daily.       triamcinolone 0.1 % ointment  Commonly known as: KENALOG      1 Application Daily. Both legs       vitamin D3 125 MCG (5000 UT) capsule capsule      Take 1 capsule by mouth Daily.                 Where to Get Your Medications        These medications were sent to [facility] - [place], [place] - [address] - [phone]  - [phone] [place] [id number]      Phone: [phone]   atenolol 50 MG tablet             Yossi Pastor MD  05/02/25  15:43 EDT

## 2025-06-11 ENCOUNTER — TELEPHONE (OUTPATIENT)
Dept: CARDIOLOGY | Age: OVER 89
End: 2025-06-11
Payer: MEDICARE

## 2025-06-11 NOTE — TELEPHONE ENCOUNTER
Tamia called back- she just checked pt's VS 10 minutes ago.  /72, HR 59,    Her callback is [phone]  Her fax is [phone]    Shabana NGUYEN RN  [place] [facility] [place]  06/11/25 14:58 EDT

## 2025-06-11 NOTE — TELEPHONE ENCOUNTER
[facility]. She said the patient's weight has been trending up since 6/8. She said she has weighed as much as 142lbs and her baseline is 137lbs. Today she is 140.2lbs. She is also SOA with exertion. She has edema around her eyes. She has an appointment already for this Friday, but Tamia said she is obligated to report this to us now. She had no vitals to provide. I asked her to please get some and then call us back with readings. Med list has been reviewed and is up to date.     Triage: when Tamia calls back please get BP and HR and send this message to .     Lynne Fisher RN  Triage [facility]

## 2025-06-12 NOTE — PROGRESS NOTES
PCP:  Andie Song APRN                                                                         ROOM:____________    : 1931  AGE: 89 y.o.    ALLERGIES:Sulfa antibiotics, Lisinopril, Milk-related compounds, and Olmesartan    LAST OV:______________________ LAST EKG:_______________ LAST WEIGHT:   Wt Readings from Last 1 Encounters:   25 59.9 kg (132 lb)        BP Readings from Last 3 Encounters:   25 98/66   25 122/80   25 119/93        WT: ____________  BP: __________ HR ______   02% _______      CHEST PAIN: YES OR NO                                           LIGHTHEADED: YES OR NO    SOA: YES OR NO                    FATIGUE: YES OR NO    PALPITATIONS: YES OR NO                                      EDEMA: YES OR NO    SLEEP APNEA: YES OR NO                                     CPAP     OR    BIPAP                                                  SMOKING:   Social History     Socioeconomic History    Marital status:    Tobacco Use    Smoking status: Never     Passive exposure: Never    Smokeless tobacco: Never   Vaping Use    Vaping status: Never Used   Substance and Sexual Activity    Alcohol use: No    Drug use: No    Sexual activity: Defer

## 2025-06-13 ENCOUNTER — OFFICE VISIT (OUTPATIENT)
Dept: CARDIOLOGY | Age: OVER 89
End: 2025-06-13
Payer: MEDICARE

## 2025-06-13 ENCOUNTER — HOSPITAL ENCOUNTER (OUTPATIENT)
Dept: CARDIOLOGY | Facility: HOSPITAL | Age: OVER 89
Discharge: HOME | End: 2025-06-13
Payer: MEDICARE

## 2025-06-13 VITALS
BODY MASS INDEX: 22.88 KG/M2 | OXYGEN SATURATION: 91 % | HEART RATE: 60 BPM | SYSTOLIC BLOOD PRESSURE: 130 MMHG | WEIGHT: 142.4 LBS | DIASTOLIC BLOOD PRESSURE: 70 MMHG | HEIGHT: 66 IN

## 2025-06-13 DIAGNOSIS — Z79.899 HIGH RISK MEDICATIONS (NOT ANTICOAGULANTS) LONG-TERM USE: ICD-10-CM

## 2025-06-13 DIAGNOSIS — I50.32 CHRONIC HEART FAILURE WITH PRESERVED EJECTION FRACTION (HFPEF): Primary | ICD-10-CM

## 2025-06-13 DIAGNOSIS — I10 ESSENTIAL HYPERTENSION: ICD-10-CM

## 2025-06-13 DIAGNOSIS — I71.21 ANEURYSM OF ASCENDING AORTA WITHOUT RUPTURE: ICD-10-CM

## 2025-06-13 DIAGNOSIS — I48.21 PERMANENT ATRIAL FIBRILLATION: ICD-10-CM

## 2025-06-13 DIAGNOSIS — I50.31 ACUTE DIASTOLIC CONGESTIVE HEART FAILURE: ICD-10-CM

## 2025-06-13 LAB
ANION GAP SERPL CALCULATED.3IONS-SCNC: 12.3 MMOL/L (ref 5–15)
BUN SERPL-MCNC: 20 MG/DL (ref 8–23)
BUN/CREAT SERPL: 12.6 (ref 7–25)
CALCIUM SPEC-SCNC: 9.7 MG/DL (ref 8.2–9.6)
CHLORIDE SERPL-SCNC: 104 MMOL/L (ref 98–107)
CO2 SERPL-SCNC: 26.7 MMOL/L (ref 22–29)
CREAT SERPL-MCNC: 1.59 MG/DL (ref 0.57–1)
EGFRCR SERPLBLD CKD-EPI 2021: 30 ML/MIN/1.73
GLUCOSE SERPL-MCNC: 126 MG/DL (ref 65–99)
POTASSIUM SERPL-SCNC: 3.6 MMOL/L (ref 3.5–5.2)
SODIUM SERPL-SCNC: 143 MMOL/L (ref 136–145)

## 2025-06-13 PROCEDURE — 36415 COLL VENOUS BLD VENIPUNCTURE: CPT

## 2025-06-13 PROCEDURE — 25010000002 BUMETANIDE PER 0.5 MG: Performed by: INTERNAL MEDICINE

## 2025-06-13 PROCEDURE — 96374 THER/PROPH/DIAG INJ IV PUSH: CPT

## 2025-06-13 PROCEDURE — 80048 BASIC METABOLIC PNL TOTAL CA: CPT | Performed by: NURSE PRACTITIONER

## 2025-06-13 PROCEDURE — 83880 ASSAY OF NATRIURETIC PEPTIDE: CPT | Performed by: INTERNAL MEDICINE

## 2025-06-13 RX ORDER — BUMETANIDE 0.25 MG/ML
2 INJECTION, SOLUTION INTRAMUSCULAR; INTRAVENOUS ONCE
Status: COMPLETED | OUTPATIENT
Start: 2025-06-13 | End: 2025-06-13

## 2025-06-13 RX ORDER — POTASSIUM CHLORIDE 1500 MG/1
20 TABLET, EXTENDED RELEASE ORAL TAKE AS DIRECTED
Qty: 10 TABLET | Refills: 3 | Status: SHIPPED | OUTPATIENT
Start: 2025-06-13

## 2025-06-13 RX ORDER — TORSEMIDE 100 MG/1
100 TABLET ORAL DAILY
Start: 2025-06-13

## 2025-06-13 RX ORDER — SPIRONOLACTONE 25 MG/1
25 TABLET ORAL DAILY
Qty: 90 TABLET | Refills: 3 | Status: SHIPPED | OUTPATIENT
Start: 2025-06-13

## 2025-06-13 RX ADMIN — BUMETANIDE 2 MG: 0.25 INJECTION INTRAMUSCULAR; INTRAVENOUS at 15:29

## 2025-06-13 NOTE — PROGRESS NOTES
PATIENTINFORMATION    Date of Office Visit: 2025  Encounter Provider: Yossi Pastor MD  Place of Service: [facility] CARDIOLOGY  Patient Name: Ryann Hernadze  : 1931    Subjective:     Encounter Date:2025      Patient ID: Ryann Hernadez is a 89 y.o. female.    Chief Complaint   Patient presents with    Atrial Fibrillation    Congestive Heart Failure       HPI  Ms. Hernadez is a pleasant 89 yo who is here with concerns of worsening bilateral leg swelling and weight gain of more than 5 pounds over the past few days.  Nursing home reached out to the office and brought today by her daughter.  She denies significant change in breathing, orthopnea, PND.  She reports being sleepless over the last few days.  Diuretic and other medications administered by nursing home but the daughter is concerned that she might not swallowing pills all the time.      ROS  All systems reviewed and negative except as noted in HPI.    Past Medical History:   Diagnosis Date    A-fib     Acute deep vein thrombosis (DVT) of calf muscle vein of left lower extremity 2020    Allergic rhinitis     Anemia     Aneurysm, ascending aorta 2016    5 CM    Anxiety     Ataxic gait     Atypical chest pain     BPPV (benign paroxysmal positional vertigo)     Bradycardia     CHF (congestive heart failure) 2025    Chronic cystitis     Chronic headaches     Chronic nonintractable headache 2016    Chronic paroxysmal hemicrania     Depression     Esophagitis     Gastritis     GERD (gastroesophageal reflux disease)     Hearing decreased     Hearing loss 2025    Hematuria     High blood pressure     History of cataract     History of irritable bowel syndrome     Hypertension     IBS (irritable bowel syndrome)     Influenza A     Kidney lesion     LEFT KIDNEY CYSTIC    Labyrinthitis 2014    OA (osteoarthritis)     Osteoporosis 2018    Other abnormal clinical finding 2012  "   LEFT UTEROCELE    Pain of thigh     Personal history of gastric ulcer     Renal disorder     Renal insufficiency 08/07/2017    Rhabdomyolysis 07/05/2022    TIA (transient ischemic attack) 02/05/2020    UTI (urinary tract infection) 03/02/2024       Past Surgical History:   Procedure Laterality Date    BREAST LUMPECTOMY      BREAST SURGERY      CATARACT EXTRACTION      COLONOSCOPY N/A     DR. MARGARITA MENDOZA    ELBOW ARTHROTOMY  07/01/1999    DR. RYAN RITCHIE    EYE SURGERY      FRACTURE SURGERY      HAND, BROKEN FINGERS  DR. MIKE CHRISTIANSON    KNEE SURGERY  08/31/1999    DR. THADDEUS MENDES    OOPHORECTOMY      TUBAL ABDOMINAL LIGATION Bilateral 1962    DR. EDDA MANNING       Social History     Socioeconomic History    Marital status:    Tobacco Use    Smoking status: Never     Passive exposure: Never    Smokeless tobacco: Never   Vaping Use    Vaping status: Never Used   Substance and Sexual Activity    Alcohol use: No    Drug use: No    Sexual activity: Defer       Family History   Problem Relation Age of Onset    Heart attack Mother     Arthritis Mother     Arthritis Father     Arthritis Sister     Arthritis Other     Kidney disease Other     Thyroid disease Other     Diabetes Other     Hypertension Other     Heart disease Other     Arthritis Maternal Grandmother          Procedures       Objective:     /70 (BP Location: Left arm, Patient Position: Sitting, Cuff Size: Adult)   Pulse 60   Ht 167.6 cm (65.98\")   Wt 64.6 kg (142 lb 6.4 oz)   SpO2 91%   BMI 23.00 kg/m²  Body mass index is 23 kg/m².     Constitutional:       General: Not in acute distress.     Appearance: Well-developed. Not diaphoretic.   Eyes:      Pupils: Pupils are equal, round, and reactive to light.   HENT:      Head: Normocephalic and atraumatic.   Neck:      Thyroid: No thyromegaly.   Pulmonary:      Effort: Pulmonary effort is normal. No respiratory distress.      Breath sounds: Normal breath sounds. No wheezing. No rales.   Chest: "      Chest wall: Not tender to palpatation.   Cardiovascular:      Normal rate. Regular rhythm.      Murmurs: There is a systolic murmur.      No gallop.    Pulses:     Intact distal pulses.   Edema:     Peripheral edema present.     Pretibial: bilateral 3+ edema of the pretibial area.     Ankle: bilateral 2+ edema of the ankle.     Feet: bilateral 2+ edema of the feet.  Abdominal:      General: Bowel sounds are normal. There is no distension.      Palpations: Abdomen is soft.      Tenderness: There is no guarding.   Musculoskeletal: Normal range of motion.         General: No deformity.      Cervical back: Normal range of motion and neck supple. Skin:     General: Skin is warm and dry.      Findings: No rash.   Neurological:      Mental Status: Alert and oriented to person, place, and time.      Cranial Nerves: No cranial nerve deficit.      Deep Tendon Reflexes: Reflexes are normal and symmetric.   Psychiatric:         Judgment: Judgment normal.         Review Of Data: I have reviewed pertinent recent labs, images and documents and pertinent findings included in HPI or assessment below.    Assessment and Plan      Chronic HFpEF: Admitted in March for heart failure exacerbation.  Persistent atrial fibrillation on atenolol and Eliquis.  Eliquis discontinued because of anemia during hospitalization in March 2025.  Ascending aortic and using measuring 5.1 cm-not candidate for surgery.  Severe TR with moderate pulmonary hypertension  Hypercholesterolemia  Anemia     She came in today with worsened bilateral lower extremity swelling.  Her daughter reports her mom looking weaker.  She got Bumex 2 mg IV in the [facility] and increased torsemide from 60mg to 100mg   Labs drawn at [facility]-she has hypokalemia and elevated proBNP  Replace electrolytes.  She will return to office in 1 week.    Diagnosis and plan of care discussed with patient and verbalized understanding.            Your medication list            Accurate as of June 13,  2025  3:16 PM. If you have any questions, ask your nurse or doctor.                CHANGE how you take these medications        Instructions Last Dose Given Next Dose Due   torsemide 100 MG tablet  Commonly known as: DEMADEX  What changed:   medication strength  how much to take  Changed by: Yossi Pastor      Take 1 tablet by mouth Daily.              CONTINUE taking these medications        Instructions Last Dose Given Next Dose Due   acetaminophen 500 MG tablet  Commonly known as: TYLENOL      Take 1 tablet by mouth Every 6 (Six) Hours As Needed for Mild Pain.       atenolol 50 MG tablet  Commonly known as: TENORMIN      Take 0.5 tablets by mouth Daily.       atorvastatin 80 MG tablet  Commonly known as: LIPITOR      Take 0.5 tablets by mouth Every Night.       donepezil 5 MG tablet  Commonly known as: ARICEPT      Take 1 tablet by mouth Daily.       Lidocaine 4 %      Place 1 patch on the skin as directed by provider Daily. Remove & Discard patch within 12 hours or as directed by MD       metroNIDAZOLE 0.75 % cream  Commonly known as: METROCREAM      APPLY TOPICALLY TO FACE TWICE DAILY       One-A-Day Womens 50 Plus tablet tablet  Generic drug: multivitamin with minerals      Take  by mouth.       triamcinolone 0.1 % ointment  Commonly known as: KENALOG      1 Application Daily. Both legs       vitamin D3 125 MCG (5000 UT) capsule capsule      Take 1 capsule by mouth Daily.                 Where to Get Your Medications        Information about where to get these medications is not yet available    Ask your nurse or doctor about these medications  torsemide 100 MG tablet             Yossi Pastor MD  06/13/25  15:16 EDT

## 2025-06-15 DIAGNOSIS — I50.31 ACUTE DIASTOLIC CONGESTIVE HEART FAILURE: Primary | ICD-10-CM

## 2025-06-15 LAB — NT-PROBNP SERPL-MCNC: 6799 PG/ML (ref 0–1800)

## 2025-06-20 ENCOUNTER — OFFICE VISIT (OUTPATIENT)
Age: OVER 89
End: 2025-06-20
Payer: MEDICARE

## 2025-06-20 ENCOUNTER — HOSPITAL ENCOUNTER (OUTPATIENT)
Dept: CARDIOLOGY | Facility: HOSPITAL | Age: OVER 89
Discharge: HOME | End: 2025-06-20
Payer: MEDICARE

## 2025-06-20 VITALS
OXYGEN SATURATION: 97 % | SYSTOLIC BLOOD PRESSURE: 116 MMHG | DIASTOLIC BLOOD PRESSURE: 70 MMHG | HEIGHT: 66 IN | HEART RATE: 60 BPM | BODY MASS INDEX: 22.02 KG/M2 | WEIGHT: 137 LBS

## 2025-06-20 DIAGNOSIS — I50.32 CHRONIC HEART FAILURE WITH PRESERVED EJECTION FRACTION (HFPEF): ICD-10-CM

## 2025-06-20 DIAGNOSIS — I50.32 CHRONIC HEART FAILURE WITH PRESERVED EJECTION FRACTION (HFPEF): Primary | ICD-10-CM

## 2025-06-20 LAB
ANION GAP SERPL CALCULATED.3IONS-SCNC: 12.3 MMOL/L (ref 5–15)
BUN SERPL-MCNC: 20 MG/DL (ref 8–23)
BUN/CREAT SERPL: 11.4 (ref 7–25)
CALCIUM SPEC-SCNC: 9.6 MG/DL (ref 8.2–9.6)
CHLORIDE SERPL-SCNC: 103 MMOL/L (ref 98–107)
CO2 SERPL-SCNC: 28.7 MMOL/L (ref 22–29)
CREAT SERPL-MCNC: 1.75 MG/DL (ref 0.57–1)
EGFRCR SERPLBLD CKD-EPI 2021: 26.7 ML/MIN/1.73
GLUCOSE SERPL-MCNC: 92 MG/DL (ref 65–99)
NT-PROBNP SERPL-MCNC: 5942 PG/ML (ref 0–1800)
POTASSIUM SERPL-SCNC: 3.7 MMOL/L (ref 3.5–5.2)
SODIUM SERPL-SCNC: 144 MMOL/L (ref 136–145)

## 2025-06-20 PROCEDURE — 80048 BASIC METABOLIC PNL TOTAL CA: CPT | Performed by: NURSE PRACTITIONER

## 2025-06-20 PROCEDURE — 93000 ELECTROCARDIOGRAM COMPLETE: CPT | Performed by: NURSE PRACTITIONER

## 2025-06-20 PROCEDURE — 1159F MED LIST DOCD IN RCRD: CPT | Performed by: NURSE PRACTITIONER

## 2025-06-20 PROCEDURE — 36415 COLL VENOUS BLD VENIPUNCTURE: CPT

## 2025-06-20 PROCEDURE — 96374 THER/PROPH/DIAG INJ IV PUSH: CPT

## 2025-06-20 PROCEDURE — 83880 ASSAY OF NATRIURETIC PEPTIDE: CPT | Performed by: NURSE PRACTITIONER

## 2025-06-20 PROCEDURE — 1160F RVW MEDS BY RX/DR IN RCRD: CPT | Performed by: NURSE PRACTITIONER

## 2025-06-20 PROCEDURE — 99214 OFFICE O/P EST MOD 30 MIN: CPT | Performed by: NURSE PRACTITIONER

## 2025-06-20 PROCEDURE — 25010000002 BUMETANIDE PER 0.5 MG: Performed by: NURSE PRACTITIONER

## 2025-06-20 RX ORDER — BUMETANIDE 0.25 MG/ML
2 INJECTION, SOLUTION INTRAMUSCULAR; INTRAVENOUS ONCE
Status: COMPLETED | OUTPATIENT
Start: 2025-06-20 | End: 2025-06-20

## 2025-06-20 RX ADMIN — BUMETANIDE 2 MG: 0.25 INJECTION INTRAMUSCULAR; INTRAVENOUS at 12:14

## 2025-06-20 NOTE — PROGRESS NOTES
Date of Office Visit: 2025  Encounter Provider: JENNA Salinas  Place of Service: [facility] CARDIOLOGY  Patient Name: Ryann Hernadez  :1931    Chief complaint: Atrial fibrillation, lower extremity edema    HPI: Ryann Hernadez is a 89 y.o. female who is a patient of Dr. Pastor she has a history of diastolic heart failure, permanent atrial fibrillation, ascending aortic aneurysm not a candidate for surgery due to age, severe TR and pulmonary hypertension.  She was in the office last week she had had worsening lower extremity edema and her was feeling weak.  She was given some IV Bumex in our cardiac evaluation center and her torsemide was increased from .  She is here for follow-up today.    She comes in today for follow-up.  Her daughter states she thinks her swelling is a little bit less.  She weighs herself at the facility where she lives but her daughter does not think the weight is correct.  She is wearing compression socks.  Walking in here today her daughter stated that she seemed pretty short of breath going from the car to the lobby at the drop-off.    Previous testing and notes have been reviewed by me.    Latest Reference Range & Units 25 15:16   proBNP 0.0 - 1,800.0 pg/mL 6,799.0 (H)   Sodium 136 - 145 mmol/L 143   Potassium 3.5 - 5.2 mmol/L 3.6   Chloride 98 - 107 mmol/L 104   CO2 22.0 - 29.0 mmol/L 26.7   Anion Gap 5.0 - 15.0 mmol/L 12.3   BUN 8.0 - 23.0 mg/dL 20.0   Creatinine 0.57 - 1.00 mg/dL 1.59 (H)   BUN/Creatinine Ratio 7.0 - 25.0  12.6   eGFR >60.0 mL/min/1.73 30.0 (L)   Glucose 65 - 99 mg/dL 126 (H)   Calcium 8.2 - 9.6 mg/dL 9.7 (H)   (H): Data is abnormally high  (L): Data is abnormally low  Past Medical History:   Diagnosis Date    A-fib     Acute deep vein thrombosis (DVT) of calf muscle vein of left lower extremity 2020    Allergic rhinitis     Anemia     Aneurysm, ascending aorta 2016    5 CM     Anxiety     Ataxic gait     Atypical chest pain     BPPV (benign paroxysmal positional vertigo)     Bradycardia     CHF (congestive heart failure) 03/21/2025    Chronic cystitis     Chronic headaches     Chronic nonintractable headache 09/14/2016    Chronic paroxysmal hemicrania     Depression     Esophagitis     Gastritis     GERD (gastroesophageal reflux disease)     Hearing decreased     Hearing loss 4/22/2025    Hematuria     High blood pressure     History of cataract     History of irritable bowel syndrome     Hypertension     IBS (irritable bowel syndrome)     Influenza A     Kidney lesion     LEFT KIDNEY CYSTIC    Labyrinthitis 07/28/2014    OA (osteoarthritis)     Osteoporosis 09/13/2018    Other abnormal clinical finding 09/21/2012    LEFT UTEROCELE    Pain of thigh     Personal history of gastric ulcer     Renal disorder     Renal insufficiency 08/07/2017    Rhabdomyolysis 07/05/2022    TIA (transient ischemic attack) 02/05/2020    UTI (urinary tract infection) 03/02/2024       Past Surgical History:   Procedure Laterality Date    BREAST LUMPECTOMY      BREAST SURGERY      CATARACT EXTRACTION      COLONOSCOPY N/A     DR. MARGARITA MENDOZA    ELBOW ARTHROTOMY  07/01/1999    DR. RYAN RITCHIE    EYE SURGERY      FRACTURE SURGERY      HAND, BROKEN FINGERS  DR. MIKE CHRISTIANSON    KNEE SURGERY  08/31/1999    DR. THADDEUS MENDES    OOPHORECTOMY      TUBAL ABDOMINAL LIGATION Bilateral 1962    DR. EDDA MANNING       Social History     Socioeconomic History    Marital status:    Tobacco Use    Smoking status: Never     Passive exposure: Never    Smokeless tobacco: Never   Vaping Use    Vaping status: Never Used   Substance and Sexual Activity    Alcohol use: No    Drug use: No    Sexual activity: Defer       Family History   Problem Relation Age of Onset    Heart attack Mother     Arthritis Mother     Arthritis Father     Arthritis Sister     Arthritis Other     Kidney disease Other     Thyroid disease Other      Diabetes Other     Hypertension Other     Heart disease Other     Arthritis Maternal Grandmother        Review of Systems   Constitutional: Negative for diaphoresis and malaise/fatigue.   Cardiovascular:  Positive for leg swelling. Negative for chest pain, claudication, dyspnea on exertion, irregular heartbeat, near-syncope, orthopnea, palpitations, paroxysmal nocturnal dyspnea and syncope.   Respiratory:  Negative for cough, shortness of breath and sleep disturbances due to breathing.    Musculoskeletal:  Negative for falls.   Neurological:  Negative for dizziness and weakness.   Psychiatric/Behavioral:  Negative for altered mental status and substance abuse.        Allergies   Allergen Reactions    Sulfa Antibiotics Anaphylaxis and Unknown - Low Severity    Lisinopril     Milk-Related Compounds Diarrhea    Olmesartan          Current Outpatient Medications:     acetaminophen (TYLENOL) 500 MG tablet, Take 1 tablet by mouth Every 6 (Six) Hours As Needed for Mild Pain., Disp: , Rfl:     atenolol (TENORMIN) 50 MG tablet, Take 0.5 tablets by mouth Daily., Disp: 30 tablet, Rfl: 0    atorvastatin (LIPITOR) 80 MG tablet, Take 0.5 tablets by mouth Every Night., Disp: , Rfl:     Cholecalciferol (VITAMIN D3) 5000 units capsule capsule, Take 1 capsule by mouth Daily., Disp: , Rfl:     donepezil (ARICEPT) 5 MG tablet, Take 1 tablet by mouth Daily., Disp: , Rfl:     Lidocaine 4 %, Place 1 patch on the skin as directed by provider Daily. Remove & Discard patch within 12 hours or as directed by MD, Disp: , Rfl:     metroNIDAZOLE (METROCREAM) 0.75 % cream, APPLY TOPICALLY TO FACE TWICE DAILY, Disp: , Rfl:     Multiple Vitamins-Minerals (ONE-A-DAY WOMENS 50 PLUS) tablet, Take  by mouth., Disp: , Rfl:     potassium chloride (K-TAB) 20 MEQ tablet controlled-release ER tablet, Take 1 tablet by mouth Take As Directed. 2 tablets daily for 3 days, Disp: 10 tablet, Rfl: 3    spironolactone (ALDACTONE) 25 MG tablet, Take 1 tablet by mouth  "Daily., Disp: 90 tablet, Rfl: 3    torsemide (DEMADEX) 100 MG tablet, Take 1 tablet by mouth Daily., Disp: , Rfl:     triamcinolone (KENALOG) 0.1 % ointment, 1 Application Daily. Both legs, Disp: , Rfl:   No current facility-administered medications for this visit.        Objective:     Vitals:    06/20/25 1109   BP: 116/70   Pulse: 60   SpO2: 97%   Weight: 62.1 kg (137 lb)   Height: 167.6 cm (65.98\")     Body mass index is 22.13 kg/m².    PHYSICAL EXAM:    Constitutional:       General: Not in acute distress.     Appearance: Normal appearance. Well-developed.   Eyes:      Pupils: Pupils are equal, round, and reactive to light.   HENT:      Head: Normocephalic.   Neck:      Vascular: No carotid bruit or JVD.   Pulmonary:      Effort: Pulmonary effort is normal. No tachypnea.      Breath sounds: Normal breath sounds. No wheezing. No rales.   Cardiovascular:      Normal rate. Irregularly irregular rhythm.      S3 Gallop.    Pulses:     Intact distal pulses.   Edema:     Peripheral edema present.     Pretibial: bilateral 2+ edema of the pretibial area.     Ankle: bilateral 2+ edema of the ankle.     Feet: bilateral 1+ edema of the feet.  Abdominal:      General: Bowel sounds are normal.      Palpations: Abdomen is soft.      Tenderness: There is no abdominal tenderness.   Musculoskeletal: Normal range of motion.      Cervical back: Normal range of motion and neck supple. No edema. Skin:     General: Skin is warm and dry.   Neurological:      Mental Status: Alert and oriented to person, place, and time.           ECG 12 Lead    Date/Time: 6/20/2025 12:41 PM  Performed by: Adrianna Castaneda APRN    Authorized by: Adrianna Castaneda APRN  Comparison: compared with previous ECG from 4/22/2025  Similar to previous ECG  Rhythm: atrial fibrillation  Rate: normal  Conduction: right bundle branch block and left posterior fascicular block  QRS axis: normal    Clinical impression: abnormal EKG              Assessment/Plan:    "   1.  Chronic diastolic heart failure-continue with torsemide at 100 mg daily.  Overloaded today will give a dose of Bumex and check labs.    2.  Permanent atrial fibrillation rate controlled with atenolol 25 mg daily.  Anticoagulation was stopped at hospital visit due to significant anemia requiring transfusion and inability to do scope due to increased risk of bleeding it was continued without    3.  Dyslipidemia on atorvastatin 80 mg daily    6.  Severe tricuspid valve regurgitation with right-sided heart failure pulmonary hypertension continue with oral diuretics    Will bring her back in 2 weeks         Your medication list            Accurate as of June 20, 2025 12:44 PM. If you have any questions, ask your nurse or doctor.                CONTINUE taking these medications        Instructions Last Dose Given Next Dose Due   acetaminophen 500 MG tablet  Commonly known as: TYLENOL      Take 1 tablet by mouth Every 6 (Six) Hours As Needed for Mild Pain.       atenolol 50 MG tablet  Commonly known as: TENORMIN      Take 0.5 tablets by mouth Daily.       atorvastatin 80 MG tablet  Commonly known as: LIPITOR      Take 0.5 tablets by mouth Every Night.       donepezil 5 MG tablet  Commonly known as: ARICEPT      Take 1 tablet by mouth Daily.       Lidocaine 4 %      Place 1 patch on the skin as directed by provider Daily. Remove & Discard patch within 12 hours or as directed by MD       metroNIDAZOLE 0.75 % cream  Commonly known as: METROCREAM      APPLY TOPICALLY TO FACE TWICE DAILY       One-A-Day Womens 50 Plus tablet tablet  Generic drug: multivitamin with minerals      Take  by mouth.       potassium chloride ER 20 MEQ tablet controlled-release ER tablet  Commonly known as: K-TAB      Take 1 tablet by mouth Take As Directed. 2 tablets daily for 3 days       spironolactone 25 MG tablet  Commonly known as: ALDACTONE      Take 1 tablet by mouth Daily.       torsemide 100 MG tablet  Commonly known as: DEMADEX       Take 1 tablet by mouth Daily.       triamcinolone 0.1 % ointment  Commonly known as: KENALOG      1 Application Daily. Both legs       vitamin D3 125 MCG (5000 UT) capsule capsule      Take 1 capsule by mouth Daily.                  As always, it has been a pleasure to participate in your patient's care.      Sincerely,     Adrianna WEST

## 2025-06-21 DIAGNOSIS — D50.8 OTHER IRON DEFICIENCY ANEMIA: Primary | ICD-10-CM

## 2025-06-25 ENCOUNTER — TELEPHONE (OUTPATIENT)
Dept: CARDIOLOGY | Age: OVER 89
End: 2025-06-25
Payer: MEDICARE

## 2025-06-25 NOTE — TELEPHONE ENCOUNTER
----- Message from Yossi Pastor sent at 6/21/2025 12:25 PM EDT -----  I have ordered iron profile and if normal may restart Eliquis 2.5 mg p.o. twice daily.  I have copied Adrianna.  Thanks  ----- Message -----  From: Miranda Benson MA  Sent: 6/20/2025   4:47 PM EDT  To: Yossi Pastor MD    Pt daughter states that they saw Adrianna Castaneda JENNA today and she ant her to restart her blood thinner but her daughter put that on hold until its ok for you that ot go back to taking eliquis.      Thanks  Miranda BOLES

## 2025-06-25 NOTE — TELEPHONE ENCOUNTER
Called the facility and the 2nd floor nurse is busy passing med. She states that if I can just fax the order to [phone] attn: 2nd floor nurse.      Thanks  Miranda BOLES

## 2025-07-10 ENCOUNTER — OFFICE VISIT (OUTPATIENT)
Age: OVER 89
End: 2025-07-10
Payer: MEDICARE

## 2025-07-10 VITALS
HEART RATE: 65 BPM | SYSTOLIC BLOOD PRESSURE: 108 MMHG | DIASTOLIC BLOOD PRESSURE: 70 MMHG | HEIGHT: 66 IN | BODY MASS INDEX: 21.21 KG/M2 | WEIGHT: 132 LBS

## 2025-07-10 DIAGNOSIS — I50.31 ACUTE DIASTOLIC CONGESTIVE HEART FAILURE: Primary | ICD-10-CM

## 2025-07-10 DIAGNOSIS — D50.8 OTHER IRON DEFICIENCY ANEMIA: ICD-10-CM

## 2025-07-10 PROCEDURE — 99214 OFFICE O/P EST MOD 30 MIN: CPT | Performed by: NURSE PRACTITIONER

## 2025-07-10 PROCEDURE — 1160F RVW MEDS BY RX/DR IN RCRD: CPT | Performed by: NURSE PRACTITIONER

## 2025-07-10 PROCEDURE — 93000 ELECTROCARDIOGRAM COMPLETE: CPT | Performed by: NURSE PRACTITIONER

## 2025-07-10 PROCEDURE — 1159F MED LIST DOCD IN RCRD: CPT | Performed by: NURSE PRACTITIONER

## 2025-07-10 NOTE — PROGRESS NOTES
Date of Office Visit: 07/10/2025  Encounter Provider: JENNA Salinas  Place of Service: [facility] CARDIOLOGY  Patient Name: Ryann Hernadez  :1931    Chief complaint: Lower extremity edema and atrial fibrillation    HPI: Ryann Hernadez is a 89 y.o. female who is a patient of Dr. Pastor she has a history of diastolic heart failure, permanent atrial fibrillation, ascending aortic aneurysm not a candidate for surgery due to age, severe TR and pulmonary hypertension.  She was in the office last week she had had worsening lower extremity edema and her was feeling weak.  She was given some IV Bumex in our cardiac evaluation center and her torsemide was increased from .     She followed up with me on  she had had a little bit less swelling in her legs.  Her daughter stated at the time she still felt pretty short of breath when she was walking in from the parking lot today.  I gave her an extra dose of IV Bumex and check labs her BUN and creatinine had gone up but her BNP was still 5900.  There has been some confusion about her Eliquis there was a note to asked to continue it from the facility and we said if we recheck her iron and the iron was normal we could restarted at 2.5 mg twice a day the daughter states they are giving it to her but I never received lab results showing that it had improved.  Previous testing and notes have been reviewed by me.      Latest Reference Range & Units Most Recent   Sodium 136 - 145 mmol/L 144  25 11:53   Potassium 3.5 - 5.2 mmol/L 3.7  25 11:53   Chloride 98 - 107 mmol/L 103  25 11:53   CO2 22.0 - 29.0 mmol/L 28.7  25 11:53   CO2 22 - 29 mmol/L 27 (E)  23 14:44   Anion Gap 5.0 - 15.0 mmol/L 12.3  25 11:53   BUN 8.0 - 23.0 mg/dL 20.0  25 11:53   Creatinine 0.57 - 1.00 mg/dL 1.75 (H)  25 11:53   BUN/Creatinine Ratio 7.0 - 25.0  11.4  25 11:53   eGFR >60.0 mL/min/1.73 26.7  (L)  6/20/25 11:53   Est GFR by Clearance >60 /1.73 m2 45 (L) (E)  1/19/23 14:44   Glucose 65 - 99 mg/dL 92  6/20/25 11:53   Calcium 8.2 - 9.6 mg/dL 9.6  6/20/25 11:53   Magnesium 1.7 - 2.3 mg/dL 2.1  3/22/25 02:42   Phosphorus 2.5 - 4.5 mg/dL 3.1  3/22/25 02:42   Alkaline Phosphatase 39 - 117 U/L 106  3/22/25 02:42   Total Protein 6.0 - 8.5 g/dL 6.3  3/22/25 02:42   Albumin 3.5 - 5.2 g/dL 3.2 (L)  3/22/25 02:42   Globulin gm/dL 3.1  3/22/25 02:42   A/G Ratio g/dL 1.0  3/22/25 02:42   AST (SGOT) 1 - 32 U/L 52 (H)  3/22/25 02:42   ALT (SGPT) 1 - 33 U/L 25  3/22/25 02:42   Total Bilirubin 0.0 - 1.2 mg/dL 0.6  3/22/25 02:42   eGFR Non African Am >60 mL/min/1.73 51 (L)  1/8/20 13:59   eGFR  Am >60 /1.73 m2 54 (L) (E)  1/19/23 14:44   Uric Acid 2.4 - 5.7 mg/dL 9.2 (H)  3/27/25 06:01   Hemoglobin A1C 4.80 - 5.60 % 6.20 (H)  7/5/22 06:07   (H): Data is abnormally high  (L): Data is abnormally low  (E): External lab result     Latest Reference Range & Units Most Recent   proBNP 0.0 - 1,800.0 pg/mL 5,942.0 (H)  6/20/25 11:53   (H): Data is abnormally high    March 30, 2025 echocardiogram    Left ventricular systolic function is normal. Left ventricular ejection fraction appears to be 56 - 60%.    Left ventricular wall thickness is consistent with mild concentric hypertrophy.    The right ventricular cavity is mildly dilated.    The right atrial cavity is severely  dilated.    Severe tricuspid valve regurgitation is present.    Estimated right ventricular systolic pressure from tricuspid regurgitation is moderately elevated (45-55 mmHg).    Moderate pulmonic valve regurgitation is present.    Moderate dilation of the ascending aorta is present 5.1 cm.    Past Medical History:   Diagnosis Date    A-fib     Acute deep vein thrombosis (DVT) of calf muscle vein of left lower extremity 02/06/2020    Allergic rhinitis     Anemia     Aneurysm, ascending aorta 03/22/2016    5 CM    Anxiety     Ataxic gait     Atypical chest  pain     BPPV (benign paroxysmal positional vertigo)     Bradycardia     CHF (congestive heart failure) 03/21/2025    Chronic cystitis     Chronic headaches     Chronic nonintractable headache 09/14/2016    Chronic paroxysmal hemicrania     Depression     Esophagitis     Gastritis     GERD (gastroesophageal reflux disease)     Hearing decreased     Hearing loss 4/22/2025    Hematuria     High blood pressure     History of cataract     History of irritable bowel syndrome     Hypertension     IBS (irritable bowel syndrome)     Influenza A     Kidney lesion     LEFT KIDNEY CYSTIC    Labyrinthitis 07/28/2014    OA (osteoarthritis)     Osteoporosis 09/13/2018    Other abnormal clinical finding 09/21/2012    LEFT UTEROCELE    Pain of thigh     Personal history of gastric ulcer     Renal disorder     Renal insufficiency 08/07/2017    Rhabdomyolysis 07/05/2022    TIA (transient ischemic attack) 02/05/2020    UTI (urinary tract infection) 03/02/2024       Past Surgical History:   Procedure Laterality Date    BREAST LUMPECTOMY      BREAST SURGERY      CATARACT EXTRACTION      COLONOSCOPY N/A     DR. MARGARITA MENDOZA    ELBOW ARTHROTOMY  07/01/1999    DR. RYAN RITCHIE    EYE SURGERY      FRACTURE SURGERY      HAND, BROKEN FINGERS  DR. MIKE CHRISTIANSON    KNEE SURGERY  08/31/1999    DR. THADDEUS MENDES    OOPHORECTOMY      TUBAL ABDOMINAL LIGATION Bilateral 1962    DR. EDDA MANNING       Social History     Socioeconomic History    Marital status:    Tobacco Use    Smoking status: Never     Passive exposure: Never    Smokeless tobacco: Never   Vaping Use    Vaping status: Never Used   Substance and Sexual Activity    Alcohol use: No    Drug use: No    Sexual activity: Defer       Family History   Problem Relation Age of Onset    Heart attack Mother     Arthritis Mother     Arthritis Father     Arthritis Sister     Arthritis Other     Kidney disease Other     Thyroid disease Other     Diabetes Other     Hypertension Other     Heart  disease Other     Arthritis Maternal Grandmother        Review of Systems   Constitutional: Negative for diaphoresis and malaise/fatigue.   Cardiovascular:  Positive for leg swelling. Negative for chest pain, claudication, dyspnea on exertion, irregular heartbeat, near-syncope, orthopnea, palpitations, paroxysmal nocturnal dyspnea and syncope.   Respiratory:  Negative for cough, shortness of breath and sleep disturbances due to breathing.    Musculoskeletal:  Negative for falls.   Neurological:  Negative for dizziness and weakness.   Psychiatric/Behavioral:  Negative for altered mental status and substance abuse.        Allergies   Allergen Reactions    Sulfa Antibiotics Anaphylaxis and Unknown - Low Severity    Lisinopril     Milk-Related Compounds Diarrhea    Olmesartan          Current Outpatient Medications:     acetaminophen (TYLENOL) 500 MG tablet, Take 1 tablet by mouth Every 6 (Six) Hours As Needed for Mild Pain., Disp: , Rfl:     atenolol (TENORMIN) 50 MG tablet, Take 0.5 tablets by mouth Daily., Disp: 30 tablet, Rfl: 0    atorvastatin (LIPITOR) 80 MG tablet, Take 0.5 tablets by mouth Every Night., Disp: , Rfl:     Cholecalciferol (VITAMIN D3) 5000 units capsule capsule, Take 1 capsule by mouth Daily., Disp: , Rfl:     donepezil (ARICEPT) 5 MG tablet, Take 1 tablet by mouth Daily., Disp: , Rfl:     Lidocaine 4 %, Place 1 patch on the skin as directed by provider Daily. Remove & Discard patch within 12 hours or as directed by MD, Disp: , Rfl:     metroNIDAZOLE (METROCREAM) 0.75 % cream, APPLY TOPICALLY TO FACE TWICE DAILY, Disp: , Rfl:     Multiple Vitamins-Minerals (ONE-A-DAY WOMENS 50 PLUS) tablet, Take  by mouth., Disp: , Rfl:     potassium chloride (K-TAB) 20 MEQ tablet controlled-release ER tablet, Take 1 tablet by mouth Take As Directed. 2 tablets daily for 3 days, Disp: 10 tablet, Rfl: 3    spironolactone (ALDACTONE) 25 MG tablet, Take 1 tablet by mouth Daily., Disp: 90 tablet, Rfl: 3    torsemide  "(DEMADEX) 100 MG tablet, Take 1 tablet by mouth Daily., Disp: , Rfl:     triamcinolone (KENALOG) 0.1 % ointment, 1 Application Daily. Both legs, Disp: , Rfl:         Objective:     Vitals:    07/10/25 1431   BP: 108/70   Pulse: 65   Weight: 59.9 kg (132 lb)   Height: 167.6 cm (65.98\")     Body mass index is 21.32 kg/m².    PHYSICAL EXAM:    Constitutional:       General: Not in acute distress.     Appearance: Normal appearance. Well-developed.   Eyes:      Pupils: Pupils are equal, round, and reactive to light.   HENT:      Head: Normocephalic.   Neck:      Vascular: No carotid bruit or JVD.   Pulmonary:      Effort: Pulmonary effort is normal. No tachypnea.      Breath sounds: Normal breath sounds. No wheezing. No rales.   Cardiovascular:      Normal rate. Regular rhythm.      No gallop.    Pulses:     Intact distal pulses.   Edema:     Peripheral edema present.     Pretibial: bilateral 1+ edema of the pretibial area.     Ankle: bilateral 1+ edema of the ankle.  Abdominal:      General: Bowel sounds are normal.      Palpations: Abdomen is soft.      Tenderness: There is no abdominal tenderness.   Musculoskeletal: Normal range of motion.      Cervical back: Normal range of motion and neck supple. No edema. Skin:     General: Skin is warm and dry.   Neurological:      Mental Status: Alert and oriented to person, place, and time.           ECG 12 Lead    Date/Time: 7/10/2025 4:24 PM  Performed by: Adrianna Castaneda APRN    Authorized by: Adrianna Castaneda APRN  Comparison: compared with previous ECG from 6/20/2025  Similar to previous ECG  Rhythm: atrial fibrillation  Rate: normal  Conduction: right bundle branch block  QRS axis: normal    Clinical impression: abnormal EKG              Assessment/Plan:      1.  Chronic diastolic heart failure-continue with torsemide at 100 mg daily.  Volume status appears to be improving.  Will check labs today     2.  Permanent atrial fibrillation rate controlled with atenolol 25 " mg daily.  I will check an iron panel today review of notes state if iron studies were normal to restart small dose of Eliquis 2.5 mg daily.  Patient has been walking in the [place] at the facility where she is living and has been building up strength she has not had any falls recently     3.  Dyslipidemia on atorvastatin 80 mg daily     6.  Severe tricuspid valve regurgitation with right-sided heart failure pulmonary hypertension continue with oral diuretics    Follow-up in 3 months with Dr. Pastor     Your medication list            Accurate as of July 10, 2025  4:24 PM. If you have any questions, ask your nurse or doctor.                CONTINUE taking these medications        Instructions Last Dose Given Next Dose Due   acetaminophen 500 MG tablet  Commonly known as: TYLENOL      Take 1 tablet by mouth Every 6 (Six) Hours As Needed for Mild Pain.       atenolol 50 MG tablet  Commonly known as: TENORMIN      Take 0.5 tablets by mouth Daily.       atorvastatin 80 MG tablet  Commonly known as: LIPITOR      Take 0.5 tablets by mouth Every Night.       donepezil 5 MG tablet  Commonly known as: ARICEPT      Take 1 tablet by mouth Daily.       Lidocaine 4 %      Place 1 patch on the skin as directed by provider Daily. Remove & Discard patch within 12 hours or as directed by MD       metroNIDAZOLE 0.75 % cream  Commonly known as: METROCREAM      APPLY TOPICALLY TO FACE TWICE DAILY       One-A-Day Womens 50 Plus tablet tablet  Generic drug: multivitamin with minerals      Take  by mouth.       potassium chloride ER 20 MEQ tablet controlled-release ER tablet  Commonly known as: K-TAB      Take 1 tablet by mouth Take As Directed. 2 tablets daily for 3 days       spironolactone 25 MG tablet  Commonly known as: ALDACTONE      Take 1 tablet by mouth Daily.       torsemide 100 MG tablet  Commonly known as: DEMADEX      Take 1 tablet by mouth Daily.       triamcinolone 0.1 % ointment  Commonly known as: KENALOG      1  Application Daily. Both legs       vitamin D3 125 MCG (5000 UT) capsule capsule      Take 1 capsule by mouth Daily.                  As always, it has been a pleasure to participate in your patient's care.      Sincerely,     Adrianna WEST

## 2025-07-11 ENCOUNTER — TELEPHONE (OUTPATIENT)
Dept: CARDIOLOGY | Age: OVER 89
End: 2025-07-11

## 2025-07-11 DIAGNOSIS — N18.32 STAGE 3B CHRONIC KIDNEY DISEASE: Primary | ICD-10-CM

## 2025-07-11 LAB
BUN SERPL-MCNC: 37 MG/DL (ref 8–23)
BUN/CREAT SERPL: 18.5 (ref 7–25)
CALCIUM SERPL-MCNC: 9.8 MG/DL (ref 8.2–9.6)
CHLORIDE SERPL-SCNC: 105 MMOL/L (ref 98–107)
CO2 SERPL-SCNC: 29.2 MMOL/L (ref 22–29)
CREAT SERPL-MCNC: 2 MG/DL (ref 0.57–1)
EGFRCR SERPLBLD CKD-EPI 2021: 22.8 ML/MIN/1.73
ERYTHROCYTE [DISTWIDTH] IN BLOOD BY AUTOMATED COUNT: 14.4 % (ref 12.3–15.4)
GLUCOSE SERPL-MCNC: 93 MG/DL (ref 65–99)
HCT VFR BLD AUTO: 33.1 % (ref 34–46.6)
HGB BLD-MCNC: 10.1 G/DL (ref 12–15.9)
IRON SATN MFR SERPL: 10 % (ref 20–50)
IRON SERPL-MCNC: 45 MCG/DL (ref 37–145)
MCH RBC QN AUTO: 27.9 PG (ref 26.6–33)
MCHC RBC AUTO-ENTMCNC: 30.5 G/DL (ref 31.5–35.7)
MCV RBC AUTO: 91.4 FL (ref 79–97)
PLATELET # BLD AUTO: 226 10*3/MM3 (ref 140–450)
POTASSIUM SERPL-SCNC: 4 MMOL/L (ref 3.5–5.2)
RBC # BLD AUTO: 3.62 10*6/MM3 (ref 3.77–5.28)
SODIUM SERPL-SCNC: 144 MMOL/L (ref 136–145)
TIBC SERPL-MCNC: 462 MCG/DL
UIBC SERPL-MCNC: 417 MCG/DL (ref 112–346)
WBC # BLD AUTO: 5.78 10*3/MM3 (ref 3.4–10.8)

## 2025-07-18 ENCOUNTER — TELEPHONE (OUTPATIENT)
Dept: CARDIOLOGY | Facility: CLINIC | Age: OVER 89
End: 2025-07-18
Payer: MEDICARE

## 2025-07-18 NOTE — TELEPHONE ENCOUNTER
Carmen, RN from [facility] is requesting to speak with Nursing staff about Ms Hernadez. Carmen states that they weigh the patient daily, and have noticed a fluctuation with her Weight. Today they have noticed increased swelling in her Right foot.     Carmen is requesting a call back at [phone] to discuss next steps for the patient.     Thank you   SALONI León

## 2025-07-18 NOTE — TELEPHONE ENCOUNTER
Carmen called me back.     Pt's weight fluctuates about 1-3 pounds every day, up and nikko, but she is not up any weight right now. She woke up this morning with 1+ pitting edema in her right foot. She wears compression stockings. RN denies any fall or injury.    Pt is on Torsemide 80 mg a day which was decreased just a few days ago due to increasing creatinine. Pt has an appt with a nephrologist coming up.    Any recommendations for the foot swelling?    Thank you,    Sugar Marie RN  Triage [facility]  07/18/25 15:46 EDT

## 2025-07-18 NOTE — TELEPHONE ENCOUNTER
Patient Daughter left VM as well wanting to ensure that the Nursing facility had called our office to discuss with Nursing staff. She mentioned that at patients last OV Dr Pastor had reduced her medication and she is thinking that might be the issue.       Please advise

## 2025-07-18 NOTE — TELEPHONE ENCOUNTER
Called and spoke to a nurse at [facility]. She is going to have Carmen give us a call back again because she wasn't available when I called.    Thank you,    Sugar Marie, RN  [place] [facility]  07/18/25 15:23 EDT

## 2025-07-21 NOTE — TELEPHONE ENCOUNTER
Notified Carmen of recommendations from Adrianna. She verbalized understanding.    Thank you,    Sugar Marie, RN  Triage [facility] [place]  07/21/25 11:29 EDT

## 2025-08-03 RX ORDER — FERROUS GLUCONATE 324(38)MG
324 TABLET ORAL 2 TIMES DAILY
Qty: 60 TABLET | Refills: 3 | Status: SHIPPED | OUTPATIENT
Start: 2025-08-03

## 2025-08-05 ENCOUNTER — TELEPHONE (OUTPATIENT)
Dept: CARDIOLOGY | Age: OVER 89
End: 2025-08-05
Payer: MEDICARE